# Patient Record
Sex: MALE | Race: WHITE | NOT HISPANIC OR LATINO | Employment: OTHER | URBAN - METROPOLITAN AREA
[De-identification: names, ages, dates, MRNs, and addresses within clinical notes are randomized per-mention and may not be internally consistent; named-entity substitution may affect disease eponyms.]

---

## 2017-01-16 DIAGNOSIS — N40.1 BENIGN NON-NODULAR PROSTATIC HYPERPLASIA WITH LOWER URINARY TRACT SYMPTOMS: Primary | Chronic | ICD-10-CM

## 2017-01-16 NOTE — TELEPHONE ENCOUNTER
terazosin (HYTRIN) 2 MG capsule      Last Written Prescription Date: 8/4/16  Last Fill Quantity: 90, # refills: 1    Last Office Visit with G, P or [facility] prescribing provider:  10/12/16   Future Office Visit:        BP Readings from Last 3 Encounters:   10/12/16 135/87   06/16/16 112/76   04/12/16 131/53

## 2017-01-18 RX ORDER — TERAZOSIN 2 MG/1
2 CAPSULE ORAL DAILY
Qty: 90 CAPSULE | Refills: 0 | Status: SHIPPED | OUTPATIENT
Start: 2017-01-18 | End: 2017-02-21

## 2017-02-02 ENCOUNTER — ANTICOAGULATION THERAPY VISIT (OUTPATIENT)
Dept: CARDIOLOGY | Facility: CLINIC | Age: 77
End: 2017-02-02
Payer: COMMERCIAL

## 2017-02-02 DIAGNOSIS — Z79.01 LONG-TERM (CURRENT) USE OF ANTICOAGULANTS: Primary | ICD-10-CM

## 2017-02-02 DIAGNOSIS — I48.0 PAROXYSMAL ATRIAL FIBRILLATION (H): ICD-10-CM

## 2017-02-02 LAB — INR POINT OF CARE: 3 (ref 0.86–1.14)

## 2017-02-02 PROCEDURE — 36416 COLLJ CAPILLARY BLOOD SPEC: CPT | Performed by: INTERNAL MEDICINE

## 2017-02-02 PROCEDURE — 85610 PROTHROMBIN TIME: CPT | Mod: QW | Performed by: INTERNAL MEDICINE

## 2017-02-02 NOTE — MR AVS SNAPSHOT
Aniket Macias   2/2/2017 11:00 AM   Anticoagulation Therapy Visit    Description:  76 year old male   Provider:   ANTICOAGULATION   Department:  [facility]           INR as of 2/2/2017     Selected INR 3.0 (2/2/2017)      Anticoagulation Summary as of 2/2/2017     INR goal 2.0-3.0   Selected INR 3.0 (2/2/2017)   Full instructions 5 mg every day   Next INR check 3/2/2017    Indications   Long-term (current) use of anticoagulants [Z79.01] [Z79.01]  Paroxysmal atrial fibrillation [I48.0]         Your next Anticoagulation Clinic appointment(s)     Mar 02, 2017 11:00 AM   Anticoagulation Visit with  ANTICOAGULATION   [facility] ([facility])    [id number]  [facility] [address]   [phone]            Mar 23, 2017  1:40 PM   Anticoagulation Visit with  ANTICOAGULATION   [facility] ([facility])    [id number]  [facility] [address]   [phone]              Contact Numbers     Anticoagulant (INR) Clinic Number: [phone]          February 2017 Details    Sun Mon Tue Wed Thu Fri Sat        1               2      5 mg   See details      3      5 mg         4      5 mg           5      5 mg         6      5 mg         7      5 mg         8      5 mg         9      5 mg         10      5 mg         11      5 mg           12      5 mg         13      5 mg         14      5 mg         15      5 mg         16      5 mg         17      5 mg         18      5 mg           19      5 mg         20      5 mg         21      5 mg         22      5 mg         23      5 mg         24      5 mg         25      5 mg           26      5 mg         27      5 mg         28      5 mg              Date Details   02/02 This INR check               How to take your warfarin dose     To take:  5 mg Take 1 of the 5 mg tablets.           March 2017 Details    Sun Mon Tue Wed Thu Fri  Sat        1      5 mg         2            3               4                 5               6               7               8               9               10               11                 12               13               14               15               16               17               18                 19               20               21               22               23               24               25                 26               27               28               29               30               31                 Date Details   No additional details    Date of next INR:  3/2/2017         How to take your warfarin dose     To take:  5 mg Take 1 of the 5 mg tablets.

## 2017-02-02 NOTE — PROGRESS NOTES
ANTICOAGULATION FOLLOW-UP CLINIC VISIT    Patient Name:  Aniket Macias  Date:  2/2/2017  Contact Type:  Face to Face    SUBJECTIVE:     Patient Findings     Positives No Problem Findings           OBJECTIVE    INR PROTIME   Date Value Ref Range Status   02/02/2017 3.0* 0.86 - 1.14 Final       ASSESSMENT / PLAN  INR assessment THER    Recheck INR In: 4 WEEKS    INR Location Clinic      Anticoagulation Summary as of 2/2/2017     INR goal 2.0-3.0   Selected INR 3.0 (2/2/2017)   Maintenance plan 5 mg (5 mg x 1) every day   Full instructions 5 mg every day   Weekly total 35 mg   No change documented Angelita Wolfe RN   Plan last modified Sallie Whalen RN (6/1/2016)   Next INR check 3/2/2017   Target end date Indefinite    Indications   Long-term (current) use of anticoagulants [Z79.01] [Z79.01]  Paroxysmal atrial fibrillation [I48.0]         Anticoagulation Episode Summary     INR check location     Preferred lab     Send INR reminders to [facility] HEART INR NURSE    Comments       Anticoagulation Care Providers     Provider Role Specialty Phone number    Sebastián Bermudez MD Responsible Cardiology [phone]            See the Encounter Report to view Anticoagulation Flowsheet and Dosing Calendar (Go to Encounters tab in chart review, and find the Anticoagulation Therapy Visit)    INR 3.0.  No changes.  He will add some broccoli or a little spinach to his iceberg salads so INR doesn't go any higher.  Continue same 5mg/day and recheck in 4 weeks.  He doesn't plan on switching to the [facility] clinic.  Jared Wolfe, LUANA

## 2017-02-21 ENCOUNTER — OFFICE VISIT (OUTPATIENT)
Dept: FAMILY MEDICINE | Facility: CLINIC | Age: 77
End: 2017-02-21
Payer: COMMERCIAL

## 2017-02-21 VITALS
DIASTOLIC BLOOD PRESSURE: 84 MMHG | RESPIRATION RATE: 18 BRPM | SYSTOLIC BLOOD PRESSURE: 132 MMHG | HEIGHT: 70 IN | TEMPERATURE: 97.2 F | HEART RATE: 77 BPM | WEIGHT: 193 LBS | OXYGEN SATURATION: 97 % | BODY MASS INDEX: 27.63 KG/M2

## 2017-02-21 DIAGNOSIS — M19.049 ARTHRITIS OF HAND: ICD-10-CM

## 2017-02-21 DIAGNOSIS — Z23 NEED FOR VACCINATION: ICD-10-CM

## 2017-02-21 DIAGNOSIS — Z85.828 HISTORY OF BASAL CELL CARCINOMA: ICD-10-CM

## 2017-02-21 DIAGNOSIS — Z00.01 ENCOUNTER FOR PREVENTATIVE ADULT HEALTH CARE EXAM WITH ABNORMAL FINDINGS: Primary | ICD-10-CM

## 2017-02-21 DIAGNOSIS — I10 BENIGN ESSENTIAL HYPERTENSION: Chronic | ICD-10-CM

## 2017-02-21 DIAGNOSIS — E78.5 HYPERLIPIDEMIA LDL GOAL <100: ICD-10-CM

## 2017-02-21 DIAGNOSIS — N40.1 BENIGN NON-NODULAR PROSTATIC HYPERPLASIA WITH LOWER URINARY TRACT SYMPTOMS: Chronic | ICD-10-CM

## 2017-02-21 DIAGNOSIS — I48.0 PAROXYSMAL ATRIAL FIBRILLATION (H): Chronic | ICD-10-CM

## 2017-02-21 DIAGNOSIS — Z79.01 LONG TERM CURRENT USE OF ANTICOAGULANT THERAPY: ICD-10-CM

## 2017-02-21 LAB
ALBUMIN SERPL-MCNC: 3.5 G/DL (ref 3.4–5)
ALP SERPL-CCNC: 96 U/L (ref 40–150)
ALT SERPL W P-5'-P-CCNC: 40 U/L (ref 0–70)
ANION GAP SERPL CALCULATED.3IONS-SCNC: 5 MMOL/L (ref 3–14)
AST SERPL W P-5'-P-CCNC: 23 U/L (ref 0–45)
BILIRUB SERPL-MCNC: 0.8 MG/DL (ref 0.2–1.3)
BUN SERPL-MCNC: 18 MG/DL (ref 7–30)
CALCIUM SERPL-MCNC: 8.9 MG/DL (ref 8.5–10.1)
CHLORIDE SERPL-SCNC: 104 MMOL/L (ref 94–109)
CHOLEST SERPL-MCNC: 129 MG/DL
CO2 SERPL-SCNC: 30 MMOL/L (ref 20–32)
CREAT SERPL-MCNC: 0.98 MG/DL (ref 0.66–1.25)
ERYTHROCYTE [DISTWIDTH] IN BLOOD BY AUTOMATED COUNT: 12.8 % (ref 10–15)
GFR SERPL CREATININE-BSD FRML MDRD: 74 ML/MIN/1.7M2
GLUCOSE SERPL-MCNC: 103 MG/DL (ref 70–99)
HCT VFR BLD AUTO: 42.3 % (ref 40–53)
HDLC SERPL-MCNC: 48 MG/DL
HGB BLD-MCNC: 14 G/DL (ref 13.3–17.7)
LDLC SERPL CALC-MCNC: 68 MG/DL
MCH RBC QN AUTO: 32.3 PG (ref 26.5–33)
MCHC RBC AUTO-ENTMCNC: 33.1 G/DL (ref 31.5–36.5)
MCV RBC AUTO: 98 FL (ref 78–100)
NONHDLC SERPL-MCNC: 81 MG/DL
PLATELET # BLD AUTO: 242 10E9/L (ref 150–450)
POTASSIUM SERPL-SCNC: 4.4 MMOL/L (ref 3.4–5.3)
PROT SERPL-MCNC: 6.7 G/DL (ref 6.8–8.8)
RBC # BLD AUTO: 4.34 10E12/L (ref 4.4–5.9)
SODIUM SERPL-SCNC: 139 MMOL/L (ref 133–144)
TRIGL SERPL-MCNC: 63 MG/DL
TSH SERPL DL<=0.005 MIU/L-ACNC: 2.36 MU/L (ref 0.4–4)
WBC # BLD AUTO: 10.5 10E9/L (ref 4–11)

## 2017-02-21 PROCEDURE — 80061 LIPID PANEL: CPT | Performed by: INTERNAL MEDICINE

## 2017-02-21 PROCEDURE — 90732 PPSV23 VACC 2 YRS+ SUBQ/IM: CPT | Performed by: INTERNAL MEDICINE

## 2017-02-21 PROCEDURE — G0438 PPPS, INITIAL VISIT: HCPCS | Performed by: INTERNAL MEDICINE

## 2017-02-21 PROCEDURE — 36415 COLL VENOUS BLD VENIPUNCTURE: CPT | Performed by: INTERNAL MEDICINE

## 2017-02-21 PROCEDURE — 85027 COMPLETE CBC AUTOMATED: CPT | Performed by: INTERNAL MEDICINE

## 2017-02-21 PROCEDURE — 84443 ASSAY THYROID STIM HORMONE: CPT | Performed by: INTERNAL MEDICINE

## 2017-02-21 PROCEDURE — G0009 ADMIN PNEUMOCOCCAL VACCINE: HCPCS | Performed by: INTERNAL MEDICINE

## 2017-02-21 PROCEDURE — 80053 COMPREHEN METABOLIC PANEL: CPT | Performed by: INTERNAL MEDICINE

## 2017-02-21 RX ORDER — TERAZOSIN 2 MG/1
2 CAPSULE ORAL DAILY
Qty: 90 CAPSULE | Refills: 3 | Status: SHIPPED | OUTPATIENT
Start: 2017-02-21 | End: 2018-04-23

## 2017-02-21 RX ORDER — LISINOPRIL 20 MG/1
20 TABLET ORAL DAILY
Qty: 90 TABLET | Refills: 3 | Status: SHIPPED | OUTPATIENT
Start: 2017-02-21 | End: 2018-04-17

## 2017-02-21 RX ORDER — WARFARIN SODIUM 5 MG/1
TABLET ORAL
Qty: 90 TABLET | Refills: 3 | Status: SHIPPED | OUTPATIENT
Start: 2017-02-21 | End: 2018-04-17

## 2017-02-21 RX ORDER — METOPROLOL TARTRATE 25 MG/1
37.5 TABLET, FILM COATED ORAL 2 TIMES DAILY
Qty: 360 TABLET | Refills: 3 | Status: SHIPPED | OUTPATIENT
Start: 2017-02-21 | End: 2017-03-23

## 2017-02-21 RX ORDER — ATORVASTATIN CALCIUM 40 MG/1
40 TABLET, FILM COATED ORAL DAILY
Qty: 90 TABLET | Refills: 3 | Status: SHIPPED | OUTPATIENT
Start: 2017-02-21 | End: 2018-04-17

## 2017-02-21 NOTE — PROGRESS NOTES
"  SUBJECTIVE:                                                            Aniket Macias is a 76 year old male who presents for Preventive Visit.  Are you in the first 12 months of your Medicare Part B coverage?  No    Healthy Habits:    Do you get at least three servings of calcium containing foods daily (dairy, green leafy vegetables, etc.)? yes    Amount of exercise or daily activities, outside of work: walking    Problems taking medications regularly No    Medication side effects: No    Have you had an eye exam in the past two years? yes    Do you see a dentist twice per year? yes    Do you have sleep apnea, excessive snoring or daytime drowsiness?no    COGNITIVE SCREEN  1) Repeat 3 items (Banana, Sunrise, Chair)    2) Clock draw: NORMAL  3) 3 item recall: Recalls 3 objects  Results: 3 items recalled: COGNITIVE IMPAIRMENT LESS LIKELY    Mini-CogTM Copyright S Benjamin. Licensed by the author for use in [employer]; reprinted with permission (gerda@[facility]). All rights reserved.      Aniket presents to the clinic today for his annual physical. He is fasting today.    Arthritis Pain- Complains of arthritis pain. Mostly in his hands, but also in his lower back. He has been trying to exercise to help control symptoms. He takes acetaminophen to treat, which only provides minor relief. He says it is \"not that bad\" at this time. Upon examination- DIP in 5th fingers and PIP joint left third finger are inflamed.    Prostate Concerns- Urinary flow is \"ok\" and he believes he is able to empty his bladder all the way, at least \"most of the time\". I have not checked his PSA. He has seen a urologist in the past when he lived in [place], and had biopsies done. They came back negative, but stated his prostate was enlarged. He gets up twice in an 8 hour night to go to the bathroom, which he feels is normal. No family h/o prostate cancer.    Of Note-  [place] has been walking up and down the hallways of his new apartment [employer] with " his wife Whitney.  S/P cataract surgery. Says he now will have to get reading glasses. He also has an astigmatism.  Had two basal cell carcinomas removed on his face. He follows with Dr. James annually.  Follows with cardiologist Dr. Bermudez for INR and EKG in March.    All Histories reviewed and [facility] as appropriate.  Social History   Substance Use Topics     Smoking status: Former Smoker     Packs/day: 1.00     Years: 20.00     Types: Cigarettes     Smokeless tobacco: Never Used      Comment: quit age 54     Alcohol use 0.0 oz/week     0 Standard drinks or equivalent per week      Comment: 2-3 wine/beer per week       The patient does not drink >3 drinks per day nor >7 drinks per week.    Today's PHQ-2 Score:   PHQ-2 ( 1999 Pf[employer]) 2/21/2017 10/12/2016   Q1: Little interest or pleasure in doing things 0 0   Q2: Feeling down, depressed or hopeless 0 0   PHQ-2 Score 0 0       Do you feel safe in your environment - Yes    Do you have a Health Care Directive?: Yes: Patient states has Advance Directive and will bring in a copy to clinic.    Current providers sharing in care for this patient include:   Patient Care Team:  Jennifer Mishra, DO as PCP - General (Internal Medicine)      Hearing impairment: No    Ability to successfully perform activities of daily living: Yes, no assistance needed     Fall risk:  Fallen 2 or more times in the past year?: No  Any fall with injury in the past year?: No    Home safety:  none identified      The following health maintenance items are reviewed in Epic and correct as of today:  Health Maintenance   Topic Date Due     ADVANCE DIRECTIVE PLANNING Q5 YRS (NO INBASKET)  06/09/1958     OP ANNUAL INR REFERRAL  09/09/2016     INFLUENZA VACCINE (SYSTEM ASSIGNED)  09/01/2017     FALL RISK ASSESSMENT  02/21/2018     COLONOSCOPY Q3 YR INBASKET MESSAGE  11/19/2018     LIPID SCREEN Q5 YR MALE (SYSTEM ASSIGNED)  02/21/2022     TETANUS IMMUNIZATION (SYSTEM ASSIGNED)  02/04/2024      "PNEUMOCOCCAL  Completed         ROS:  Comprehensive ROS negative unless as stated above in HPI.    This document serves as a record of the services and decisions personally performed and made by Jennifer Mishra DO. It was created on his/her behalf by Muna Linares, a trained medical scribe. The creation of this document is based the provider's statements to the medical scribe.  Scribe Muna Linares 11:00 AM, February 21, 2017  OBJECTIVE:                                                            /84 (BP Location: Right arm)  Pulse 77  Temp 97.2  F (36.2  C) (Oral)  Resp 18  Ht 5' 10\" (1.778 m)  Wt 193 lb (87.5 kg)  SpO2 97%  BMI 27.69 kg/m2 Estimated body mass index is 27.69 kg/(m^2) as calculated from the following:    Height as of this encounter: 5' 10\" (1.778 m).    Weight as of this encounter: 193 lb (87.5 kg).  EXAM:   GENERAL: healthy, alert and no distress  EYES: Eyes grossly normal to inspection, PERRL and conjunctivae and sclerae normal  HENT: ear canals and TM's normal, nose and mouth without ulcers or lesions  NECK: no adenopathy, no asymmetry, masses, or scars and thyroid normal to palpation  RESP: lungs clear to auscultation - no rales, rhonchi or wheezes  CV: irregularly irregular rhythm, normal S1 S2, no S3 or S4, no murmur, click or rub, mild sock line edema   ABDOMEN: soft, nontender, no hepatosplenomegaly, no masses and bowel sounds normal  MS: no gross musculoskeletal defects noted, inflamed DIP in 5th fingers bilaterally and PIP joint left third finger  NEURO: Normal strength and tone, mentation intact and speech normal  PSYCH: mentation appears normal, affect normal/bright    ASSESSMENT / PLAN:                                                            1. Encounter for preventative adult health care exam with abnormal findings  Robust male    2. Hyperlipidemia   - atorvastatin (LIPITOR) 40 MG tablet; Take 1 tablet (40 mg) by mouth daily  Dispense: 90 tablet; Refill: 3  - " "Lipid panel reflex to direct LDL    3. Benign essential hypertension; goal < 140/90  At goal on recheck  - lisinopril (PRINIVIL/ZESTRIL) 20 MG tablet; Take 1 tablet (20 mg) by mouth daily  Dispense: 90 tablet; Refill: 3  - CBC with platelets  - Comprehensive metabolic panel    4. Benign non-nodular prostatic hyperplasia with lower urinary tract symptoms  Stable, defer on PEPITO/PSA per discussion above - remote normal prostate biopsy in [place]  - terazosin (HYTRIN) 2 MG capsule; Take 1 capsule (2 mg) by mouth daily  Dispense: 90 capsule; Refill: 3    5. Paroxysmal atrial fibrillation  Follows with Dr. Bermudez and is anticoagulated   - metoprolol (LOPRESSOR) 25 MG tablet; Take 1.5 tablets (37.5 mg) by mouth 2 times daily  Dispense: 360 tablet; Refill: 3  - TSH with free T4 reflex    6. Long term current use of anticoagulant therapy  - warfarin (COUMADIN) 5 MG tablet; Take 1 tab daily or as directed by INR clinic  Dispense: 90 tablet; Refill: 3    7. Arthritis of hand  Treats with acetaminophen.     8. History of basal cell carcinoma  Follows with Dr. James      End of Life Planning:  Patient currently has an advanced directive: Yes: Patient states has Advance Directive and will bring in a copy to clinic.    COUNSELING:  Reviewed preventive health counseling, as reflected in patient instructions       Regular exercise       Healthy diet/nutrition  Estimated body mass index is 27.69 kg/(m^2) as calculated from the following:    Height as of this encounter: 5' 10\" (1.778 m).    Weight as of this encounter: 193 lb (87.5 kg).   reports that he has quit smoking. His smoking use included Cigarettes. He has a 20.00 pack-year smoking history. He has never used smokeless tobacco.       Patient Instructions   Labs today  Pneumonia shot today  Follow up annually or as needed    Appropriate preventive services were discussed with this patient, including applicable screening as appropriate for cardiovascular disease, diabetes, " osteopenia/osteoporosis, and glaucoma.  As appropriate for age/gender, discussed screening for colorectal cancer, prostate cancer, breast cancer, and cervical cancer. Checklist reviewing preventive services available has been given to the patient.    Reviewed patients plan of care and provided an AVS. The Intermediate Care Plan ( asthma action plan, low back pain action plan, and migraine action plan) for Aniket meets the Care Plan requirement. This Care Plan has been established and reviewed with the Patient.    The information in this document, created by the medical scribe for me, accurately reflects the services I personally performed and the decisions made by me. I have reviewed and approved this document for accuracy prior to leaving the patient care area.  Jennifer Mishra DO  11:02 AM, 02/21/17    Jennifer Mishra DO  [facility]

## 2017-02-21 NOTE — NURSING NOTE
"Chief Complaint   Patient presents with     Wellness Visit       Initial BP (!) 155/96 (BP Location: Right arm, Patient Position: Right side, Cuff Size: Adult Regular)  Pulse 77  Temp 97.2  F (36.2  C) (Oral)  Resp 18  Ht 5' 10\" (1.778 m)  Wt 193 lb (87.5 kg)  SpO2 97%  BMI 27.69 kg/m2 Estimated body mass index is 27.69 kg/(m^2) as calculated from the following:    Height as of this encounter: 5' 10\" (1.778 m).    Weight as of this encounter: 193 lb (87.5 kg).  Medication Reconciliation: complete   Sheeba Garza CMA (AAMA)      "

## 2017-02-21 NOTE — PATIENT INSTRUCTIONS
Labs today  Pneumonia shot today  Follow up annually or as needed      Preventive Health Recommendations:   Male Ages 65 and over    Yearly exam:             See your health care provider every year in order to  o   Review health changes.   o   Discuss preventive care.    o   Review your medicines if your doctor has prescribed any.    Talk with your health care provider about whether you should have a test to screen for prostate cancer (PSA).    Every 3 years, have a diabetes test (fasting glucose). If you are at risk for diabetes, you should have this test more often.    Every 5 years, have a cholesterol test. Have this test more often if you are at risk for high cholesterol or heart disease.     Every 10 years, have a colonoscopy. Or, have a yearly FIT test (stool test). These exams will check for colon cancer.    Talk to with your health care provider about screening for Abdominal Aortic Aneurysm if you have a family history of AAA or have a history of smoking.  Shots:     Get a flu shot each year.     Get a tetanus shot every 10 years.     Talk to your doctor about your pneumonia vaccines. There are now two you should receive - Pneumovax (PPSV 23) and Prevnar (PCV 13).    Talk to your doctor about a shingles vaccine.     Talk to your doctor about the hepatitis B vaccine.  Nutrition:     Eat at least 5 servings of fruits and vegetables each day.     Eat whole-grain bread, whole-wheat pasta and brown rice instead of white grains and rice.     Talk to your doctor about Calcium and Vitamin D.   Lifestyle    Exercise for at least 150 minutes a week (30 minutes a day, 5 days a week). This will help you control your weight and prevent disease.     Limit alcohol to one drink per day.     No smoking.     Wear sunscreen to prevent skin cancer.     See your dentist every six months for an exam and cleaning.     See your eye doctor every 1 to 2 years to screen for conditions such as glaucoma, macular degeneration and  cataracts.

## 2017-02-21 NOTE — NURSING NOTE
Screening Questionnaire for Adult Immunization    Are you sick today?   No   Do you have allergies to medications, food, a vaccine component or latex?   No   Have you ever had a serious reaction after receiving a vaccination?   No   Do you have a long-term health problem with heart disease, lung disease, asthma, kidney disease, metabolic disease (e.g. diabetes), anemia, or other blood disorder?   No   Do you have cancer, leukemia, HIV/AIDS, or any other immune system problem?   No   In the past 3 months, have you taken medications that affect  your immune system, such as prednisone, other steroids, or anticancer drugs; drugs for the treatment of rheumatoid arthritis, Crohn s disease, or psoriasis; or have you had radiation treatments?   No   Have you had a seizure, or a brain or other nervous system problem?   No   During the past year, have you received a transfusion of blood or blood     products, or been given immune (gamma) globulin or antiviral drug?   No   For women: Are you pregnant or is there a chance you could become        pregnant during the next month?   No   Have you received any vaccinations in the past 4 weeks?   No     Immunization questionnaire answers were all negative.      MNVFC doesn't apply on this patient    Per orders of Dr. Mishra, injection of Pneumovax 23 given by Sheeba Garza. Patient instructed to remain in clinic for 20 minutes afterwards, and to report any adverse reaction to me immediately.       Screening performed by Sheeba Garza on 2/21/2017 at 11:17 AM.

## 2017-02-21 NOTE — LETTER
[facility]  [address] [place] [address] [facility]  [address]  [place]  [address]  Tel: [phone]    February 23, 2017    Aniket Ricojuno  [address]  [address]  [place] [address]      [place],     It was nice seeing you in clinic this past week!   Your labs are all satisfactory overall.   Your fasting glucose is minimally elevated but not in a diabetic range.   Your cholesterol is excellent.   Your thyroid function (TSH) is normal.   Your kidney function (GFR) is normal.   Liver testing (AST, ALT, alkaline phosphatase and bilirubin) is normal.   Your complete blood count including white blood cells (infection fighting/inflammatory cells), hemoglobin (oxygen carrying) and platelets (which help blood clot) is normal.   Please continue with the plan of care as we discussed and let me know if you have any questions/concerns. Thanks!     Sincerely,    Jennifer Mishra, DO/NB    Results for orders placed or performed in visit on 02/21/17   TSH with free T4 reflex   Result Value Ref Range    TSH 2.36 0.40 - 4.00 mU/L   Lipid panel reflex to direct LDL   Result Value Ref Range    Cholesterol 129 <200 mg/dL    Triglycerides 63 <150 mg/dL    HDL Cholesterol 48 >39 mg/dL    LDL Cholesterol Calculated 68 <100 mg/dL    Non HDL Cholesterol 81 <130 mg/dL   CBC with platelets   Result Value Ref Range    WBC 10.5 4.0 - 11.0 10e9/L    RBC Count 4.34 (L) 4.4 - 5.9 10e12/L    Hemoglobin 14.0 13.3 - 17.7 g/dL    Hematocrit 42.3 40.0 - 53.0 %    MCV 98 78 - 100 fl    MCH 32.3 26.5 - 33.0 pg    MCHC 33.1 31.5 - 36.5 g/dL    RDW 12.8 10.0 - 15.0 %    Platelet Count 242 150 - 450 10e9/L   Comprehensive metabolic panel   Result Value Ref Range    Sodium 139 133 - 144 mmol/L    Potassium 4.4 3.4 - 5.3 mmol/L    Chloride 104 94 - 109 mmol/L    Carbon Dioxide 30 20 - 32 mmol/L    Anion Gap 5 3 - 14 mmol/L    Glucose 103 (H) 70 - 99 mg/dL    Urea Nitrogen 18 7 - 30 mg/dL    Creatinine 0.98 0.66 - 1.25 mg/dL    GFR Estimate 74 >60  mL/min/1.7m2    GFR Estimate If Black 89 >60 mL/min/1.7m2    Calcium 8.9 8.5 - 10.1 mg/dL    Bilirubin Total 0.8 0.2 - 1.3 mg/dL    Albumin 3.5 3.4 - 5.0 g/dL    Protein Total 6.7 (L) 6.8 - 8.8 g/dL    Alkaline Phosphatase 96 40 - 150 U/L    ALT 40 0 - 70 U/L    AST 23 0 - 45 U/L

## 2017-02-21 NOTE — MR AVS SNAPSHOT
After Visit Summary   2/21/2017    Aniket Macias    MRN: [id number]           Patient Information     Date Of Birth          1940        Visit Information        Provider Department      2/21/2017 10:30 AM Jennifer Mishra,  [facility]        Today's Diagnoses     Encounter for preventative adult health care exam with abnormal findings    -  1    Hyperlipidemia LDL goal <100        Benign essential hypertension; goal < 140/90        Benign non-nodular prostatic hyperplasia with lower urinary tract symptoms        Paroxysmal atrial fibrillation        Long term current use of anticoagulant therapy        Arthritis of hand        History of basal cell carcinoma          Care Instructions    Labs today  Pneumonia shot today  Follow up annually or as needed      Preventive Health Recommendations:   Male Ages 65 and over    Yearly exam:             See your health care provider every year in order to  o   Review health changes.   o   Discuss preventive care.    o   Review your medicines if your doctor has prescribed any.    Talk with your health care provider about whether you should have a test to screen for prostate cancer (PSA).    Every 3 years, have a diabetes test (fasting glucose). If you are at risk for diabetes, you should have this test more often.    Every 5 years, have a cholesterol test. Have this test more often if you are at risk for high cholesterol or heart disease.     Every 10 years, have a colonoscopy. Or, have a yearly FIT test (stool test). These exams will check for colon cancer.    Talk to with your health care provider about screening for Abdominal Aortic Aneurysm if you have a family history of AAA or have a history of smoking.  Shots:     Get a flu shot each year.     Get a tetanus shot every 10 years.     Talk to your doctor about your pneumonia vaccines. There are now two you should receive - Pneumovax (PPSV 23) and Prevnar (PCV 13).    Talk to your doctor  about a shingles vaccine.     Talk to your doctor about the hepatitis B vaccine.  Nutrition:     Eat at least 5 servings of fruits and vegetables each day.     Eat whole-grain bread, whole-wheat pasta and brown rice instead of white grains and rice.     Talk to your doctor about Calcium and Vitamin D.   Lifestyle    Exercise for at least 150 minutes a week (30 minutes a day, 5 days a week). This will help you control your weight and prevent disease.     Limit alcohol to one drink per day.     No smoking.     Wear sunscreen to prevent skin cancer.     See your dentist every six months for an exam and cleaning.     See your eye doctor every 1 to 2 years to screen for conditions such as glaucoma, macular degeneration and cataracts.        Follow-ups after your visit        Your next 10 appointments already scheduled     Mar 02, 2017 11:00 AM CST   Anticoagulation Visit with [facility] ANTICOAGULATION   [facility] ([facility])    [address] [place] [id number] [phone]            Mar 23, 2017  1:40 PM CDT   Anticoagulation Visit with HATFIELD ANTICOAGULATION   [facility] ([facility])    [address] [place] [address] [phone]            Mar 23, 2017  2:15 PM CDT   [facility] EP RETURN with Sebastián Bermudez MD   [facility] ([facility])    [address] [place] [id number] [phone]              Who to contact     If you have questions or need follow up information about today's clinic visit or your schedule please contact [facility] directly at [phone].  Normal or non-critical lab and imaging results will be communicated to you by MyChart, letter or phone within 4 business days after the clinic has received the results. If you do not hear from us within 7 days, please contact the clinic  "through [employer] or phone. If you have a critical or abnormal lab result, we will notify you by phone as soon as possible.  Submit refill requests through [employer] or call your pharmacy and they will forward the refill request to us. Please allow 3 business days for your refill to be completed.          Additional Information About Your Visit        [employer] Information     [employer] lets you send messages to your doctor, view your test results, renew your prescriptions, schedule appointments and more. To sign up, go to [email or web address] . Click on \"Log in\" on the left side of the screen, which will take you to the Welcome page. Then click on \"Sign up Now\" on the right side of the page.     You will be asked to enter the access code listed below, as well as some personal information. Please follow the directions to create your username and password.     Your access code is: [email or web address]  Expires: 2017 11:11 AM     Your access code will  in 90 days. If you need help or a new code, please call your [place] clinic or [phone].        Care EveryWhere ID     This is your Care EveryWhere ID. This could be used by other organizations to access your [facility] medical records  [id number]        Your Vitals Were     Pulse Temperature Respirations Height Pulse Oximetry BMI (Body Mass Index)    77 97.2  F (36.2  C) (Oral) 18 5' 10\" (1.778 m) 97% 27.69 kg/m2       Blood Pressure from Last 3 Encounters:   17 132/84   10/12/16 135/87   16 112/76    Weight from Last 3 Encounters:   17 193 lb (87.5 kg)   10/12/16 192 lb (87.1 kg)   16 192 lb (87.1 kg)              We Performed the Following     CBC with platelets     Comprehensive metabolic panel     Lipid panel reflex to direct LDL     TSH with free T4 reflex          Where to get your medicines      These medications were sent to [facility] [address] [facility] [place] - [address]  [address] [facility], [place] " [address]     Phone:  [phone]     atorvastatin 40 MG tablet    lisinopril 20 MG tablet    metoprolol 25 MG tablet    terazosin 2 MG capsule    warfarin 5 MG tablet          Primary Care Provider Office Phone # Fax #    Jennifer Mishra [phone]       [facility] [address] [facility] [address]  [facility] [address]        Thank you!     Thank you for choosing [facility]  for your care. Our goal is always to provide you with excellent care. Hearing back from our patients is one way we can continue to improve our services. Please take a few minutes to complete the written survey that you may receive in the mail after your visit with us. Thank you!             Your Updated Medication List - Protect others around you: Learn how to safely use, store and throw away your medicines at www.disposemymeds.org.          This list is accurate as of: 2/21/17 11:11 AM.  Always use your most recent med list.                   Brand Name Dispense Instructions for use    ACETAMINOPHEN PO      Take 1,000 mg by mouth every 6 hours as needed for pain       atorvastatin 40 MG tablet    LIPITOR    90 tablet    Take 1 tablet (40 mg) by mouth daily       hydrocortisone 1 % cream    CORTAID     Apply topically daily as needed       latanoprost 0.005 % ophthalmic solution    XALATAN     Place 1 drop into both eyes At Bedtime       lisinopril 20 MG tablet    PRINIVIL/ZESTRIL    90 tablet    Take 1 tablet (20 mg) by mouth daily       metoprolol 25 MG tablet    LOPRESSOR    360 tablet    Take 1.5 tablets (37.5 mg) by mouth 2 times daily       METROGEL 1 % gel   Generic drug:  metroNIDAZOLE      Apply topically daily as needed       MULTIVITAMIN PO      Take 1 tablet by mouth daily       terazosin 2 MG capsule    HYTRIN    90 capsule    Take 1 capsule (2 mg) by mouth daily       warfarin 5 MG tablet    COUMADIN    90 tablet    Take 1 tab daily or as directed by INR clinic

## 2017-03-02 ENCOUNTER — ANTICOAGULATION THERAPY VISIT (OUTPATIENT)
Dept: CARDIOLOGY | Facility: CLINIC | Age: 77
End: 2017-03-02
Payer: COMMERCIAL

## 2017-03-02 DIAGNOSIS — Z79.01 LONG-TERM (CURRENT) USE OF ANTICOAGULANTS: ICD-10-CM

## 2017-03-02 DIAGNOSIS — I48.0 PAROXYSMAL ATRIAL FIBRILLATION (H): ICD-10-CM

## 2017-03-02 LAB — INR POINT OF CARE: 2.7 (ref 0.86–1.14)

## 2017-03-02 PROCEDURE — 85610 PROTHROMBIN TIME: CPT | Mod: QW | Performed by: INTERNAL MEDICINE

## 2017-03-02 PROCEDURE — 36416 COLLJ CAPILLARY BLOOD SPEC: CPT | Performed by: INTERNAL MEDICINE

## 2017-03-02 NOTE — PROGRESS NOTES
ANTICOAGULATION FOLLOW-UP CLINIC VISIT    Patient Name:  Aniket Macias  Date:  3/2/2017  Contact Type:  Face to Face    SUBJECTIVE:     Patient Findings     Positives No Problem Findings           OBJECTIVE    INR Protime   Date Value Ref Range Status   03/02/2017 2.7 (A) 0.86 - 1.14 Final       ASSESSMENT / PLAN  INR assessment THER    Recheck INR In: 3 WEEKS    INR Location Clinic      Anticoagulation Summary as of 3/2/2017     INR goal 2.0-3.0   Today's INR 2.7   Maintenance plan 5 mg (5 mg x 1) every day   Full instructions 5 mg every day   Weekly total 35 mg   No change documented Angelita Wolfe RN   Plan last modified Sallie Whalen RN (6/1/2016)   Next INR check 3/23/2017   Target end date Indefinite    Indications   Long-term (current) use of anticoagulants [Z79.01] [Z79.01]  Paroxysmal atrial fibrillation [I48.0]         Anticoagulation Episode Summary     INR check location     Preferred lab     Send INR reminders to [facility] HEART INR NURSE    Comments       Anticoagulation Care Providers     Provider Role Specialty Phone number    Sebastián Bermudez MD Responsible Cardiology [phone]            See the Encounter Report to view Anticoagulation Flowsheet and Dosing Calendar (Go to Encounters tab in chart review, and find the Anticoagulation Therapy Visit)    INR 2.7.  No changes.  He is keeping up with the salads.  Continue same schedule and recheck in 3 weeks prior to seeing Dr. Bermudez.  Jared Wolfe, RN

## 2017-03-02 NOTE — MR AVS SNAPSHOT
Aniket Macias   3/2/2017 11:00 AM   Anticoagulation Therapy Visit    Description:  76 year old male   Provider:   ANTICOAGULATION   Department:  [facility]           INR as of 3/2/2017     Today's INR 2.7      Anticoagulation Summary as of 3/2/2017     INR goal 2.0-3.0   Today's INR 2.7   Full instructions 5 mg every day   Next INR check 3/23/2017    Indications   Long-term (current) use of anticoagulants [Z79.01] [Z79.01]  Paroxysmal atrial fibrillation [I48.0]         Your next Anticoagulation Clinic appointment(s)     Mar 23, 2017  1:40 PM CDT   Anticoagulation Visit with  ANTICOAGULATION   [facility] ([facility])    [address] [place], [place] [id number]  [facility] [address] [phone]              Contact Numbers     Anticoagulant (INR) Clinic Number: [phone]          March 2017 Details    Sun Mon Tue Wed Thu Fri Sat        1               2      5 mg   See details      3      5 mg         4      5 mg           5      5 mg         6      5 mg         7      5 mg         8      5 mg         9      5 mg         10      5 mg         11      5 mg           12      5 mg         13      5 mg         14      5 mg         15      5 mg         16      5 mg         17      5 mg         18      5 mg           19      5 mg         20      5 mg         21      5 mg         22      5 mg         23            24               25                 26               27               28               29               30               31                 Date Details   03/02 This INR check       Date of next INR:  3/23/2017         How to take your warfarin dose     To take:  5 mg Take 1 of the 5 mg tablets.

## 2017-03-13 DIAGNOSIS — L71.9 ROSACEA: Primary | ICD-10-CM

## 2017-03-13 NOTE — TELEPHONE ENCOUNTER
Metrogel is historical in chart    Pending Prescriptions:                       Disp   Refills    metroNIDAZOLE (METROGEL) 1 % gel          45 g   3            Sig: Apply topically daily as needed          Last Written Prescription Date:  historical  Last Fill Quantity: -,   # refills: -  Last Office Visit with [facility] [place] or [facility] prescribing provider: 2-21-17  Future Office visit:       Routing refill request to provider for review/approval because:  Medication is reported/historical    RT Gurwinder (R)

## 2017-03-14 PROBLEM — L71.9 ROSACEA: Status: ACTIVE | Noted: 2017-03-14

## 2017-03-14 RX ORDER — METRONIDAZOLE 10 MG/G
GEL TOPICAL DAILY
Qty: 45 G | Refills: 3 | Status: SHIPPED | OUTPATIENT
Start: 2017-03-14 | End: 2017-06-22

## 2017-03-14 NOTE — TELEPHONE ENCOUNTER
Pt called the clinic and confirmed  He's using this rx for rosacea  Please send this rx to [facility] [address] [place] [facility] - [place], [place] - [address]  Pt's out of rx

## 2017-03-17 ENCOUNTER — PRE VISIT (OUTPATIENT)
Dept: CARDIOLOGY | Facility: CLINIC | Age: 77
End: 2017-03-17

## 2017-03-17 ASSESSMENT — CHADS2 SCORE
HTN: YES
DIABETES: NO
VASCULAR DISEASE: NO
STROK/TIA/THROM: NO
CHF: NO
AGE: >74
SEX: MALE

## 2017-03-23 ENCOUNTER — OFFICE VISIT (OUTPATIENT)
Dept: CARDIOLOGY | Facility: CLINIC | Age: 77
End: 2017-03-23
Attending: INTERNAL MEDICINE
Payer: COMMERCIAL

## 2017-03-23 ENCOUNTER — ANTICOAGULATION THERAPY VISIT (OUTPATIENT)
Dept: CARDIOLOGY | Facility: CLINIC | Age: 77
End: 2017-03-23
Payer: COMMERCIAL

## 2017-03-23 VITALS
DIASTOLIC BLOOD PRESSURE: 80 MMHG | SYSTOLIC BLOOD PRESSURE: 134 MMHG | HEIGHT: 70 IN | HEART RATE: 71 BPM | WEIGHT: 192 LBS | BODY MASS INDEX: 27.49 KG/M2

## 2017-03-23 DIAGNOSIS — I48.0 PAROXYSMAL ATRIAL FIBRILLATION (H): Primary | ICD-10-CM

## 2017-03-23 DIAGNOSIS — I48.0 PAROXYSMAL ATRIAL FIBRILLATION (H): ICD-10-CM

## 2017-03-23 DIAGNOSIS — I48.0 PAROXYSMAL ATRIAL FIBRILLATION (H): Chronic | ICD-10-CM

## 2017-03-23 DIAGNOSIS — I10 BENIGN ESSENTIAL HYPERTENSION: Chronic | ICD-10-CM

## 2017-03-23 DIAGNOSIS — Z79.01 LONG-TERM (CURRENT) USE OF ANTICOAGULANTS: ICD-10-CM

## 2017-03-23 LAB — INR POINT OF CARE: 3.8 (ref 0.86–1.14)

## 2017-03-23 PROCEDURE — 99214 OFFICE O/P EST MOD 30 MIN: CPT | Mod: 25 | Performed by: INTERNAL MEDICINE

## 2017-03-23 PROCEDURE — 36416 COLLJ CAPILLARY BLOOD SPEC: CPT | Performed by: INTERNAL MEDICINE

## 2017-03-23 PROCEDURE — 85610 PROTHROMBIN TIME: CPT | Mod: QW | Performed by: INTERNAL MEDICINE

## 2017-03-23 PROCEDURE — 93000 ELECTROCARDIOGRAM COMPLETE: CPT | Performed by: INTERNAL MEDICINE

## 2017-03-23 RX ORDER — METOPROLOL TARTRATE 50 MG
50 TABLET ORAL 2 TIMES DAILY
Qty: 180 TABLET | Refills: 3 | Status: SHIPPED | OUTPATIENT
Start: 2017-03-23 | End: 2018-04-17

## 2017-03-23 NOTE — LETTER
3/23/2017    Jennifer Mishra, [facility]   [address]  [facility] [address]    RE: Aniket Macias       Dear Colleague,    I had the pleasure of seeing Aniket Macias in the [facility].    REASON FOR VISIT:  Evaluation of paroxysmal atrial fibrillation.      Mr. Macias is a pleasant 76-year-old gentleman with history of hypertension, skin cancer, paroxysmal atrial fibrillation who is here for routine followup.      The patient is followed here in clinic.  He has been on chronic therapy with Coumadin and metoprolol for paroxysmal atrial fibrillation.  He informs that in the last year, he started noticing more frequent episodes of palpitations.  However, the episodes are very sporadic and do not seem to affect his lifestyle.  In fact, he informs that he has not had any palpitations in the last 3 weeks.      EKG today showed atrial fibrillation with controlled ventricular response.      Of note, he also had a previous history of syncope which happened in April of last year.  At that time, workup was negative.  Echocardiogram revealed normal cardiac function and an event monitor was negative.  I offered a loop recorder, which he declined.     Outpatient Encounter Prescriptions as of 3/23/2017   Medication Sig Dispense Refill     metoprolol (LOPRESSOR) 50 MG tablet Take 1 tablet (50 mg) by mouth 2 times daily 180 tablet 3     metroNIDAZOLE (METROGEL) 1 % gel Apply topically daily 45 g 3     atorvastatin (LIPITOR) 40 MG tablet Take 1 tablet (40 mg) by mouth daily 90 tablet 3     lisinopril (PRINIVIL/ZESTRIL) 20 MG tablet Take 1 tablet (20 mg) by mouth daily 90 tablet 3     terazosin (HYTRIN) 2 MG capsule Take 1 capsule (2 mg) by mouth daily 90 capsule 3     warfarin (COUMADIN) 5 MG tablet Take 1 tab daily or as directed by INR clinic 90 tablet 3     hydrocortisone (CORTAID) 1 % cream Apply topically daily as needed       ACETAMINOPHEN PO Take 1,000 mg by mouth every 6  hours as needed for pain       Multiple Vitamins-Minerals (MULTIVITAMIN PO) Take 1 tablet by mouth daily        latanoprost (XALATAN) 0.005 % ophthalmic solution Place 1 drop into both eyes At Bedtime       [DISCONTINUED] metoprolol (LOPRESSOR) 25 MG tablet Take 1.5 tablets (37.5 mg) by mouth 2 times daily 360 tablet 3     No facility-administered encounter medications on file as of 3/23/2017.       ASSESSMENT AND PLAN:   1.  Atrial fibrillation.  It seems to be now persistent Afib; however, heart rate is well-controlled.  He seems to be asymptomatic.  I recommend a Holter monitor to evaluate nature of the AFib, as well as rate control.  During this visit, we discussed the possibility of increasing metoprolol to 50 mg a day.  He agreed with plan.   2.  Embolic prevention.  [id number] score of 3.  Continue anticoagulation with Coumadin indefinitely.   3.  Syncope, likely neurally-mediated hypotension.  He had had problems associated with that.  No recurrence so far.  Declined loop recorder.   4.  Followup care.  Follow up in clinic with me in a year or earlier as needed.     Again, thank you for allowing me to participate in the care of your patient.      Sincerely,    Sebastián Bermudez MD     [facility]

## 2017-03-23 NOTE — MR AVS SNAPSHOT
Aniket Macias   3/23/2017 1:40 PM   Anticoagulation Therapy Visit    Description:  76 year old male   Provider:  [facility] ANTICOAGULATION   Department:  [facility]           INR as of 3/23/2017     Today's INR 3.8!      Anticoagulation Summary as of 3/23/2017     INR goal 2.0-3.0   Today's INR 3.8!   Full instructions 3/23: Hold; Otherwise 5 mg every day   Next INR check 4/6/2017    Indications   Long-term (current) use of anticoagulants [Z79.01] [Z79.01]  Paroxysmal atrial fibrillation [I48.0]         Your next Anticoagulation Clinic appointment(s)     Apr 06, 2017 10:40 AM CDT   Anticoagulation Visit with  ANTICOAGULATION   [facility] ([facility])    [address] [place] [id number] [phone]              Contact Numbers     Anticoagulant (INR) Clinic Number: [phone]          March 2017 Details    Sun Mon Tue Wed Thu Fri Sat        1               2               3               4                 5               6               7               8               9               10               11                 12               13               14               15               16               17               18                 19               20               21               22               23      Hold   See details      24      5 mg         25      5 mg           26      5 mg         27      5 mg         28      5 mg         29      5 mg         30      5 mg         31      5 mg           Date Details   03/23 This INR check               How to take your warfarin dose     To take:  5 mg Take 1 of the 5 mg tablets.    Hold Do not take your warfarin dose. See the Details table to the right for additional instructions.                April 2017 Details    Sun Mon Tue Wed Thu Fri Sat           1      5 mg           2      5 mg         3      5 mg         4      5 mg         5      5 mg         6            7                8                 9               10               11               12               13               14               15                 16               17               18               19               20               21               22                 23               24               25               26               27               28               29                 30                      Date Details   No additional details    Date of next INR:  4/6/2017         How to take your warfarin dose     To take:  5 mg Take 1 of the 5 mg tablets.

## 2017-03-23 NOTE — PROGRESS NOTES
ANTICOAGULATION FOLLOW-UP CLINIC VISIT    Patient Name:  Aniket Macias  Date:  3/23/2017  Contact Type:  Face to Face    SUBJECTIVE:     Patient Findings     Positives No Problem Findings           OBJECTIVE    INR Protime   Date Value Ref Range Status   03/23/2017 3.8 (A) 0.86 - 1.14 Final       ASSESSMENT / PLAN  INR assessment SUPRA    Recheck INR In: 2 WEEKS    INR Location Clinic      Anticoagulation Summary as of 3/23/2017     INR goal 2.0-3.0   Today's INR 3.8!   Maintenance plan 5 mg (5 mg x 1) every day   Full instructions 3/23: Hold; Otherwise 5 mg every day   Weekly total 35 mg   Plan last modified Sallie Whalen, RN (6/1/2016)   Next INR check 4/6/2017   Target end date Indefinite    Indications   Long-term (current) use of anticoagulants [Z79.01] [Z79.01]  Paroxysmal atrial fibrillation [I48.0]         Anticoagulation Episode Summary     INR check location     Preferred lab     Send INR reminders to [facility] HEART INR NURSE    Comments       Anticoagulation Care Providers     Provider Role Specialty Phone number    Sebastián Bermudez MD Responsible Cardiology [phone]            See the Encounter Report to view Anticoagulation Flowsheet and Dosing Calendar (Go to Encounters tab in chart review, and find the Anticoagulation Therapy Visit)    INR 3.8.  Less greens this past week.  Seeing Dr. Bermudez today.  No bleeding.  Hold dose today then back to 5mg/day and recheck in 2 weeks. Jared Wolfe RN

## 2017-03-23 NOTE — PROGRESS NOTES
"HPI and Plan:   See dictation  [id number]    Physical Exam:  Vitals: /80  Pulse 71  Ht 1.778 m (5' 10\")  Wt 87.1 kg (192 lb)  BMI 27.55 kg/m2    Constitutional:  AAO x3.  Pt is in NAD.  HEAD: normocephalic.  SKIN: Skin normal color, texture and turgor with no lesions or eruptions.  Eyes: PERRL, EOMI.  ENT:  Supple, normal JVP. No lymphadenopathy or thyroid enlargement.  Chest:  CTAB.  Cardiac:  IIR, variable sound of S1 and Normal S2. No murmurs rubs or gallop.   Abdomen:  Normal BS.  Soft, non-tender and non-distended.  No rebound or guarding.    Extremities:  Pedious pulses palpable B/L.  No LE edema noticed.   Neurological: Strength and sensation grossly symmetric and intact throughout.       CURRENT MEDICATIONS:  Current Outpatient Prescriptions   Medication Sig Dispense Refill     metoprolol (LOPRESSOR) 50 MG tablet Take 1 tablet (50 mg) by mouth 2 times daily 180 tablet 3     metroNIDAZOLE (METROGEL) 1 % gel Apply topically daily 45 g 3     atorvastatin (LIPITOR) 40 MG tablet Take 1 tablet (40 mg) by mouth daily 90 tablet 3     lisinopril (PRINIVIL/ZESTRIL) 20 MG tablet Take 1 tablet (20 mg) by mouth daily 90 tablet 3     terazosin (HYTRIN) 2 MG capsule Take 1 capsule (2 mg) by mouth daily 90 capsule 3     warfarin (COUMADIN) 5 MG tablet Take 1 tab daily or as directed by INR clinic 90 tablet 3     hydrocortisone (CORTAID) 1 % cream Apply topically daily as needed       ACETAMINOPHEN PO Take 1,000 mg by mouth every 6 hours as needed for pain       Multiple Vitamins-Minerals (MULTIVITAMIN PO) Take 1 tablet by mouth daily        latanoprost (XALATAN) 0.005 % ophthalmic solution Place 1 drop into both eyes At Bedtime       [DISCONTINUED] metoprolol (LOPRESSOR) 25 MG tablet Take 1.5 tablets (37.5 mg) by mouth 2 times daily 360 tablet 3       ALLERGIES   No Known Allergies    PAST MEDICAL HISTORY:  Past Medical History:   Diagnosis Date     Basal cell carcinoma      BPH (benign prostatic hypertrophy)      " Diverticulosis      Glaucoma      Hemorrhoids      HTN (hypertension)      Hyperlipidemia      Obesity      Paroxysmal atrial fibrillation (H) Dx 2013 [place]     PVC (premature ventricular contraction)      Squamous cell carcinoma in situ of skin      Syncope 4/11/2016       PAST SURGICAL HISTORY:  Past Surgical History:   Procedure Laterality Date     BACK SURGERY       COLONOSCOPY  11/19/15    hx polyps     SURGICAL HISTORY OF -       Laminectomy     SURGICAL HISTORY OF -       biopsy of prostate     TONSILLECTOMY & ADENOIDECTOMY         FAMILY HISTORY:  Family History   Problem Relation Age of Onset     CEREBROVASCULAR DISEASE Mother      Hypertension Mother      CEREBROVASCULAR DISEASE Father      Myocardial Infarction Father      Hypertension Father      Hypertension Sister      Myocardial Infarction Sister        SOCIAL HISTORY:  Social History     Social History     Marital status:      Spouse name: N/A     Number of children: N/A     Years of education: N/A     Social History Main Topics     Smoking status: Former Smoker     Packs/day: 1.00     Years: 20.00     Types: Cigarettes     Smokeless tobacco: Never Used      Comment: quit age 54     Alcohol use 0.0 oz/week     0 Standard drinks or equivalent per week      Comment: 2-3 wine/beer per week     Drug use: No     Sexual activity: Not Currently     Partners: Female     Other Topics Concern     Caffeine Concern Yes     2 cups coffee per day     Sleep Concern No     Stress Concern No     Weight Concern No     Exercise No     Social History Narrative       Review of Systems:  Skin:  Negative     Eyes:  Negative    ENT:  Negative    Respiratory:  Positive for dyspnea on exertion  Cardiovascular:  Negative    Gastroenterology: Negative    Genitourinary:  not assessed    Musculoskeletal:  Positive for arthritis  Neurologic:  Positive for numbness or tingling of hands  Psychiatric:  Negative    Heme/Lymph/Imm:  Negative    Endocrine:  Negative         Recent Lab Results:  LIPID RESULTS:  Lab Results   Component Value Date    CHOL 129 2017    HDL 48 2017    LDL 68 2017    TRIG 63 2017    CHOLHDLRATIO 4.6 2015       LIVER ENZYME RESULTS:  Lab Results   Component Value Date    AST 23 2017    ALT 40 2017       CBC RESULTS:  Lab Results   Component Value Date    WBC 10.5 2017    RBC 4.34 (L) 2017    HGB 14.0 2017    HCT 42.3 2017    MCV 98 2017    MCH 32.3 2017    MCHC 33.1 2017    RDW 12.8 2017     2017       BMP RESULTS:  Lab Results   Component Value Date     2017    POTASSIUM 4.4 2017    CHLORIDE 104 2017    CO2 30 2017    ANIONGAP 5 2017     (H) 2017    BUN 18 2017    CR 0.98 2017    GFRESTIMATED 74 2017    GFRESTBLACK 89 2017    MCKAYLA 8.9 2017        A1C RESULTS:  No results found for: A1C    INR RESULTS:  Lab Results   Component Value Date    INR 3.8 (A) 2017    INR 2.7 (A) 2017    INR 2.13 (H) 2016    INR 1.96 (H) 2016         ECHOCARDIOGRAM  Recent Results (from the past 8760 hour(s))   Echocardiogram    Narrative    Interpretation Summary                    [facility]  Echocardiography Laboratory  [facility] [place] [address]        Name: EDWIN CHAU  MRN: [id number]  : 1940  Study Date: 2016 02:59 PM  Age: 75 yrs  Gender: Male  Reason For Study: Syncope  Ordering Physician: Allan Davidson PA-C  Referring Physician: Jennifer Mishra  Performed By: Arvind Garcia RDCS     BSA: 2.0 m2  Height: 70 in  Weight: 187 lb  HR: 67  BP: 132/62 mmHg  ______________________________________________________________________________        Procedure  Complete Portable Echo Adult.  ______________________________________________________________________________     Interpretation Summary     The left ventricle is normal in  size.  Left ventricular systolic function is normal.  The visual ejection fraction is estimated at 60%.  The aortic valve is trileaflet with aortic valve sclerosis.  The aortic root is normal size.  There is no pericardial effusion.  The rhythm was normal sinus.  ______________________________________________________________________________           Left Ventricle  The left ventricle is normal in size. There is normal left ventricular wall  thickness. Left ventricular systolic function is normal. The visual ejection  fraction is estimated at 60%. The transmitral spectral Doppler flow pattern  is normal for age.     Right Ventricle  The right ventricle is normal size. The right ventricular systolic function  is normal.  Atria  Normal left atrial size. Right atrial size is normal. Intact atrial septum.     Mitral Valve  The mitral valve is normal in structure and function. There is physiologic  mitral regurgitation.     Tricuspid Valve  Normal tricuspid valve. There is physiologic tricuspid regurgitation.     Aortic Valve  The aortic valve is trileaflet with aortic valve sclerosis. There is  physiologic aortic regurgitation. No aortic stenosis is present.     Pulmonic Valve  Normal pulmonic valve.     Vessels  The aortic root is normal size. The IVC is normal in size and reactivity with  respiration, suggesting normal central venous pressure.  Pericardium  There is no pericardial effusion.     Rhythm  The rhythm was normal sinus.     ______________________________________________________________________________  MMode/2D Measurements & Calculations  IVSd: 1.4 cm  LVIDd: 4.2 cm  LVIDs: 2.2 cm  LVPWd: 1.1 cm  FS: 48.2 %  EDV(Teich): 79.1 ml  ESV(Teich): 15.9 ml  LV mass(C)d: 189.8 grams  LA dimension: 4.7 cm  asc Aorta Diam: 3.4 cm  LA Volume (BP): 68.5 ml     LA Volume Index (BP): 33.7 ml/m2        Doppler Measurements & Calculations  MV E max peggy: 104.9 cm/sec  MV A max peggy: 48.0 cm/sec  MV E/A: 2.2  MV P1/2t max  peggy: 107.6 cm/sec  MV P1/2t: 59.7 msec  MVA(P1/2t): 3.7 cm2  MV dec slope: 527.6 cm/sec2  MV dec time: 0.20 sec  Lateral E/e': 12.5  Medial E/e': 14.6              ______________________________________________________________________________        Report approved by: Sandy Smith 04/11/2016 04:19 PM            Orders Placed This Encounter   Procedures     EKG 12-lead complete w/read - Clinics (performed today)     Holter Monitor 24 hour - Adult     Orders Placed This Encounter   Medications     metoprolol (LOPRESSOR) 50 MG tablet     Sig: Take 1 tablet (50 mg) by mouth 2 times daily     Dispense:  180 tablet     Refill:  3     Medications Discontinued During This Encounter   Medication Reason     metoprolol (LOPRESSOR) 25 MG tablet Reorder         Encounter Diagnoses   Name Primary?     Paroxysmal atrial fibrillation (H) Yes     Benign essential hypertension; goal < 140/90      Paroxysmal atrial fibrillation          CC  Sebastián Bermudez MD   [facility]  [address]    [place] [id number]

## 2017-03-23 NOTE — MR AVS SNAPSHOT
After Visit Summary   3/23/2017    Aniket Macias    MRN: [id number]           Patient Information     Date Of Birth          1940        Visit Information        Provider Department      3/23/2017 2:15 PM Sebastián Bermudez MD [facility] HEART [facility]        Today's Diagnoses     Paroxysmal atrial fibrillation (H)    -  1    Benign essential hypertension; goal < 140/90        Paroxysmal atrial fibrillation           Follow-ups after your visit        Your next 10 appointments already scheduled     Apr 06, 2017 10:40 AM CDT   Anticoagulation Visit with [place] [facility]TICO[facility]ULATION   [facility] ([facility])    [address]  [place] [address]   [phone]            Apr 06, 2017 11:00 AM CDT   Holter Monitor with MON   [facility] - [facility] ([facility])    [address]  [place] [address] [phone]              Future tests that were ordered for you today     Open Future Orders        Priority Expected Expires Ordered    Holter Monitor 24 hour - Adult Routine 3/30/2017 3/23/2018 3/23/2017            Who to contact     If you have questions or need follow up information about today's clinic visit or your schedule please contact [facility] directly at [phone].  Normal or non-critical lab and imaging results will be communicated to you by MyChart, letter or phone within 4 business days after the clinic has received the results. If you do not hear from us within 7 days, please contact the clinic through MyChart or phone. If you have a critical or abnormal lab result, we will notify you by phone as soon as possible.  Submit refill requests through [email or web address] or call your pharmacy and they will forward the refill request to us. Please allow 3 business days for your refill to be completed.           "Additional Information About Your Visit        MyChart Information     [employer] lets you send messages to your doctor, view your test results, renew your prescriptions, schedule appointments and more. To sign up, go to [email or web address] . Click on \"Log in\" on the left side of the screen, which will take you to the Welcome page. Then click on \"Sign up Now\" on the right side of the page.     You will be asked to enter the access code listed below, as well as some personal information. Please follow the directions to create your username and password.     Your access code is: [email or web address]  Expires: 2017 12:11 PM     Your access code will  in 90 days. If you need help or a new code, please call your [place] clinic or [phone].        Care EveryWhere ID     This is your Care EveryWhere ID. This could be used by other organizations to access your [place] medical records  [id number]        Your Vitals Were     Pulse Height BMI (Body Mass Index)             71 1.778 m (5' 10\") 27.55 kg/m2          Blood Pressure from Last 3 Encounters:   17 134/80   17 132/84   10/12/16 135/87    Weight from Last 3 Encounters:   17 87.1 kg (192 lb)   17 87.5 kg (193 lb)   10/12/16 87.1 kg (192 lb)              We Performed the Following     EKG 12-lead complete w/read - Clinics (performed today)     EKG 12-lead complete w/read - Clinics (to be scheduled)     Follow-Up with Electrophysiologist          Today's Medication Changes          These changes are accurate as of: 3/23/17  2:36 PM.  If you have any questions, ask your nurse or doctor.               These medicines have changed or have updated prescriptions.        Dose/Directions    metoprolol 50 MG tablet   Commonly known as:  LOPRESSOR   This may have changed:    - medication strength  - how much to take   Used for:  Paroxysmal atrial fibrillation (H)   Changed by:  Sebastián Bermudez MD        Dose:  50 mg   Take 1 tablet (50 mg) " by mouth 2 times daily   Quantity:  180 tablet   Refills:  3            Where to get your medicines      These medications were sent to [facility] [address] [facility] [place] - [address]  [address], [place] [address]     Phone:  [phone]     metoprolol 50 MG tablet                Primary Care Provider Office Phone # Fax #    [place] Mishra,  [phone]       [facility] [address]   [facility] [address]        Thank you!     Thank you for choosing [facility] [place]  for your care. Our goal is always to provide you with excellent care. Hearing back from our patients is one way we can continue to improve our services. Please take a few minutes to complete the written survey that you may receive in the mail after your visit with us. Thank you!             Your Updated Medication List - Protect others around you: Learn how to safely use, store and throw away your medicines at www.disposemymeds.org.          This list is accurate as of: 3/23/17  2:36 PM.  Always use your most recent med list.                   Brand Name Dispense Instructions for use    ACETAMINOPHEN PO      Take 1,000 mg by mouth every 6 hours as needed for pain       atorvastatin 40 MG tablet    LIPITOR    90 tablet    Take 1 tablet (40 mg) by mouth daily       hydrocortisone 1 % cream    CORTAID     Apply topically daily as needed       latanoprost 0.005 % ophthalmic solution    XALATAN     Place 1 drop into both eyes At Bedtime       lisinopril 20 MG tablet    PRINIVIL/ZESTRIL    90 tablet    Take 1 tablet (20 mg) by mouth daily       metoprolol 50 MG tablet    LOPRESSOR    180 tablet    Take 1 tablet (50 mg) by mouth 2 times daily       metroNIDAZOLE 1 % gel    METROGEL    45 g    Apply topically daily       MULTIVITAMIN PO      Take 1 tablet by mouth daily       terazosin 2 MG capsule    HYTRIN    90 capsule    Take 1 capsule (2 mg) by mouth  daily       warfarin 5 MG tablet    COUMADIN    90 tablet    Take 1 tab daily or as directed by INR clinic

## 2017-03-24 NOTE — PROGRESS NOTES
REASON FOR VISIT:  Evaluation of paroxysmal atrial fibrillation.      HISTORY OF PRESENT ILLNESS:  Mr. Macias is a pleasant 76-year-old gentleman with history of hypertension, skin cancer, paroxysmal atrial fibrillation who is here for routine followup.      The patient is followed here in clinic.  He has been on chronic therapy with Coumadin and metoprolol for paroxysmal atrial fibrillation.  He informs that in the last year, he started noticing more frequent episodes of palpitations.  However, the episodes are very sporadic and do not seem to affect his lifestyle.  In fact, he informs that he has not had any palpitations in the last 3 weeks.      EKG today showed atrial fibrillation with controlled ventricular response.      Of note, he also had a previous history of syncope which happened in April of last year.  At that time, workup was negative.  Echocardiogram revealed normal cardiac function and an event monitor was negative.  I offered a loop recorder, which he declined.      ASSESSMENT AND PLAN:   1.  Atrial fibrillation.  It seems to be now persistent Afib; however, heart rate is well-controlled.  He seems to be asymptomatic.  I recommend a Holter monitor to evaluate nature of the AFib, as well as rate control.  During this visit, we discussed the possibility of increasing metoprolol to 50 mg a day.  He agreed with plan.   2.  Embolic prevention.  [id number] score of 3.  Continue anticoagulation with Coumadin indefinitely.   3.  Syncope, likely neurally-mediated hypotension.  He had had problems associated with that.  No recurrence so far.  Declined loop recorder.   4.  Followup care.  Follow up in clinic with me in a year or earlier as needed.         ELISABET TURNER MD             D: 2017 14:36   T: 2017 11:05   MT: al      Name:     EDWIN MACIAS   MRN:      [id number]        Account:      [id number]   :      1940           Service Date: 2017      Document: [id number]

## 2017-04-06 ENCOUNTER — ANTICOAGULATION THERAPY VISIT (OUTPATIENT)
Dept: CARDIOLOGY | Facility: CLINIC | Age: 77
End: 2017-04-06
Payer: COMMERCIAL

## 2017-04-06 ENCOUNTER — HOSPITAL ENCOUNTER (OUTPATIENT)
Dept: CARDIOLOGY | Facility: CLINIC | Age: 77
Discharge: HOME | End: 2017-04-06
Attending: INTERNAL MEDICINE | Admitting: INTERNAL MEDICINE
Payer: MEDICARE

## 2017-04-06 DIAGNOSIS — I48.0 PAROXYSMAL ATRIAL FIBRILLATION (H): ICD-10-CM

## 2017-04-06 DIAGNOSIS — Z79.01 LONG-TERM (CURRENT) USE OF ANTICOAGULANTS: ICD-10-CM

## 2017-04-06 LAB — INR POINT OF CARE: 2.8 (ref 0.86–1.14)

## 2017-04-06 PROCEDURE — 93225 XTRNL ECG REC<48 HRS REC: CPT

## 2017-04-06 PROCEDURE — 93227 XTRNL ECG REC<48 HR R&I: CPT | Performed by: INTERNAL MEDICINE

## 2017-04-06 PROCEDURE — 36416 COLLJ CAPILLARY BLOOD SPEC: CPT | Performed by: INTERNAL MEDICINE

## 2017-04-06 PROCEDURE — 85610 PROTHROMBIN TIME: CPT | Mod: QW | Performed by: INTERNAL MEDICINE

## 2017-04-06 NOTE — PROGRESS NOTES
ANTICOAGULATION FOLLOW-UP CLINIC VISIT    Patient Name:  Aniket Macias  Date:  4/6/2017  Contact Type:  Face to Face    SUBJECTIVE:     Patient Findings     Positives No Problem Findings           OBJECTIVE    INR Protime   Date Value Ref Range Status   04/06/2017 2.8 (A) 0.86 - 1.14 Final       ASSESSMENT / PLAN  INR assessment THER    Recheck INR In: 4 WEEKS    INR Location Clinic      Anticoagulation Summary as of 4/6/2017     INR goal 2.0-3.0   Today's INR 2.8   Maintenance plan 5 mg (5 mg x 1) every day   Full instructions 5 mg every day   Weekly total 35 mg   No change documented Angelita Wolfe RN   Plan last modified Sallie Whalen RN (6/1/2016)   Next INR check 5/4/2017   Target end date Indefinite    Indications   Long-term (current) use of anticoagulants [Z79.01] [Z79.01]  Paroxysmal atrial fibrillation [I48.0]         Anticoagulation Episode Summary     INR check location     Preferred lab     Send INR reminders to [facility] HEART INR NURSE    Comments       Anticoagulation Care Providers     Provider Role Specialty Phone number    Sebastián Bermudez MD Responsible Cardiology [phone]            See the Encounter Report to view Anticoagulation Flowsheet and Dosing Calendar (Go to Encounters tab in chart review, and find the Anticoagulation Therapy Visit)    INR 2.8.  Back in range after holding 1 dose and increasing his greens.  Continue the 5mg/day and recheck in 4 weeks.  Jared Wolfe, LUANA

## 2017-04-06 NOTE — MR AVS SNAPSHOT
Aniket Macias   4/6/2017 10:40 AM   Anticoagulation Therapy Visit    Description:  76 year old male   Provider:   ANTICOAGULATION   Department:  [facility]           INR as of 4/6/2017     Today's INR 2.8      Anticoagulation Summary as of 4/6/2017     INR goal 2.0-3.0   Today's INR 2.8   Full instructions 5 mg every day   Next INR check 5/4/2017    Indications   Long-term (current) use of anticoagulants [Z79.01] [Z79.01]  Paroxysmal atrial fibrillation [I48.0]         Your next Anticoagulation Clinic appointment(s)     May 04, 2017 10:40 AM CDT   Anticoagulation Visit with  ANTICOAGULATION   [facility] ([facility])    [address] [place] [id number] [phone]              Contact Numbers     Anticoagulant (INR) Clinic Number: [phone]          April 2017 Details    Sun Mon Tue Wed Thu Fri Sat           1                 2               3               4               5               6      5 mg   See details      7      5 mg         8      5 mg           9      5 mg         10      5 mg         11      5 mg         12      5 mg         13      5 mg         14      5 mg         15      5 mg           16      5 mg         17      5 mg         18      5 mg         19      5 mg         20      5 mg         21      5 mg         22      5 mg           23      5 mg         24      5 mg         25      5 mg         26      5 mg         27      5 mg         28      5 mg         29      5 mg           30      5 mg                Date Details   04/06 This INR check               How to take your warfarin dose     To take:  5 mg Take 1 of the 5 mg tablets.           May 2017 Details    Sun Mon Tue Wed Thu Fri Sat      1      5 mg         2      5 mg         3      5 mg         4            5               6                 7               8               9               10               11               12               13                  14               15               16               17               18               19               20                 21               22               23               24               25               26               27                 28               29               30               31                   Date Details   No additional details    Date of next INR:  5/4/2017         How to take your warfarin dose     To take:  5 mg Take 1 of the 5 mg tablets.

## 2017-04-11 ENCOUNTER — TELEPHONE (OUTPATIENT)
Dept: CARDIOLOGY | Facility: CLINIC | Age: 77
End: 2017-04-11

## 2017-04-12 ENCOUNTER — APPOINTMENT (OUTPATIENT)
Age: 77
Setting detail: DERMATOLOGY
End: 2017-05-14

## 2017-04-12 DIAGNOSIS — B35.1 TINEA UNGUIUM: ICD-10-CM

## 2017-04-12 DIAGNOSIS — D18.0 HEMANGIOMA: ICD-10-CM

## 2017-04-12 DIAGNOSIS — L81.4 OTHER MELANIN HYPERPIGMENTATION: ICD-10-CM

## 2017-04-12 DIAGNOSIS — D22 MELANOCYTIC NEVI: ICD-10-CM

## 2017-04-12 DIAGNOSIS — L21.8 OTHER SEBORRHEIC DERMATITIS: ICD-10-CM

## 2017-04-12 DIAGNOSIS — Z85.828 PERSONAL HISTORY OF OTHER MALIGNANT NEOPLASM OF SKIN: ICD-10-CM

## 2017-04-12 DIAGNOSIS — L82.1 OTHER SEBORRHEIC KERATOSIS: ICD-10-CM

## 2017-04-12 PROBLEM — D18.01 HEMANGIOMA OF SKIN AND SUBCUTANEOUS TISSUE: Status: ACTIVE | Noted: 2017-04-12

## 2017-04-12 PROBLEM — D22.5 MELANOCYTIC NEVI OF TRUNK: Status: ACTIVE | Noted: 2017-04-12

## 2017-04-12 PROCEDURE — 99214 OFFICE O/P EST MOD 30 MIN: CPT

## 2017-04-12 PROCEDURE — OTHER MIPS QUALITY: OTHER

## 2017-04-12 PROCEDURE — OTHER TREATMENT REGIMEN: OTHER

## 2017-04-12 PROCEDURE — OTHER COUNSELING: OTHER

## 2017-04-12 ASSESSMENT — LOCATION DETAILED DESCRIPTION DERM
LOCATION DETAILED: RIGHT GREAT TOENAIL
LOCATION DETAILED: RIGHT MEDIAL UPPER BACK
LOCATION DETAILED: RIGHT SUPERIOR MEDIAL UPPER BACK
LOCATION DETAILED: RIGHT INFERIOR MEDIAL UPPER BACK
LOCATION DETAILED: RIGHT CENTRAL TEMPLE
LOCATION DETAILED: RIGHT UPPER CUTANEOUS LIP
LOCATION DETAILED: RIGHT INFERIOR MEDIAL MALAR CHEEK
LOCATION DETAILED: INFERIOR MID FOREHEAD
LOCATION DETAILED: LEFT GREAT TOENAIL
LOCATION DETAILED: LEFT INFERIOR MEDIAL MALAR CHEEK

## 2017-04-12 ASSESSMENT — LOCATION SIMPLE DESCRIPTION DERM
LOCATION SIMPLE: RIGHT CHEEK
LOCATION SIMPLE: RIGHT GREAT TOE
LOCATION SIMPLE: INFERIOR FOREHEAD
LOCATION SIMPLE: RIGHT TEMPLE
LOCATION SIMPLE: LEFT GREAT TOE
LOCATION SIMPLE: RIGHT UPPER BACK
LOCATION SIMPLE: LEFT CHEEK
LOCATION SIMPLE: RIGHT LIP

## 2017-04-12 ASSESSMENT — LOCATION ZONE DERM
LOCATION ZONE: TRUNK
LOCATION ZONE: FACE
LOCATION ZONE: FACE
LOCATION ZONE: LIP
LOCATION ZONE: TOENAIL

## 2017-04-12 NOTE — PROCEDURE: MIPS QUALITY
Quality 431: Preventive Care And Screening: Unhealthy Alcohol Use - Screening: Patient screened for unhealthy alcohol use using a single question and scores less than 2 times per year
Detail Level: Detailed
Quality 226: Preventive Care And Screening: Tobacco Use: Screening And Cessation Intervention: Patient screened for tobacco and is an ex-smoker
Quality 130: Documentation Of Current Medications In The Medical Record: Current Medications Documented

## 2017-04-12 NOTE — PROCEDURE: TREATMENT REGIMEN
Detail Level: Zone
Otc Regimen: Apply OTC hydrocortisone to dry scaly red areas on face until resolved.

## 2017-05-04 ENCOUNTER — ANTICOAGULATION THERAPY VISIT (OUTPATIENT)
Dept: CARDIOLOGY | Facility: CLINIC | Age: 77
End: 2017-05-04
Payer: COMMERCIAL

## 2017-05-04 DIAGNOSIS — Z79.01 LONG-TERM (CURRENT) USE OF ANTICOAGULANTS: ICD-10-CM

## 2017-05-04 DIAGNOSIS — I48.0 PAROXYSMAL ATRIAL FIBRILLATION (H): ICD-10-CM

## 2017-05-04 LAB — INR POINT OF CARE: 2.9 (ref 0.86–1.14)

## 2017-05-04 PROCEDURE — 85610 PROTHROMBIN TIME: CPT | Mod: QW | Performed by: INTERNAL MEDICINE

## 2017-05-04 PROCEDURE — 36416 COLLJ CAPILLARY BLOOD SPEC: CPT | Performed by: INTERNAL MEDICINE

## 2017-05-04 NOTE — PROGRESS NOTES
ANTICOAGULATION FOLLOW-UP CLINIC VISIT    Patient Name:  Aniket Macias  Date:  5/4/2017  Contact Type:  Face to Face    SUBJECTIVE:     Patient Findings     Positives No Problem Findings           OBJECTIVE    INR Protime   Date Value Ref Range Status   05/04/2017 2.9 (A) 0.86 - 1.14 Final       ASSESSMENT / PLAN  INR assessment THER    Recheck INR In: 5 WEEKS    INR Location Clinic      Anticoagulation Summary as of 5/4/2017     INR goal 2.0-3.0   Today's INR 2.9   Maintenance plan 5 mg (5 mg x 1) every day   Full instructions 5 mg every day   Weekly total 35 mg   No change documented Angelita Wolfe RN   Plan last modified Sallie Whalen RN (6/1/2016)   Next INR check 6/8/2017   Target end date Indefinite    Indications   Long-term (current) use of anticoagulants [Z79.01] [Z79.01]  Paroxysmal atrial fibrillation [I48.0]         Anticoagulation Episode Summary     INR check location     Preferred lab     Send INR reminders to [facility] HEART INR NURSE    Comments       Anticoagulation Care Providers     Provider Role Specialty Phone number    Sebastián Bermudez MD Responsible Cardiology [phone]            See the Encounter Report to view Anticoagulation Flowsheet and Dosing Calendar (Go to Encounters tab in chart review, and find the Anticoagulation Therapy Visit)    INR 2.9.  No changes.  Continue same schedule and recheck in 5 weeks.  He has broccoli about every other day and is able to continue that amount or even a little bit more.  Jared Wolfe, RN

## 2017-05-04 NOTE — MR AVS SNAPSHOT
Aniket Macias   5/4/2017 10:40 AM   Anticoagulation Therapy Visit    Description:  76 year old male   Provider:   ANTICOAGULATION   Department:  [facility]           INR as of 5/4/2017     Today's INR 2.9      Anticoagulation Summary as of 5/4/2017     INR goal 2.0-3.0   Today's INR 2.9   Full instructions 5 mg every day   Next INR check 6/8/2017    Indications   Long-term (current) use of anticoagulants [Z79.01] [Z79.01]  Paroxysmal atrial fibrillation [I48.0]         Your next Anticoagulation Clinic appointment(s)     Jun 08, 2017 10:40 AM CDT   Anticoagulation Visit with  ANTICOAGULATION   [facility] ([facility])    [address] [place] [id number] [phone]              Contact Numbers     Anticoagulant (INR) Clinic Number: [phone]          May 2017 Details    Sun Mon Tue Wed Thu Fri Sat      1               2               3               4      5 mg   See details      5      5 mg         6      5 mg           7      5 mg         8      5 mg         9      5 mg         10      5 mg         11      5 mg         12      5 mg         13      5 mg           14      5 mg         15      5 mg         16      5 mg         17      5 mg         18      5 mg         19      5 mg         20      5 mg           21      5 mg         22      5 mg         23      5 mg         24      5 mg         25      5 mg         26      5 mg         27      5 mg           28      5 mg         29      5 mg         30      5 mg         31      5 mg             Date Details   05/04 This INR check               How to take your warfarin dose     To take:  5 mg Take 1 of the 5 mg tablets.           June 2017 Details    Sun Mon Tue Wed Thu Fri Sat         1      5 mg         2      5 mg         3      5 mg           4      5 mg         5      5 mg         6      5 mg         7      5 mg         8            9               10                 11                12               13               14               15               16               17                 18               19               20               21               22               23               24                 25               26               27               28               29               30                 Date Details   No additional details    Date of next INR:  6/8/2017         How to take your warfarin dose     To take:  5 mg Take 1 of the 5 mg tablets.

## 2017-06-08 ENCOUNTER — ANTICOAGULATION THERAPY VISIT (OUTPATIENT)
Dept: CARDIOLOGY | Facility: CLINIC | Age: 77
End: 2017-06-08
Payer: COMMERCIAL

## 2017-06-08 DIAGNOSIS — I48.0 PAROXYSMAL ATRIAL FIBRILLATION (H): ICD-10-CM

## 2017-06-08 DIAGNOSIS — Z79.01 LONG-TERM (CURRENT) USE OF ANTICOAGULANTS: ICD-10-CM

## 2017-06-08 LAB — INR POINT OF CARE: 3 (ref 0.86–1.14)

## 2017-06-08 PROCEDURE — 36416 COLLJ CAPILLARY BLOOD SPEC: CPT | Performed by: INTERNAL MEDICINE

## 2017-06-08 PROCEDURE — 85610 PROTHROMBIN TIME: CPT | Mod: QW | Performed by: INTERNAL MEDICINE

## 2017-06-08 NOTE — PROGRESS NOTES
ANTICOAGULATION FOLLOW-UP CLINIC VISIT    Patient Name:  Aniket Macias  Date:  6/8/2017  Contact Type:  Face to Face    SUBJECTIVE:     Patient Findings     Positives Change in diet/appetite (eating the whole 30 diet for 3 more weeks)           OBJECTIVE    INR Protime   Date Value Ref Range Status   06/08/2017 3.0 (A) 0.86 - 1.14 Final       ASSESSMENT / PLAN  INR assessment THER    Recheck INR In: 5 WEEKS    INR Location Clinic      Anticoagulation Summary as of 6/8/2017     INR goal 2.0-3.0   Today's INR 3.0   Maintenance plan 5 mg (5 mg x 1) every day   Full instructions 5 mg every day   Weekly total 35 mg   No change documented Stefani Kaminski RN   Plan last modified Sallie Whalen RN (6/1/2016)   Next INR check 7/13/2017   Target end date Indefinite    Indications   Long-term (current) use of anticoagulants [Z79.01] [Z79.01]  Paroxysmal atrial fibrillation [I48.0]         Anticoagulation Episode Summary     INR check location     Preferred lab     Send INR reminders to [facility] HEART INR NURSE    Comments       Anticoagulation Care Providers     Provider Role Specialty Phone number    Sebastián Bermudez MD Responsible Cardiology [phone]            See the Encounter Report to view Anticoagulation Flowsheet and Dosing Calendar (Go to Encounters tab in chart review, and find the Anticoagulation Therapy Visit)    INR 3.0 No change in meds. He started eating the Whole 30 diet 1 week ago and plans to continue on it for at least another 3 weeks. No abnormal bleeding or bruising. Will eat an extra serving of greens or veggies today and he can eat an extra serving per week. Continue 5 mg daily and recheck in 5 weeks. Dosage adjustment made based on physician directed care plan.    Stefani Kaminski, RN

## 2017-06-08 NOTE — MR AVS SNAPSHOT
Aniket Macias   6/8/2017 10:40 AM   Anticoagulation Therapy Visit    Description:  76 year old male   Provider:   ANTICOAGULATION   Department:  [facility]           INR as of 6/8/2017     Today's INR 3.0      Anticoagulation Summary as of 6/8/2017     INR goal 2.0-3.0   Today's INR 3.0   Full instructions 5 mg every day   Next INR check 7/13/2017    Indications   Long-term (current) use of anticoagulants [Z79.01] [Z79.01]  Paroxysmal atrial fibrillation [I48.0]         Your next Anticoagulation Clinic appointment(s)     Jul 13, 2017 10:40 AM CDT   Anticoagulation Visit with  ANTICOAGULATION   [facility] ([facility])    [address] [place] [id number] [phone]              Contact Numbers     Anticoagulant (INR) Clinic Number: [phone]          June 2017 Details    Sun Mon Tue Wed Thu Fri Sat         1               2               3                 4               5               6               7               8      5 mg   See details      9      5 mg         10      5 mg           11      5 mg         12      5 mg         13      5 mg         14      5 mg         15      5 mg         16      5 mg         17      5 mg           18      5 mg         19      5 mg         20      5 mg         21      5 mg         22      5 mg         23      5 mg         24      5 mg           25      5 mg         26      5 mg         27      5 mg         28      5 mg         29      5 mg         30      5 mg           Date Details   06/08 This INR check               How to take your warfarin dose     To take:  5 mg Take 1 of the 5 mg tablets.           July 2017 Details    Sun Mon Tue Wed Thu Fri Sat           1      5 mg           2      5 mg         3      5 mg         4      5 mg         5      5 mg         6      5 mg         7      5 mg         8      5 mg           9      5 mg         10      5 mg         11      5 mg          12      5 mg         13            14               15                 16               17               18               19               20               21               22                 23               24               25               26               27               28               29                 30               31                     Date Details   No additional details    Date of next INR:  7/13/2017         How to take your warfarin dose     To take:  5 mg Take 1 of the 5 mg tablets.

## 2017-06-22 ENCOUNTER — OFFICE VISIT (OUTPATIENT)
Dept: FAMILY MEDICINE | Facility: CLINIC | Age: 77
End: 2017-06-22
Payer: COMMERCIAL

## 2017-06-22 VITALS
SYSTOLIC BLOOD PRESSURE: 131 MMHG | TEMPERATURE: 96.8 F | DIASTOLIC BLOOD PRESSURE: 80 MMHG | BODY MASS INDEX: 26.2 KG/M2 | HEART RATE: 59 BPM | OXYGEN SATURATION: 98 % | HEIGHT: 70 IN | WEIGHT: 183 LBS

## 2017-06-22 DIAGNOSIS — M54.50 ACUTE RIGHT-SIDED LOW BACK PAIN WITHOUT SCIATICA: ICD-10-CM

## 2017-06-22 DIAGNOSIS — R29.898 WEAKNESS OF RIGHT LEG: Primary | ICD-10-CM

## 2017-06-22 PROCEDURE — 99213 OFFICE O/P EST LOW 20 MIN: CPT | Performed by: NURSE PRACTITIONER

## 2017-06-22 RX ORDER — TRAMADOL HYDROCHLORIDE 50 MG/1
100 TABLET ORAL EVERY 8 HOURS PRN
Qty: 20 TABLET | Refills: 0 | Status: SHIPPED | OUTPATIENT
Start: 2017-06-22 | End: 2017-06-28

## 2017-06-22 NOTE — PROGRESS NOTES
SUBJECTIVE:                                                    Aniket Macias is a 77 year old male who presents to clinic today for the following health issues:      Back Pain      Duration: 4 days of right sided low back pain that does not radiate into his leg, it is sharp and positional.  However today he has been experiencing righ tlegt weakness when going up the stairs.  He has no numbness or tingling but his thigh strength is decreased.  He denies knee pain or weakness.  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              Specific cause: none    Description:   Location of pain: low back right  Character of pain: Radiating  Pain radiation:radiates into the right leg and upper hip/sciatic area, next to spine.  New numbness or weakness in legs, not attributed to pain:  YES weakness just today    Intensity: Currently 6/10, At its worst 8/10    History:   Pain interferes with job: Not applicable  History of back problems: previous spinal surgery - 30 years ago or so.  Any previous MRI or X-rays: None  Sees a specialist for back pain:  No  Therapies tried without relief: acetaminophen (Tylenol)    Alleviating factors:   Improved by: acetaminophen (Tylenol), rest and sitting      Precipitating factors:  Worsened by: Lifting, Bending, Standing and Walking    Functional and Psychosocial Screen (Sameer STarT Back):      Not performed today       Accompanying Signs & Symptoms:  Risk of Fracture:  None  Risk of Cauda Equina:  none  Risk of Infection:  None  Risk of Cancer:  None  Risk of Ankylosing Spondylitis:  Onset at age <35, male, AND morning back stiffness. no                    Problem list and histories reviewed & adjusted, as indicated.  Additional history: as documented    Patient Active Problem List   Diagnosis     Paroxysmal atrial fibrillation     Benign essential hypertension; goal < 140/90     Hyperlipidemia     Glaucoma     Benign neoplasm of colon      Benign non-nodular prostatic hyperplasia with lower urinary tract symptoms     Long-term (current) use of anticoagulants [Z79.01]     History of basal cell carcinoma     Rosacea     Past Surgical History:   Procedure Laterality Date     BACK SURGERY       COLONOSCOPY  11/19/15    hx polyps     SURGICAL HISTORY OF -       Laminectomy     SURGICAL HISTORY OF -       biopsy of prostate     TONSILLECTOMY & ADENOIDECTOMY         Social History   Substance Use Topics     Smoking status: Former Smoker     Packs/day: 1.00     Years: 20.00     Types: Cigarettes     Smokeless tobacco: Never Used      Comment: quit age 54     Alcohol use 0.0 oz/week     0 Standard drinks or equivalent per week      Comment: 2-3 wine/beer per week     Family History   Problem Relation Age of Onset     CEREBROVASCULAR DISEASE Mother      Hypertension Mother      CEREBROVASCULAR DISEASE Father      Myocardial Infarction Father      Hypertension Father      Hypertension Sister      Myocardial Infarction Sister          Current Outpatient Prescriptions   Medication Sig Dispense Refill     UNABLE TO FIND MEDICATION NAME: Pro-racia    OTC rosacea cream.       traMADol (ULTRAM) 50 MG tablet Take 2 tablets (100 mg) by mouth every 8 hours as needed for pain Maximum 6 tablet(s) per day 20 tablet 0     metoprolol (LOPRESSOR) 50 MG tablet Take 1 tablet (50 mg) by mouth 2 times daily 180 tablet 3     atorvastatin (LIPITOR) 40 MG tablet Take 1 tablet (40 mg) by mouth daily 90 tablet 3     lisinopril (PRINIVIL/ZESTRIL) 20 MG tablet Take 1 tablet (20 mg) by mouth daily 90 tablet 3     terazosin (HYTRIN) 2 MG capsule Take 1 capsule (2 mg) by mouth daily 90 capsule 3     warfarin (COUMADIN) 5 MG tablet Take 1 tab daily or as directed by INR clinic 90 tablet 3     hydrocortisone (CORTAID) 1 % cream Apply topically daily as needed       ACETAMINOPHEN PO Take 1,000 mg by mouth every 6 hours as needed for pain       Multiple Vitamins-Minerals (MULTIVITAMIN PO)  "Take 1 tablet by mouth daily        latanoprost (XALATAN) 0.005 % ophthalmic solution Place 1 drop into both eyes At Bedtime       No Known Allergies    Reviewed and updated as needed this visit by clinical staff  Allergies       Reviewed and updated as needed this visit by Provider         ROS:  Constitutional, HEENT, cardiovascular, pulmonary, gi and gu systems are negative, except as otherwise noted.    OBJECTIVE:                                                    /80 (BP Location: Right arm, Cuff Size: Adult Regular)  Pulse 59  Temp 96.8  F (36  C) (Tympanic)  Ht 5' 10\" (1.778 m)  Wt 183 lb (83 kg)  SpO2 98%  BMI 26.26 kg/m2  Body mass index is 26.26 kg/(m^2).  GENERAL: healthy, alert and no distress  EYES: Eyes grossly normal to inspection, PERRL and conjunctivae and sclerae normal  NECK: no adenopathy, no asymmetry, masses, or scars and thyroid normal to palpation  RESP: lungs clear to auscultation - no rales, rhonchi or wheezes  CV: regular rate and rhythm, normal S1 S2, no S3 or S4, no murmur, click or rub, no peripheral edema   MS: no gross musculoskeletal defects noted, no edema, right quadricep 4/5 left quad 5/5, noted decreased quadricep strenth stepping onto exam platform and stepping down with no associated pain in low back  BACK: no CVA tenderness,  paralumbar tenderness at right L4 level mid scapular line with muscle induration, neg SLR, no pain in right glut,     PSYCH: mentation appears normal, affect normal/bright    Diagnostic Test Results:  MRI     ASSESSMENT/PLAN:                                                        ICD-10-CM    1. Weakness of right leg M62.81 MR Lumbar Spine w/o Contrast   2. Acute right-sided low back pain without sciatica M54.5 traMADol (ULTRAM) 50 MG tablet       Patient Instructions   Schedule the MRI of your lumbar spine at [phone]      MAHNAZ Ag [facility]    "

## 2017-06-22 NOTE — MR AVS SNAPSHOT
After Visit Summary   6/22/2017    Aniket Macias    MRN: [id number]           Patient Information     Date Of Birth          1940        Visit Information        Provider Department      6/22/2017 3:30 PM Arina Gomez APRN CNP [facility]        Today's Diagnoses     Weakness of right leg    -  1    Acute right-sided low back pain without sciatica          Care Instructions    Schedule the MRI of your lumbar spine at [phone]          Follow-ups after your visit        Your next 10 appointments already scheduled     Jul 13, 2017 10:40 AM CDT   Anticoagulation Visit with [place] ANTICOAGULATION   [facility] PHYSICIANS [facility] AT [place] ([facility])    [address]  [facility] [id number] [phone]              Future tests that were ordered for you today     Open Future Orders        Priority Expected Expires Ordered    MR Lumbar Spine w/o Contrast Routine 6/22/2017 6/22/2018 6/22/2017            Who to contact     If you have questions or need follow up information about today's clinic visit or your schedule please contact [facility] directly at [phone].  Normal or non-critical lab and imaging results will be communicated to you by [facility], letter or phone within 4 business days after the clinic has received the results. If you do not hear from us within 7 days, please contact the clinic through [facility] or phone. If you have a critical or abnormal lab result, we will notify you by phone as soon as possible.  Submit refill requests through [facility] or call your pharmacy and they will forward the refill request to us. Please allow 3 business days for your refill to be completed.          Additional Information About Your Visit        [employer] Information     [facility] lets you send messages to your doctor, view your test results, renew your prescriptions, schedule appointments and more. To sign up, go to [email or web address]  ". Click on \"Log in\" on the left side of the screen, which will take you to the Welcome page. Then click on \"Sign up Now\" on the right side of the page.     You will be asked to enter the access code listed below, as well as some personal information. Please follow the directions to create your username and password.     Your access code is: [email or web address]  Expires: 2017  4:07 PM     Your access code will  in 90 days. If you need help or a new code, please call your [facility] clinic or [phone].        Care EveryWhere ID     This is your Care EveryWhere ID. This could be used by other organizations to access your [facility] medical records  [id number]        Your Vitals Were     Pulse Temperature Height Pulse Oximetry BMI (Body Mass Index)       59 96.8  F (36  C) (Tympanic) 5' 10\" (1.778 m) 98% 26.26 kg/m2        Blood Pressure from Last 3 Encounters:   17 131/80   17 134/80   17 132/84    Weight from Last 3 Encounters:   17 183 lb (83 kg)   17 192 lb (87.1 kg)   17 193 lb (87.5 kg)                 Today's Medication Changes          These changes are accurate as of: 17  4:07 PM.  If you have any questions, ask your nurse or doctor.               Start taking these medicines.        Dose/Directions    traMADol 50 MG tablet   Commonly known as:  ULTRAM   Used for:  Acute right-sided low back pain without sciatica   Started by:  Arina Gomez APRN CNP        Dose:  100 mg   Take 2 tablets (100 mg) by mouth every 8 hours as needed for pain Maximum 6 tablet(s) per day   Quantity:  20 tablet   Refills:  0         Stop taking these medicines if you haven't already. Please contact your care team if you have questions.     metroNIDAZOLE 1 % gel   Commonly known as:  METROGEL   Stopped by:  Arina Gomez APRN CNP                Where to get your medicines      Some of these will need a paper prescription and others can be bought over the counter.  Ask your nurse " if you have questions.     Bring a paper prescription for each of these medications     traMADol 50 MG tablet                Primary Care Provider Office Phone # Fax #    Jennifer Mishra [phone]       [facility] [address] [facility] [address]  [facility] [address]        Equal Access to Services     EDY CORDOVA : Hadii aad ku hadasho Soomaali, waaxda luqadaha, qaybta kaalmada adeegyada, waxay idiin hayaan adeeg khluis manuelashwin lasherman akins. So [facility] [phone].    ATENCIÓN: Si habla español, tiene a owens disposición servicios gratuitos de asistencia lingüística. Llame al [phone].    We comply with applicable federal civil rights laws and [place] laws. We do not discriminate on the basis of race, color, national origin, age, disability sex, sexual orientation or gender identity.            Thank you!     Thank you for choosing [facility]  for your care. Our goal is always to provide you with excellent care. Hearing back from our patients is one way we can continue to improve our services. Please take a few minutes to complete the written survey that you may receive in the mail after your visit with us. Thank you!             Your Updated Medication List - Protect others around you: Learn how to safely use, store and throw away your medicines at www.disposemymeds.org.          This list is accurate as of: 6/22/17  4:07 PM.  Always use your most recent med list.                   Brand Name Dispense Instructions for use Diagnosis    ACETAMINOPHEN PO      Take 1,000 mg by mouth every 6 hours as needed for pain        atorvastatin 40 MG tablet    LIPITOR    90 tablet    Take 1 tablet (40 mg) by mouth daily    Hyperlipidemia LDL goal <100       hydrocortisone 1 % cream    CORTAID     Apply topically daily as needed        latanoprost 0.005 % ophthalmic solution    XALATAN     Place 1 drop into both eyes At Bedtime        lisinopril 20 MG tablet    PRINIVIL/ZESTRIL    90 tablet    Take 1  tablet (20 mg) by mouth daily    Benign essential hypertension       metoprolol 50 MG tablet    LOPRESSOR    180 tablet    Take 1 tablet (50 mg) by mouth 2 times daily    Paroxysmal atrial fibrillation (H)       MULTIVITAMIN PO      Take 1 tablet by mouth daily        terazosin 2 MG capsule    HYTRIN    90 capsule    Take 1 capsule (2 mg) by mouth daily    Benign non-nodular prostatic hyperplasia with lower urinary tract symptoms       traMADol 50 MG tablet    ULTRAM    20 tablet    Take 2 tablets (100 mg) by mouth every 8 hours as needed for pain Maximum 6 tablet(s) per day    Acute right-sided low back pain without sciatica       UNABLE TO FIND      MEDICATION NAME: Pro-racia  OTC rosacea cream.        warfarin 5 MG tablet    COUMADIN    90 tablet    Take 1 tab daily or as directed by INR clinic    Long term current use of anticoagulant therapy

## 2017-06-22 NOTE — NURSING NOTE
"Chief Complaint   Patient presents with     Back Pain     right sciatic pain x 4 days       Initial There were no vitals taken for this visit. Estimated body mass index is 27.55 kg/(m^2) as calculated from the following:    Height as of 3/23/17: 5' 10\" (1.778 m).    Weight as of 3/23/17: 192 lb (87.1 kg).  Medication Reconciliation: complete   Sherri Camargo MA      "

## 2017-06-22 NOTE — NURSING NOTE
"Chief Complaint   Patient presents with     Back Pain     right sciatic pain x 4 days       Initial /80 (BP Location: Right arm, Cuff Size: Adult Regular)  Pulse 59  Temp 96.8  F (36  C) (Tympanic)  Ht 5' 10\" (1.778 m)  Wt 183 lb (83 kg)  SpO2 98%  BMI 26.26 kg/m2 Estimated body mass index is 26.26 kg/(m^2) as calculated from the following:    Height as of this encounter: 5' 10\" (1.778 m).    Weight as of this encounter: 183 lb (83 kg).  Medication Reconciliation: complete   Sherri Camargo MA      "

## 2017-06-25 ENCOUNTER — HOSPITAL ENCOUNTER (OUTPATIENT)
Dept: MRI IMAGING | Facility: CLINIC | Age: 77
Discharge: HOME | End: 2017-06-25
Attending: NURSE PRACTITIONER | Admitting: NURSE PRACTITIONER
Payer: MEDICARE

## 2017-06-25 DIAGNOSIS — R29.898 WEAKNESS OF RIGHT LEG: ICD-10-CM

## 2017-06-25 PROCEDURE — 72148 MRI LUMBAR SPINE W/O DYE: CPT

## 2017-06-25 NOTE — LETTER
[facility]  [address] [facility]  [address]  [place]  [address]  Tel: [phone]    June 27, 2017    Aniket Ricojuno  [address]  [facility] [address]        Dear  Jackyjuno,    This is your copy of the MRI of your low back showing the spinal stenosis.  The referral to the spine specialists has been placed and you will be receiving a call.    If you have any further questions or problems, please contact our office.      Sincerely,    Arina Gomez, DIANA/subhash    Results for orders placed or performed during the hospital encounter of 06/25/17   MR Lumbar Spine w/o Contrast    Narrative    MR LUMBAR SPINE WITHOUT CONTRAST 6/25/2017 7:49 AM    HISTORY: Right leg pain.    COMPARISON: None.    TECHNIQUE: Routine exam T12 through the sacrum.    FINDINGS: Conus lies at mid L1 and appears normal. Alignment is normal  throughout the lumbar spine. No paraspinous soft tissue abnormality.    Findings by level as follows:    L1-2: Negative. No disc protrusion. No central or lateral stenosis.    L2-3: Dehydration of the disc material. Mild annular bulge. Small  right lateral disc protrusion seen on axial series 901 image 25 but  without definite nerve root compression. No central stenosis. No  left-sided foraminal stenosis. Mild facet joint disease on the right.    L3-4: Dehydration of the disc material. Moderate annular bulge. Mild  facet joint disease on the left and moderate facet joint disease on  the right. Mild to moderate central stenosis. Mild bilateral foraminal  stenosis.    L4-5: Dehydration of the disc material. Prominent annular bulge and  posterior annular fissure. Moderate bilateral facet joint disease with  ligamentous hypertrophy. Severe central spinal stenosis. Moderate  bilateral foraminal stenosis.    L5-S1: Advanced narrowing of the interspace. Annular bulge and small  posterior annular fissure. No significant central or right lateral  stenosis. Mild foraminal stenosis on the left.  Mild bilateral facet  joint disease.      Impression    IMPRESSION:  1. Multilevel degenerative change as described L2-3 through  lumbosacral level.  2. Severe central spinal stenosis L4-5.  3. Tiny right lateral disc protrusion L2-3 but without definite nerve  root compression.    YELITZA RAMIREZ MD           Enclosure: Lab Results

## 2017-06-27 ENCOUNTER — TELEPHONE (OUTPATIENT)
Dept: FAMILY MEDICINE | Facility: CLINIC | Age: 77
End: 2017-06-27

## 2017-06-27 DIAGNOSIS — M48.062 SPINAL STENOSIS OF LUMBAR REGION WITH NEUROGENIC CLAUDICATION: Primary | ICD-10-CM

## 2017-06-27 NOTE — TELEPHONE ENCOUNTER
Correction to previous note:  Severe spinal stenosis of L4-L5  Collaborated with PCP  He will be referred to ortho  spine specialists for appropriate disposition

## 2017-06-27 NOTE — TELEPHONE ENCOUNTER
Reason for Call:  Other MRI    Detailed comments: patient is calling for results of MRI he had on 6/25.    Phone Number Patient can be reached at: Cell number on file:    Telephone Information:   Mobile [phone]       Best Time: any    Can we leave a detailed message on this number? YES    Call taken on 6/27/2017 at 4:20 PM by Indigo Roth

## 2017-06-27 NOTE — PROGRESS NOTES
This is your copy of the MRI of your low back showing the spinal stenosis.  The referral to the spine specialists has been placed and you will be receiving a call.

## 2017-06-28 DIAGNOSIS — M54.50 ACUTE RIGHT-SIDED LOW BACK PAIN WITHOUT SCIATICA: ICD-10-CM

## 2017-06-28 RX ORDER — TRAMADOL HYDROCHLORIDE 50 MG/1
50-100 TABLET ORAL EVERY 8 HOURS PRN
Qty: 60 TABLET | Refills: 0 | Status: ON HOLD | OUTPATIENT
Start: 2017-06-28 | End: 2018-11-29

## 2017-06-28 NOTE — TELEPHONE ENCOUNTER
PCP,  Pt requesting a refill of recent Tramadol RX.  Recent dx and referral to ortho.  Catherine Marley RN

## 2017-06-28 NOTE — TELEPHONE ENCOUNTER
Controlled Substance Refill Request for Tramadol  Problem List Complete:  No     PROVIDER TO CONSIDER COMPLETION OF PROBLEM LIST AND OVERVIEW/CONTROLLED SUBSTANCE AGREEMENT    Last Written Prescription Date:  06/22/17  Last Fill Quantity: 20,   # refills: 0    Last Office Visit with [facility] [place]: 06/22/17    Future Office visit:     Controlled substance agreement on file: No.     Processing:  Patient will  in clinic   checked in past 6 months?  No, route to RN     Darby Rodriguez MA

## 2017-06-28 NOTE — TELEPHONE ENCOUNTER
Reason for Call:  Medication or medication refill:    Do you use a [facility]?  Name of the pharmacy and phone number for the current request:  Will  at      Name of the medication requested: traMADol (ULTRAM) 50 MG tablet    Other request: Wife has an appointment today. Aniket Macias would like the prescription to be ready when she has her appointment.     Can we leave a detailed message on this number? YES    Phone number patient can be reached at: Cell number on file:    Telephone Information:   Mobile [phone]       Best Time: ASAP     Call taken on 6/28/2017 at 10:47 AM by Deb Poole

## 2017-07-05 ENCOUNTER — OFFICE VISIT (OUTPATIENT)
Dept: NEUROSURGERY | Facility: CLINIC | Age: 77
End: 2017-07-05
Attending: PHYSICIAN ASSISTANT
Payer: COMMERCIAL

## 2017-07-05 VITALS
HEIGHT: 69 IN | HEART RATE: 51 BPM | SYSTOLIC BLOOD PRESSURE: 125 MMHG | DIASTOLIC BLOOD PRESSURE: 73 MMHG | OXYGEN SATURATION: 97 % | TEMPERATURE: 97.9 F | WEIGHT: 179 LBS | BODY MASS INDEX: 26.51 KG/M2

## 2017-07-05 DIAGNOSIS — M54.16 LUMBAR RADICULOPATHY: Primary | ICD-10-CM

## 2017-07-05 PROCEDURE — 99211 OFF/OP EST MAY X REQ PHY/QHP: CPT | Performed by: PHYSICIAN ASSISTANT

## 2017-07-05 PROCEDURE — 99203 OFFICE O/P NEW LOW 30 MIN: CPT | Performed by: PHYSICIAN ASSISTANT

## 2017-07-05 NOTE — NURSING NOTE
"Aniket Macias is a 77 year old male who presents for:  Chief Complaint   Patient presents with     Neurologic Problem     referral from Dr. PADMA Mishra for right sided low back pain without sciatica        Initial Vitals:  There were no vitals taken for this visit. Estimated body mass index is 26.26 kg/(m^2) as calculated from the following:    Height as of 6/22/17: 5' 10\" (1.778 m).    Weight as of 6/22/17: 183 lb (83 kg).. There is no height or weight on file to calculate BSA. BP completed using cuff size: regular  Data Unavailable    Do you feel safe in your environment?  Yes  Do you need any refills today? No    Nursing Comments:referral from Dr. PADMA Mishra for right sided low back pain without sciatica .  Patient rates his pain today as 3-4 to upper right thigh      5 min. nursing intake time  Casi Hong CMA, AAS      Discharge plan:   See providers dictation   2 min. nursing discharge time  Casi Hong CMA, AAS       "

## 2017-07-05 NOTE — MR AVS SNAPSHOT
After Visit Summary   7/5/2017    Aniket Macias    MRN: [id number]           Patient Information     Date Of Birth          1940        Visit Information        Provider Department      7/5/2017 2:05 PM Beryl Kwan PA-C [facility] Neurosurgery [facility]        Today's Diagnoses     Lumbar radiculopathy    -  1      Care Instructions    L4-5 steroid injection ordered - they will contact you in 1-2 days to schedule          Follow-ups after your visit        Your next 10 appointments already scheduled     Jul 13, 2017 10:40 AM CDT   Anticoagulation Visit with [facility]   [facility] [place] ([facility])    [address] [place] [id number] [phone]              Future tests that were ordered for you today     Open Future Orders        Priority Expected Expires Ordered    XR Lumbar Epidural Injection Incl Imaging Routine 7/5/2017 7/5/2018 7/5/2017            Who to contact     If you have questions or need follow up information about today's clinic visit or your schedule please contact [facility] NEUROSURGERY [facility] directly at [phone].  Normal or non-critical lab and imaging results will be communicated to you by [employer], letter or phone within 4 business days after the clinic has received the results. If you do not hear from us within 7 days, please contact the clinic through [employer] or phone. If you have a critical or abnormal lab result, we will notify you by phone as soon as possible.  Submit refill requests through [employer] or call your pharmacy and they will forward the refill request to us. Please allow 3 business days for your refill to be completed.          Additional Information About Your Visit        [employer] Information     [employer] lets you send messages to your doctor, view your test results, renew your prescriptions, schedule appointments and more. To sign up, go to  [email or web address] . Click on \"Log in\" on the left side of the screen, which will take you to the Welcome page. Then click on \"Sign up Now\" on the right side of the page.     You will be asked to enter the access code listed below, as well as some personal information. Please follow the directions to create your username and password.     Your access code is: [email or web address]  Expires: 2017  4:07 PM     Your access code will  in 90 days. If you need help or a new code, please call your [facility] clinic or [phone].        Care EveryWhere ID     This is your Care EveryWhere ID. This could be used by other organizations to access your [facility] medical records  [id number]        Your Vitals Were     Pulse Temperature Height Pulse Oximetry BMI (Body Mass Index)       51 97.9  F (36.6  C) (Oral) 5' 8.5\" (1.74 m) 97% 26.82 kg/m2        Blood Pressure from Last 3 Encounters:   17 125/73   17 131/80   17 134/80    Weight from Last 3 Encounters:   17 179 lb (81.2 kg)   17 183 lb (83 kg)   17 192 lb (87.1 kg)               Primary Care Provider Office Phone # Fax #    Jennifer Mishra  [phone]       [facility] [address] [facility] [address]  [facility] [address]        Equal Access to Services     EDY CORDOVA AH: Hadii aad ku hadasho Soomaali, waaxda luqadaha, qaybta kaalmada adeegyada, kim akins. So [facility] [phone].    ATENCIÓN: Si habla español, tiene a owens disposición servicios gratuitos de asistencia lingüística. Llame al [phone].    We comply with applicable federal civil rights laws and [place] laws. We do not discriminate on the basis of race, color, national origin, age, disability sex, sexual orientation or gender identity.            Thank you!     Thank you for choosing [facility] NEUROSURGERY [facility]  for your care. Our goal is always to provide you with excellent care. Hearing back from our " patients is one way we can continue to improve our services. Please take a few minutes to complete the written survey that you may receive in the mail after your visit with us. Thank you!             Your Updated Medication List - Protect others around you: Learn how to safely use, store and throw away your medicines at www.disposemymeds.org.          This list is accurate as of: 7/5/17  2:33 PM.  Always use your most recent med list.                   Brand Name Dispense Instructions for use Diagnosis    ACETAMINOPHEN PO      Take 1,000 mg by mouth every 6 hours as needed for pain        atorvastatin 40 MG tablet    LIPITOR    90 tablet    Take 1 tablet (40 mg) by mouth daily    Hyperlipidemia LDL goal <100       hydrocortisone 1 % cream    CORTAID     Apply topically daily as needed        latanoprost 0.005 % ophthalmic solution    XALATAN     Place 1 drop into both eyes At Bedtime        lisinopril 20 MG tablet    PRINIVIL/ZESTRIL    90 tablet    Take 1 tablet (20 mg) by mouth daily    Benign essential hypertension       metoprolol 50 MG tablet    LOPRESSOR    180 tablet    Take 1 tablet (50 mg) by mouth 2 times daily    Paroxysmal atrial fibrillation (H)       MULTIVITAMIN PO      Take 1 tablet by mouth daily        terazosin 2 MG capsule    HYTRIN    90 capsule    Take 1 capsule (2 mg) by mouth daily    Benign non-nodular prostatic hyperplasia with lower urinary tract symptoms       traMADol 50 MG tablet    ULTRAM    60 tablet    Take 1-2 tablets ( mg) by mouth every 8 hours as needed for pain Maximum 6 tablet(s) per day    Acute right-sided low back pain without sciatica       UNABLE TO FIND      MEDICATION NAME: Pro-racia  OTC rosacea cream.        warfarin 5 MG tablet    COUMADIN    90 tablet    Take 1 tab daily or as directed by INR clinic    Long term current use of anticoagulant therapy

## 2017-07-05 NOTE — PROGRESS NOTES
Dr. Jack Jacobs  [facility]  Neurosurgery Clinic Visit      CC: Right low back pain    Primary care Provider: Jennifer Mishra      Reason For Visit:   I was asked to consult on the patient for low back pain.      HPI: Aniket Macias is a 77 year old male who presents for evaluation of right low back pain x 1 week. Pain is located in right low back and radiates into right lateral thigh and right groin. Describes the pain as constant and achy. Standing or walking for extended periods of time causes the pain to worsen. He has been using heat, which has helped dull the pain. He has not done any physical therapy or injections. Denies trauma or injury. He does have a remote history of a lumbar laminectomy approximately 30 years ago. He has noticed changes in urination with hesitancy. Denies bowel changes. He experienced one episode of left leg weakness when he went from seated to standing and felt as though his leg collapsed. Denies foot drop.   Current pain: 2-3/10 At worst: 7-8/10    Past Medical History:   Diagnosis Date     Basal cell carcinoma      BPH (benign prostatic hypertrophy)      Diverticulosis      Glaucoma      Hemorrhoids      HTN (hypertension)      Hyperlipidemia      Obesity      Paroxysmal atrial fibrillation (H) Dx 2013 [place]     PVC (premature ventricular contraction)      Squamous cell carcinoma in situ of skin      Syncope 4/11/2016       Past Medical History reviewed with patient during visit.    Past Surgical History:   Procedure Laterality Date     BACK SURGERY       COLONOSCOPY  11/19/15    hx polyps     SURGICAL HISTORY OF -       Laminectomy     SURGICAL HISTORY OF -       biopsy of prostate     TONSILLECTOMY & ADENOIDECTOMY       Past Surgical History reviewed with patient during visit.    Current Outpatient Prescriptions   Medication     traMADol (ULTRAM) 50 MG tablet     UNABLE TO FIND     metoprolol (LOPRESSOR) 50 MG tablet     atorvastatin (LIPITOR) 40 MG tablet      lisinopril (PRINIVIL/ZESTRIL) 20 MG tablet     terazosin (HYTRIN) 2 MG capsule     warfarin (COUMADIN) 5 MG tablet     hydrocortisone (CORTAID) 1 % cream     ACETAMINOPHEN PO     Multiple Vitamins-Minerals (MULTIVITAMIN PO)     latanoprost (XALATAN) 0.005 % ophthalmic solution     No current facility-administered medications for this visit.        No Known Allergies    Social History     Social History     Marital status:      Spouse name: N/A     Number of children: N/A     Years of education: N/A     Social History Main Topics     Smoking status: Former Smoker     Packs/day: 1.00     Years: 20.00     Types: Cigarettes     Smokeless tobacco: Never Used      Comment: quit age 54     Alcohol use 0.0 oz/week     0 Standard drinks or equivalent per week      Comment: 2-3 wine/beer per week     Drug use: No     Sexual activity: Not Currently     Partners: Female     Other Topics Concern     Caffeine Concern Yes     2 cups coffee per day     Sleep Concern No     Stress Concern No     Weight Concern No     Exercise No     Social History Narrative       Family History   Problem Relation Age of Onset     CEREBROVASCULAR DISEASE Mother      Hypertension Mother      CEREBROVASCULAR DISEASE Father      Myocardial Infarction Father      Hypertension Father      Hypertension Sister      Myocardial Infarction Sister           ROS: 10 point ROS neg other than the symptoms noted above in the HPI.    Vital Signs: There were no vitals taken for this visit.    Examination:  Constitutional:  Alert, well nourished, NAD.  HEENT: Normocephalic, atraumatic.   Pulmonary:  Without shortness of breath, normal effort.   Lymph: no lymphadenopathy to low back or LE.   Integumentary: Skin is free of rashes or lesions.   Cardiovascular:  No pitting edema of BLE.      Neurological:  Awake  Alert  Oriented x 3  Speech clear  Cranial nerves II - XII grossly intact  PERRL  EOMI  Face symmetric  Tongue midline  Motor exam   Hip Flexor:                 Right: 5/5  Left:  5/5  Hip Adductor:             Right:  5/5  Left:  5/5  Hip Abductor:             Right:  5/5  Left:  5/5  Gastroc Soleus:        Right:  5/5  Left:  5/5  Tib/Ant:                      Right:  5/5  Left:  5/5  EHL:                          Right:  5/5  Left:  5/5       Sensation normal to bilateral upper and lower extremities.    Reflexes are 2+ in the patellar and Achilles. There is no clonus. Downgoing Babinski.    Musculoskeletal:  Gait: Able to stand from a seated position. Normal non-antalgic, non-myelopathic gait.  Able to heel/toe walk without loss of balance  Lumbar examination reveals no tenderness of the spine or paraspinous muscles.  Hip height is symmetrical. Negative SI joint, sciatic notch or greater trochanteric tenderness to palpation bilaterally.  Straight leg raise is negative bilaterally.      Imaging:   MRI of the lumbar spine from 6/25/2017 was reviewed in the office today.     MR LUMBAR SPINE WITHOUT CONTRAST 6/25/2017 7:49 AM     HISTORY: Right leg pain.     COMPARISON: None.     TECHNIQUE: Routine exam T12 through the sacrum.     FINDINGS: Conus lies at mid L1 and appears normal. Alignment is normal throughout the lumbar spine. No paraspinous soft tissue abnormality.     Findings by level as follows:     L1-2: Negative. No disc protrusion. No central or lateral stenosis.     L2-3: Dehydration of the disc material. Mild annular bulge. Small  right lateral disc protrusion seen on axial series 901 image 25 but  without definite nerve root compression. No central stenosis. No  left-sided foraminal stenosis. Mild facet joint disease on the right.     L3-4: Dehydration of the disc material. Moderate annular bulge. Mild facet joint disease on the left and moderate facet joint disease on the right. Mild to moderate central stenosis. Mild bilateral foraminal stenosis.     L4-5: Dehydration of the disc material. Prominent annular bulge and  posterior annular fissure.  Moderate bilateral facet joint disease with  ligamentous hypertrophy. Severe central spinal stenosis. Moderate  bilateral foraminal stenosis.     L5-S1: Advanced narrowing of the interspace. Annular bulge and small posterior annular fissure. No significant central or right lateral  stenosis. Mild foraminal stenosis on the left. Mild bilateral facet  joint disease.      IMPRESSION:  1. Multilevel degenerative change as described L2-3 through  lumbosacral level.  2. Severe central spinal stenosis L4-5.  3. Tiny right lateral disc protrusion L2-3 but without definite nerve  root compression.     YELITZA RAMIREZ MD    Assessment/Plan:   Aniket Macias is a 77 year old male ho presents for evaluation of right low back pain x 1 week. Pain is located in right low back and radiates into right lateral thigh and right groin. Describes the pain as constant and achy. Lumbar MRI reveals severe central stenosis at L4-5, which is likely causing his symptoms. We discussed physical therapy and lumbar KEVIN, as he is adamant about starting with conservative treatment before discussing surgery. He does not wish to try physical therapy at this point and would like to have an order for an injection placed. I have referred him for an L4-5 KEVIN. Patient voiced understanding and agreement.          Beryl Kwan PA-C  [facility]  [address] [address]    Tel [phone]  Pager [phone]

## 2017-07-06 ENCOUNTER — CARE COORDINATION (OUTPATIENT)
Dept: CARDIOLOGY | Facility: CLINIC | Age: 77
End: 2017-07-06

## 2017-07-06 ENCOUNTER — TELEPHONE (OUTPATIENT)
Dept: CARDIOLOGY | Facility: CLINIC | Age: 77
End: 2017-07-06

## 2017-07-06 NOTE — PROGRESS NOTES
Call from LUANA Martinez - patient scheduled for spinal injection on Mon 7/10 and will hold warfarin for 4 days prior (today 7/6 - 7/9) and then resume his usual 5 mg daily on 7/10 after procedure. He was scheduled for routine INR check on 7/13.    Called patient and r/s his INR recheck to 7/17. Normally want to recheck 10 days after resuming but [place].

## 2017-07-06 NOTE — TELEPHONE ENCOUNTER
Pt called and is having a spinal injection on Monday and needs to hold his Warfarin per Epic for 4 days prior to injection. Pt then stated that he will restart Monday night, taking 5 mg and is scheduled for an INR on Thursday. Pt wanting to know what else he will need to do. Pt has INR's completed at this clinic, so explained that I would discuss with INR nurse, as to what pt needs to do. Spoke to Rosa who stated that pt takes 5 mg daily and would not need to have INR done until about 7/20. Rosa will call pt to ask if date and what time will work for pt. Marina

## 2017-07-10 ENCOUNTER — HOSPITAL ENCOUNTER (OUTPATIENT)
Dept: GENERAL RADIOLOGY | Facility: CLINIC | Age: 77
End: 2017-07-10
Attending: PHYSICIAN ASSISTANT
Payer: MEDICARE

## 2017-07-10 ENCOUNTER — HOSPITAL ENCOUNTER (OUTPATIENT)
Facility: CLINIC | Age: 77
Discharge: HOME | End: 2017-07-10
Attending: RADIOLOGY
Payer: MEDICARE

## 2017-07-10 VITALS
SYSTOLIC BLOOD PRESSURE: 136 MMHG | RESPIRATION RATE: 18 BRPM | TEMPERATURE: 97.4 F | DIASTOLIC BLOOD PRESSURE: 77 MMHG | OXYGEN SATURATION: 97 % | HEART RATE: 60 BPM

## 2017-07-10 DIAGNOSIS — M54.16 LUMBAR RADICULOPATHY: ICD-10-CM

## 2017-07-10 LAB — INR PPP: 1.22 (ref 0.86–1.14)

## 2017-07-10 PROCEDURE — 27211111 XR LUMBAR TRANSFORAMINAL INJ SINGLE

## 2017-07-10 PROCEDURE — 25500064 ZZH RX 255 OP 636: Performed by: PHYSICIAN ASSISTANT

## 2017-07-10 PROCEDURE — 25000128 H RX IP 250 OP 636: Performed by: PHYSICIAN ASSISTANT

## 2017-07-10 PROCEDURE — 40000863 ZZH STATISTIC RADIOLOGY XRAY, US, CT, MAR, NM

## 2017-07-10 PROCEDURE — 36415 COLL VENOUS BLD VENIPUNCTURE: CPT | Performed by: RADIOLOGY

## 2017-07-10 PROCEDURE — 25000125 ZZHC RX 250: Performed by: PHYSICIAN ASSISTANT

## 2017-07-10 PROCEDURE — 85610 PROTHROMBIN TIME: CPT | Performed by: RADIOLOGY

## 2017-07-10 RX ORDER — DEXAMETHASONE SODIUM PHOSPHATE 10 MG/ML
20 INJECTION, SOLUTION INTRAMUSCULAR; INTRAVENOUS ONCE
Status: COMPLETED | OUTPATIENT
Start: 2017-07-10 | End: 2017-07-10

## 2017-07-10 RX ORDER — IOPAMIDOL 408 MG/ML
10 INJECTION, SOLUTION INTRATHECAL ONCE
Status: COMPLETED | OUTPATIENT
Start: 2017-07-10 | End: 2017-07-10

## 2017-07-10 RX ADMIN — DEXAMETHASONE SODIUM PHOSPHATE 20 MG: 10 INJECTION, SOLUTION INTRAMUSCULAR; INTRAVENOUS at 11:52

## 2017-07-10 RX ADMIN — IOPAMIDOL 2 ML: 408 INJECTION, SOLUTION INTRATHECAL at 11:50

## 2017-07-10 RX ADMIN — LIDOCAINE HYDROCHLORIDE 2.5 ML: 10 INJECTION, SOLUTION EPIDURAL; INFILTRATION; INTRACAUDAL; PERINEURAL at 11:49

## 2017-07-10 RX ADMIN — LIDOCAINE HYDROCHLORIDE 3 ML: 10 INJECTION, SOLUTION EPIDURAL; INFILTRATION; INTRACAUDAL; PERINEURAL at 11:52

## 2017-07-10 NOTE — PROGRESS NOTES
Pt arrives ambulatory for his KEVIN,  alert and oriented, wife is with the patient, skin is warm and dry, color is good.

## 2017-07-10 NOTE — DISCHARGE INSTRUCTIONS
Steroid Injection Discharge Instructions     After you go home:      You may resume your normal diet.    Care of Puncture Site:      If you have a bandaid on your puncture site, you may remove it the next morning    You may shower tomorrow    No bath tubs, whirlpools or swimming for at least 3 days     Activity:      You may go back to normal activity in 24 hours    You should let pain be your guide as to the extent of your activities    Maintain any activity limitations as ordered by your provider    Do NOT drive a vehicle until tomorrow    Medicines:      You may resume all medications    Resume your Warfarin/Coumadin at your regular dose today. Follow up with your provider to have your INR rechecked    Resume your Platelet Inhibitors and Aspirin tomorrow at your regular dose    For minor pain, you may take Acetaminophen (Tylenol) or Ibuprofen (Advil)    Pain:       You may experience increased or different pain over the next 24-48 hours    For the next 48 hrs - you may use ice packs for discomfort     Call your primary care doctor if:      You have severe pain that does not improve with pain medication    You have chills or a fever greater than 101 F (38 C)    The site is red, swollen, hot or tender    New problems with your bowel or bladder    Any questions or concerns    Other Instructions:      New numbness down your leg post injection is temporary and may last for up to 6 hours. You may need assistance with activity until your leg has normal sensation.    If you are diabetic, monitor your blood sugar closely. Contact the provider who manages your diabetes to help you control your blood sugar if needed.    For Your Information:      A steroid was injected to help decrease swelling and may help to reduce pain. It may take up to 7-10 days to obtain full results.    Some patients will get lasting relief from a single injection. Others may require up to 3 injections to get results. If you have more than one  steroid injection, they should be given 2 weeks apart.    Side effects of your steroid injection are mild and will go away in 2-3 days  - Insomnia  - Heartburn  - Flushed face  - Water retention  - Increased appetite  - Increased blood sugar      If you have questions call:        [facility] Radiology Dept @ [phone]      The provider who performed your procedure was ___Dr. Frost.

## 2017-07-10 NOTE — IP AVS SNAPSHOT
[place] [address]    [place] [address]    Phone:  [phone]                                       After Visit Summary   7/10/2017    Aniket Macias    MRN: [id number]           After Visit Summary Signature Page     I have received my discharge instructions, and my questions have been answered. I have discussed any challenges I see with this plan with the nurse or doctor.    ..........................................................................................................................................  Patient/Patient Representative Signature      ..........................................................................................................................................  Patient Representative Print Name and Relationship to Patient    ..................................................               ................................................  Date                                            Time    ..........................................................................................................................................  Reviewed by Signature/Title    ...................................................              ..............................................  Date                                                            Time

## 2017-07-10 NOTE — IP AVS SNAPSHOT
MRN:[id number]                      After Visit Summary   7/10/2017    Aniket Macias    MRN: [id number]           Visit Information        Department      7/10/2017 10:08 AM [facility]          Review of your medicines      UNREVIEWED medicines. Ask your doctor about these medicines        Dose / Directions    ACETAMINOPHEN PO        Dose:  1000 mg   Take 1,000 mg by mouth every 6 hours as needed for pain   Refills:  0       atorvastatin 40 MG tablet   Commonly known as:  LIPITOR   Used for:  Hyperlipidemia LDL goal <100        Dose:  40 mg   Take 1 tablet (40 mg) by mouth daily   Quantity:  90 tablet   Refills:  3       hydrocortisone 1 % cream   Commonly known as:  CORTAID        Apply topically daily as needed   Refills:  0       latanoprost 0.005 % ophthalmic solution   Commonly known as:  XALATAN        Dose:  1 drop   Place 1 drop into both eyes At Bedtime   Refills:  0       lisinopril 20 MG tablet   Commonly known as:  PRINIVIL/ZESTRIL   Used for:  Benign essential hypertension        Dose:  20 mg   Take 1 tablet (20 mg) by mouth daily   Quantity:  90 tablet   Refills:  3       metoprolol 50 MG tablet   Commonly known as:  LOPRESSOR   Used for:  Paroxysmal atrial fibrillation (H)        Dose:  50 mg   Take 1 tablet (50 mg) by mouth 2 times daily   Quantity:  180 tablet   Refills:  3       MULTIVITAMIN PO        Dose:  1 tablet   Take 1 tablet by mouth daily   Refills:  0       terazosin 2 MG capsule   Commonly known as:  HYTRIN   Used for:  Benign non-nodular prostatic hyperplasia with lower urinary tract symptoms        Dose:  2 mg   Take 1 capsule (2 mg) by mouth daily   Quantity:  90 capsule   Refills:  3       traMADol 50 MG tablet   Commonly known as:  ULTRAM   Used for:  Acute right-sided low back pain without sciatica        Dose:   mg   Take 1-2 tablets ( mg) by mouth every 8 hours as needed for pain Maximum 6 tablet(s) per day   Quantity:  60 tablet    Refills:  0       UNABLE TO FIND        MEDICATION NAME: Pro-racia  OTC rosacea cream.   Refills:  0       warfarin 5 MG tablet   Commonly known as:  COUMADIN   Used for:  Long term current use of anticoagulant therapy        Take 1 tab daily or as directed by INR clinic   Quantity:  90 tablet   Refills:  3                Protect others around you: Learn how to safely use, store and throw away your medicines at www.disposemymeds.org.         Follow-ups after your visit        Your next 10 appointments already scheduled     Jul 17, 2017 10:40 AM CDT   Anticoagulation Visit with [facility]TICO[facility]ULATION   [facility] ([facility])    [address] [place], [place] [id number]  [facility] [id number] [phone]               Care Instructions        Further instructions from your care team       Steroid Injection Discharge Instructions     After you go home:      You may resume your normal diet.    Care of Puncture Site:      If you have a bandaid on your puncture site, you may remove it the next morning    You may shower tomorrow    No bath tubs, whirlpools or swimming for at least 3 days     Activity:      You may go back to normal activity in 24 hours    You should let pain be your guide as to the extent of your activities    Maintain any activity limitations as ordered by your provider    Do NOT drive a vehicle until tomorrow    Medicines:      You may resume all medications    Resume your Warfarin/Coumadin at your regular dose today. Follow up with your provider to have your INR rechecked    Resume your Platelet Inhibitors and Aspirin tomorrow at your regular dose    For minor pain, you may take Acetaminophen (Tylenol) or Ibuprofen (Advil)    Pain:       You may experience increased or different pain over the next 24-48 hours    For the next 48 hrs - you may use ice packs for discomfort     Call your primary care doctor if:      You have severe pain that does not improve with  "pain medication    You have chills or a fever greater than 101 F (38 C)    The site is red, swollen, hot or tender    New problems with your bowel or bladder    Any questions or concerns    Other Instructions:      New numbness down your leg post injection is temporary and may last for up to 6 hours. You may need assistance with activity until your leg has normal sensation.    If you are diabetic, monitor your blood sugar closely. Contact the provider who manages your diabetes to help you control your blood sugar if needed.    For Your Information:      A steroid was injected to help decrease swelling and may help to reduce pain. It may take up to 7-10 days to obtain full results.    Some patients will get lasting relief from a single injection. Others may require up to 3 injections to get results. If you have more than one steroid injection, they should be given 2 weeks apart.    Side effects of your steroid injection are mild and will go away in 2-3 days  - Insomnia  - Heartburn  - Flushed face  - Water retention  - Increased appetite  - Increased blood sugar      If you have questions call:        [facility] Radiology Dept @ [phone]      The provider who performed your procedure was ___Dr. Frost.         Additional Information About Your Visit        [employer] Information     [employer] lets you send messages to your doctor, view your test results, renew your prescriptions, schedule appointments and more. To sign up, go to [email or web address] . Click on \"Log in\" on the left side of the screen, which will take you to the Welcome page. Then click on \"Sign up Now\" on the right side of the page.     You will be asked to enter the access code listed below, as well as some personal information. Please follow the directions to create your username and password.     Your access code is: [email or web address]  Expires: 2017  4:07 PM     Your access code will  in 90 days. If you need help or a new code, " please call your [place] clinic or [phone].        Care EveryWhere ID     This is your Care EveryWhere ID. This could be used by other organizations to access your [place] medical records  [id number]        Your Vitals Were     Blood Pressure Pulse Temperature Respirations Pulse Oximetry       140/78 (BP Location: Right arm) 58 97.4  F (36.3  C) (Oral) 18 97%        Primary Care Provider Office Phone # Fax #    Jennifer Mishra,  [phone]      Equal Access to Services     [facility]: Hadii aad ku hadasho Soomaali, waaxda luqadaha, qaybta kaalmada adeegyada, waxay hiro hayrosa isela solomon . So [facility] [phone].    ATENCIÓN: Si habla español, tiene a owens disposición servicios gratuitos de asistencia lingüística. Llame al [phone].    We comply with applicable federal civil rights laws and [place] laws. We do not discriminate on the basis of race, color, national origin, age, disability sex, sexual orientation or gender identity.            Thank you!     Thank you for choosing [facility] for your care. Our goal is always to provide you with excellent care. Hearing back from our patients is one way we can continue to improve our services. Please take a few minutes to complete the written survey that you may receive in the mail after you visit with us. Thank you!             Medication List: This is a list of all your medications and when to take them. Check marks below indicate your daily home schedule. Keep this list as a reference.      Medications           Morning Afternoon Evening Bedtime As Needed    ACETAMINOPHEN PO   Take 1,000 mg by mouth every 6 hours as needed for pain                                atorvastatin 40 MG tablet   Commonly known as:  LIPITOR   Take 1 tablet (40 mg) by mouth daily                                hydrocortisone 1 % cream   Commonly known as:  CORTAID   Apply topically daily as needed                                latanoprost 0.005 %  ophthalmic solution   Commonly known as:  XALATAN   Place 1 drop into both eyes At Bedtime                                lisinopril 20 MG tablet   Commonly known as:  PRINIVIL/ZESTRIL   Take 1 tablet (20 mg) by mouth daily                                metoprolol 50 MG tablet   Commonly known as:  LOPRESSOR   Take 1 tablet (50 mg) by mouth 2 times daily                                MULTIVITAMIN PO   Take 1 tablet by mouth daily                                terazosin 2 MG capsule   Commonly known as:  HYTRIN   Take 1 capsule (2 mg) by mouth daily                                traMADol 50 MG tablet   Commonly known as:  ULTRAM   Take 1-2 tablets ( mg) by mouth every 8 hours as needed for pain Maximum 6 tablet(s) per day                                UNABLE TO FIND   MEDICATION NAME: Pro-racia  OTC rosacea cream.                                warfarin 5 MG tablet   Commonly known as:  COUMADIN   Take 1 tab daily or as directed by INR clinic

## 2017-07-17 ENCOUNTER — ANTICOAGULATION THERAPY VISIT (OUTPATIENT)
Dept: CARDIOLOGY | Facility: CLINIC | Age: 77
End: 2017-07-17
Payer: COMMERCIAL

## 2017-07-17 DIAGNOSIS — Z79.01 LONG-TERM (CURRENT) USE OF ANTICOAGULANTS: ICD-10-CM

## 2017-07-17 DIAGNOSIS — I48.0 PAROXYSMAL ATRIAL FIBRILLATION (H): ICD-10-CM

## 2017-07-17 LAB — INR POINT OF CARE: 2 (ref 0.86–1.14)

## 2017-07-17 PROCEDURE — 85610 PROTHROMBIN TIME: CPT | Mod: QW | Performed by: INTERNAL MEDICINE

## 2017-07-17 PROCEDURE — 36416 COLLJ CAPILLARY BLOOD SPEC: CPT | Performed by: INTERNAL MEDICINE

## 2017-07-17 NOTE — MR AVS SNAPSHOT
Aniket Macias   7/17/2017 10:40 AM   Anticoagulation Therapy Visit    Description:  77 year old male   Provider:   ANTICOAGULATION   Department:  [facility]           INR as of 7/17/2017     Today's INR 2.0      Anticoagulation Summary as of 7/17/2017     INR goal 2.0-3.0   Today's INR 2.0   Full instructions 5 mg every day   Next INR check 8/2/2017    Indications   Long-term (current) use of anticoagulants [Z79.01] [Z79.01]  Paroxysmal atrial fibrillation [I48.0]         Your next Anticoagulation Clinic appointment(s)     Aug 02, 2017 10:40 AM CDT   Anticoagulation Visit with  ANTICOAGULATION   [facility] ([facility])    [address] [place] [id number] [phone]              Contact Numbers     Anticoagulant (INR) Clinic Number: [id number]          July 2017 Details    Sun Mon Tue Wed Thu Fri Sat           1                 2               3               4               5               6               7               8                 9               10               11               12               13               14               15                 16               17      5 mg   See details      18      5 mg         19      5 mg         20      5 mg         21      5 mg         22      5 mg           23      5 mg         24      5 mg         25      5 mg         26      5 mg         27      5 mg         28      5 mg         29      5 mg           30      5 mg         31      5 mg               Date Details   07/17 This INR check               How to take your warfarin dose     To take:  5 mg Take 1 of the 5 mg tablets.           August 2017 Details    Sun Mon Tue Wed Thu Fri Sat       1      5 mg         2            3               4               5                 6               7               8               9               10               11               12                 13               14                15               16               17               18               19                 20               21               22               23               24               25               26                 27               28               29               30               31                  Date Details   No additional details    Date of next INR:  8/2/2017         How to take your warfarin dose     To take:  5 mg Take 1 of the 5 mg tablets.

## 2017-07-17 NOTE — PROGRESS NOTES
ANTICOAGULATION FOLLOW-UP CLINIC VISIT    Patient Name:  Aniket Macias  Date:  7/17/2017  Contact Type:  Face to Face    SUBJECTIVE:     Patient Findings     Positives No Problem Findings           OBJECTIVE    INR Protime   Date Value Ref Range Status   07/17/2017 2.0 (A) 0.86 - 1.14 Final       ASSESSMENT / PLAN  INR assessment THER    Recheck INR In: 2 WEEKS    INR Location Clinic      Anticoagulation Summary as of 7/17/2017     INR goal 2.0-3.0   Today's INR 2.0   Maintenance plan 5 mg (5 mg x 1) every day   Full instructions 5 mg every day   Weekly total 35 mg   No change documented Angelita Wolfe RN   Plan last modified Sallie Whalen RN (6/1/2016)   Next INR check 8/2/2017   Target end date Indefinite    Indications   Long-term (current) use of anticoagulants [Z79.01] [Z79.01]  Paroxysmal atrial fibrillation [I48.0]         Anticoagulation Episode Summary     INR check location     Preferred lab     Send INR reminders to [facility] HEART INR NURSE    Comments       Anticoagulation Care Providers     Provider Role Specialty Phone number    Sebastián Bermudez MD Responsible Cardiology [phone]            See the Encounter Report to view Anticoagulation Flowsheet and Dosing Calendar (Go to Encounters tab in chart review, and find the Anticoagulation Therapy Visit)    INR 2.0.  He held his warfarin for 4 days prior to spinal injection on 7/10.  Her resumed his 5mg/day on 7/10.  The last INR was 3.0 and he had just started the whole 30 diet this week of the last INR check.  He has been doing the whole 30 diet for about 2 month now but we really don't have an INR reflecting that diet so we will continue the 5mg/day and recheck in about 2 weeks.  Jared Wolfe, LUANA

## 2017-08-02 ENCOUNTER — ANTICOAGULATION THERAPY VISIT (OUTPATIENT)
Dept: CARDIOLOGY | Facility: CLINIC | Age: 77
End: 2017-08-02
Payer: COMMERCIAL

## 2017-08-02 DIAGNOSIS — I48.0 PAROXYSMAL ATRIAL FIBRILLATION (H): ICD-10-CM

## 2017-08-02 DIAGNOSIS — Z79.01 LONG-TERM (CURRENT) USE OF ANTICOAGULANTS: ICD-10-CM

## 2017-08-02 LAB — INR POINT OF CARE: 2.6 (ref 0.86–1.14)

## 2017-08-02 PROCEDURE — 36416 COLLJ CAPILLARY BLOOD SPEC: CPT | Performed by: INTERNAL MEDICINE

## 2017-08-02 PROCEDURE — 85610 PROTHROMBIN TIME: CPT | Mod: QW | Performed by: INTERNAL MEDICINE

## 2017-08-02 NOTE — PROGRESS NOTES
ANTICOAGULATION FOLLOW-UP CLINIC VISIT    Patient Name:  Aniket Macias  Date:  8/2/2017  Contact Type:  Face to Face    SUBJECTIVE:     Patient Findings     Positives No Problem Findings           OBJECTIVE    INR Protime   Date Value Ref Range Status   08/02/2017 2.6 (A) 0.86 - 1.14 Final       ASSESSMENT / PLAN  INR assessment THER    Recheck INR In: 5 WEEKS    INR Location Clinic      Anticoagulation Summary as of 8/2/2017     INR goal 2.0-3.0   Today's INR 2.6   Maintenance plan 5 mg (5 mg x 1) every day   Full instructions 5 mg every day   Weekly total 35 mg   No change documented Rosa Zaman, RN   Plan last modified Sallie Whalen RN (6/1/2016)   Next INR check 9/6/2017   Target end date Indefinite    Indications   Long-term (current) use of anticoagulants [Z79.01] [Z79.01]  Paroxysmal atrial fibrillation [I48.0]         Anticoagulation Episode Summary     INR check location     Preferred lab     Send INR reminders to [facility] HEART INR NURSE    Comments       Anticoagulation Care Providers     Provider Role Specialty Phone number    Sebastián Bermudez MD Responsible Cardiology [phone]            See the Encounter Report to view Anticoagulation Flowsheet and Dosing Calendar (Go to Encounters tab in chart review, and find the Anticoagulation Therapy Visit)    IRN 2.6 No med or diet changes. Has been on Whole30 diet for a couple of months now, and INR stable with that change. Continue 5 mg daily, recheck in 5 weeks.    Rosa Zaman, RN

## 2017-08-02 NOTE — MR AVS SNAPSHOT
Aniket Macias   8/2/2017 10:40 AM   Anticoagulation Therapy Visit    Description:  77 year old male   Provider:   ANTICOAGULATION   Department:  [facility]           INR as of 8/2/2017     Today's INR 2.6      Anticoagulation Summary as of 8/2/2017     INR goal 2.0-3.0   Today's INR 2.6   Full instructions 5 mg every day   Next INR check 9/6/2017    Indications   Long-term (current) use of anticoagulants [Z79.01] [Z79.01]  Paroxysmal atrial fibrillation [I48.0]         Your next Anticoagulation Clinic appointment(s)     Sep 06, 2017 10:40 AM CDT   Anticoagulation Visit with  ANTICOAGULATION   [facility] ([facility])    [address] [place] [id number] [phone]              Contact Numbers     Anticoagulant (INR) Clinic Number: [id number]          August 2017 Details    Sun Mon Tue Wed Thu Fri Sat       1               2      5 mg   See details      3      5 mg         4      5 mg         5      5 mg           6      5 mg         7      5 mg         8      5 mg         9      5 mg         10      5 mg         11      5 mg         12      5 mg           13      5 mg         14      5 mg         15      5 mg         16      5 mg         17      5 mg         18      5 mg         19      5 mg           20      5 mg         21      5 mg         22      5 mg         23      5 mg         24      5 mg         25      5 mg         26      5 mg           27      5 mg         28      5 mg         29      5 mg         30      5 mg         31      5 mg            Date Details   08/02 This INR check               How to take your warfarin dose     To take:  5 mg Take 1 of the 5 mg tablets.           September 2017 Details    Sun Mon Tue Wed Thu Fri Sat          1      5 mg         2      5 mg           3      5 mg         4      5 mg         5      5 mg         6            7               8               9                 10                11               12               13               14               15               16                 17               18               19               20               21               22               23                 24               25               26               27               28               29               30                Date Details   No additional details    Date of next INR:  9/6/2017         How to take your warfarin dose     To take:  5 mg Take 1 of the 5 mg tablets.

## 2017-09-06 ENCOUNTER — ANTICOAGULATION THERAPY VISIT (OUTPATIENT)
Dept: CARDIOLOGY | Facility: CLINIC | Age: 77
End: 2017-09-06
Payer: COMMERCIAL

## 2017-09-06 DIAGNOSIS — Z79.01 LONG-TERM (CURRENT) USE OF ANTICOAGULANTS: ICD-10-CM

## 2017-09-06 DIAGNOSIS — I48.0 PAROXYSMAL ATRIAL FIBRILLATION (H): ICD-10-CM

## 2017-09-06 LAB — INR POINT OF CARE: 2.6 (ref 0.86–1.14)

## 2017-09-06 PROCEDURE — 36416 COLLJ CAPILLARY BLOOD SPEC: CPT | Performed by: INTERNAL MEDICINE

## 2017-09-06 PROCEDURE — 85610 PROTHROMBIN TIME: CPT | Mod: QW | Performed by: INTERNAL MEDICINE

## 2017-09-06 NOTE — MR AVS SNAPSHOT
Aniket Macias   9/6/2017 10:40 AM   Anticoagulation Therapy Visit    Description:  77 year old male   Provider:   ANTICOAGULATION   Department:  [facility]           INR as of 9/6/2017     Today's INR 2.6      Anticoagulation Summary as of 9/6/2017     INR goal 2.0-3.0   Today's INR 2.6   Full instructions 5 mg every day   Next INR check 10/11/2017    Indications   Long-term (current) use of anticoagulants [Z79.01] [Z79.01]  Paroxysmal atrial fibrillation [I48.0]         Your next Anticoagulation Clinic appointment(s)     Oct 11, 2017 10:40 AM CDT   Anticoagulation Visit with  ANTICOAGULATION   [facility] ([facility])    [address] [place] [id number] [phone]              Contact Numbers     Anticoagulant (INR) Clinic Number: [phone]          September 2017 Details    Sun Mon Tue Wed Thu Fri Sat          1               2                 3               4               5               6      5 mg   See details      7      5 mg         8      5 mg         9      5 mg           10      5 mg         11      5 mg         12      5 mg         13      5 mg         14      5 mg         15      5 mg         16      5 mg           17      5 mg         18      5 mg         19      5 mg         20      5 mg         21      5 mg         22      5 mg         23      5 mg           24      5 mg         25      5 mg         26      5 mg         27      5 mg         28      5 mg         29      5 mg         30      5 mg          Date Details   09/06 This INR check               How to take your warfarin dose     To take:  5 mg Take 1 of the 5 mg tablets.           October 2017 Details    Sun Mon Tue Wed Thu Fri Sat     1      5 mg         2      5 mg         3      5 mg         4      5 mg         5      5 mg         6      5 mg         7      5 mg           8      5 mg         9      5 mg         10      5 mg         11             12               13               14                 15               16               17               18               19               20               21                 22               23               24               25               26               27               28                 29               30               31                    Date Details   No additional details    Date of next INR:  10/11/2017         How to take your warfarin dose     To take:  5 mg Take 1 of the 5 mg tablets.

## 2017-09-06 NOTE — PROGRESS NOTES
ANTICOAGULATION FOLLOW-UP CLINIC VISIT    Patient Name:  Aniket Macias  Date:  9/6/2017  Contact Type:  Face to Face    SUBJECTIVE:     Patient Findings     Positives No Problem Findings           OBJECTIVE    INR Protime   Date Value Ref Range Status   09/06/2017 2.6 (A) 0.86 - 1.14 Final       ASSESSMENT / PLAN  INR assessment THER    Recheck INR In: 5 WEEKS    INR Location Clinic      Anticoagulation Summary as of 9/6/2017     INR goal 2.0-3.0   Today's INR 2.6   Maintenance plan 5 mg (5 mg x 1) every day   Full instructions 5 mg every day   Weekly total 35 mg   No change documented Stefani Kaminski, LUANA   Plan last modified Sallie Whalen RN (6/1/2016)   Next INR check 10/11/2017   Target end date Indefinite    Indications   Long-term (current) use of anticoagulants [Z79.01] [Z79.01]  Paroxysmal atrial fibrillation [I48.0]         Anticoagulation Episode Summary     INR check location     Preferred lab     Send INR reminders to [facility] HEART INR NURSE    Comments       Anticoagulation Care Providers     Provider Role Specialty Phone number    Sebastián Bermudez MD Responsible Cardiology [phone]            See the Encounter Report to view Anticoagulation Flowsheet and Dosing Calendar (Go to Encounters tab in chart review, and find the Anticoagulation Therapy Visit)    INR 2.6 No change in meds or diet. No abnormal bleeding or bruising. Will continue current dosing of 5 mg daily and recheck in 5 weeks. Dosage adjustment made based on physician directed care plan.    Stefani Kaminski, LUANA

## 2017-10-06 ENCOUNTER — ALLIED HEALTH/NURSE VISIT (OUTPATIENT)
Dept: NURSING | Facility: CLINIC | Age: 77
End: 2017-10-06
Payer: COMMERCIAL

## 2017-10-06 DIAGNOSIS — Z23 NEED FOR PROPHYLACTIC VACCINATION AND INOCULATION AGAINST INFLUENZA: Primary | ICD-10-CM

## 2017-10-06 PROCEDURE — G0008 ADMIN INFLUENZA VIRUS VAC: HCPCS

## 2017-10-06 PROCEDURE — 90662 IIV NO PRSV INCREASED AG IM: CPT

## 2017-10-06 PROCEDURE — 99207 ZZC NO CHARGE NURSE ONLY: CPT

## 2017-10-06 NOTE — PROGRESS NOTES
Injectable Influenza Immunization Documentation    1.  Is the person to be vaccinated sick today?   No    2. Does the person to be vaccinated have an allergy to a component   of the vaccine?   No    3. Has the person to be vaccinated ever had a serious reaction   to influenza vaccine in the past?   No    4. Has the person to be vaccinated ever had Guillain-Barré syndrome?   No    Form completed by Nolvia Mehta LPN

## 2017-10-06 NOTE — MR AVS SNAPSHOT
"              After Visit Summary   10/6/2017    Aniket Macias    MRN: [id number]           Patient Information     Date Of Birth          1940        Visit Information        Provider Department      10/6/2017 10:30 AM [place] NURSE [facility]        Today's Diagnoses     Need for prophylactic vaccination and inoculation against influenza    -  1       Follow-ups after your visit        Your next 10 appointments already scheduled     Oct 11, 2017 10:40 AM CDT   Anticoagulation Visit with [place] ANTICOAGULATION   [facility] ([facility])    [address] [place], [place] [id number]  [place] [address] [phone]              Who to contact     If you have questions or need follow up information about today's clinic visit or your schedule please contact [facility] directly at [phone].  Normal or non-critical lab and imaging results will be communicated to you by MyChart, letter or phone within 4 business days after the clinic has received the results. If you do not hear from us within 7 days, please contact the clinic through MyChart or phone. If you have a critical or abnormal lab result, we will notify you by phone as soon as possible.  Submit refill requests through LUV[facility] or call your pharmacy and they will forward the refill request to us. Please allow 3 business days for your refill to be completed.          Additional Information About Your Visit        MyChart Information     LUVHAN lets you send messages to your doctor, view your test results, renew your prescriptions, schedule appointments and more. To sign up, go to [email or web address] . Click on \"Log in\" on the left side of the screen, which will take you to the Welcome page. Then click on \"Sign up Now\" on the right side of the page.     You will be asked to enter the access code listed below, as well as some personal information. Please follow the directions to create your " username and password.     Your access code is: [email or web address]  Expires: 2018 10:47 AM     Your access code will  in 90 days. If you need help or a new code, please call your [place] clinic or [phone].        Care EveryWhere ID     This is your Care EveryWhere ID. This could be used by other organizations to access your [place] medical records  [id number]         Blood Pressure from Last 3 Encounters:   07/10/17 136/77   17 125/73   17 131/80    Weight from Last 3 Encounters:   17 179 lb (81.2 kg)   17 183 lb (83 kg)   17 192 lb (87.1 kg)              We Performed the Following     FLU VACCINE, INCREASED ANTIGEN, PRESV FREE, AGE 65+ [80111]     Vaccine Administration, Initial [02860]        Primary Care Provider Office Phone # Fax #    Jennifer Mishra,  [phone] [address] [address]        Equal Access to Services     [facility]: Hadii aad ku hadasho Soomaali, waaxda luqadaha, qaybta kaalmada hafsa, kim solomon . So [facility] [phone].    ATENCIÓN: Si habla español, tiene a owens disposición servicios gratuitos de asistencia lingüística. [facility] [phone].    We comply with applicable federal civil rights laws and [place] laws. We do not discriminate on the basis of race, color, national origin, age, disability, sex, sexual orientation, or gender identity.            Thank you!     Thank you for choosing [facility]  for your care. Our goal is always to provide you with excellent care. Hearing back from our patients is one way we can continue to improve our services. Please take a few minutes to complete the written survey that you may receive in the mail after your visit with us. Thank you!             Your Updated Medication List - Protect others around you: Learn how to safely use, store and throw away your medicines at www.disposemymeds.org.          This list is accurate  as of: 10/6/17 10:47 AM.  Always use your most recent med list.                   Brand Name Dispense Instructions for use Diagnosis    ACETAMINOPHEN PO      Take 1,000 mg by mouth every 6 hours as needed for pain        atorvastatin 40 MG tablet    LIPITOR    90 tablet    Take 1 tablet (40 mg) by mouth daily    Hyperlipidemia LDL goal <100       hydrocortisone 1 % cream    CORTAID     Apply topically daily as needed        latanoprost 0.005 % ophthalmic solution    XALATAN     Place 1 drop into both eyes At Bedtime        lisinopril 20 MG tablet    PRINIVIL/ZESTRIL    90 tablet    Take 1 tablet (20 mg) by mouth daily    Benign essential hypertension       metoprolol 50 MG tablet    LOPRESSOR    180 tablet    Take 1 tablet (50 mg) by mouth 2 times daily    Paroxysmal atrial fibrillation (H)       MULTIVITAMIN PO      Take 1 tablet by mouth daily        terazosin 2 MG capsule    HYTRIN    90 capsule    Take 1 capsule (2 mg) by mouth daily    Benign non-nodular prostatic hyperplasia with lower urinary tract symptoms       traMADol 50 MG tablet    ULTRAM    60 tablet    Take 1-2 tablets ( mg) by mouth every 8 hours as needed for pain Maximum 6 tablet(s) per day    Acute right-sided low back pain without sciatica       UNABLE TO FIND      MEDICATION NAME: Pro-racia  OTC rosacea cream.        warfarin 5 MG tablet    COUMADIN    90 tablet    Take 1 tab daily or as directed by INR clinic    Long term current use of anticoagulant therapy

## 2017-10-11 ENCOUNTER — ANTICOAGULATION THERAPY VISIT (OUTPATIENT)
Dept: CARDIOLOGY | Facility: CLINIC | Age: 77
End: 2017-10-11
Payer: COMMERCIAL

## 2017-10-11 DIAGNOSIS — I48.0 PAROXYSMAL ATRIAL FIBRILLATION (H): ICD-10-CM

## 2017-10-11 DIAGNOSIS — Z79.01 LONG-TERM (CURRENT) USE OF ANTICOAGULANTS: ICD-10-CM

## 2017-10-11 LAB — INR POINT OF CARE: 2.6 (ref 0.86–1.14)

## 2017-10-11 PROCEDURE — 85610 PROTHROMBIN TIME: CPT | Mod: QW | Performed by: INTERNAL MEDICINE

## 2017-10-11 PROCEDURE — 36416 COLLJ CAPILLARY BLOOD SPEC: CPT | Performed by: INTERNAL MEDICINE

## 2017-10-11 NOTE — MR AVS SNAPSHOT
Aniket Macias   10/11/2017 10:40 AM   Anticoagulation Therapy Visit    Description:  77 year old male   Provider:   ANTICOAGULATION   Department:  [facility]           INR as of 10/11/2017     Today's INR 2.6      Anticoagulation Summary as of 10/11/2017     INR goal 2.0-3.0   Today's INR 2.6   Full instructions 5 mg every day   Next INR check 11/15/2017    Indications   Long-term (current) use of anticoagulants [Z79.01] [Z79.01]  Paroxysmal atrial fibrillation [I48.0]         Your next Anticoagulation Clinic appointment(s)     Nov 15, 2017 10:40 AM CST   Anticoagulation Visit with  ANTICOAGULATION   [facility] ([facility])    [address] [id number]  [facility] [id number] [phone]              Contact Numbers     Anticoagulant (INR) Clinic Number: [id number]          October 2017 Details    Sun Mon Tue Wed Thu Fri Sat     1               2               3               4               5               6               7                 8               9               10               11      5 mg   See details      12      5 mg         13      5 mg         14      5 mg           15      5 mg         16      5 mg         17      5 mg         18      5 mg         19      5 mg         20      5 mg         21      5 mg           22      5 mg         23      5 mg         24      5 mg         25      5 mg         26      5 mg         27      5 mg         28      5 mg           29      5 mg         30      5 mg         31      5 mg              Date Details   10/11 This INR check               How to take your warfarin dose     To take:  5 mg Take 1 of the 5 mg tablets.           November 2017 Details    Sun Mon Tue Wed Thu Fri Sat        1      5 mg         2      5 mg         3      5 mg         4      5 mg           5      5 mg         6      5 mg         7      5 mg         8      5 mg         9      5 mg         10      5 mg         11       5 mg           12      5 mg         13      5 mg         14      5 mg         15            16               17               18                 19               20               21               22               23               24               25                 26               27               28               29               30                  Date Details   No additional details    Date of next INR:  11/15/2017         How to take your warfarin dose     To take:  5 mg Take 1 of the 5 mg tablets.

## 2017-10-11 NOTE — PROGRESS NOTES
ANTICOAGULATION FOLLOW-UP CLINIC VISIT    Patient Name:  Aniket Macias  Date:  10/11/2017  Contact Type:  Face to Face    SUBJECTIVE:     Patient Findings     Positives No Problem Findings           OBJECTIVE    INR Protime   Date Value Ref Range Status   10/11/2017 2.6 (A) 0.86 - 1.14 Final       ASSESSMENT / PLAN  INR assessment THER    Recheck INR In: 5 WEEKS    INR Location Clinic      Anticoagulation Summary as of 10/11/2017     INR goal 2.0-3.0   Today's INR 2.6   Maintenance plan 5 mg (5 mg x 1) every day   Full instructions 5 mg every day   Weekly total 35 mg   No change documented Kalina Garcia RN   Plan last modified Sallie Whalen RN (6/1/2016)   Next INR check 11/15/2017   Target end date Indefinite    Indications   Long-term (current) use of anticoagulants [Z79.01] [Z79.01]  Paroxysmal atrial fibrillation [I48.0]         Anticoagulation Episode Summary     INR check location     Preferred lab     Send INR reminders to [facility] HEART INR NURSE    Comments       Anticoagulation Care Providers     Provider Role Specialty Phone number    Sebastián Bermudez MD Responsible Cardiology [phone]            See the Encounter Report to view Anticoagulation Flowsheet and Dosing Calendar (Go to Encounters tab in chart review, and find the Anticoagulation Therapy Visit)    INR 2.6. No diet or med changes. No bleeding or bruising issues. He will continue with his current warfarin dosing of 5 mg daily. Recheck in 5 weeks.Dosage adjustment made based on physician directed care plan.    Kalina Garcia RN

## 2017-11-15 ENCOUNTER — ANTICOAGULATION THERAPY VISIT (OUTPATIENT)
Dept: CARDIOLOGY | Facility: CLINIC | Age: 77
End: 2017-11-15
Payer: COMMERCIAL

## 2017-11-15 DIAGNOSIS — I48.0 PAROXYSMAL ATRIAL FIBRILLATION (H): ICD-10-CM

## 2017-11-15 DIAGNOSIS — Z79.01 LONG-TERM (CURRENT) USE OF ANTICOAGULANTS: ICD-10-CM

## 2017-11-15 LAB — INR POINT OF CARE: 3.9 (ref 0.86–1.14)

## 2017-11-15 PROCEDURE — 85610 PROTHROMBIN TIME: CPT | Mod: QW | Performed by: INTERNAL MEDICINE

## 2017-11-15 PROCEDURE — 36416 COLLJ CAPILLARY BLOOD SPEC: CPT | Performed by: INTERNAL MEDICINE

## 2017-11-15 NOTE — MR AVS SNAPSHOT
Aniket aMcias   11/15/2017 10:40 AM   Anticoagulation Therapy Visit    Description:  77 year old male   Provider:  LIU ANTICOAGULATION   Department:  [facility]           INR as of 11/15/2017     Today's INR 3.9!      Anticoagulation Summary as of 11/15/2017     INR goal 2.0-3.0   Today's INR 3.9!   Full instructions 11/15: Hold; Otherwise 5 mg every day   Next INR check 11/29/2017    Indications   Long-term (current) use of anticoagulants [Z79.01] [Z79.01]  Paroxysmal atrial fibrillation [I48.0]         Your next Anticoagulation Clinic appointment(s)     Nov 29, 2017 11:00 AM CST   Anticoagulation Visit with [place] ANTICOAGULATION   [facility] ([facility])    [address] [place] [address]   [phone]              Contact Numbers     Anticoagulant (INR) Clinic Number: [phone]          November 2017 Details    Sun Mon Tue Wed Thu Fri Sat        1               2               3               4                 5               6               7               8               9               10               11                 12               13               14               15      Hold   See details      16      5 mg         17      5 mg         18      5 mg           19      5 mg         20      5 mg         21      5 mg         22      5 mg         23      5 mg         24      5 mg         25      5 mg           26      5 mg         27      5 mg         28      5 mg         29            30                  Date Details   11/15 This INR check       Date of next INR:  11/29/2017         How to take your warfarin dose     To take:  5 mg Take 1 of the 5 mg tablets.    Hold Do not take your warfarin dose. See the Details table to the right for additional instructions.

## 2017-11-15 NOTE — PROGRESS NOTES
ANTICOAGULATION FOLLOW-UP CLINIC VISIT    Patient Name:  Aniket Macias  Date:  11/15/2017  Contact Type:  Face to Face    SUBJECTIVE:        OBJECTIVE    INR Protime   Date Value Ref Range Status   11/15/2017 3.9 (A) 0.86 - 1.14 Final       ASSESSMENT / PLAN  INR assessment SUPRA    Recheck INR In: 2 WEEKS    INR Location Clinic      Anticoagulation Summary as of 11/15/2017     INR goal 2.0-3.0   Today's INR 3.9!   Maintenance plan 5 mg (5 mg x 1) every day   Full instructions 11/15: Hold; Otherwise 5 mg every day   Weekly total 35 mg   Plan last modified Sallie Whalen, RN (6/1/2016)   Next INR check 11/29/2017   Target end date Indefinite    Indications   Long-term (current) use of anticoagulants [Z79.01] [Z79.01]  Paroxysmal atrial fibrillation [I48.0]         Anticoagulation Episode Summary     INR check location     Preferred lab     Send INR reminders to [facility] HEART INR NURSE    Comments       Anticoagulation Care Providers     Provider Role Specialty Phone number    Sebastián Bermudez MD Responsible Cardiology [phone]          INR 3.9. No changes to diet. No bleeding or bruising issues. Pt's arthritis flared up in his fingers so he has been taking tylenol 1,000 mg daily for past week. He is not sure if he will continue to take. Pt ate a lot of broccoli last night. I told him he should increase his greens if he has to continue to take tylenol. Will have pt hold warfarin today, and then resume taking 5 mg daily. Recheck INR in 2 weeks.  See the Encounter Report to view Anticoagulation Flowsheet and Dosing Calendar (Go to Encounters tab in chart review, and find the Anticoagulation Therapy Visit)    Dosage adjustment made based on physician directed care plan.    Sallie Whalen RN

## 2017-11-29 ENCOUNTER — ANTICOAGULATION THERAPY VISIT (OUTPATIENT)
Dept: CARDIOLOGY | Facility: CLINIC | Age: 77
End: 2017-11-29
Payer: COMMERCIAL

## 2017-11-29 DIAGNOSIS — I48.0 PAROXYSMAL ATRIAL FIBRILLATION (H): ICD-10-CM

## 2017-11-29 DIAGNOSIS — Z79.01 LONG-TERM (CURRENT) USE OF ANTICOAGULANTS: ICD-10-CM

## 2017-11-29 LAB — INR POINT OF CARE: 3.1 (ref 0.86–1.14)

## 2017-11-29 PROCEDURE — 85610 PROTHROMBIN TIME: CPT | Mod: QW | Performed by: INTERNAL MEDICINE

## 2017-11-29 PROCEDURE — 36416 COLLJ CAPILLARY BLOOD SPEC: CPT | Performed by: INTERNAL MEDICINE

## 2017-11-29 NOTE — MR AVS SNAPSHOT
Aniket Macias   11/29/2017 11:00 AM   Anticoagulation Therapy Visit    Description:  77 year old male   Provider:  LIU ANTICOAGULATION   Department:  [facility]           INR as of 11/29/2017     Today's INR 3.1!      Anticoagulation Summary as of 11/29/2017     INR goal 2.0-3.0   Today's INR 3.1!   Full instructions 5 mg every day   Next INR check 1/3/2018    Indications   Long-term (current) use of anticoagulants [Z79.01] [Z79.01]  Paroxysmal atrial fibrillation [I48.0]         Your next Anticoagulation Clinic appointment(s)     Jan 03, 2018 10:40 AM CST   Anticoagulation Visit with [place] ANTICOAGULATION   [facility] ([facility])    [address] [place] [address] [phone]              Contact Numbers     Anticoagulant (INR) Clinic Number: [phone]          November 2017 Details    Sun Mon Tue Wed Thu Fri Sat        1               2               3               4                 5               6               7               8               9               10               11                 12               13               14               15               16               17               18                 19               20               21               22               23               24               25                 26               27               28               29      5 mg   See details      30      5 mg            Date Details   11/29 This INR check               How to take your warfarin dose     To take:  5 mg Take 1 of the 5 mg tablets.           December 2017 Details    Sun Mon Tue Wed Thu Fri Sat          1      5 mg         2      5 mg           3      5 mg         4      5 mg         5      5 mg         6      5 mg         7      5 mg         8      5 mg         9      5 mg           10      5 mg         11      5 mg         12      5 mg         13      5 mg         14      5 mg         15       5 mg         16      5 mg           17      5 mg         18      5 mg         19      5 mg         20      5 mg         21      5 mg         22      5 mg         23      5 mg           24      5 mg         25      5 mg         26      5 mg         27      5 mg         28      5 mg         29      5 mg         30      5 mg           31      5 mg                Date Details   No additional details            How to take your warfarin dose     To take:  5 mg Take 1 of the 5 mg tablets.           January 2018 Details    Sun Mon Tue Wed Thu Fri Sat      1      5 mg         2      5 mg         3            4               5               6                 7               8               9               10               11               12               13                 14               15               16               17               18               19               20                 21               22               23               24               25               26               27                 28               29               30               31                   Date Details   No additional details    Date of next INR:  1/3/2018         How to take your warfarin dose     To take:  5 mg Take 1 of the 5 mg tablets.

## 2017-11-29 NOTE — PROGRESS NOTES
ANTICOAGULATION FOLLOW-UP CLINIC VISIT    Patient Name:  Aniket Macias  Date:  11/29/2017  Contact Type:  Face to Face    SUBJECTIVE:     Patient Findings     Positives Change in diet/appetite (eating broccoli or salad daily; continues to have wine several times a week)           OBJECTIVE    INR Protime   Date Value Ref Range Status   11/29/2017 3.1 (A) 0.86 - 1.14 Final       ASSESSMENT / PLAN  INR assessment THER    Recheck INR In: 5 WEEKS    INR Location Clinic      Anticoagulation Summary as of 11/29/2017     INR goal 2.0-3.0   Today's INR 3.1!   Maintenance plan 5 mg (5 mg x 1) every day   Full instructions 5 mg every day   Weekly total 35 mg   No change documented Stefani Kaminski RN   Plan last modified Sallie Whalen RN (6/1/2016)   Next INR check 1/3/2018   Target end date Indefinite    Indications   Long-term (current) use of anticoagulants [Z79.01] [Z79.01]  Paroxysmal atrial fibrillation [I48.0]         Anticoagulation Episode Summary     INR check location     Preferred lab     Send INR reminders to [facility] HEART INR NURSE    Comments       Anticoagulation Care Providers     Provider Role Specialty Phone number    Sebastián Bermudez MD Responsible Cardiology [phone]            See the Encounter Report to view Anticoagulation Flowsheet and Dosing Calendar (Go to Encounters tab in chart review, and find the Anticoagulation Therapy Visit)    INR 3.1 He has stopped using Tylenol for arthritis pain in his hands. Has been eating greens or broccoli most days. Continues to drink wine a few times a week. No abnormal bleeding or bruising. He will continue current dosing of 5 mg daily, continue to eat greens/veggies most days and recheck in 5 weeks after the holidays. Dosage adjustment made based on physician directed care plan.    Stefani Kaminski, RN

## 2018-01-03 ENCOUNTER — ANTICOAGULATION THERAPY VISIT (OUTPATIENT)
Dept: CARDIOLOGY | Facility: CLINIC | Age: 78
End: 2018-01-03
Payer: COMMERCIAL

## 2018-01-03 DIAGNOSIS — Z79.01 LONG-TERM (CURRENT) USE OF ANTICOAGULANTS: ICD-10-CM

## 2018-01-03 DIAGNOSIS — I48.0 PAROXYSMAL ATRIAL FIBRILLATION (H): ICD-10-CM

## 2018-01-03 LAB — INR POINT OF CARE: 2.7 (ref 0.86–1.14)

## 2018-01-03 PROCEDURE — 36416 COLLJ CAPILLARY BLOOD SPEC: CPT | Performed by: INTERNAL MEDICINE

## 2018-01-03 PROCEDURE — 85610 PROTHROMBIN TIME: CPT | Mod: QW | Performed by: INTERNAL MEDICINE

## 2018-01-03 NOTE — PROGRESS NOTES
ANTICOAGULATION FOLLOW-UP CLINIC VISIT    Patient Name:  Aniket Macias  Date:  1/3/2018  Contact Type:  Face to Face    SUBJECTIVE:     Patient Findings     Positives No Problem Findings           OBJECTIVE    INR Protime   Date Value Ref Range Status   01/03/2018 2.7 (A) 0.86 - 1.14 Final       ASSESSMENT / PLAN  INR assessment THER    Recheck INR In: 5 WEEKS    INR Location Clinic      Anticoagulation Summary as of 1/3/2018     INR goal 2.0-3.0   Today's INR 2.7   Maintenance plan 5 mg (5 mg x 1) every day   Full instructions 5 mg every day   Weekly total 35 mg   Plan last modified Sallie Whalen RN (6/1/2016)   Next INR check 2/7/2018   Target end date Indefinite    Indications   Long-term (current) use of anticoagulants [Z79.01] [Z79.01]  Paroxysmal atrial fibrillation [I48.0]         Anticoagulation Episode Summary     INR check location     Preferred lab     Send INR reminders to [facility] HEART INR NURSE    Comments       Anticoagulation Care Providers     Provider Role Specialty Phone number    Sebastián Bermudez MD Responsible Cardiology [phone]            See the Encounter Report to view Anticoagulation Flowsheet and Dosing Calendar (Go to Encounters tab in chart review, and find the Anticoagulation Therapy Visit)    INR 2.7 No change in meds. No greens or ETOH for 2 days. Recent URI but symptoms improving. No abnormal bleeding or bruising. Will continue current dosing of 5 mg daily and recheck in 5 weeks. Dosage adjustment made based on physician directed care plan.    Stefani Kaminski RN

## 2018-01-03 NOTE — MR AVS SNAPSHOT
Aniket Macias   1/3/2018 10:40 AM   Anticoagulation Therapy Visit    Description:  77 year old male   Provider:   ANTICOAGULATION   Department:  [facility]           INR as of 1/3/2018     Today's INR 2.7      Anticoagulation Summary as of 1/3/2018     INR goal 2.0-3.0   Today's INR 2.7   Full instructions 5 mg every day   Next INR check 2/7/2018    Indications   Long-term (current) use of anticoagulants [Z79.01] [Z79.01]  Paroxysmal atrial fibrillation [I48.0]         Your next Anticoagulation Clinic appointment(s)     Jan 03, 2018 10:40 AM CST   Anticoagulation Visit with  ANTICOAGULATION   [facility] ([facility])    [address]  [facility] [address]   [phone]            Feb 07, 2018 10:40 AM CST   Anticoagulation Visit with  ANTICOAGULATION   [facility] ([facility])    [address]  [facility] [address]   [phone]              Contact Numbers     Anticoagulant (INR) Clinic Number: [phone]          January 2018 Details    Sun Mon Tue Wed Thu Fri Sat      1               2               3      5 mg   See details      4      5 mg         5      5 mg         6      5 mg           7      5 mg         8      5 mg         9      5 mg         10      5 mg         11      5 mg         12      5 mg         13      5 mg           14      5 mg         15      5 mg         16      5 mg         17      5 mg         18      5 mg         19      5 mg         20      5 mg           21      5 mg         22      5 mg         23      5 mg         24      5 mg         25      5 mg         26      5 mg         27      5 mg           28      5 mg         29      5 mg         30      5 mg         31      5 mg             Date Details   01/03 This INR check               How to take your warfarin dose     To take:  5 mg Take 1 of the 5 mg tablets.           February 2018  Details    Sun Mon Tue Wed Thu Fri Sat         1      5 mg         2      5 mg         3      5 mg           4      5 mg         5      5 mg         6      5 mg         7            8               9               10                 11               12               13               14               15               16               17                 18               19               20               21               22               23               24                 25               26               27               28                   Date Details   No additional details    Date of next INR:  2/7/2018         How to take your warfarin dose     To take:  5 mg Take 1 of the 5 mg tablets.

## 2018-02-07 ENCOUNTER — ANTICOAGULATION THERAPY VISIT (OUTPATIENT)
Dept: CARDIOLOGY | Facility: CLINIC | Age: 78
End: 2018-02-07
Payer: COMMERCIAL

## 2018-02-07 DIAGNOSIS — I48.0 PAROXYSMAL ATRIAL FIBRILLATION (H): ICD-10-CM

## 2018-02-07 DIAGNOSIS — Z79.01 LONG-TERM (CURRENT) USE OF ANTICOAGULANTS: ICD-10-CM

## 2018-02-07 LAB — INR POINT OF CARE: 2.6 (ref 0.86–1.14)

## 2018-02-07 PROCEDURE — 85610 PROTHROMBIN TIME: CPT | Mod: QW | Performed by: INTERNAL MEDICINE

## 2018-02-07 PROCEDURE — 36416 COLLJ CAPILLARY BLOOD SPEC: CPT | Performed by: INTERNAL MEDICINE

## 2018-02-07 NOTE — PROGRESS NOTES
ANTICOAGULATION FOLLOW-UP CLINIC VISIT    Patient Name:  Aniket Macias  Date:  2/7/2018  Contact Type:  Face to Face    SUBJECTIVE:     Patient Findings     Positives No Problem Findings           OBJECTIVE    INR Protime   Date Value Ref Range Status   02/07/2018 2.6 (A) 0.86 - 1.14 Final       ASSESSMENT / PLAN  INR assessment THER    Recheck INR In: 2 WEEKS    INR Location Clinic      Anticoagulation Summary as of 2/7/2018     INR goal 2.0-3.0   Today's INR 2.6   Maintenance plan 5 mg (5 mg x 1) every day   Full instructions 5 mg every day   Weekly total 35 mg   No change documented Sallie Whalen RN   Plan last modified Sallie Whalen RN (6/1/2016)   Next INR check 2/22/2018   Target end date Indefinite    Indications   Long-term (current) use of anticoagulants [Z79.01] [Z79.01]  Paroxysmal atrial fibrillation [I48.0]         Anticoagulation Episode Summary     INR check location     Preferred lab     Send INR reminders to [facility] HEART INR NURSE    Comments       Anticoagulation Care Providers     Provider Role Specialty Phone number    Sebastián Bermudez MD Responsible Cardiology [phone]          INR 2.6. No changes to diet or medications. Will continue same dosing of 5 mg daily. Pt may be leaving to [place] until Mid April, on 2/23. The plans are not completely set yet. Will recheck INR in 2 weeks prior to him leaving.   See the Encounter Report to view Anticoagulation Flowsheet and Dosing Calendar (Go to Encounters tab in chart review, and find the Anticoagulation Therapy Visit)    Dosage adjustment made based on physician directed care plan.    Sallie Whalen RN

## 2018-02-07 NOTE — MR AVS SNAPSHOT
Aniket Macias   2/7/2018 10:40 AM   Anticoagulation Therapy Visit    Description:  77 year old male   Provider:  [facility]   Department:  [facility]           INR as of 2/7/2018     Today's INR 2.6      Anticoagulation Summary as of 2/7/2018     INR goal 2.0-3.0   Today's INR 2.6   Full instructions 5 mg every day   Next INR check 2/22/2018    Indications   Long-term (current) use of anticoagulants [Z79.01] [Z79.01]  Paroxysmal atrial fibrillation [I48.0]         Your next Anticoagulation Clinic appointment(s)     Feb 22, 2018 10:40 AM CST   Anticoagulation Visit with  ANTICOAGULATION   [facility] ([facility])    [address] [place] [id number] [phone]              Contact Numbers     Anticoagulant (INR) Clinic Number: [phone]          February 2018 Details    Sun Mon Tue Wed Thu Fri Sat         1               2               3                 4               5               6               7      5 mg   See details      8      5 mg         9      5 mg         10      5 mg           11      5 mg         12      5 mg         13      5 mg         14      5 mg         15      5 mg         16      5 mg         17      5 mg           18      5 mg         19      5 mg         20      5 mg         21      5 mg         22            23               24                 25               26               27               28                   Date Details   02/07 This INR check       Date of next INR:  2/22/2018         How to take your warfarin dose     To take:  5 mg Take 1 of the 5 mg tablets.

## 2018-03-15 ENCOUNTER — ANTICOAGULATION THERAPY VISIT (OUTPATIENT)
Dept: CARDIOLOGY | Facility: CLINIC | Age: 78
End: 2018-03-15
Payer: COMMERCIAL

## 2018-03-15 DIAGNOSIS — I48.0 PAROXYSMAL ATRIAL FIBRILLATION (H): ICD-10-CM

## 2018-03-15 DIAGNOSIS — Z79.01 LONG-TERM (CURRENT) USE OF ANTICOAGULANTS: ICD-10-CM

## 2018-03-15 LAB — INR POINT OF CARE: 2.6 (ref 0.86–1.14)

## 2018-03-15 PROCEDURE — 85610 PROTHROMBIN TIME: CPT | Mod: QW | Performed by: INTERNAL MEDICINE

## 2018-03-15 PROCEDURE — 36416 COLLJ CAPILLARY BLOOD SPEC: CPT | Performed by: INTERNAL MEDICINE

## 2018-03-15 NOTE — MR AVS SNAPSHOT
Aniket Macias   3/15/2018 10:40 AM   Anticoagulation Therapy Visit    Description:  77 year old male   Provider:   ANTICOAGULATION   Department:  [facility]           INR as of 3/15/2018     Today's INR 2.6      Anticoagulation Summary as of 3/15/2018     INR goal 2.0-3.0   Today's INR 2.6   Full instructions 5 mg every day   Next INR check 4/17/2018    Indications   Long-term (current) use of anticoagulants [Z79.01] [Z79.01]  Paroxysmal atrial fibrillation [I48.0]         Your next Anticoagulation Clinic appointment(s)     Apr 17, 2018 10:20 AM CDT   Anticoagulation Visit with  ANTICOAGULATION   [facility] ([facility])    [address] [place] [id number] [phone]              Contact Numbers     Anticoagulant (INR) Clinic Number: [phone]          March 2018 Details    Sun Mon Tue Wed Thu Fri Sat         1               2               3                 4               5               6               7               8               9               10                 11               12               13               14               15      5 mg   See details      16      5 mg         17      5 mg           18      5 mg         19      5 mg         20      5 mg         21      5 mg         22      5 mg         23      5 mg         24      5 mg           25      5 mg         26      5 mg         27      5 mg         28      5 mg         29      5 mg         30      5 mg         31      5 mg          Date Details   03/15 This INR check               How to take your warfarin dose     To take:  5 mg Take 1 of the 5 mg tablets.           April 2018 Details    Sun Mon Tue Wed Thu Fri Sat     1      5 mg         2      5 mg         3      5 mg         4      5 mg         5      5 mg         6      5 mg         7      5 mg           8      5 mg         9      5 mg         10      5 mg         11      5 mg         12      5 mg          13      5 mg         14      5 mg           15      5 mg         16      5 mg         17            18               19               20               21                 22               23               24               25               26               27               28                 29               30                     Date Details   No additional details    Date of next INR:  4/17/2018         How to take your warfarin dose     To take:  5 mg Take 1 of the 5 mg tablets.

## 2018-03-15 NOTE — PROGRESS NOTES
ANTICOAGULATION FOLLOW-UP CLINIC VISIT    Patient Name:  Aniket Macias  Date:  3/15/2018  Contact Type:  Face to Face    SUBJECTIVE:     Patient Findings     Positives No Problem Findings           OBJECTIVE    INR Protime   Date Value Ref Range Status   03/15/2018 2.6 (A) 0.86 - 1.14 Final       ASSESSMENT / PLAN  INR assessment THER    Recheck INR In: 5 WEEKS    INR Location Clinic      Anticoagulation Summary as of 3/15/2018     INR goal 2.0-3.0   Today's INR 2.6   Maintenance plan 5 mg (5 mg x 1) every day   Full instructions 5 mg every day   Weekly total 35 mg   No change documented Angelita Wolfe RN   Plan last modified Sallie Whalen RN (6/1/2016)   Next INR check 4/17/2018   Target end date Indefinite    Indications   Long-term (current) use of anticoagulants [Z79.01] [Z79.01]  Paroxysmal atrial fibrillation [I48.0]         Anticoagulation Episode Summary     INR check location     Preferred lab     Send INR reminders to [facility] HEART INR NURSE    Comments       Anticoagulation Care Providers     Provider Role Specialty Phone number    Sebastián Bermudez MD Responsible Cardiology [phone]            See the Encounter Report to view Anticoagulation Flowsheet and Dosing Calendar (Go to Encounters tab in chart review, and find the Anticoagulation Therapy Visit)    INR 2.6.  No changes.  Continue same schedule and recheck in 4-5 weeks prior to seeing Dr. Bermudez.  Jared Wolfe, RN

## 2018-04-17 ENCOUNTER — OFFICE VISIT (OUTPATIENT)
Dept: CARDIOLOGY | Facility: CLINIC | Age: 78
End: 2018-04-17
Attending: INTERNAL MEDICINE
Payer: COMMERCIAL

## 2018-04-17 ENCOUNTER — ANTICOAGULATION THERAPY VISIT (OUTPATIENT)
Dept: CARDIOLOGY | Facility: CLINIC | Age: 78
End: 2018-04-17
Payer: COMMERCIAL

## 2018-04-17 VITALS
WEIGHT: 188 LBS | HEART RATE: 73 BPM | BODY MASS INDEX: 27.85 KG/M2 | DIASTOLIC BLOOD PRESSURE: 99 MMHG | HEIGHT: 69 IN | SYSTOLIC BLOOD PRESSURE: 157 MMHG

## 2018-04-17 DIAGNOSIS — I48.0 PAROXYSMAL ATRIAL FIBRILLATION (H): Chronic | ICD-10-CM

## 2018-04-17 DIAGNOSIS — Z79.01 LONG-TERM (CURRENT) USE OF ANTICOAGULANTS: ICD-10-CM

## 2018-04-17 DIAGNOSIS — I10 BENIGN ESSENTIAL HYPERTENSION: Chronic | ICD-10-CM

## 2018-04-17 DIAGNOSIS — I48.0 PAROXYSMAL ATRIAL FIBRILLATION (H): ICD-10-CM

## 2018-04-17 DIAGNOSIS — Z79.01 LONG TERM CURRENT USE OF ANTICOAGULANT THERAPY: ICD-10-CM

## 2018-04-17 DIAGNOSIS — E78.5 HYPERLIPIDEMIA LDL GOAL <100: ICD-10-CM

## 2018-04-17 LAB — INR POINT OF CARE: 3.6 (ref 0.86–1.14)

## 2018-04-17 PROCEDURE — 36416 COLLJ CAPILLARY BLOOD SPEC: CPT | Performed by: INTERNAL MEDICINE

## 2018-04-17 PROCEDURE — 99207 ZZC NO CHARGE NURSE ONLY: CPT | Performed by: INTERNAL MEDICINE

## 2018-04-17 PROCEDURE — 99214 OFFICE O/P EST MOD 30 MIN: CPT | Performed by: INTERNAL MEDICINE

## 2018-04-17 PROCEDURE — 93000 ELECTROCARDIOGRAM COMPLETE: CPT | Performed by: INTERNAL MEDICINE

## 2018-04-17 PROCEDURE — 85610 PROTHROMBIN TIME: CPT | Mod: QW | Performed by: INTERNAL MEDICINE

## 2018-04-17 RX ORDER — LISINOPRIL 20 MG/1
20 TABLET ORAL DAILY
Qty: 90 TABLET | Refills: 3 | Status: SHIPPED | OUTPATIENT
Start: 2018-04-17 | End: 2018-05-23

## 2018-04-17 RX ORDER — METOPROLOL TARTRATE 50 MG
50 TABLET ORAL 2 TIMES DAILY
Qty: 180 TABLET | Refills: 3 | Status: SHIPPED | OUTPATIENT
Start: 2018-04-17 | End: 2019-04-04

## 2018-04-17 RX ORDER — WARFARIN SODIUM 5 MG/1
TABLET ORAL
Qty: 90 TABLET | Refills: 3 | Status: SHIPPED | OUTPATIENT
Start: 2018-04-17 | End: 2018-04-23

## 2018-04-17 RX ORDER — ATORVASTATIN CALCIUM 40 MG/1
40 TABLET, FILM COATED ORAL DAILY
Qty: 90 TABLET | Refills: 3 | Status: SHIPPED | OUTPATIENT
Start: 2018-04-17 | End: 2019-04-04

## 2018-04-17 NOTE — PROGRESS NOTES
ANTICOAGULATION FOLLOW-UP CLINIC VISIT    Patient Name:  Aniket Macias  Date:  4/17/2018  Contact Type:  Face to Face    SUBJECTIVE:     Patient Findings     Positives Change in medications    Comments Has been taking tramadol by mistake thinking it was one of his prostate medications. Last taken last night.           OBJECTIVE    INR Protime   Date Value Ref Range Status   04/17/2018 3.6 (A) 0.86 - 1.14 Final       ASSESSMENT / PLAN  INR assessment SUPRA    Recheck INR In: 3 WEEKS    INR Location Clinic    Billed home INR No    Vit K given? None      Anticoagulation Summary as of 4/17/2018     INR goal 2.0-3.0   Today's INR 3.6!   Maintenance plan 5 mg (5 mg x 1) every day   Full instructions 4/17: 2.5 mg; Otherwise 5 mg every day   Weekly total 35 mg   Plan last modified Sallie Whalen, RN (6/1/2016)   Next INR check 5/8/2018   Target end date Indefinite    Indications   Long-term (current) use of anticoagulants [Z79.01] [Z79.01]  Paroxysmal atrial fibrillation [I48.0]         Anticoagulation Episode Summary     INR check location     Preferred lab     Send INR reminders to [facility] HEART INR NURSE    Comments       Anticoagulation Care Providers     Provider Role Specialty Phone number    Sebastián Bermudez MD Responsible Cardiology [phone]            See the Encounter Report to view Anticoagulation Flowsheet and Dosing Calendar (Go to Encounters tab in chart review, and find the Anticoagulation Therapy Visit)    INR 3.6. No diet changes, however, he showed me a bottle of Tramadol that he thought he was taking for his prostate. I explained that it was a pain medication. He was surprised to hear that. He said he usually will take Tylenol if he has any general aches or pain. No bleeding or bruising issues. He will take 2.5 mg today, then resume his normal schedule of 5 mg daily. He will make sure to put the pain medication away then look for his terazosin. He will also eat some greens (not kale) this  week as well if he does have to use the tylenol.He agreed for an inr recheck in 3 weeks.Dosage adjustment made based on physician directed care plan.    Kalina Garcia RN

## 2018-04-17 NOTE — MR AVS SNAPSHOT
Aniket Macias   4/17/2018 10:20 AM   Anticoagulation Therapy Visit    Description:  77 year old male   Provider:  [facility]   Department:  [facility]           INR as of 4/17/2018     Today's INR 3.6!      Anticoagulation Summary as of 4/17/2018     INR goal 2.0-3.0   Today's INR 3.6!   Full instructions 4/17: 2.5 mg; Otherwise 5 mg every day   Next INR check 5/8/2018    Indications   Long-term (current) use of anticoagulants [Z79.01] [Z79.01]  Paroxysmal atrial fibrillation [I48.0]         Your next Anticoagulation Clinic appointment(s)     May 08, 2018 10:40 AM CDT   Anticoagulation Visit with [facility]   [facility] ([facility])    [address] [place] [id number] [phone] OPT [address]              Contact Numbers     Anticoagulant (INR) Clinic Number: [phone]          April 2018 Details    Sun Mon Tue Wed Thu Fri Sat     1               2               3               4               5               6               7                 8               9               10               11               12               13               14                 15               16               17      2.5 mg   See details      18      5 mg         19      5 mg         20      5 mg         21      5 mg           22      5 mg         23      5 mg         24      5 mg         25      5 mg         26      5 mg         27      5 mg         28      5 mg           29      5 mg         30      5 mg               Date Details   04/17 This INR check               How to take your warfarin dose     To take:  2.5 mg Take 0.5 of a 5 mg tablet.    To take:  5 mg Take 1 of the 5 mg tablets.           May 2018 Details    Sun Mon Tue Wed Thu Fri Sat       1      5 mg         2      5 mg         3      5 mg         4      5 mg         5      5 mg           6      5 mg         7      5 mg         8            9               10                11               12                 13               14               15               16               17               18               19                 20               21               22               23               24               25               26                 27               28               29               30               31                  Date Details   No additional details    Date of next INR:  5/8/2018         How to take your warfarin dose     To take:  5 mg Take 1 of the 5 mg tablets.

## 2018-04-17 NOTE — PROGRESS NOTES
"Electrophysiology/ Clinic Note         H&P and Plan:     REASON FOR VISIT:  Evaluation of paroxysmal atrial fibrillation.       HISTORY OF PRESENT ILLNESS:  Mr. Macias is a pleasant 77-year-old gentleman with history of hypertension, skin cancer and permanent atrial fibrillation, who is here for routine followup.       He is on chronic therapy with Coumadin and metoprolol. Heart rate seems to be well controlled and he is asymptomatic.  He denies any symptoms such as chest pain, shortness of breath, palpitation, lightheadedness, near syncope or syncope.       Holter done in 2017 revealed Afib with average heart rate around 70 bpm.  EKG today showed atrial fibrillation with controlled ventricular response.  Previous echo (2016) revealed normal cardiac function.      ASSESSMENT AND PLAN:   1.  Atrial fibrillation.  Heart rate is well-controlled.  He continues to be asymptomatic.  We will continue metoprolol at current dose.    2.  Embolic prevention.  [id number] score of 3.  Continue anticoagulation with Coumadin indefinitely.   3.  HTN.  BP is elevated today.  He would like to follow up BP at home prior to adjust medications.  He will let us know if BP remains elevated.  4. Syncope, likely neurally-mediated hypotension. No recurrence in the last year. He declined loop recorder in the past.   4.  Followup care.  Follow up in clinic with me in a year or earlier as needed.       Sebastián Bermudez MD    Physical Exam:  Vitals: BP (!) 157/99  Pulse 73  Ht 1.74 m (5' 8.5\")  Wt 85.3 kg (188 lb)  BMI 28.17 kg/m2    Constitutional:  AAO x3.  Pt is in NAD.  HEAD: normocephalic.  SKIN: Skin normal color, texture and turgor with no lesions or eruptions.  Eyes: PERRL, EOMI.  ENT:  Supple, normal JVP. No lymphadenopathy or thyroid enlargement.  Chest:  CTAB.  Cardiac:  IIR, variable sound of S1 and Normal S2.  No murmurs rubs or gallop.    Abdomen:  Normal BS.  Soft, non-tender and non-distended.  No rebound or guarding.  "   Extremities:  Pedious pulses palpable B/L.  No LE edema noticed.   Neurological: Strength and sensation grossly symmetric and intact throughout.       CURRENT MEDICATIONS:  Current Outpatient Prescriptions   Medication Sig Dispense Refill     atorvastatin (LIPITOR) 40 MG tablet Take 1 tablet (40 mg) by mouth daily 90 tablet 3     lisinopril (PRINIVIL/ZESTRIL) 20 MG tablet Take 1 tablet (20 mg) by mouth daily 90 tablet 3     metoprolol tartrate (LOPRESSOR) 50 MG tablet Take 1 tablet (50 mg) by mouth 2 times daily 180 tablet 3     warfarin (COUMADIN) 5 MG tablet Take 1 tab daily or as directed by INR clinic 90 tablet 3     traMADol (ULTRAM) 50 MG tablet Take 1-2 tablets ( mg) by mouth every 8 hours as needed for pain Maximum 6 tablet(s) per day 60 tablet 0     UNABLE TO FIND MEDICATION NAME: Pro-racia    OTC rosacea cream.       terazosin (HYTRIN) 2 MG capsule Take 1 capsule (2 mg) by mouth daily 90 capsule 3     hydrocortisone (CORTAID) 1 % cream Apply topically daily as needed       ACETAMINOPHEN PO Take 1,000 mg by mouth every 6 hours as needed for pain       Multiple Vitamins-Minerals (MULTIVITAMIN PO) Take 1 tablet by mouth daily        latanoprost (XALATAN) 0.005 % ophthalmic solution Place 1 drop into both eyes At Bedtime       [DISCONTINUED] metoprolol (LOPRESSOR) 50 MG tablet Take 1 tablet (50 mg) by mouth 2 times daily 180 tablet 3     [DISCONTINUED] atorvastatin (LIPITOR) 40 MG tablet Take 1 tablet (40 mg) by mouth daily 90 tablet 3     [DISCONTINUED] lisinopril (PRINIVIL/ZESTRIL) 20 MG tablet Take 1 tablet (20 mg) by mouth daily 90 tablet 3     [DISCONTINUED] warfarin (COUMADIN) 5 MG tablet Take 1 tab daily or as directed by INR clinic 90 tablet 3       ALLERGIES   No Active Allergies    PAST MEDICAL HISTORY:  Past Medical History:   Diagnosis Date     Basal cell carcinoma      BPH (benign prostatic hypertrophy)      Diverticulosis      Glaucoma      Hemorrhoids      HTN (hypertension)       Hyperlipidemia      Obesity      Persistent atrial fibrillation (H)      PVC (premature ventricular contraction)      Squamous cell carcinoma in situ of skin      Syncope 4/11/2016       PAST SURGICAL HISTORY:  Past Surgical History:   Procedure Laterality Date     BACK SURGERY       COLONOSCOPY  11/19/15    hx polyps     SURGICAL HISTORY OF -       Laminectomy     SURGICAL HISTORY OF -       biopsy of prostate     TONSILLECTOMY & ADENOIDECTOMY         FAMILY HISTORY:  Family History   Problem Relation Age of Onset     CEREBROVASCULAR DISEASE Mother      Hypertension Mother      CEREBROVASCULAR DISEASE Father      Myocardial Infarction Father      Hypertension Father      Hypertension Sister      Myocardial Infarction Sister        SOCIAL HISTORY:  Social History     Social History     Marital status:      Spouse name: N/A     Number of children: N/A     Years of education: N/A     Social History Main Topics     Smoking status: Former Smoker     Packs/day: 1.00     Years: 20.00     Types: Cigarettes     Smokeless tobacco: Never Used      Comment: quit age 54     Alcohol use 0.0 oz/week     0 Standard drinks or equivalent per week      Comment: 2-3 wine/beer per week     Drug use: No     Sexual activity: Not Currently     Partners: Female     Other Topics Concern     Caffeine Concern Yes     2 cups coffee per day     Sleep Concern No     Stress Concern No     Weight Concern No     Exercise No     Social History Narrative       Review of Systems:  Skin:  Negative     Eyes:  Negative    ENT:  Negative    Respiratory:  Positive for dyspnea on exertion  Cardiovascular:  Negative    Gastroenterology: Negative    Genitourinary:  not assessed    Musculoskeletal:  Positive for arthritis  Neurologic:  Positive for numbness or tingling of hands  Psychiatric:  Negative    Heme/Lymph/Imm:  Negative    Endocrine:  Negative        Recent Lab Results:  LIPID RESULTS:  Lab Results   Component Value Date    CHOL 129 02/21/2017     HDL 48 02/21/2017    LDL 68 02/21/2017    TRIG 63 02/21/2017    CHOLHDLRATIO 4.6 03/05/2015       LIVER ENZYME RESULTS:  Lab Results   Component Value Date    AST 23 02/21/2017    ALT 40 02/21/2017       CBC RESULTS:  Lab Results   Component Value Date    WBC 10.5 02/21/2017    RBC 4.34 (L) 02/21/2017    HGB 14.0 02/21/2017    HCT 42.3 02/21/2017    MCV 98 02/21/2017    MCH 32.3 02/21/2017    MCHC 33.1 02/21/2017    RDW 12.8 02/21/2017     02/21/2017       BMP RESULTS:  Lab Results   Component Value Date     02/21/2017    POTASSIUM 4.4 02/21/2017    CHLORIDE 104 02/21/2017    CO2 30 02/21/2017    ANIONGAP 5 02/21/2017     (H) 02/21/2017    BUN 18 02/21/2017    CR 0.98 02/21/2017    GFRESTIMATED 74 02/21/2017    GFRESTBLACK 89 02/21/2017    MCKAYLA 8.9 02/21/2017        A1C RESULTS:  No results found for: A1C    INR RESULTS:  Lab Results   Component Value Date    INR 3.6 (A) 04/17/2018    INR 2.6 (A) 03/15/2018    INR 1.22 (H) 07/10/2017    INR 2.13 (H) 04/12/2016         ECHOCARDIOGRAM  No results found for this or any previous visit (from the past 8760 hour(s)).      No orders of the defined types were placed in this encounter.    Orders Placed This Encounter   Medications     atorvastatin (LIPITOR) 40 MG tablet     Sig: Take 1 tablet (40 mg) by mouth daily     Dispense:  90 tablet     Refill:  3     lisinopril (PRINIVIL/ZESTRIL) 20 MG tablet     Sig: Take 1 tablet (20 mg) by mouth daily     Dispense:  90 tablet     Refill:  3     metoprolol tartrate (LOPRESSOR) 50 MG tablet     Sig: Take 1 tablet (50 mg) by mouth 2 times daily     Dispense:  180 tablet     Refill:  3     warfarin (COUMADIN) 5 MG tablet     Sig: Take 1 tab daily or as directed by INR clinic     Dispense:  90 tablet     Refill:  3     Medications Discontinued During This Encounter   Medication Reason     atorvastatin (LIPITOR) 40 MG tablet Reorder     lisinopril (PRINIVIL/ZESTRIL) 20 MG tablet Reorder     metoprolol (LOPRESSOR) 50  MG tablet Reorder     warfarin (COUMADIN) 5 MG tablet Reorder         Encounter Diagnoses   Name Primary?     Paroxysmal atrial fibrillation (H)      Hyperlipidemia LDL goal <100      Benign essential hypertension; goal < 140/90      Paroxysmal atrial fibrillation      Long term current use of anticoagulant therapy          CC  Sebastián Bermudez MD  [address]  [place] [id number]

## 2018-04-17 NOTE — LETTER
"4/17/2018    Jennifer Mishra, DO  [address]  [place] [address]    RE: Aniket Macias       Dear Colleague,    I had the pleasure of seeing Aniket Macias in the [facility].    Electrophysiology/ Clinic Note         H&P and Plan:     REASON FOR VISIT:  Evaluation of paroxysmal atrial fibrillation.       HISTORY OF PRESENT ILLNESS:  Mr. Macias is a pleasant 77-year-old gentleman with history of hypertension, skin cancer and permanent atrial fibrillation, who is here for routine followup.       He is on chronic therapy with Coumadin and metoprolol. Heart rate seems to be well controlled and he is asymptomatic.  He denies any symptoms such as chest pain, shortness of breath, palpitation, lightheadedness, near syncope or syncope.       Holter done in 2017 revealed Afib with average heart rate around 70 bpm.  EKG today showed atrial fibrillation with controlled ventricular response.  Previous echo (2016) revealed normal cardiac function.      ASSESSMENT AND PLAN:   1.  Atrial fibrillation.   Heart rate is well-controlled.  He  continues to be asymptomatic.   We will continue metoprolol at current dose.    2.  Embolic prevention.  [id number] score of 3.  Continue anticoagulation with Coumadin indefinitely.   3.   HTN.  BP is elevated today.  He would like to follow up BP at home prior to adjust medications.  He will let us know if BP remains elevated.  4. Syncope, likely neurally-mediated hypotension. No recurrence in the last year. He declined loop recorder in the past.   4.  Followup care.  Follow up in clinic with me in a year or earlier as needed.       Sebastián Bermudez MD    Physical Exam:  Vitals: BP (!) 157/99  Pulse 73  Ht 1.74 m (5' 8.5\")  Wt 85.3 kg (188 lb)  BMI 28.17 kg/m2    Constitutional:  AAO x3.  Pt is in NAD.  HEAD: normocephalic.  SKIN: Skin normal color, texture and turgor with no lesions or eruptions.  Eyes: PERRL, EOMI.  ENT:  Supple, normal JVP. No " lymphadenopathy or thyroid enlargement.  Chest:  CTAB.  Cardiac:  IIR, variable sound of S1 and Normal S2.  No murmurs rubs or gallop.    Abdomen:  Normal BS.  Soft, non-tender and non-distended.  No rebound or guarding.    Extremities:  Pedious pulses palpable B/L.  No LE edema noticed.   Neurological: Strength and sensation grossly symmetric and intact throughout.       CURRENT MEDICATIONS:  Current Outpatient Prescriptions   Medication Sig Dispense Refill     atorvastatin (LIPITOR) 40 MG tablet Take 1 tablet (40 mg) by mouth daily 90 tablet 3     lisinopril (PRINIVIL/ZESTRIL) 20 MG tablet Take 1 tablet (20 mg) by mouth daily 90 tablet 3     metoprolol tartrate (LOPRESSOR) 50 MG tablet Take 1 tablet (50 mg) by mouth 2 times daily 180 tablet 3     warfarin (COUMADIN) 5 MG tablet Take 1 tab daily or as directed by INR clinic 90 tablet 3     traMADol (ULTRAM) 50 MG tablet Take 1-2 tablets ( mg) by mouth every 8 hours as needed for pain Maximum 6 tablet(s) per day 60 tablet 0     UNABLE TO FIND MEDICATION NAME: Pro-racia    OTC rosacea cream.       terazosin (HYTRIN) 2 MG capsule Take 1 capsule (2 mg) by mouth daily 90 capsule 3     hydrocortisone (CORTAID) 1 % cream Apply topically daily as needed       ACETAMINOPHEN PO Take 1,000 mg by mouth every 6 hours as needed for pain       Multiple Vitamins-Minerals (MULTIVITAMIN PO) Take 1 tablet by mouth daily        latanoprost (XALATAN) 0.005 % ophthalmic solution Place 1 drop into both eyes At Bedtime       [DISCONTINUED] metoprolol (LOPRESSOR) 50 MG tablet Take 1 tablet (50 mg) by mouth 2 times daily 180 tablet 3     [DISCONTINUED] atorvastatin (LIPITOR) 40 MG tablet Take 1 tablet (40 mg) by mouth daily 90 tablet 3     [DISCONTINUED] lisinopril (PRINIVIL/ZESTRIL) 20 MG tablet Take 1 tablet (20 mg) by mouth daily 90 tablet 3     [DISCONTINUED] warfarin (COUMADIN) 5 MG tablet Take 1 tab daily or as directed by INR clinic 90 tablet 3       ALLERGIES   No Active  Allergies    PAST MEDICAL HISTORY:  Past Medical History:   Diagnosis Date     Basal cell carcinoma      BPH (benign prostatic hypertrophy)      Diverticulosis      Glaucoma      Hemorrhoids      HTN (hypertension)      Hyperlipidemia      Obesity      Persistent atrial fibrillation (H)      PVC (premature ventricular contraction)      Squamous cell carcinoma in situ of skin      Syncope 4/11/2016       PAST SURGICAL HISTORY:  Past Surgical History:   Procedure Laterality Date     BACK SURGERY       COLONOSCOPY  11/19/15    hx polyps     SURGICAL HISTORY OF -       Laminectomy     SURGICAL HISTORY OF -       biopsy of prostate     TONSILLECTOMY & ADENOIDECTOMY         FAMILY HISTORY:  Family History   Problem Relation Age of Onset     CEREBROVASCULAR DISEASE Mother      Hypertension Mother      CEREBROVASCULAR DISEASE Father      Myocardial Infarction Father      Hypertension Father      Hypertension Sister      Myocardial Infarction Sister        SOCIAL HISTORY:  Social History     Social History     Marital status:      Spouse name: N/A     Number of children: N/A     Years of education: N/A     Social History Main Topics     Smoking status: Former Smoker     Packs/day: 1.00     Years: 20.00     Types: Cigarettes     Smokeless tobacco: Never Used      Comment: quit age 54     Alcohol use 0.0 oz/week     0 Standard drinks or equivalent per week      Comment: 2-3 wine/beer per week     Drug use: No     Sexual activity: Not Currently     Partners: Female     Other Topics Concern     Caffeine Concern Yes     2 cups coffee per day     Sleep Concern No     Stress Concern No     Weight Concern No     Exercise No     Social History Narrative       Review of Systems:  Skin:  Negative     Eyes:  Negative    ENT:  Negative    Respiratory:  Positive for dyspnea on exertion  Cardiovascular:  Negative    Gastroenterology: Negative    Genitourinary:  not assessed    Musculoskeletal:  Positive for arthritis  Neurologic:   Positive for numbness or tingling of hands  Psychiatric:  Negative    Heme/Lymph/Imm:  Negative    Endocrine:  Negative        Recent Lab Results:  LIPID RESULTS:  Lab Results   Component Value Date    CHOL 129 02/21/2017    HDL 48 02/21/2017    LDL 68 02/21/2017    TRIG 63 02/21/2017    CHOLHDLRATIO 4.6 03/05/2015       LIVER ENZYME RESULTS:  Lab Results   Component Value Date    AST 23 02/21/2017    ALT 40 02/21/2017       CBC RESULTS:  Lab Results   Component Value Date    WBC 10.5 02/21/2017    RBC 4.34 (L) 02/21/2017    HGB 14.0 02/21/2017    HCT 42.3 02/21/2017    MCV 98 02/21/2017    MCH 32.3 02/21/2017    MCHC 33.1 02/21/2017    RDW 12.8 02/21/2017     02/21/2017       BMP RESULTS:  Lab Results   Component Value Date     02/21/2017    POTASSIUM 4.4 02/21/2017    CHLORIDE 104 02/21/2017    CO2 30 02/21/2017    ANIONGAP 5 02/21/2017     (H) 02/21/2017    BUN 18 02/21/2017    CR 0.98 02/21/2017    GFRESTIMATED 74 02/21/2017    GFRESTBLACK 89 02/21/2017    MCKAYLA 8.9 02/21/2017        A1C RESULTS:  No results found for: A1C    INR RESULTS:  Lab Results   Component Value Date    INR 3.6 (A) 04/17/2018    INR 2.6 (A) 03/15/2018    INR 1.22 (H) 07/10/2017    INR 2.13 (H) 04/12/2016         ECHOCARDIOGRAM  No results found for this or any previous visit (from the past 8760 hour(s)).      No orders of the defined types were placed in this encounter.    Orders Placed This Encounter   Medications     atorvastatin (LIPITOR) 40 MG tablet     Sig: Take 1 tablet (40 mg) by mouth daily     Dispense:  90 tablet     Refill:  3     lisinopril (PRINIVIL/ZESTRIL) 20 MG tablet     Sig: Take 1 tablet (20 mg) by mouth daily     Dispense:  90 tablet     Refill:  3     metoprolol tartrate (LOPRESSOR) 50 MG tablet     Sig: Take 1 tablet (50 mg) by mouth 2 times daily     Dispense:  180 tablet     Refill:  3     warfarin (COUMADIN) 5 MG tablet     Sig: Take 1 tab daily or as directed by INR clinic     Dispense:  90  tablet     Refill:  3     Medications Discontinued During This Encounter   Medication Reason     atorvastatin (LIPITOR) 40 MG tablet Reorder     lisinopril (PRINIVIL/ZESTRIL) 20 MG tablet Reorder     metoprolol (LOPRESSOR) 50 MG tablet Reorder     warfarin (COUMADIN) 5 MG tablet Reorder         Encounter Diagnoses   Name Primary?     Paroxysmal atrial fibrillation (H)      Hyperlipidemia LDL goal <100      Benign essential hypertension; goal < 140/90      Paroxysmal atrial fibrillation      Long term current use of anticoagulant therapy          Thank you for allowing me to participate in the care of your patient.    Sincerely,     Sebastián Bermudez MD     [facility]

## 2018-04-17 NOTE — LETTER
"4/17/2018    [place] Mishra, DO  [address]  [place] [address]    RE: Aniket Macias       Dear Colleague,    I had the pleasure of seeing Aniket Macias in the [facility].    Electrophysiology/ Clinic Note         H&P and Plan:     REASON FOR VISIT:  Evaluation of paroxysmal atrial fibrillation.       HISTORY OF PRESENT ILLNESS:  Mr. Macias is a pleasant 77-year-old gentleman with history of hypertension, skin cancer and permanent atrial fibrillation, who is here for routine followup.       He is on chronic therapy with Coumadin and metoprolol. Heart rate seems to be well controlled and he is asymptomatic.  He denies any symptoms such as chest pain, shortness of breath, palpitation, lightheadedness, near syncope or syncope.       Holter done in 2017 revealed Afib with average heart rate around 70 bpm.  EKG today showed atrial fibrillation with controlled ventricular response.  Previous echo (2016) revealed normal cardiac function.      ASSESSMENT AND PLAN:   1.  Atrial fibrillation.   Heart rate is well-controlled.  He  continues to be asymptomatic.   We will continue metoprolol at current dose.    2.  Embolic prevention.  [id number] score of 3.  Continue anticoagulation with Coumadin indefinitely.   3.   HTN.  BP is elevated today.  He would like to follow up BP at home prior to adjust medications.  He will let us know if BP remains elevated.  4. Syncope, likely neurally-mediated hypotension. No recurrence in the last year. He declined loop recorder in the past.   4.  Followup care.  Follow up in clinic with me in a year or earlier as needed.       Sebastián Bermudez MD    Physical Exam:  Vitals: BP (!) 157/99  Pulse 73  Ht 1.74 m (5' 8.5\")  Wt 85.3 kg (188 lb)  BMI 28.17 kg/m2    Constitutional:  AAO x3.  Pt is in NAD.  HEAD: normocephalic.  SKIN: Skin normal color, texture and turgor with no lesions or eruptions.  Eyes: PERRL, EOMI.  ENT:  Supple, normal JVP. No " lymphadenopathy or thyroid enlargement.  Chest:  CTAB.  Cardiac:  IIR, variable sound of S1 and Normal S2.  No murmurs rubs or gallop.    Abdomen:  Normal BS.  Soft, non-tender and non-distended.  No rebound or guarding.    Extremities:  Pedious pulses palpable B/L.  No LE edema noticed.   Neurological: Strength and sensation grossly symmetric and intact throughout.       CURRENT MEDICATIONS:  Current Outpatient Prescriptions   Medication Sig Dispense Refill     atorvastatin (LIPITOR) 40 MG tablet Take 1 tablet (40 mg) by mouth daily 90 tablet 3     lisinopril (PRINIVIL/ZESTRIL) 20 MG tablet Take 1 tablet (20 mg) by mouth daily 90 tablet 3     metoprolol tartrate (LOPRESSOR) 50 MG tablet Take 1 tablet (50 mg) by mouth 2 times daily 180 tablet 3     warfarin (COUMADIN) 5 MG tablet Take 1 tab daily or as directed by INR clinic 90 tablet 3     traMADol (ULTRAM) 50 MG tablet Take 1-2 tablets ( mg) by mouth every 8 hours as needed for pain Maximum 6 tablet(s) per day 60 tablet 0     UNABLE TO FIND MEDICATION NAME: Pro-racia    OTC rosacea cream.       terazosin (HYTRIN) 2 MG capsule Take 1 capsule (2 mg) by mouth daily 90 capsule 3     hydrocortisone (CORTAID) 1 % cream Apply topically daily as needed       ACETAMINOPHEN PO Take 1,000 mg by mouth every 6 hours as needed for pain       Multiple Vitamins-Minerals (MULTIVITAMIN PO) Take 1 tablet by mouth daily        latanoprost (XALATAN) 0.005 % ophthalmic solution Place 1 drop into both eyes At Bedtime       [DISCONTINUED] metoprolol (LOPRESSOR) 50 MG tablet Take 1 tablet (50 mg) by mouth 2 times daily 180 tablet 3     [DISCONTINUED] atorvastatin (LIPITOR) 40 MG tablet Take 1 tablet (40 mg) by mouth daily 90 tablet 3     [DISCONTINUED] lisinopril (PRINIVIL/ZESTRIL) 20 MG tablet Take 1 tablet (20 mg) by mouth daily 90 tablet 3     [DISCONTINUED] warfarin (COUMADIN) 5 MG tablet Take 1 tab daily or as directed by INR clinic 90 tablet 3       ALLERGIES   No Active  Allergies    PAST MEDICAL HISTORY:  Past Medical History:   Diagnosis Date     Basal cell carcinoma      BPH (benign prostatic hypertrophy)      Diverticulosis      Glaucoma      Hemorrhoids      HTN (hypertension)      Hyperlipidemia      Obesity      Persistent atrial fibrillation (H)      PVC (premature ventricular contraction)      Squamous cell carcinoma in situ of skin      Syncope 4/11/2016       PAST SURGICAL HISTORY:  Past Surgical History:   Procedure Laterality Date     BACK SURGERY       COLONOSCOPY  11/19/15    hx polyps     SURGICAL HISTORY OF -       Laminectomy     SURGICAL HISTORY OF -       biopsy of prostate     TONSILLECTOMY & ADENOIDECTOMY         FAMILY HISTORY:  Family History   Problem Relation Age of Onset     CEREBROVASCULAR DISEASE Mother      Hypertension Mother      CEREBROVASCULAR DISEASE Father      Myocardial Infarction Father      Hypertension Father      Hypertension Sister      Myocardial Infarction Sister        SOCIAL HISTORY:  Social History     Social History     Marital status:      Spouse name: N/A     Number of children: N/A     Years of education: N/A     Social History Main Topics     Smoking status: Former Smoker     Packs/day: 1.00     Years: 20.00     Types: Cigarettes     Smokeless tobacco: Never Used      Comment: quit age 54     Alcohol use 0.0 oz/week     0 Standard drinks or equivalent per week      Comment: 2-3 wine/beer per week     Drug use: No     Sexual activity: Not Currently     Partners: Female     Other Topics Concern     Caffeine Concern Yes     2 cups coffee per day     Sleep Concern No     Stress Concern No     Weight Concern No     Exercise No     Social History Narrative       Review of Systems:  Skin:  Negative     Eyes:  Negative    ENT:  Negative    Respiratory:  Positive for dyspnea on exertion  Cardiovascular:  Negative    Gastroenterology: Negative    Genitourinary:  not assessed    Musculoskeletal:  Positive for arthritis  Neurologic:   Positive for numbness or tingling of hands  Psychiatric:  Negative    Heme/Lymph/Imm:  Negative    Endocrine:  Negative        Recent Lab Results:  LIPID RESULTS:  Lab Results   Component Value Date    CHOL 129 02/21/2017    HDL 48 02/21/2017    LDL 68 02/21/2017    TRIG 63 02/21/2017    CHOLHDLRATIO 4.6 03/05/2015       LIVER ENZYME RESULTS:  Lab Results   Component Value Date    AST 23 02/21/2017    ALT 40 02/21/2017       CBC RESULTS:  Lab Results   Component Value Date    WBC 10.5 02/21/2017    RBC 4.34 (L) 02/21/2017    HGB 14.0 02/21/2017    HCT 42.3 02/21/2017    MCV 98 02/21/2017    MCH 32.3 02/21/2017    MCHC 33.1 02/21/2017    RDW 12.8 02/21/2017     02/21/2017       BMP RESULTS:  Lab Results   Component Value Date     02/21/2017    POTASSIUM 4.4 02/21/2017    CHLORIDE 104 02/21/2017    CO2 30 02/21/2017    ANIONGAP 5 02/21/2017     (H) 02/21/2017    BUN 18 02/21/2017    CR 0.98 02/21/2017    GFRESTIMATED 74 02/21/2017    GFRESTBLACK 89 02/21/2017    MCKAYLA 8.9 02/21/2017        A1C RESULTS:  No results found for: A1C    INR RESULTS:  Lab Results   Component Value Date    INR 3.6 (A) 04/17/2018    INR 2.6 (A) 03/15/2018    INR 1.22 (H) 07/10/2017    INR 2.13 (H) 04/12/2016         ECHOCARDIOGRAM  No results found for this or any previous visit (from the past 8760 hour(s)).      No orders of the defined types were placed in this encounter.    Orders Placed This Encounter   Medications     atorvastatin (LIPITOR) 40 MG tablet     Sig: Take 1 tablet (40 mg) by mouth daily     Dispense:  90 tablet     Refill:  3     lisinopril (PRINIVIL/ZESTRIL) 20 MG tablet     Sig: Take 1 tablet (20 mg) by mouth daily     Dispense:  90 tablet     Refill:  3     metoprolol tartrate (LOPRESSOR) 50 MG tablet     Sig: Take 1 tablet (50 mg) by mouth 2 times daily     Dispense:  180 tablet     Refill:  3     warfarin (COUMADIN) 5 MG tablet     Sig: Take 1 tab daily or as directed by INR clinic     Dispense:  90  tablet     Refill:  3     Medications Discontinued During This Encounter   Medication Reason     atorvastatin (LIPITOR) 40 MG tablet Reorder     lisinopril (PRINIVIL/ZESTRIL) 20 MG tablet Reorder     metoprolol (LOPRESSOR) 50 MG tablet Reorder     warfarin (COUMADIN) 5 MG tablet Reorder         Encounter Diagnoses   Name Primary?     Paroxysmal atrial fibrillation (H)      Hyperlipidemia LDL goal <100      Benign essential hypertension; goal < 140/90      Paroxysmal atrial fibrillation      Long term current use of anticoagulant therapy          CC  Sebastián Bermudez MD  [address]  [place] [address]                Thank you for allowing me to participate in the care of your patient.      Sincerely,     Sebastián Bermudez MD     [facility]    cc:   Sebastián Bermudez MD  [address]  [place] [id number]

## 2018-04-17 NOTE — MR AVS SNAPSHOT
After Visit Summary   4/17/2018    Aniket Macias    MRN: [id number]           Patient Information     Date Of Birth          1940        Visit Information        Provider Department      4/17/2018 10:45 AM Sebastián Bermudez MD [facility]        Today's Diagnoses     Paroxysmal atrial fibrillation (H)        Hyperlipidemia LDL goal <100        Benign essential hypertension; goal < 140/90        Paroxysmal atrial fibrillation        Long term current use of anticoagulant therapy           Follow-ups after your visit        Your next 10 appointments already scheduled     Apr 23, 2018  1:30 PM CDT   PHYSICAL with [place] Mishra,    [facility]    [address] [facility] [id number] [phone]            May 08, 2018 10:40 AM CDT   Anticoagulation Visit with [facility]   [facility] ([facility])    [address]  [facility] [id number] [phone] OPT 2              Who to contact     If you have questions or need follow up information about today's clinic visit or your schedule please contact [facility] directly at [phone].  Normal or non-critical lab and imaging results will be communicated to you by [facility], letter or phone within 4 business days after the clinic has received the results. If you do not hear from us within 7 days, please contact the clinic through [facility] or phone. If you have a critical or abnormal lab result, we will notify you by phone as soon as possible.  Submit refill requests through [employer] or call your pharmacy and they will forward the refill request to us. Please allow 3 business days for your refill to be completed.          Additional Information About Your Visit        [facility] Information     [employer] lets you send messages to your doctor, view your test results,  "renew your prescriptions, schedule appointments and more. To sign up, go to [email or web address] . Click on \"Log in\" on the left side of the screen, which will take you to the Welcome page. Then click on \"Sign up Now\" on the right side of the page.     You will be asked to enter the access code listed below, as well as some personal information. Please follow the directions to create your username and password.     Your access code is: [email or web address]  Expires: 2018 11:15 AM     Your access code will  in 90 days. If you need help or a new code, please call your [facility] clinic or [phone].        Care EveryWhere ID     This is your Care EveryWhere ID. This could be used by other organizations to access your [facility] medical records  [id number]        Your Vitals Were     Pulse Height BMI (Body Mass Index)             73 1.74 m (5' 8.5\") 28.17 kg/m2          Blood Pressure from Last 3 Encounters:   18 (!) 157/99   07/10/17 136/77   17 125/73    Weight from Last 3 Encounters:   18 85.3 kg (188 lb)   17 81.2 kg (179 lb)   17 83 kg (183 lb)              We Performed the Following     EKG 12-lead complete w/read - Clinics (to be scheduled)     Follow-Up with Electrophysiologist          Where to get your medicines      These medications were sent to [id number] [place] [facility] [address]  [address] [address] [facility] [address]     Phone:  [phone]     atorvastatin 40 MG tablet    lisinopril 20 MG tablet    metoprolol tartrate 50 MG tablet    warfarin 5 MG tablet          Primary Care Provider Office Phone # Fax #    [place] Whitley Mishra [phone] [address] [facility] [address]  [facility] [address]        Equal Access to Services     EDY CORDOVA AH: Hadii gonsalo Ellison, makenna woods, ashley pereyra, kim akins. [facility] [phone].    ATENCIÓN: Si marcin gold a owens " disposición servicios gratuitos de asistencia lingüística. Elicia watson [phone].    We comply with applicable federal civil rights laws and [place] laws. We do not discriminate on the basis of race, color, national origin, age, disability, sex, sexual orientation, or gender identity.            Thank you!     Thank you for choosing [facility]  for your care. Our goal is always to provide you with excellent care. Hearing back from our patients is one way we can continue to improve our services. Please take a few minutes to complete the written survey that you may receive in the mail after your visit with us. Thank you!             Your Updated Medication List - Protect others around you: Learn how to safely use, store and throw away your medicines at www.disposemymeds.org.          This list is accurate as of 4/17/18 11:15 AM.  Always use your most recent med list.                   Brand Name Dispense Instructions for use Diagnosis    ACETAMINOPHEN PO      Take 1,000 mg by mouth every 6 hours as needed for pain        atorvastatin 40 MG tablet    LIPITOR    90 tablet    Take 1 tablet (40 mg) by mouth daily    Hyperlipidemia LDL goal <100       hydrocortisone 1 % cream    CORTAID     Apply topically daily as needed        latanoprost 0.005 % ophthalmic solution    XALATAN     Place 1 drop into both eyes At Bedtime        lisinopril 20 MG tablet    PRINIVIL/ZESTRIL    90 tablet    Take 1 tablet (20 mg) by mouth daily    Benign essential hypertension       metoprolol tartrate 50 MG tablet    LOPRESSOR    180 tablet    Take 1 tablet (50 mg) by mouth 2 times daily    Paroxysmal atrial fibrillation (H)       MULTIVITAMIN PO      Take 1 tablet by mouth daily        terazosin 2 MG capsule    HYTRIN    90 capsule    Take 1 capsule (2 mg) by mouth daily    Benign non-nodular prostatic hyperplasia with lower urinary tract symptoms       traMADol 50 MG tablet    ULTRAM    60 tablet     Take 1-2 tablets ( mg) by mouth every 8 hours as needed for pain Maximum 6 tablet(s) per day    Acute right-sided low back pain without sciatica       UNABLE TO FIND      MEDICATION NAME: Pro-racia  OTC rosacea cream.        warfarin 5 MG tablet    COUMADIN    90 tablet    Take 1 tab daily or as directed by INR clinic    Long term current use of anticoagulant therapy

## 2018-04-23 ENCOUNTER — OFFICE VISIT (OUTPATIENT)
Dept: FAMILY MEDICINE | Facility: CLINIC | Age: 78
End: 2018-04-23
Payer: COMMERCIAL

## 2018-04-23 VITALS
SYSTOLIC BLOOD PRESSURE: 160 MMHG | OXYGEN SATURATION: 94 % | DIASTOLIC BLOOD PRESSURE: 88 MMHG | WEIGHT: 186.9 LBS | HEART RATE: 81 BPM | TEMPERATURE: 97.3 F | BODY MASS INDEX: 27.68 KG/M2 | HEIGHT: 69 IN

## 2018-04-23 DIAGNOSIS — Z12.11 SPECIAL SCREENING FOR MALIGNANT NEOPLASMS, COLON: ICD-10-CM

## 2018-04-23 DIAGNOSIS — Z00.00 MEDICARE ANNUAL WELLNESS VISIT, SUBSEQUENT: Primary | ICD-10-CM

## 2018-04-23 DIAGNOSIS — E78.5 HYPERLIPIDEMIA, UNSPECIFIED HYPERLIPIDEMIA TYPE: Chronic | ICD-10-CM

## 2018-04-23 DIAGNOSIS — H93.13 TINNITUS, BILATERAL: ICD-10-CM

## 2018-04-23 DIAGNOSIS — N40.1 BENIGN NON-NODULAR PROSTATIC HYPERPLASIA WITH LOWER URINARY TRACT SYMPTOMS: Chronic | ICD-10-CM

## 2018-04-23 DIAGNOSIS — M48.062 SPINAL STENOSIS OF LUMBAR REGION WITH NEUROGENIC CLAUDICATION: ICD-10-CM

## 2018-04-23 DIAGNOSIS — I10 BENIGN ESSENTIAL HYPERTENSION: Chronic | ICD-10-CM

## 2018-04-23 DIAGNOSIS — I48.0 PAROXYSMAL ATRIAL FIBRILLATION (H): Chronic | ICD-10-CM

## 2018-04-23 PROCEDURE — 99213 OFFICE O/P EST LOW 20 MIN: CPT | Mod: 25 | Performed by: INTERNAL MEDICINE

## 2018-04-23 PROCEDURE — 99397 PER PM REEVAL EST PAT 65+ YR: CPT | Performed by: INTERNAL MEDICINE

## 2018-04-23 RX ORDER — WARFARIN SODIUM 5 MG/1
5 TABLET ORAL DAILY
COMMUNITY
End: 2019-06-05

## 2018-04-23 RX ORDER — TERAZOSIN 2 MG/1
4 CAPSULE ORAL AT BEDTIME
Qty: 180 CAPSULE | Refills: 3 | Status: SHIPPED | OUTPATIENT
Start: 2018-04-23 | End: 2018-05-23

## 2018-04-23 NOTE — PATIENT INSTRUCTIONS
Increase your Terazosin to 4 mg every evening  Let me know if your urine flow isn't improving  Continue monitoring your blood pressure  Try to reduce the salt/sodium in your diet  Continue Lisinopril 20 mg in AM and 10 mg in PM for now  Schedule future fasting labs and nurse blood pressure check in about a month  You should get a call to schedule the colonoscopy for sometime later this fall  The buzzing in your ears sounds not worrisome  Follow up at least annually but sooner if questions/concerns    Preventive Health Recommendations:   Male Ages 65 and over    Yearly exam:             See your health care provider every year in order to  o   Review health changes.   o   Discuss preventive care.    o   Review your medicines if your doctor has prescribed any.    Talk with your health care provider about whether you should have a test to screen for prostate cancer (PSA).    Every 3 years, have a diabetes test (fasting glucose). If you are at risk for diabetes, you should have this test more often.    Every 5 years, have a cholesterol test. Have this test more often if you are at risk for high cholesterol or heart disease.     Every 10 years, have a colonoscopy. Or, have a yearly FIT test (stool test). These exams will check for colon cancer.    Talk to with your health care provider about screening for Abdominal Aortic Aneurysm if you have a family history of AAA or have a history of smoking.  Shots:     Get a flu shot each year.     Get a tetanus shot every 10 years.     Talk to your doctor about your pneumonia vaccines. There are now two you should receive - Pneumovax (PPSV 23) and Prevnar (PCV 13).    Talk to your doctor about a shingles vaccine.     Talk to your doctor about the hepatitis B vaccine.  Nutrition:     Eat at least 5 servings of fruits and vegetables each day.     Eat whole-grain bread, whole-wheat pasta and brown rice instead of white grains and rice.     Talk to your doctor about Calcium and Vitamin  D.   Lifestyle    Exercise for at least 150 minutes a week (30 minutes a day, 5 days a week). This will help you control your weight and prevent disease.     Limit alcohol to one drink per day.     No smoking.     Wear sunscreen to prevent skin cancer.     See your dentist every six months for an exam and cleaning.     See your eye doctor every 1 to 2 years to screen for conditions such as glaucoma, macular degeneration and cataracts.

## 2018-04-23 NOTE — NURSING NOTE
"Chief Complaint   Patient presents with     Wellness Visit       Initial /86 (BP Location: Right arm, Cuff Size: Adult Large)  Pulse 81  Temp 97.3  F (36.3  C) (Oral)  Ht 5' 8.5\" (1.74 m)  Wt 186 lb 14.4 oz (84.8 kg)  SpO2 94%  BMI 28 kg/m2 Estimated body mass index is 28 kg/(m^2) as calculated from the following:    Height as of this encounter: 5' 8.5\" (1.74 m).    Weight as of this encounter: 186 lb 14.4 oz (84.8 kg).  Medication Reconciliation: complete   Alexandra Pillai MA  "

## 2018-04-23 NOTE — PROGRESS NOTES
SUBJECTIVE:   Aniket Macias is a 77 year old male who presents for Preventive Visit.  Are you in the first 12 months of your Medicare Part B coverage?  No    Healthy Habits:    Do you get at least three servings of calcium containing foods daily (dairy, green leafy vegetables, etc.)? yes    Amount of exercise or daily activities, outside of work: 2-3 day(s) per week    Problems taking medications regularly No    Medication side effects: No    Have you had an eye exam in the past two years? Yes     Do you see a dentist twice per year? once    Do you have sleep apnea, excessive snoring or daytime drowsiness? No       Ability to successfully perform activities of daily living: Yes, no assistance needed    Home safety:  none identified     Hearing impairment: No    Fall risk:  Fallen 2 or more times in the past year?: No  Any fall with injury in the past year?: No    COGNITIVE SCREEN  1) Repeat 3 items (Banana, Sunrise, Chair)    2) Clock draw: NORMAL  3) 3 item recall: Recalls 3 objects  Results: 3 items recalled: COGNITIVE IMPAIRMENT LESS LIKELY    Mini-CogTM Copyright S Benjamin. Licensed by the author for use in [employer]; reprinted with permission (gerda@[facility]). All rights reserved.      Aniket is here for annual visit today but also has a lot of additional medical concerns.  BPs erratic recently - 120s-150s/70-100s even after increasing from Lisinopril 20 mg to 20 mg in AM and 10 mg in PM per cardiology. He is checking daily. Never dizzy when on lower end. Says he could cut back on sodium in diet as doesn't pay attention to that.  Had lumbar injection last summer per neurosurg that worked quite well. Thought was having some recurrent stabs of pain recently but this was minimal and he isn't concerned about it.  Some buzzing in his ears bilaterally like insects that is chronic for about 10-15 years and is there no matter what time of day. No hearing impairment that he notices. 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 Hearing is better than his wife's though. No vertigo. Doesn't bother him at all but he brought it up to his wife and she was concerned and said he aught to mention it to me today.  H/o BPH and has starting-stopping of urinary stream and worsening of frequency every 2 hours (and recently got up 3 times during a 2.5 hour movie he was watching). Sometimes still has strong steady stream but other times really has to strain to get a flow started. Is on Hytrin 2 mg daily. Used to be on Finasteride but stopped d/t cost. Urologist in [place], [place] had done past benign biopsies. Declines PSA b/c scared of false readings as he's seen in the past.   Some grinding in neck and hard time looking up to right. Some arthritis in 5th digits bilaterally.     Problems        Social History   Substance Use Topics     Smoking status: Former Smoker     Packs/day: 1.00     Years: 20.00     Types: Cigarettes     Smokeless tobacco: Never Used      Comment: quit age 54     Alcohol use 0.0 oz/week     0 Standard drinks or equivalent per week      Comment: 3-4 drinks per week       If you drink alcohol do you typically have >3 drinks per day or >7 drinks per week? No                        Today's PHQ-2 Score:   PHQ-2 ( 1999 Pfizer) 4/23/2018 2/21/2017   Q1: Little interest or pleasure in doing things 0 0   Q2: Feeling down, depressed or hopeless 0 0   PHQ-2 Score 0 0       Do you feel safe in your environment - Yes    Do you have a Health Care Directive?: Yes: Advance Directive has been received and scanned.    Current providers sharing in care for this patient include:   Patient Care Team:  Jennifer Mishra DO as PCP - General (Internal Medicine)    The following health maintenance items are reviewed in Epic and correct as of today:  Health Maintenance   Topic Date Due     ADVANCE DIRECTIVE PLANNING Q5 YRS  06/09/1994     OP ANNUAL INR REFERRAL  09/09/2016     COLONOSCOPY Q3 YR  11/19/2018     FALL RISK  "ASSESSMENT  04/23/2019     LIPID SCREEN Q5 YR MALE (SYSTEM ASSIGNED)  02/21/2022     TETANUS IMMUNIZATION (SYSTEM ASSIGNED)  02/04/2024     PNEUMOCOCCAL  Completed     INFLUENZA VACCINE  Completed       ROS:  Comprehensive ROS negative unless as stated above in HPI.     OBJECTIVE:   /88  Pulse 81  Temp 97.3  F (36.3  C) (Oral)  Ht 5' 8.5\" (1.74 m)  Wt 186 lb 14.4 oz (84.8 kg)  SpO2 94%  BMI 28 kg/m2 Estimated body mass index is 28 kg/(m^2) as calculated from the following:    Height as of this encounter: 5' 8.5\" (1.74 m).    Weight as of this encounter: 186 lb 14.4 oz (84.8 kg).  EXAM:   BP elevated still on recheck  GENERAL: healthy, alert and no distress  EYES: Eyes grossly normal to inspection, PERRL and conjunctivae and sclerae normal  HENT: oropharynx clear, oral mucous membranes moist and minor cerumen on right  NECK: no adenopathy, no asymmetry, masses, or scars and thyroid normal to palpation  RESP: lungs clear to auscultation - no rales, rhonchi or wheezes  CV: irregularly irregular rate controlled, no murmur, click or rub, no peripheral edema and no carotid bruits  ABDOMEN: soft, nontender, no hepatosplenomegaly, no masses and bowel sounds normal  RECTAL: bilobed enlarged prostate without nodularity and soft internal and external hemorrhoids  MS: left buttock lipoma; 5th finger DIP OA bilaterally   SKIN: no suspicious lesions or rashes  NEURO: Normal strength and tone, mentation intact and speech normal  PSYCH: mentation appears normal, affect normal/bright    ASSESSMENT / PLAN:   1. Medicare annual wellness visit, subsequent  Up to date on preventative health care - colonoscopy due later this year d/t h/o polyps and frequent monitoring  Follows with Dr. James for h/o BCC    2. Benign non-nodular prostatic hyperplasia with lower urinary tract symptoms  See discussion above and PEPITO  He declines PSA; h/o negative prostate biopsies in the past when living in [place]  Trial of increased Hytrin  - " "terazosin (HYTRIN) 2 MG capsule; Take 2 capsules (4 mg) by mouth At Bedtime  Dispense: 180 capsule; Refill: 3    3. Benign essential hypertension; goal < 140/90  Increasing Hytrin  Decrease Na+ in diet  May need to still eventually increase Lisinopril to the full 40 mg daily dose  Has f/u with cardiology planned next month as well  - CBC with platelets; Future  - Comprehensive metabolic panel; Future    4. Paroxysmal atrial fibrillation  Appreciate Coumadin clinic - apparently they caught a mix up of his recently where he was using Tramadol instead of Terazosin. MiniCog normal today - need to follow.  Acetaminophen may enhance the anticoagulant effect of Vitamin K Antagonists. This appears most likely with daily acetaminophen doses exceeding 1.3 or 2 g/day for multiple consecutive days. He is aware to have greens if higher dose Acetaminophen.     5. Hyperlipidemia, unspecified hyperlipidemia type  Remains on Lipitor  - Lipid panel reflex to direct LDL Fasting; Future    6. Spinal stenosis of lumbar region with neurogenic claudication  Improved with cortisone injection last year; Tramadol still on list but he likely rarely uses    7. Tinnitus, bilateral  Minor cerumen only; chronic symptoms which do not bother him without associated other symptomatology - reassured ok to monitor    8. Special screening for malignant neoplasms, colon  He is willing and robust enough to have repeat colonoscopy late 2018  - GASTROENTEROLOGY ADULT REF PROCEDURE ONLY Harpreet Dos Santos [phone]; Dr. POLINA Burden; Future    End of Life Planning:  Patient currently has an advanced directive: Yes.  Practitioner is supportive of decision.    COUNSELING:  Reviewed preventive health counseling, as reflected in patient instructions       Regular exercise       Healthy diet/nutrition  Estimated body mass index is 28 kg/(m^2) as calculated from the following:    Height as of this encounter: 5' 8.5\" (1.74 m).    Weight as of this encounter: 186 " lb 14.4 oz (84.8 kg).   reports that he has quit smoking. His smoking use included Cigarettes. He has a 20.00 pack-year smoking history. He has never used smokeless tobacco.      Appropriate preventive services were discussed with this patient, including applicable screening as appropriate for cardiovascular disease, diabetes, osteopenia/osteoporosis, and glaucoma.  As appropriate for age/gender, discussed screening for colorectal cancer, prostate cancer, breast cancer, and cervical cancer. Checklist reviewing preventive services available has been given to the patient.    Reviewed patients plan of care and provided an AVS. The Intermediate Care Plan ( asthma action plan, low back pain action plan, and migraine action plan) for Aniket meets the Care Plan requirement. This Care Plan has been established and reviewed with the Patient.    Patient Instructions   Increase your Terazosin to 4 mg every evening  Let me know if your urine flow isn't improving  Continue monitoring your blood pressure  Try to reduce the salt/sodium in your diet  Continue Lisinopril 20 mg in AM and 10 mg in PM for now  Schedule future fasting labs and nurse blood pressure check in about a month  You should get a call to schedule the colonoscopy for sometime later this fall  The buzzing in your ears sounds not worrisome  Follow up at least annually but sooner if questions/concerns    MDM: Additional time outside of preventative health care spent discussing uncontrolled HTN and BPH.      Jennifer Mishra,   [facility]

## 2018-04-23 NOTE — MR AVS SNAPSHOT
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        Benign essential hypertension; goal < 140/90        Hyperlipidemia, unspecified hyperlipidemia type        Special screening for malignant neoplasms, colon          Care Instructions    Increase your Terazosin to 4 mg every evening  Let me know if your urine flow isn't improving  Continue monitoring your blood pressure  Try to reduce the salt/sodium in your diet  Continue Lisinopril 20 mg in AM and 10 mg in PM for now  Schedule future fasting labs and nurse blood pressure check in about a month  You should get a call to schedule the colonoscopy for sometime later this fall  The buzzing in your ears sounds not worrisome  Follow up at least annually but sooner if questions/concerns    Preventive Health Recommendations:   Male Ages 65 and over    Yearly exam:             See your health care provider every year in order to  o   Review health changes.   o   Discuss preventive care.    o   Review your medicines if your doctor has prescribed any.    Talk with your health care provider about whether you should have a test to screen for prostate cancer (PSA).    Every 3 years, have a diabetes test (fasting glucose). If you are at risk for diabetes, you should have this test more often.    Every 5 years, have a cholesterol test. Have this test more often if you are at risk for high cholesterol or heart disease.     Every 10 years, have a colonoscopy. Or, have a yearly FIT test (stool test). These exams will check for colon cancer.    Talk to with your health care provider about screening for  Abdominal Aortic Aneurysm if you have a family history of AAA or have a history of smoking.  Shots:     Get a flu shot each year.     Get a tetanus shot every 10 years.     Talk to your doctor about your pneumonia vaccines. There are now two you should receive - Pneumovax (PPSV 23) and Prevnar (PCV 13).    Talk to your doctor about a shingles vaccine.     Talk to your doctor about the hepatitis B vaccine.  Nutrition:     Eat at least 5 servings of fruits and vegetables each day.     Eat whole-grain bread, whole-wheat pasta and brown rice instead of white grains and rice.     Talk to your doctor about Calcium and Vitamin D.   Lifestyle    Exercise for at least 150 minutes a week (30 minutes a day, 5 days a week). This will help you control your weight and prevent disease.     Limit alcohol to one drink per day.     No smoking.     Wear sunscreen to prevent skin cancer.     See your dentist every six months for an exam and cleaning.     See your eye doctor every 1 to 2 years to screen for conditions such as glaucoma, macular degeneration and cataracts.          Follow-ups after your visit        Additional Services     GASTROENTEROLOGY ADULT REF PROCEDURE ONLY Harpreet Dos Santos [phone]; Dr. POLINA Burden                 Your next 10 appointments already scheduled     May 08, 2018 10:40 AM CDT   Anticoagulation Visit with [facility]   [facility] ([facility])    [address] [place] [id number] [phone] OPT 2              Future tests that were ordered for you today     Open Future Orders        Priority Expected Expires Ordered    CBC with platelets Routine 5/21/2018 10/23/2018 4/23/2018    Comprehensive metabolic panel Routine 5/21/2018 10/23/2018 4/23/2018    Lipid panel reflex to direct LDL Fasting Routine 5/21/2018 10/23/2018 4/23/2018    GASTROENTEROLOGY ADULT REF PROCEDURE ONLY Harpreet Dos Santos [phone]; Dr. POLINA Burden  "Routine 2018 10/23/2018 2018            Who to contact     If you have questions or need follow up information about today's clinic visit or your schedule please contact [facility] directly at [phone].  Normal or non-critical lab and imaging results will be communicated to you by MyChart, letter or phone within 4 business days after the clinic has received the results. If you do not hear from us within 7 days, please contact the clinic through IPLogichart or phone. If you have a critical or abnormal lab result, we will notify you by phone as soon as possible.  Submit refill requests through Nimia or call your pharmacy and they will forward the refill request to us. Please allow 3 business days for your refill to be completed.          Additional Information About Your Visit        IPLogicharContextbroker Information     Nimia lets you send messages to your doctor, view your test results, renew your prescriptions, schedule appointments and more. To sign up, go to [email or web address] . Click on \"Log in\" on the left side of the screen, which will take you to the Welcome page. Then click on \"Sign up Now\" on the right side of the page.     You will be asked to enter the access code listed below, as well as some personal information. Please follow the directions to create your username and password.     Your access code is: [email or web address]  Expires: 2018 11:15 AM     Your access code will  in 90 days. If you need help or a new code, please call your [facility] clinic or [phone].        Care EveryWhere ID     This is your Care EveryWhere ID. This could be used by other organizations to access your [facility] medical records  [id number]        Your Vitals Were     Pulse Temperature Height Pulse Oximetry BMI (Body Mass Index)       81 97.3  F (36.3  C) (Oral) 5' 8.5\" (1.74 m) 94% 28 kg/m2        Blood Pressure from Last 3 Encounters:   18 160/88   18 (!) 157/99   07/10/17 136/77    " Weight from Last 3 Encounters:   04/23/18 186 lb 14.4 oz (84.8 kg)   04/17/18 188 lb (85.3 kg)   07/05/17 179 lb (81.2 kg)              We Performed the Following     [facility] ACCESS CODE - Add PCP and Click on cosign on right          Today's Medication Changes          These changes are accurate as of 4/23/18  2:35 PM.  If you have any questions, ask your nurse or doctor.               These medicines have changed or have updated prescriptions.        Dose/Directions    lisinopril 20 MG tablet   Commonly known as:  PRINIVIL/ZESTRIL   This may have changed:  additional instructions   Used for:  Benign essential hypertension        Dose:  20 mg   Take 1 tablet (20 mg) by mouth daily   Quantity:  90 tablet   Refills:  3       terazosin 2 MG capsule   Commonly known as:  HYTRIN   This may have changed:    - how much to take  - when to take this   Used for:  Benign non-nodular prostatic hyperplasia with lower urinary tract symptoms   Changed by:  Jennifer Mishra DO        Dose:  4 mg   Take 2 capsules (4 mg) by mouth At Bedtime   Quantity:  180 capsule   Refills:  3            Where to get your medicines      These medications were sent to [id number] - [place], [place] - [address]  [address], [facility] [address]     Phone:  [phone]     terazosin 2 MG capsule                Primary Care Provider Office Phone # Fax #    Jennifer Mishra [phone] [address]  [facility] [address]        Equal Access to Services     GINNY CORDOVA AH: Hadii aad ku hadasho Soomaali, waaxda luqadaha, qaybta kaalmada adeegyada, waxay idiin hayaan petra galvezaraashwin la'rosa isela . So [facility] [phone].    ATENCIÓN: Si habla español, tiene a owens disposición servicios gratuitos de asistencia lingüística. Llame al [phone].    We comply with applicable federal civil rights laws and [place] laws. We do not discriminate on the basis of race, color, national origin, age,  disability, sex, sexual orientation, or gender identity.            Thank you!     Thank you for choosing [facility]  for your care. Our goal is always to provide you with excellent care. Hearing back from our patients is one way we can continue to improve our services. Please take a few minutes to complete the written survey that you may receive in the mail after your visit with us. Thank you!             Your Updated Medication List - Protect others around you: Learn how to safely use, store and throw away your medicines at www.disposemymeds.org.          This list is accurate as of 4/23/18  2:35 PM.  Always use your most recent med list.                   Brand Name Dispense Instructions for use Diagnosis    ACETAMINOPHEN PO      Take 1,000 mg by mouth every 6 hours as needed for pain        atorvastatin 40 MG tablet    LIPITOR    90 tablet    Take 1 tablet (40 mg) by mouth daily    Hyperlipidemia LDL goal <100       hydrocortisone 1 % cream    CORTAID     Apply topically daily as needed        JANTOVEN 5 MG tablet   Generic drug:  warfarin      Take 5 mg by mouth daily        latanoprost 0.005 % ophthalmic solution    XALATAN     Place 1 drop into both eyes At Bedtime        lisinopril 20 MG tablet    PRINIVIL/ZESTRIL    90 tablet    Take 1 tablet (20 mg) by mouth daily    Benign essential hypertension       metoprolol tartrate 50 MG tablet    LOPRESSOR    180 tablet    Take 1 tablet (50 mg) by mouth 2 times daily    Paroxysmal atrial fibrillation (H)       MULTIVITAMIN PO      Take 1 tablet by mouth daily        terazosin 2 MG capsule    HYTRIN    180 capsule    Take 2 capsules (4 mg) by mouth At Bedtime    Benign non-nodular prostatic hyperplasia with lower urinary tract symptoms       traMADol 50 MG tablet    ULTRAM    60 tablet    Take 1-2 tablets ( mg) by mouth every 8 hours as needed for pain Maximum 6 tablet(s) per day    Acute right-sided low back pain without sciatica       UNABLE TO  FIND      MEDICATION NAME: Pro-racia  OTC rosacea cream.

## 2018-04-29 ENCOUNTER — TELEPHONE (OUTPATIENT)
Dept: FAMILY MEDICINE | Facility: CLINIC | Age: 78
End: 2018-04-29

## 2018-04-29 NOTE — TELEPHONE ENCOUNTER
"As I was going through Lowell's chart this weekend, I noticed a recent INR (cardiology team) note where he must have mixed up his Tramadol and his Terazosin (which is for his prostate). This was the note, \"Has been taking tramadol by mistake thinking it was one of his prostate medications. Last taken last night\". She corrected this for him.    How long does he think he did that for? He saw me in clinic last week with worsening urinary flow and so Terazosin was increased. He's also been struggling with erratic blood pressures so may be good to review his meds with him over the phone and suggest he bring them all in either to his next INR appointment or his upcoming cardiology appointment.    Thanks!  "

## 2018-04-30 NOTE — TELEPHONE ENCOUNTER
TO PCP:   Spoke with patient  He only mixed up the Tramadol and Terazosin for 3 days total   States his BP readings have been better since recent OV - 130s/low 80s usually. Today was 130/76.   He will bring a log of readings to his next INR visit    Altagracia SOL RN

## 2018-05-01 ENCOUNTER — ANTICOAGULATION THERAPY VISIT (OUTPATIENT)
Dept: CARDIOLOGY | Facility: CLINIC | Age: 78
End: 2018-05-01
Payer: COMMERCIAL

## 2018-05-01 DIAGNOSIS — I48.0 PAROXYSMAL ATRIAL FIBRILLATION (H): ICD-10-CM

## 2018-05-01 DIAGNOSIS — Z79.01 LONG-TERM (CURRENT) USE OF ANTICOAGULANTS: ICD-10-CM

## 2018-05-01 LAB — INR POINT OF CARE: 3 (ref 0.86–1.14)

## 2018-05-01 PROCEDURE — 36416 COLLJ CAPILLARY BLOOD SPEC: CPT | Performed by: INTERNAL MEDICINE

## 2018-05-01 PROCEDURE — 85610 PROTHROMBIN TIME: CPT | Mod: QW | Performed by: INTERNAL MEDICINE

## 2018-05-01 NOTE — PROGRESS NOTES
ANTICOAGULATION FOLLOW-UP CLINIC VISIT    Patient Name:  Aniket Macias  Date:  5/1/2018  Contact Type:  Face to Face    SUBJECTIVE:     Patient Findings     Positives No Problem Findings           OBJECTIVE    INR Protime   Date Value Ref Range Status   05/01/2018 3.0 (A) 0.86 - 1.14 Final       ASSESSMENT / PLAN  INR assessment THER    Recheck INR In: 4 WEEKS    INR Location Clinic      Anticoagulation Summary as of 5/1/2018     INR goal 2.0-3.0   Today's INR 3.0   Maintenance plan 5 mg (5 mg x 1) every day   Full instructions 5 mg every day   Weekly total 35 mg   No change documented Stefani Kaminski, LUANA   Plan last modified Sallie Whalen RN (6/1/2016)   Next INR check 5/29/2018   Target end date Indefinite    Indications   Long-term (current) use of anticoagulants [Z79.01] [Z79.01]  Paroxysmal atrial fibrillation [I48.0]         Anticoagulation Episode Summary     INR check location     Preferred lab     Send INR reminders to [facility] HEART INR NURSE    Comments       Anticoagulation Care Providers     Provider Role Specialty Phone number    Sebastián Bermudez MD Responsible Cardiology [phone]            See the Encounter Report to view Anticoagulation Flowsheet and Dosing Calendar (Go to Encounters tab in chart review, and find the Anticoagulation Therapy Visit)    INR 3.0 No longer taking Tramadol regularly. No change in other meds or diet. No abnormal bleeding or bruising. Will continue current dosing of 5 mg daily and recheck in 4 weeks. If still in range then will stretch time interval to 5 weeks.    Stefani Kaminski RN

## 2018-05-01 NOTE — MR AVS SNAPSHOT
Aniket Macias   5/1/2018 10:20 AM   Anticoagulation Therapy Visit    Description:  77 year old male   Provider:  [facility]   Department:  [facility]           INR as of 5/1/2018     Today's INR 3.0      Anticoagulation Summary as of 5/1/2018     INR goal 2.0-3.0   Today's INR 3.0   Full instructions 5 mg every day   Next INR check 5/29/2018    Indications   Long-term (current) use of anticoagulants [Z79.01] [Z79.01]  Paroxysmal atrial fibrillation [I48.0]         Your next Anticoagulation Clinic appointment(s)     May 29, 2018 10:40 AM CDT   Anticoagulation Visit with  ANTICOAGULATION   [facility] ([facility])    [address] [place], [place] [address]  [facility] [address] [phone] OPT 2              Contact Numbers     Anticoagulant (INR) Clinic Number: [phone]          May 2018 Details    Sun Mon Tue Wed Thu Fri Sat       1      5 mg   See details      2      5 mg         3      5 mg         4      5 mg         5      5 mg           6      5 mg         7      5 mg         8      5 mg         9      5 mg         10      5 mg         11      5 mg         12      5 mg           13      5 mg         14      5 mg         15      5 mg         16      5 mg         17      5 mg         18      5 mg         19      5 mg           20      5 mg         21      5 mg         22      5 mg         23      5 mg         24      5 mg         25      5 mg         26      5 mg           27      5 mg         28      5 mg         29            30               31                  Date Details   05/01 This INR check       Date of next INR:  5/29/2018         How to take your warfarin dose     To take:  5 mg Take 1 of the 5 mg tablets.

## 2018-05-02 ENCOUNTER — APPOINTMENT (OUTPATIENT)
Age: 78
Setting detail: DERMATOLOGY
End: 2018-05-23

## 2018-05-02 DIAGNOSIS — Z71.89 OTHER SPECIFIED COUNSELING: ICD-10-CM

## 2018-05-02 DIAGNOSIS — L81.4 OTHER MELANIN HYPERPIGMENTATION: ICD-10-CM

## 2018-05-02 DIAGNOSIS — L82.1 OTHER SEBORRHEIC KERATOSIS: ICD-10-CM

## 2018-05-02 DIAGNOSIS — Z85.828 PERSONAL HISTORY OF OTHER MALIGNANT NEOPLASM OF SKIN: ICD-10-CM

## 2018-05-02 DIAGNOSIS — L11.1 TRANSIENT ACANTHOLYTIC DERMATOSIS [GROVER]: ICD-10-CM

## 2018-05-02 DIAGNOSIS — L98.8 OTHER SPECIFIED DISORDERS OF THE SKIN AND SUBCUTANEOUS TISSUE: ICD-10-CM

## 2018-05-02 DIAGNOSIS — D22 MELANOCYTIC NEVI: ICD-10-CM

## 2018-05-02 DIAGNOSIS — L57.0 ACTINIC KERATOSIS: ICD-10-CM

## 2018-05-02 DIAGNOSIS — B35.1 TINEA UNGUIUM: ICD-10-CM

## 2018-05-02 DIAGNOSIS — D18.0 HEMANGIOMA: ICD-10-CM

## 2018-05-02 PROBLEM — D18.01 HEMANGIOMA OF SKIN AND SUBCUTANEOUS TISSUE: Status: ACTIVE | Noted: 2018-05-02

## 2018-05-02 PROBLEM — D48.5 NEOPLASM OF UNCERTAIN BEHAVIOR OF SKIN: Status: ACTIVE | Noted: 2018-05-02

## 2018-05-02 PROBLEM — D22.5 MELANOCYTIC NEVI OF TRUNK: Status: ACTIVE | Noted: 2018-05-02

## 2018-05-02 PROCEDURE — 17003 DESTRUCT PREMALG LES 2-14: CPT

## 2018-05-02 PROCEDURE — 17000 DESTRUCT PREMALG LESION: CPT

## 2018-05-02 PROCEDURE — OTHER LIQUID NITROGEN: OTHER

## 2018-05-02 PROCEDURE — 11100: CPT | Mod: 59

## 2018-05-02 PROCEDURE — 99214 OFFICE O/P EST MOD 30 MIN: CPT | Mod: 25

## 2018-05-02 PROCEDURE — OTHER MIPS QUALITY: OTHER

## 2018-05-02 PROCEDURE — OTHER BIOPSY BY SHAVE METHOD: OTHER

## 2018-05-02 PROCEDURE — OTHER COUNSELING: OTHER

## 2018-05-02 ASSESSMENT — LOCATION SIMPLE DESCRIPTION DERM
LOCATION SIMPLE: LEFT CHEEK
LOCATION SIMPLE: RIGHT LOWER BACK
LOCATION SIMPLE: RIGHT UPPER BACK
LOCATION SIMPLE: RIGHT CHEEK
LOCATION SIMPLE: RIGHT TEMPLE
LOCATION SIMPLE: RIGHT LIP
LOCATION SIMPLE: INFERIOR FOREHEAD
LOCATION SIMPLE: LEFT SCALP
LOCATION SIMPLE: RIGHT GREAT TOE
LOCATION SIMPLE: LEFT EAR
LOCATION SIMPLE: LEFT GREAT TOE

## 2018-05-02 ASSESSMENT — LOCATION ZONE DERM
LOCATION ZONE: SCALP
LOCATION ZONE: LIP
LOCATION ZONE: TOE
LOCATION ZONE: TOENAIL
LOCATION ZONE: FACE
LOCATION ZONE: FACE
LOCATION ZONE: EAR
LOCATION ZONE: TRUNK

## 2018-05-02 ASSESSMENT — LOCATION DETAILED DESCRIPTION DERM
LOCATION DETAILED: LEFT SCAPHA
LOCATION DETAILED: RIGHT SUPERIOR MEDIAL UPPER BACK
LOCATION DETAILED: RIGHT MEDIAL UPPER BACK
LOCATION DETAILED: RIGHT INFERIOR MEDIAL UPPER BACK
LOCATION DETAILED: LEFT SUPERIOR CENTRAL MALAR CHEEK
LOCATION DETAILED: RIGHT UPPER CUTANEOUS LIP
LOCATION DETAILED: RIGHT GREAT TOENAIL
LOCATION DETAILED: RIGHT SUPERIOR MEDIAL MIDBACK
LOCATION DETAILED: RIGHT CENTRAL TEMPLE
LOCATION DETAILED: RIGHT SUPERIOR MEDIAL MALAR CHEEK
LOCATION DETAILED: LEFT CENTRAL FRONTAL SCALP
LOCATION DETAILED: LEFT DORSAL GREAT TOE
LOCATION DETAILED: INFERIOR MID FOREHEAD

## 2018-05-02 NOTE — PROCEDURE: BIOPSY BY SHAVE METHOD
Body Location Override (Optional - Billing Will Still Be Based On Selected Body Map Location If Applicable): left nasal alar groove
Size Of Lesion In Cm: 0.3
Curettage Text: The wound bed was treated with curettage after the biopsy was performed.
Electrodesiccation And Curettage Text: The wound bed was treated with electrodesiccation and curettage after the biopsy was performed.
Biopsy Method: double edge Personna blade
Hemostasis: Electrocautery and Aluminum Chloride
Destruction After The Procedure: No
Detail Level: Detailed
Anesthesia Type: 1% lidocaine with epinephrine
Cryotherapy Text: The wound bed was treated with cryotherapy after the biopsy was performed.
Wound Care: Vaseline
Silver Nitrate Text: The wound bed was treated with silver nitrate after the biopsy was performed.
Electrodesiccation Text: The wound bed was treated with electrodesiccation after the biopsy was performed.
Type Of Destruction Used: Electrodesiccation
Dressing: Band-Aid
Consent: Written consent was obtained and risks were reviewed including but not limited to scarring, infection, bleeding, scabbing, incomplete removal, nerve damage and allergy to anesthesia.
Notification Instructions: Patient will be notified of biopsy results. However, patient instructed to call the office if not contacted within 2 weeks.
Was A Bandage Applied: Yes
Billing Type: Third-Party Bill
Biopsy Type: H and E
Post-Care Instructions: I reviewed with the patient in detail post-care instructions. Patient is to keep the biopsy site cover and dry overnight, and then apply H2O2,  vaseline  and a bandage daily until healed.
Additional Anesthesia Volume In Cc (Will Not Render If 0): 0

## 2018-05-02 NOTE — PROCEDURE: COUNSELING
Patient Specific Counseling (Will Not Stick From Patient To Patient): Continue current treatment with tea tree oil
Detail Level: Detailed
Detail Level: Generalized
Detail Level: Zone
Detail Level: Simple

## 2018-05-02 NOTE — PROCEDURE: LIQUID NITROGEN
Detail Level: Detailed
Duration Of Freeze Thaw-Cycle (Seconds): 10
Number Of Freeze-Thaw Cycles: 1 freeze-thaw cycle
Total Number Of Aks Treated: 3
Render Post-Care Instructions In Note?: yes
Consent: The patient's consent was obtained including but not limited to risks of crusting, scabbing, blistering, scarring, darker or lighter pigmentary change, recurrence, incomplete removal and infection.
Post-Care Instructions: I reviewed with the patient in detail post-care instructions. (Written instructions given) Patient is to wear sun protection, and avoid picking at any of the treated lesions. Pt may apply Vaseline to crusted or scabbing areas.

## 2018-05-02 NOTE — PROCEDURE: MIPS QUALITY
Detail Level: Detailed
Quality 431: Preventive Care And Screening: Unhealthy Alcohol Use - Screening: Patient screened for unhealthy alcohol use using a single question and scores less than 2 times per year
Quality 265: Biopsy Follow-Up: Biopsy results reviewed, communicated, tracked, and documented
Quality 226: Preventive Care And Screening: Tobacco Use: Screening And Cessation Intervention: Patient screened for tobacco and is an ex-smoker
Quality 110: Preventive Care And Screening: Influenza Immunization: Influenza Immunization previously received during influenza season
Quality 130: Documentation Of Current Medications In The Medical Record: Current Medications Documented

## 2018-05-11 ENCOUNTER — APPOINTMENT (OUTPATIENT)
Age: 78
Setting detail: DERMATOLOGY
End: 2018-05-23

## 2018-05-11 PROBLEM — C44.311 BASAL CELL CARCINOMA OF SKIN OF NOSE: Status: ACTIVE | Noted: 2018-05-11

## 2018-05-11 PROCEDURE — OTHER MOHS SURGERY PHONE CONSULTATION: OTHER

## 2018-05-22 ENCOUNTER — ALLIED HEALTH/NURSE VISIT (OUTPATIENT)
Dept: NURSING | Facility: CLINIC | Age: 78
End: 2018-05-22
Payer: MEDICARE

## 2018-05-22 ENCOUNTER — TELEPHONE (OUTPATIENT)
Dept: FAMILY MEDICINE | Facility: CLINIC | Age: 78
End: 2018-05-22

## 2018-05-22 ENCOUNTER — APPOINTMENT (OUTPATIENT)
Age: 78
Setting detail: DERMATOLOGY
End: 2018-05-23

## 2018-05-22 VITALS — SYSTOLIC BLOOD PRESSURE: 138 MMHG | DIASTOLIC BLOOD PRESSURE: 92 MMHG

## 2018-05-22 DIAGNOSIS — I10 BENIGN ESSENTIAL HYPERTENSION: Chronic | ICD-10-CM

## 2018-05-22 DIAGNOSIS — E78.5 HYPERLIPIDEMIA, UNSPECIFIED HYPERLIPIDEMIA TYPE: Chronic | ICD-10-CM

## 2018-05-22 DIAGNOSIS — Z71.89 OTHER SPECIFIED COUNSELING: ICD-10-CM

## 2018-05-22 DIAGNOSIS — N40.1 BENIGN NON-NODULAR PROSTATIC HYPERPLASIA WITH LOWER URINARY TRACT SYMPTOMS: Chronic | ICD-10-CM

## 2018-05-22 DIAGNOSIS — I10 BENIGN ESSENTIAL HYPERTENSION: Primary | Chronic | ICD-10-CM

## 2018-05-22 PROBLEM — C44.311 BASAL CELL CARCINOMA OF SKIN OF NOSE: Status: ACTIVE | Noted: 2018-05-22

## 2018-05-22 LAB
ALBUMIN SERPL-MCNC: 3.4 G/DL (ref 3.4–5)
ALP SERPL-CCNC: 85 U/L (ref 40–150)
ALT SERPL W P-5'-P-CCNC: 47 U/L (ref 0–70)
ANION GAP SERPL CALCULATED.3IONS-SCNC: 8 MMOL/L (ref 3–14)
AST SERPL W P-5'-P-CCNC: 28 U/L (ref 0–45)
BILIRUB SERPL-MCNC: 0.9 MG/DL (ref 0.2–1.3)
BUN SERPL-MCNC: 17 MG/DL (ref 7–30)
CALCIUM SERPL-MCNC: 8.3 MG/DL (ref 8.5–10.1)
CHLORIDE SERPL-SCNC: 107 MMOL/L (ref 94–109)
CHOLEST SERPL-MCNC: 110 MG/DL
CO2 SERPL-SCNC: 26 MMOL/L (ref 20–32)
CREAT SERPL-MCNC: 0.96 MG/DL (ref 0.66–1.25)
ERYTHROCYTE [DISTWIDTH] IN BLOOD BY AUTOMATED COUNT: 13.2 % (ref 10–15)
GFR SERPL CREATININE-BSD FRML MDRD: 76 ML/MIN/1.7M2
GLUCOSE SERPL-MCNC: 112 MG/DL (ref 70–99)
HCT VFR BLD AUTO: 42.9 % (ref 40–53)
HDLC SERPL-MCNC: 53 MG/DL
HGB BLD-MCNC: 14 G/DL (ref 13.3–17.7)
LDLC SERPL CALC-MCNC: 47 MG/DL
MCH RBC QN AUTO: 33.2 PG (ref 26.5–33)
MCHC RBC AUTO-ENTMCNC: 32.6 G/DL (ref 31.5–36.5)
MCV RBC AUTO: 102 FL (ref 78–100)
NONHDLC SERPL-MCNC: 57 MG/DL
PLATELET # BLD AUTO: 193 10E9/L (ref 150–450)
POTASSIUM SERPL-SCNC: 4.4 MMOL/L (ref 3.4–5.3)
PROT SERPL-MCNC: 6.4 G/DL (ref 6.8–8.8)
RBC # BLD AUTO: 4.22 10E12/L (ref 4.4–5.9)
SODIUM SERPL-SCNC: 141 MMOL/L (ref 133–144)
TRIGL SERPL-MCNC: 52 MG/DL
WBC # BLD AUTO: 8.7 10E9/L (ref 4–11)

## 2018-05-22 PROCEDURE — 36415 COLL VENOUS BLD VENIPUNCTURE: CPT | Performed by: INTERNAL MEDICINE

## 2018-05-22 PROCEDURE — 85027 COMPLETE CBC AUTOMATED: CPT | Performed by: INTERNAL MEDICINE

## 2018-05-22 PROCEDURE — OTHER MIPS QUALITY: OTHER

## 2018-05-22 PROCEDURE — 80053 COMPREHEN METABOLIC PANEL: CPT | Performed by: INTERNAL MEDICINE

## 2018-05-22 PROCEDURE — OTHER DIAGNOSIS COMMENT: OTHER

## 2018-05-22 PROCEDURE — 17312 MOHS ADDL STAGE: CPT

## 2018-05-22 PROCEDURE — OTHER COUNSELING: OTHER

## 2018-05-22 PROCEDURE — OTHER MOHS SURGERY: OTHER

## 2018-05-22 PROCEDURE — 99207 ZZC NO CHARGE NURSE ONLY: CPT

## 2018-05-22 PROCEDURE — 80061 LIPID PANEL: CPT | Performed by: INTERNAL MEDICINE

## 2018-05-22 PROCEDURE — 17311 MOHS 1 STAGE H/N/HF/G: CPT

## 2018-05-22 NOTE — MR AVS SNAPSHOT
After Visit Summary   5/22/2018    Aniket Macias    MRN: [id number]           Patient Information     Date Of Birth          1940        Visit Information        Provider Department      5/22/2018 10:45 AM CS NURSE [facility]        Today's Diagnoses     Benign essential hypertension; goal < 140/90    -  1       Follow-ups after your visit        Your next 10 appointments already scheduled     May 29, 2018 10:40 AM CDT   Anticoagulation Visit with [facility]   [facility] ([facility])    [address]  [facility] [address] [phone] OPT 2              Who to contact     If you have questions or need follow up information about today's clinic visit or your schedule please contact [facility] directly at [phone].  Normal or non-critical lab and imaging results will be communicated to you by [employer], letter or phone within 4 business days after the clinic has received the results. If you do not hear from us within 7 days, please contact the clinic through [employer] or phone. If you have a critical or abnormal lab result, we will notify you by phone as soon as possible.  Submit refill requests through [employer] or call your pharmacy and they will forward the refill request to us. Please allow 3 business days for your refill to be completed.          Additional Information About Your Visit        MyChart Information     [employer] gives you secure access to your electronic health record. If you see a primary care provider, you can also send messages to your care team and make appointments. If you have questions, please call your primary care clinic.  If you do not have a primary care provider, please call [phone] and they will assist you.        Care EveryWhere ID     This is your Care EveryWhere ID. This could be used by other organizations to access your [facility] medical records  [id number]          Blood Pressure from Last 3 Encounters:   05/22/18 (!) 138/92   04/23/18 160/88   04/17/18 (!) 157/99    Weight from Last 3 Encounters:   04/23/18 186 lb 14.4 oz (84.8 kg)   04/17/18 188 lb (85.3 kg)   07/05/17 179 lb (81.2 kg)              Today, you had the following     No orders found for display         Today's Medication Changes          These changes are accurate as of 5/22/18 11:00 AM.  If you have any questions, ask your nurse or doctor.               These medicines have changed or have updated prescriptions.        Dose/Directions    lisinopril 20 MG tablet   Commonly known as:  PRINIVIL/ZESTRIL   This may have changed:  additional instructions   Used for:  Benign essential hypertension        Dose:  20 mg   Take 1 tablet (20 mg) by mouth daily   Quantity:  90 tablet   Refills:  3                Primary Care Provider Office Phone # Fax #    Jennifer Arreaga Danica,  [phone] [address] [address]        Equal Access to Services     [facility]: Hadii gonsalo ku hadasho Soomaali, waaxda luqadaha, qaybta kaalmada adeegyada, waxay hiro solomon . So [facility] [phone].    ATENCIÓN: Si habla español, tiene a owens disposición servicios gratuitos de asistencia lingüística. Llame al [phone].    We comply with applicable federal civil rights laws and [place] laws. We do not discriminate on the basis of race, color, national origin, age, disability, sex, sexual orientation, or gender identity.            Thank you!     Thank you for choosing [facility]  for your care. Our goal is always to provide you with excellent care. Hearing back from our patients is one way we can continue to improve our services. Please take a few minutes to complete the written survey that you may receive in the mail after your visit with us. Thank you!             Your Updated Medication List - Protect others around you: Learn how to safely use, store and throw away  your medicines at www.disposemymeds.org.          This list is accurate as of 5/22/18 11:00 AM.  Always use your most recent med list.                   Brand Name Dispense Instructions for use Diagnosis    ACETAMINOPHEN PO      Take 1,000 mg by mouth every 6 hours as needed for pain        atorvastatin 40 MG tablet    LIPITOR    90 tablet    Take 1 tablet (40 mg) by mouth daily    Hyperlipidemia LDL goal <100       hydrocortisone 1 % cream    CORTAID     Apply topically daily as needed        JANTOVEN 5 MG tablet   Generic drug:  warfarin      Take 5 mg by mouth daily        latanoprost 0.005 % ophthalmic solution    XALATAN     Place 1 drop into both eyes At Bedtime        lisinopril 20 MG tablet    PRINIVIL/ZESTRIL    90 tablet    Take 1 tablet (20 mg) by mouth daily    Benign essential hypertension       metoprolol tartrate 50 MG tablet    LOPRESSOR    180 tablet    Take 1 tablet (50 mg) by mouth 2 times daily    Paroxysmal atrial fibrillation (H)       MULTIVITAMIN PO      Take 1 tablet by mouth daily        terazosin 2 MG capsule    HYTRIN    180 capsule    Take 2 capsules (4 mg) by mouth At Bedtime    Benign non-nodular prostatic hyperplasia with lower urinary tract symptoms       traMADol 50 MG tablet    ULTRAM    60 tablet    Take 1-2 tablets ( mg) by mouth every 8 hours as needed for pain Maximum 6 tablet(s) per day    Acute right-sided low back pain without sciatica       UNABLE TO FIND      MEDICATION NAME: Pro-racia  OTC rosacea cream.

## 2018-05-22 NOTE — PROCEDURE: MOHS SURGERY
Post-Care Instructions: The patient was provided with detailed verbal and written instructions for daily wound care, including use of H2O2 cleansing, followed by application of plain Vaseline and a bandage.  These instructions including Dr. London's contact information should there be any questions or concerns.  The patient is not to engage in any heavy lifting, exercise, or swimming for the next week.  Should the patient develop any fevers, chills, bleeding, or pain not controlled by OTC analgesics, s/he should contact the office immediately.

## 2018-05-22 NOTE — PROCEDURE: MOHS SURGERY
Body Location Override (Optional - Billing Will Still Be Based On Selected Body Map Location If Applicable): Left Nasal Alar Groove

## 2018-05-22 NOTE — PROCEDURE: MIPS QUALITY
Quality 110: Preventive Care And Screening: Influenza Immunization: Influenza Immunization previously received during influenza season
Detail Level: Detailed
Quality 130: Documentation Of Current Medications In The Medical Record: Current Medications Documented
Quality 431: Preventive Care And Screening: Unhealthy Alcohol Use - Screening: Patient screened for unhealthy alcohol use using a single question and scores less than 2 times per year
Quality 143: Oncology: Medical And Radiation- Pain Intensity Quantified: Pain severity quantified, no pain present
Quality 226: Preventive Care And Screening: Tobacco Use: Screening And Cessation Intervention: Patient screened for tobacco and is an ex-smoker

## 2018-05-22 NOTE — TELEPHONE ENCOUNTER
Patient is here for a nurse only appointment for a blood pressure check.    Aniket Macias is a 77 year old patient who comes in today for a Blood Pressure check.  Initial BP:  BP (!) 158/98     Data Unavailable  Disposition: Had patient wait 10 minutes to recheck BP  Patient is here for a BP check only.  Riana Blanchard CMA    After 10 minutes 138/92    Please see readings and advise.  Riana Blanchard CMA

## 2018-05-22 NOTE — NURSING NOTE
Aniket Macias is a 77 year old patient who comes in today for a Blood Pressure check.  Initial BP:  BP (!) 158/98     Data Unavailable  Disposition: Had patient wait 10 minutes to recheck BP  Patient is here for a BP check only.  Riana Blanchard CMA

## 2018-05-23 RX ORDER — TERAZOSIN 5 MG/1
5 CAPSULE ORAL AT BEDTIME
Qty: 90 CAPSULE | Refills: 3 | Status: SHIPPED | OUTPATIENT
Start: 2018-05-23 | End: 2019-04-04

## 2018-05-23 RX ORDER — LISINOPRIL 40 MG/1
40 TABLET ORAL DAILY
Qty: 90 TABLET | Refills: 3 | Status: SHIPPED | OUTPATIENT
Start: 2018-05-23 | End: 2019-04-04

## 2018-05-29 ENCOUNTER — ANTICOAGULATION THERAPY VISIT (OUTPATIENT)
Dept: CARDIOLOGY | Facility: CLINIC | Age: 78
End: 2018-05-29
Payer: COMMERCIAL

## 2018-05-29 DIAGNOSIS — Z79.01 LONG-TERM (CURRENT) USE OF ANTICOAGULANTS: ICD-10-CM

## 2018-05-29 DIAGNOSIS — I48.0 PAROXYSMAL ATRIAL FIBRILLATION (H): ICD-10-CM

## 2018-05-29 LAB — INR POINT OF CARE: 3.2 (ref 0.86–1.14)

## 2018-05-29 PROCEDURE — 36416 COLLJ CAPILLARY BLOOD SPEC: CPT | Performed by: INTERNAL MEDICINE

## 2018-05-29 PROCEDURE — 85610 PROTHROMBIN TIME: CPT | Mod: QW | Performed by: INTERNAL MEDICINE

## 2018-05-29 NOTE — MR AVS SNAPSHOT
Aniket Macias   5/29/2018 10:40 AM   Anticoagulation Therapy Visit    Description:  77 year old male   Provider:  [facility]   Department:  [facility]           INR as of 5/29/2018     Today's INR 3.2!      Anticoagulation Summary as of 5/29/2018     INR goal 2.0-3.0   Today's INR 3.2!   Full warfarin instructions 2.5 mg on Tue; 5 mg all other days   Next INR check 6/12/2018    Indications   Long-term (current) use of anticoagulants [Z79.01] [Z79.01]  Paroxysmal atrial fibrillation [I48.0]         Your next Anticoagulation Clinic appointment(s)     May 29, 2018 10:40 AM CDT   Anticoagulation Visit with  ANTICOAGULATION   [facility] ([facility])    [address] [place] [address] [phone] OPT 2            Jun 12, 2018 10:40 AM CDT   Anticoagulation Visit with  ANTICOAGULATION   [facility] ([facility])    [address] [place] [address] [phone] OPT 2              Contact Numbers     Anticoagulant (INR) Clinic Number: [phone]          May 2018 Details    Sun Mon Tue Wed Thu Fri Sat       1               2               3               4               5                 6               7               8               9               10               11               12                 13               14               15               16               17               18               19                 20               21               22               23               24               25               26                 27               28               29      2.5 mg   See details      30      5 mg         31      5 mg            Date Details   05/29 This INR check               How to take your warfarin dose     To take:  2.5 mg Take 0.5 of a 5 mg tablet.    To take:  5 mg Take 1 of the 5 mg tablets.           June 2018 Details    Sun  Mon Tue Wed Thu Fri Sat          1      5 mg         2      5 mg           3      5 mg         4      5 mg         5      2.5 mg         6      5 mg         7      5 mg         8      5 mg         9      5 mg           10      5 mg         11      5 mg         12            13               14               15               16                 17               18               19               20               21               22               23                 24               25               26               27               28               29               30                Date Details   No additional details    Date of next INR:  6/12/2018         How to take your warfarin dose     To take:  2.5 mg Take 0.5 of a 5 mg tablet.    To take:  5 mg Take 1 of the 5 mg tablets.

## 2018-05-29 NOTE — PROGRESS NOTES
ANTICOAGULATION FOLLOW-UP CLINIC VISIT    Patient Name:  Aniket Macias  Date:  5/29/2018  Contact Type:  Face to Face    SUBJECTIVE:     Patient Findings     Positives Change in diet/appetite (hasn't been able to keep green veggies/salads in his diet)           OBJECTIVE    INR Protime   Date Value Ref Range Status   05/29/2018 3.2 (A) 0.86 - 1.14 Final       ASSESSMENT / PLAN  INR assessment SUPRA    Recheck INR In: 2 WEEKS    INR Location Clinic      Anticoagulation Summary as of 5/29/2018     INR goal 2.0-3.0   Today's INR 3.2!   Warfarin maintenance plan 2.5 mg (5 mg x 0.5) on Tue; 5 mg (5 mg x 1) all other days   Full warfarin instructions 2.5 mg on Tue; 5 mg all other days   Weekly warfarin total 32.5 mg   Plan last modified Stefani Kaminski, RN (5/29/2018)   Next INR check 6/12/2018   Target end date Indefinite    Indications   Long-term (current) use of anticoagulants [Z79.01] [Z79.01]  Paroxysmal atrial fibrillation [I48.0]         Anticoagulation Episode Summary     INR check location     Preferred lab     Send INR reminders to [facility] HEART INR NURSE    Comments       Anticoagulation Care Providers     Provider Role Specialty Phone number    Sebastián Bermudez MD Responsible Cardiology [phone]            See the Encounter Report to view Anticoagulation Flowsheet and Dosing Calendar (Go to Encounters tab in chart review, and find the Anticoagulation Therapy Visit)    INR 3.2 He hasn't been able to keep green veggies/salads in his diet recently. No change in meds. Had a basal cell cancer excised from his face. No abnormal bleeding or bruising. Today's INR was the 3rd INR at 3.0 or above recently so will decrease dosing to 2.5 mg Tuesdays and 5 mg all other days with recheck in 2 weeks. Dosage adjustment made based on physician directed care plan.    Stefani Kaminski, RN

## 2018-06-05 ENCOUNTER — APPOINTMENT (OUTPATIENT)
Age: 78
Setting detail: DERMATOLOGY
End: 2018-07-09

## 2018-06-05 DIAGNOSIS — L98419 CHRONIC ULCER OF OTHER SPECIFIED SITES: ICD-10-CM

## 2018-06-05 DIAGNOSIS — L98429 CHRONIC ULCER OF OTHER SPECIFIED SITES: ICD-10-CM

## 2018-06-05 DIAGNOSIS — L57.0 ACTINIC KERATOSIS: ICD-10-CM

## 2018-06-05 PROBLEM — L98.499 NON-PRESSURE CHRONIC ULCER OF SKIN OF OTHER SITES WITH UNSPECIFIED SEVERITY: Status: ACTIVE | Noted: 2018-06-05

## 2018-06-05 PROBLEM — D48.5 NEOPLASM OF UNCERTAIN BEHAVIOR OF SKIN: Status: ACTIVE | Noted: 2018-06-05

## 2018-06-05 PROCEDURE — OTHER BIOPSY BY SHAVE METHOD: OTHER

## 2018-06-05 PROCEDURE — OTHER COUNSELING: OTHER

## 2018-06-05 PROCEDURE — OTHER POST-OP WOUND EVALUATION: OTHER

## 2018-06-05 PROCEDURE — OTHER MIPS QUALITY: OTHER

## 2018-06-05 PROCEDURE — 11100: CPT

## 2018-06-05 PROCEDURE — OTHER DIAGNOSIS COMMENT: OTHER

## 2018-06-05 PROCEDURE — 99212 OFFICE O/P EST SF 10 MIN: CPT | Mod: 25

## 2018-06-05 ASSESSMENT — LOCATION SIMPLE DESCRIPTION DERM
LOCATION SIMPLE: LEFT CHEEK
LOCATION SIMPLE: LEFT NOSE

## 2018-06-05 ASSESSMENT — LOCATION DETAILED DESCRIPTION DERM
LOCATION DETAILED: LEFT MEDIAL MALAR CHEEK
LOCATION DETAILED: LEFT NASAL ALA

## 2018-06-05 ASSESSMENT — LOCATION ZONE DERM
LOCATION ZONE: NOSE
LOCATION ZONE: FACE

## 2018-06-05 NOTE — PROCEDURE: POST-OP WOUND EVALUATION
Detail Level: Detailed
Patient To Follow-Up With?: our clinic
Wound Crusting?: crusted
Body Location Override (Optional): L nasal alar groove
Follow Up Units (Optional): 0
Wound Evaluated By (Optional): Mary London, MPhil, MD and Sari London, DNP, APRN, NP-C

## 2018-06-05 NOTE — PROCEDURE: MIPS QUALITY
Quality 110: Preventive Care And Screening: Influenza Immunization: Influenza Immunization previously received during influenza season
Detail Level: Detailed
Quality 226: Preventive Care And Screening: Tobacco Use: Screening And Cessation Intervention: Patient screened for tobacco and is an ex-smoker
Quality 131: Pain Assessment And Follow-Up: Pain assessment using a standardized tool is documented as negative, no follow-up plan required
Quality 130: Documentation Of Current Medications In The Medical Record: Current Medications Documented
Quality 431: Preventive Care And Screening: Unhealthy Alcohol Use - Screening: Patient screened for unhealthy alcohol use using a single question and scores less than 2 times per year

## 2018-06-05 NOTE — PROCEDURE: BIOPSY BY SHAVE METHOD
Billing Type: Third-Party Bill
Body Location Override (Optional - Billing Will Still Be Based On Selected Body Map Location If Applicable): L medial cheek
Dressing: Band-Aid
Type Of Destruction Used: Electrodesiccation
Size Of Lesion In Cm: 0.7
Silver Nitrate Text: The wound bed was treated with silver nitrate after the biopsy was performed.
X Size Of Lesion In Cm: 0
Consent: Written consent was obtained and risks were reviewed including but not limited to scarring, infection, bleeding, scabbing, incomplete removal, nerve damage and allergy to anesthesia.
Notification Instructions: Patient will be notified of biopsy results. However, patient instructed to call the office if not contacted within 2 weeks.
Anesthesia Volume In Cc (Will Not Render If 0): 0.3
Hemostasis: Aluminum Chloride
Post-Care Instructions: I reviewed with the patient in detail post-care instructions. Patient is to keep the biopsy site cover and dry overnight, and then apply H2O2,  vaseline  and a bandage daily until healed.
Biopsy Method: double edge Personna blade
Bill 12657 For Specimen Handling/Conveyance To Laboratory?: no
Electrodesiccation And Curettage Text: The wound bed was treated with electrodesiccation and curettage after the biopsy was performed.
Biopsy Type: H and E
Render Post-Care Instructions In Note?: yes
Electrodesiccation Text: The wound bed was treated with electrodesiccation after the biopsy was performed.
Detail Level: Detailed
Wound Care: Vaseline
Curettage Text: The wound bed was treated with curettage after the biopsy was performed.
Anesthesia Type: 1% lidocaine with epinephrine and a 1:10 solution of 8.4% sodium bicarbonate
Cryotherapy Text: The wound bed was treated with cryotherapy after the biopsy was performed.

## 2018-06-12 ENCOUNTER — ANTICOAGULATION THERAPY VISIT (OUTPATIENT)
Dept: CARDIOLOGY | Facility: CLINIC | Age: 78
End: 2018-06-12
Payer: COMMERCIAL

## 2018-06-12 DIAGNOSIS — Z79.01 LONG-TERM (CURRENT) USE OF ANTICOAGULANTS: ICD-10-CM

## 2018-06-12 DIAGNOSIS — I48.0 PAROXYSMAL ATRIAL FIBRILLATION (H): ICD-10-CM

## 2018-06-12 LAB — INR POINT OF CARE: 2.6 (ref 0.86–1.14)

## 2018-06-12 PROCEDURE — 36416 COLLJ CAPILLARY BLOOD SPEC: CPT | Performed by: INTERNAL MEDICINE

## 2018-06-12 PROCEDURE — 85610 PROTHROMBIN TIME: CPT | Mod: QW | Performed by: INTERNAL MEDICINE

## 2018-06-12 NOTE — PROGRESS NOTES
ANTICOAGULATION FOLLOW-UP CLINIC VISIT    Patient Name:  Aniket Macias  Date:  6/12/2018  Contact Type:  Face to Face    SUBJECTIVE:     Patient Findings     Positives No Problem Findings           OBJECTIVE    INR Protime   Date Value Ref Range Status   06/12/2018 2.6 (A) 0.86 - 1.14 Final       ASSESSMENT / PLAN  INR assessment THER    Recheck INR In: 4 WEEKS    INR Location Clinic      Anticoagulation Summary as of 6/12/2018     INR goal 2.0-3.0   Today's INR 2.6   Warfarin maintenance plan 2.5 mg (5 mg x 0.5) on Tue; 5 mg (5 mg x 1) all other days   Full warfarin instructions 2.5 mg on Tue; 5 mg all other days   Weekly warfarin total 32.5 mg   No change documented Elo Sarkar RN   Plan last modified Stefani Kaminski RN (5/29/2018)   Next INR check 7/10/2018   Target end date Indefinite    Indications   Long-term (current) use of anticoagulants [Z79.01] [Z79.01]  Paroxysmal atrial fibrillation [I48.0]         Anticoagulation Episode Summary     INR check location     Preferred lab     Send INR reminders to [facility] HEART INR NURSE    Comments       Anticoagulation Care Providers     Provider Role Specialty Phone number    Sebastián Bermudez MD Responsible Cardiology [phone]            See the Encounter Report to view Anticoagulation Flowsheet and Dosing Calendar (Go to Encounters tab in chart review, and find the Anticoagulation Therapy Visit)    INR 2.6. No changes to dosing 2.5 mg on Tue; 5 mg all other days. Pt took acetaminophen 1000 mg on Jose 6/10/18. Not eating as many greens. No other changes or concerns. Next appt in 4 weeks. Dosage adjustment made based on physician directed care plan.    Elo Sarkar, RN

## 2018-06-12 NOTE — MR AVS SNAPSHOT
Aniket Macias   6/12/2018 10:40 AM   Anticoagulation Therapy Visit    Description:  78 year old male   Provider:  LIU ANTICOAGULATION   Department:  [facility]           INR as of 6/12/2018     Today's INR 2.6      Anticoagulation Summary as of 6/12/2018     INR goal 2.0-3.0   Today's INR 2.6   Full warfarin instructions 2.5 mg on Tue; 5 mg all other days   Next INR check 7/10/2018    Indications   Long-term (current) use of anticoagulants [Z79.01] [Z79.01]  Paroxysmal atrial fibrillation [I48.0]         Your next Anticoagulation Clinic appointment(s)     Jul 10, 2018 10:20 AM CDT   Anticoagulation Visit with HATFIELD ANTICOAGULATION   [facility] ([facility])    [address] [place] [id number] [phone] OPT 2              Contact Numbers     Anticoagulant (INR) Clinic Number: [phone]          June 2018 Details    Sun Mon Tue Wed Thu Fri Sat          1               2                 3               4               5               6               7               8               9                 10               11               12      2.5 mg   See details      13      5 mg         14      5 mg         15      5 mg         16      5 mg           17      5 mg         18      5 mg         19      2.5 mg         20      5 mg         21      5 mg         22      5 mg         23      5 mg           24      5 mg         25      5 mg         26      2.5 mg         27      5 mg         28      5 mg         29      5 mg         30      5 mg          Date Details   06/12 This INR check               How to take your warfarin dose     To take:  2.5 mg Take 0.5 of a 5 mg tablet.    To take:  5 mg Take 1 of the 5 mg tablets.           July 2018 Details    Sun Mon Tue Wed Thu Fri Sat     1      5 mg         2      5 mg         3      2.5 mg         4      5 mg         5      5 mg         6      5 mg         7      5 mg           8      5 mg          9      5 mg         10            11               12               13               14                 15               16               17               18               19               20               21                 22               23               24               25               26               27               28                 29               30               31                    Date Details   No additional details    Date of next INR:  7/10/2018         How to take your warfarin dose     To take:  2.5 mg Take 0.5 of a 5 mg tablet.    To take:  5 mg Take 1 of the 5 mg tablets.

## 2018-06-26 ENCOUNTER — ALLIED HEALTH/NURSE VISIT (OUTPATIENT)
Dept: NURSING | Facility: CLINIC | Age: 78
End: 2018-06-26
Payer: MEDICARE

## 2018-06-26 VITALS — DIASTOLIC BLOOD PRESSURE: 88 MMHG | SYSTOLIC BLOOD PRESSURE: 130 MMHG

## 2018-06-26 DIAGNOSIS — I10 BENIGN ESSENTIAL HYPERTENSION: Primary | Chronic | ICD-10-CM

## 2018-06-26 DIAGNOSIS — I10 BENIGN ESSENTIAL HYPERTENSION: Chronic | ICD-10-CM

## 2018-06-26 PROCEDURE — 99207 ZZC NO CHARGE NURSE ONLY: CPT

## 2018-06-26 PROCEDURE — 36415 COLL VENOUS BLD VENIPUNCTURE: CPT | Performed by: INTERNAL MEDICINE

## 2018-06-26 PROCEDURE — 80048 BASIC METABOLIC PNL TOTAL CA: CPT | Performed by: INTERNAL MEDICINE

## 2018-06-26 NOTE — MR AVS SNAPSHOT
After Visit Summary   6/26/2018    Aniket Macias    MRN: [id number]           Patient Information     Date Of Birth          1940        Visit Information        Provider Department      6/26/2018 10:00 AM CS NURSE [facility] Jenifer        Today's Diagnoses     Benign essential hypertension; goal < 140/90    -  1       Follow-ups after your visit        Your next 10 appointments already scheduled     Jul 10, 2018 10:20 AM CDT   Anticoagulation Visit with HATFIELD ANTICOAGULATION   [facility] ([facility])    [address]  [facility] [address] [phone] OPT 2              Who to contact     If you have questions or need follow up information about today's clinic visit or your schedule please contact [facility] directly at [phone].  Normal or non-critical lab and imaging results will be communicated to you by [email or web address], letter or phone within 4 business days after the clinic has received the results. If you do not hear from us within 7 days, please contact the clinic through [email or web address] or phone. If you have a critical or abnormal lab result, we will notify you by phone as soon as possible.  Submit refill requests through [employer]JET or call your pharmacy and they will forward the refill request to us. Please allow 3 business days for your refill to be completed.          Additional Information About Your Visit        MyChart Information     XO[employer]ET gives you secure access to your electronic health record. If you see a primary care provider, you can also send messages to your care team and make appointments. If you have questions, please call your primary care clinic.  If you do not have a primary care provider, please call [phone] and they will assist you.        Care EveryWhere ID     This is your Care EveryWhere ID. This could be used by other organizations to access your [facility] medical records  [id number]          Blood Pressure from Last 3 Encounters:   06/26/18 130/88   05/22/18 (!) 138/92   04/23/18 160/88    Weight from Last 3 Encounters:   04/23/18 186 lb 14.4 oz (84.8 kg)   04/17/18 188 lb (85.3 kg)   07/05/17 179 lb (81.2 kg)              Today, you had the following     No orders found for display       Primary Care Provider Office Phone # Fax #    Jennifer Mishra,  [phone] [address]   [facility] [address]        Equal Access to Services     [facility]: Hadii aad ku hadasho Soomaali, waaxda luqadaha, qaybta kaalmada adeegyada, kim solomon . So [facility] [phone].    ATENCIÓN: Si habla español, tiene a owens disposición servicios gratuitos de asistencia lingüística. Llame al [phone].    We comply with applicable federal civil rights laws and [place] laws. We do not discriminate on the basis of race, color, national origin, age, disability, sex, sexual orientation, or gender identity.            Thank you!     Thank you for choosing [facility]  for your care. Our goal is always to provide you with excellent care. Hearing back from our patients is one way we can continue to improve our services. Please take a few minutes to complete the written survey that you may receive in the mail after your visit with us. Thank you!             Your Updated Medication List - Protect others around you: Learn how to safely use, store and throw away your medicines at www.disposemymeds.org.          This list is accurate as of 6/26/18 11:59 PM.  Always use your most recent med list.                   Brand Name Dispense Instructions for use Diagnosis    ACETAMINOPHEN PO      Take 1,000 mg by mouth every 6 hours as needed for pain        atorvastatin 40 MG tablet    LIPITOR    90 tablet    Take 1 tablet (40 mg) by mouth daily    Hyperlipidemia LDL goal <100       hydrocortisone 1 % cream    CORTAID     Apply topically daily as needed        JANTOVEN 5 MG  tablet   Generic drug:  warfarin      Take 5 mg by mouth daily        latanoprost 0.005 % ophthalmic solution    XALATAN     Place 1 drop into both eyes At Bedtime        lisinopril 40 MG tablet    PRINIVIL/ZESTRIL    90 tablet    Take 1 tablet (40 mg) by mouth daily    Benign essential hypertension       metoprolol tartrate 50 MG tablet    LOPRESSOR    180 tablet    Take 1 tablet (50 mg) by mouth 2 times daily    Paroxysmal atrial fibrillation (H)       MULTIVITAMIN PO      Take 1 tablet by mouth daily        terazosin 5 MG capsule    HYTRIN    90 capsule    Take 1 capsule (5 mg) by mouth At Bedtime    Benign non-nodular prostatic hyperplasia with lower urinary tract symptoms       traMADol 50 MG tablet    ULTRAM    60 tablet    Take 1-2 tablets ( mg) by mouth every 8 hours as needed for pain Maximum 6 tablet(s) per day    Acute right-sided low back pain without sciatica       UNABLE TO FIND      MEDICATION NAME: Pro-racia  OTC rosacea cream.

## 2018-06-26 NOTE — NURSING NOTE
Aniket Macias is a 78 year old patient who comes in today for a Blood Pressure check.  Initial BP:  /88 (BP Location: Right arm, Patient Position: Chair, Cuff Size: Adult Regular)     Data Unavailable  Disposition: results routed to provider

## 2018-06-27 LAB
ANION GAP SERPL CALCULATED.3IONS-SCNC: 7 MMOL/L (ref 3–14)
BUN SERPL-MCNC: 13 MG/DL (ref 7–30)
CALCIUM SERPL-MCNC: 8.2 MG/DL (ref 8.5–10.1)
CHLORIDE SERPL-SCNC: 109 MMOL/L (ref 94–109)
CO2 SERPL-SCNC: 27 MMOL/L (ref 20–32)
CREAT SERPL-MCNC: 1.05 MG/DL (ref 0.66–1.25)
GFR SERPL CREATININE-BSD FRML MDRD: 68 ML/MIN/1.7M2
GLUCOSE SERPL-MCNC: 96 MG/DL (ref 70–99)
POTASSIUM SERPL-SCNC: 4.6 MMOL/L (ref 3.4–5.3)
SODIUM SERPL-SCNC: 143 MMOL/L (ref 133–144)

## 2018-07-10 ENCOUNTER — ANTICOAGULATION THERAPY VISIT (OUTPATIENT)
Dept: CARDIOLOGY | Facility: CLINIC | Age: 78
End: 2018-07-10
Payer: COMMERCIAL

## 2018-07-10 DIAGNOSIS — I48.0 PAROXYSMAL ATRIAL FIBRILLATION (H): ICD-10-CM

## 2018-07-10 DIAGNOSIS — Z79.01 LONG-TERM (CURRENT) USE OF ANTICOAGULANTS: ICD-10-CM

## 2018-07-10 LAB — INR POINT OF CARE: 2.7 (ref 0.86–1.14)

## 2018-07-10 PROCEDURE — 99207 ZZC NO CHARGE NURSE ONLY: CPT | Performed by: INTERNAL MEDICINE

## 2018-07-10 PROCEDURE — 85610 PROTHROMBIN TIME: CPT | Mod: QW | Performed by: INTERNAL MEDICINE

## 2018-07-10 PROCEDURE — 36416 COLLJ CAPILLARY BLOOD SPEC: CPT | Performed by: INTERNAL MEDICINE

## 2018-07-10 NOTE — PROGRESS NOTES
ANTICOAGULATION FOLLOW-UP CLINIC VISIT    Patient Name:  Aniket Macias  Date:  7/10/2018  Contact Type:  Face to Face    SUBJECTIVE:     Patient Findings     Positives Other complaints (Pt reports increase in his arthritis. )           OBJECTIVE    INR Protime   Date Value Ref Range Status   07/10/2018 2.7 (A) 0.86 - 1.14 Final       ASSESSMENT / PLAN  INR assessment THER    Recheck INR In: 3 WEEKS    INR Location Clinic      Anticoagulation Summary as of 7/10/2018     INR goal 2.0-3.0   Today's INR 2.7   Warfarin maintenance plan 2.5 mg (5 mg x 0.5) on Tue; 5 mg (5 mg x 1) all other days   Full warfarin instructions 2.5 mg on Tue; 5 mg all other days   Weekly warfarin total 32.5 mg   Plan last modified Stefani Kaminski RN (5/29/2018)   Next INR check 7/31/2018   Target end date Indefinite    Indications   Long-term (current) use of anticoagulants [Z79.01] [Z79.01]  Paroxysmal atrial fibrillation [I48.0]         Anticoagulation Episode Summary     INR check location     Preferred lab     Send INR reminders to [facility] HEART INR NURSE    Comments       Anticoagulation Care Providers     Provider Role Specialty Phone number    Sebastián Bermudez MD Responsible Cardiology [phone]            See the Encounter Report to view Anticoagulation Flowsheet and Dosing Calendar (Go to Encounters tab in chart review, and find the Anticoagulation Therapy Visit)    INR 2.7. No changes to dosing. Pt reports an increase in arthritis pain. States he will be taking more tylenol for the pain. He will increase his greens while taking tylenol. Denies bleeding or bruising. Next apt in 3 weeks. Dosage adjustment made based on physician directed care plan.    Elo Sarkar RN

## 2018-07-10 NOTE — MR AVS SNAPSHOT
Aniket Macias   7/10/2018 10:20 AM   Anticoagulation Therapy Visit    Description:  78 year old male   Provider:  LIU ANTICOAGULATION   Department:  [facility]           INR as of 7/10/2018     Today's INR 2.7      Anticoagulation Summary as of 7/10/2018     INR goal 2.0-3.0   Today's INR 2.7   Full warfarin instructions 2.5 mg on Tue; 5 mg all other days   Next INR check 7/31/2018    Indications   Long-term (current) use of anticoagulants [Z79.01] [Z79.01]  Paroxysmal atrial fibrillation [I48.0]         Your next Anticoagulation Clinic appointment(s)     Jul 31, 2018 10:40 AM CDT   Anticoagulation Visit with HATFIELD ANTICOAGULATION   [facility] ([facility])    [address] [place] [id number] [phone] OPT 2              Contact Numbers     Anticoagulant (INR) Clinic Number: [phone]          July 2018 Details    Sun Mon Tue Wed Thu Fri Sat     1               2               3               4               5               6               7                 8               9               10      2.5 mg   See details      11      5 mg         12      5 mg         13      5 mg         14      5 mg           15      5 mg         16      5 mg         17      2.5 mg         18      5 mg         19      5 mg         20      5 mg         21      5 mg           22      5 mg         23      5 mg         24      2.5 mg         25      5 mg         26      5 mg         27      5 mg         28      5 mg           29      5 mg         30      5 mg         31                 Date Details   07/10 This INR check       Date of next INR:  7/31/2018         How to take your warfarin dose     To take:  2.5 mg Take 0.5 of a 5 mg tablet.    To take:  5 mg Take 1 of the 5 mg tablets.

## 2018-07-31 ENCOUNTER — ANTICOAGULATION THERAPY VISIT (OUTPATIENT)
Dept: CARDIOLOGY | Facility: CLINIC | Age: 78
End: 2018-07-31
Payer: COMMERCIAL

## 2018-07-31 DIAGNOSIS — Z79.01 LONG-TERM (CURRENT) USE OF ANTICOAGULANTS: ICD-10-CM

## 2018-07-31 DIAGNOSIS — I48.0 PAROXYSMAL ATRIAL FIBRILLATION (H): ICD-10-CM

## 2018-07-31 LAB — INR POINT OF CARE: 3.5 (ref 0.86–1.14)

## 2018-07-31 PROCEDURE — 85610 PROTHROMBIN TIME: CPT | Mod: QW | Performed by: INTERNAL MEDICINE

## 2018-07-31 PROCEDURE — 36416 COLLJ CAPILLARY BLOOD SPEC: CPT | Performed by: INTERNAL MEDICINE

## 2018-07-31 NOTE — MR AVS SNAPSHOT
Aniket Macias   7/31/2018 10:40 AM   Anticoagulation Therapy Visit    Description:  78 year old male   Provider:  LIU ANTICOAGULATION   Department:  [facility]           INR as of 7/31/2018     Today's INR 3.5!      Anticoagulation Summary as of 7/31/2018     INR goal 2.0-3.0   Today's INR 3.5!   Full warfarin instructions 2.5 mg on Tue; 5 mg all other days   Next INR check 8/14/2018    Indications   Long-term (current) use of anticoagulants [Z79.01] [Z79.01]  Paroxysmal atrial fibrillation [I48.0]         Your next Anticoagulation Clinic appointment(s)     Aug 14, 2018 10:40 AM CDT   Anticoagulation Visit with HATFIELD ANTICOAGULATION   [facility] ([facility])    [address] [place] [id number] [phone] OPT 2              Contact Numbers     Anticoagulant (INR) Clinic Number: [phone]          July 2018 Details    Sun Mon Tue Wed Thu Fri Sat     1               2               3               4               5               6               7                 8               9               10               11               12               13               14                 15               16               17               18               19               20               21                 22               23               24               25               26               27               28                 29               30               31      2.5 mg   See details           Date Details   07/31 This INR check               How to take your warfarin dose     To take:  2.5 mg Take 0.5 of a 5 mg tablet.           August 2018 Details    Sun Mon Tue Wed Thu Fri Sat        1      5 mg         2      5 mg         3      5 mg         4      5 mg           5      5 mg         6      5 mg         7      2.5 mg         8      5 mg         9      5 mg         10      5 mg         11      5 mg           12      5 mg         13       5 mg         14            15               16               17               18                 19               20               21               22               23               24               25                 26               27               28               29               30               31                 Date Details   No additional details    Date of next INR:  8/14/2018         How to take your warfarin dose     To take:  2.5 mg Take 0.5 of a 5 mg tablet.    To take:  5 mg Take 1 of the 5 mg tablets.

## 2018-07-31 NOTE — PROGRESS NOTES
ANTICOAGULATION FOLLOW-UP CLINIC VISIT    Patient Name:  Aniket Macias  Date:  7/31/2018  Contact Type:  Face to Face    SUBJECTIVE:     Patient Findings     Positives Extra doses (he mistakenly took 5 mg daily for the last 3 weeks), Change in diet/appetite (eating fewer greens recently)           OBJECTIVE    INR Protime   Date Value Ref Range Status   07/31/2018 3.5 (A) 0.86 - 1.14 Final       ASSESSMENT / PLAN  INR assessment SUPRA    Recheck INR In: 2 WEEKS    INR Location Clinic      Anticoagulation Summary as of 7/31/2018     INR goal 2.0-3.0   Today's INR 3.5!   Warfarin maintenance plan 2.5 mg (5 mg x 0.5) on Tue; 5 mg (5 mg x 1) all other days   Full warfarin instructions 2.5 mg on Tue; 5 mg all other days   Weekly warfarin total 32.5 mg   Plan last modified Stefani Kaminski RN (5/29/2018)   Next INR check 8/14/2018   Target end date Indefinite    Indications   Long-term (current) use of anticoagulants [Z79.01] [Z79.01]  Paroxysmal atrial fibrillation [I48.0]         Anticoagulation Episode Summary     INR check location     Preferred lab     Send INR reminders to [facility] HEART INR NURSE    Comments       Anticoagulation Care Providers     Provider Role Specialty Phone number    Sebastián Bermudez MD Responsible Cardiology [phone]            See the Encounter Report to view Anticoagulation Flowsheet and Dosing Calendar (Go to Encounters tab in chart review, and find the Anticoagulation Therapy Visit)    INR 3.5 He forgot that he was to take only 2.5 mg on Tuesdays so he has been taking 5 mg daily for the last 3 weeks. Eating fewer greens this month while he has been travelling. No abnormal bleeding or bruising. Will resume the lower dosing schedule of 2.5 mg Tu and 5 mg all other days with recheck in 2 weeks. He will resume eating his usual amount of greens. Dosage adjustment made based on physician directed care plan.    Stefani Kaminski RN

## 2018-08-15 ENCOUNTER — ANTICOAGULATION THERAPY VISIT (OUTPATIENT)
Dept: CARDIOLOGY | Facility: CLINIC | Age: 78
End: 2018-08-15
Payer: COMMERCIAL

## 2018-08-15 DIAGNOSIS — Z79.01 LONG-TERM (CURRENT) USE OF ANTICOAGULANTS: ICD-10-CM

## 2018-08-15 DIAGNOSIS — I48.0 PAROXYSMAL ATRIAL FIBRILLATION (H): ICD-10-CM

## 2018-08-15 LAB — INR POINT OF CARE: 2.3 (ref 0.86–1.14)

## 2018-08-15 PROCEDURE — 85610 PROTHROMBIN TIME: CPT | Mod: QW | Performed by: INTERNAL MEDICINE

## 2018-08-15 PROCEDURE — 36416 COLLJ CAPILLARY BLOOD SPEC: CPT | Performed by: INTERNAL MEDICINE

## 2018-08-15 NOTE — PROGRESS NOTES
ANTICOAGULATION FOLLOW-UP CLINIC VISIT    Patient Name:  Aniket Macias  Date:  8/15/2018  Contact Type:  Face to Face    SUBJECTIVE:        OBJECTIVE    INR Protime   Date Value Ref Range Status   08/15/2018 2.3 (A) 0.86 - 1.14 Final       ASSESSMENT / PLAN  INR assessment THER    Recheck INR In: 5 WEEKS    INR Location Clinic      Anticoagulation Summary as of 8/15/2018     INR goal 2.0-3.0   Today's INR 2.3   Warfarin maintenance plan 2.5 mg (5 mg x 0.5) on Tue; 5 mg (5 mg x 1) all other days   Full warfarin instructions 2.5 mg on Tue; 5 mg all other days   Weekly warfarin total 32.5 mg   No change documented Penfield, Lynette E, RN   Plan last modified Stefani Kaminski RN (5/29/2018)   Next INR check 9/18/2018   Target end date Indefinite    Indications   Long-term (current) use of anticoagulants [Z79.01] [Z79.01]  Paroxysmal atrial fibrillation [I48.0]         Anticoagulation Episode Summary     INR check location     Preferred lab     Send INR reminders to [facility] HEART INR NURSE    Comments       Anticoagulation Care Providers     Provider Role Specialty Phone number    Sebastián Bermudez MD Responsible Cardiology [phone]            See the Encounter Report to view Anticoagulation Flowsheet and Dosing Calendar (Go to Encounters tab in chart review, and find the Anticoagulation Therapy Visit)    INR=2.3  No changes or complications. Will continue same confirmed dose and recheck in 4 weeks. Pt states he will not come sooner than 5 weeks.     Lynette E. Penfield, RN

## 2018-08-15 NOTE — MR AVS SNAPSHOT
Aniket Macias   8/15/2018 10:40 AM   Anticoagulation Therapy Visit    Description:  78 year old male   Provider:  LIU ANTICOAGULATION   Department:  [facility]           INR as of 8/15/2018     Today's INR 2.3      Anticoagulation Summary as of 8/15/2018     INR goal 2.0-3.0   Today's INR 2.3   Full warfarin instructions 2.5 mg on Tue; 5 mg all other days   Next INR check 9/18/2018    Indications   Long-term (current) use of anticoagulants [Z79.01] [Z79.01]  Paroxysmal atrial fibrillation [I48.0]         Your next Anticoagulation Clinic appointment(s)     Sep 18, 2018 10:40 AM CDT   Anticoagulation Visit with HATFIELD ANTICOAGULATION   [facility] ([facility])    [address] [place] [id number] [phone] OPT 2              Contact Numbers     Anticoagulant (INR) Clinic Number: [phone]          August 2018 Details    Sun Mon Tue Wed Thu Fri Sat        1               2               3               4                 5               6               7               8               9               10               11                 12               13               14               15      5 mg   See details      16      5 mg         17      5 mg         18      5 mg           19      5 mg         20      5 mg         21      2.5 mg         22      5 mg         23      5 mg         24      5 mg         25      5 mg           26      5 mg         27      5 mg         28      2.5 mg         29      5 mg         30      5 mg         31      5 mg           Date Details   08/15 This INR check               How to take your warfarin dose     To take:  2.5 mg Take 0.5 of a 5 mg tablet.    To take:  5 mg Take 1 of the 5 mg tablets.           September 2018 Details    Sun Mon Tue Wed Thu Fri Sat           1      5 mg           2      5 mg         3      5 mg         4      2.5 mg         5      5 mg         6      5 mg         7      5 mg          8      5 mg           9      5 mg         10      5 mg         11      2.5 mg         12      5 mg         13      5 mg         14      5 mg         15      5 mg           16      5 mg         17      5 mg         18            19               20               21               22                 23               24               25               26               27               28               29                 30                      Date Details   No additional details    Date of next INR:  9/18/2018         How to take your warfarin dose     To take:  2.5 mg Take 0.5 of a 5 mg tablet.    To take:  5 mg Take 1 of the 5 mg tablets.

## 2018-09-18 ENCOUNTER — ANTICOAGULATION THERAPY VISIT (OUTPATIENT)
Dept: CARDIOLOGY | Facility: CLINIC | Age: 78
End: 2018-09-18
Payer: COMMERCIAL

## 2018-09-18 DIAGNOSIS — I48.0 PAROXYSMAL ATRIAL FIBRILLATION (H): ICD-10-CM

## 2018-09-18 DIAGNOSIS — Z79.01 LONG-TERM (CURRENT) USE OF ANTICOAGULANTS: ICD-10-CM

## 2018-09-18 LAB — INR POINT OF CARE: 4 (ref 0.86–1.14)

## 2018-09-18 PROCEDURE — 36416 COLLJ CAPILLARY BLOOD SPEC: CPT | Performed by: INTERNAL MEDICINE

## 2018-09-18 PROCEDURE — 85610 PROTHROMBIN TIME: CPT | Mod: QW | Performed by: INTERNAL MEDICINE

## 2018-09-18 NOTE — MR AVS SNAPSHOT
Aniket Macias   9/18/2018 10:40 AM   Anticoagulation Therapy Visit    Description:  78 year old male   Provider:  LIU ANTICOAGULATION   Department:  [facility]           INR as of 9/18/2018     Today's INR 4.0!      Anticoagulation Summary as of 9/18/2018     INR goal 2.0-3.0   Today's INR 4.0!   Full warfarin instructions 9/18: Hold; Otherwise 2.5 mg on Tue; 5 mg all other days   Next INR check 10/1/2018    Indications   Long-term (current) use of anticoagulants [Z79.01] [Z79.01]  Paroxysmal atrial fibrillation [I48.0]         Your next Anticoagulation Clinic appointment(s)     Oct 01, 2018  9:00 AM CDT   Anticoagulation Visit with  ANTICOAGULATION   [facility] ([facility])    [address] [place] [id number]   [phone] OPT 2              Contact Numbers     Anticoagulant (INR) Clinic Number: [phone]          September 2018 Details    Sun Mon Tue Wed Thu Fri Sat           1                 2               3               4               5               6               7               8                 9               10               11               12               13               14               15                 16               17               18      Hold   See details      19      5 mg         20      5 mg         21      5 mg         22      5 mg           23      5 mg         24      5 mg         25      2.5 mg         26      5 mg         27      5 mg         28      5 mg         29      5 mg           30      5 mg                Date Details   09/18 This INR check               How to take your warfarin dose     To take:  2.5 mg Take 0.5 of a 5 mg tablet.    To take:  5 mg Take 1 of the 5 mg tablets.    Hold Do not take your warfarin dose. See the Details table to the right for additional instructions.                October 2018 Details    Sun Mon Tue Wed Thu Fri Sat      1            2               3                4               5               6                 7               8               9               10               11               12               13                 14               15               16               17               18               19               20                 21               22               23               24               25               26               27                 28               29               30               31                   Date Details   No additional details    Date of next INR:  10/1/2018         How to take your warfarin dose     To take:  5 mg Take 1 of the 5 mg tablets.

## 2018-09-18 NOTE — PROGRESS NOTES
ANTICOAGULATION FOLLOW-UP CLINIC VISIT    Patient Name:  Aniket Macias  Date:  9/18/2018  Contact Type:  Face to Face    SUBJECTIVE:     Patient Findings     Positives Unexplained INR or factor level change           OBJECTIVE    INR Protime   Date Value Ref Range Status   09/18/2018 4.0 (A) 0.86 - 1.14 Final       ASSESSMENT / PLAN  INR assessment SUPRA    Recheck INR In: 2 WEEKS    INR Location Clinic      Anticoagulation Summary as of 9/18/2018     INR goal 2.0-3.0   Today's INR 4.0!   Warfarin maintenance plan 2.5 mg (5 mg x 0.5) on Tue; 5 mg (5 mg x 1) all other days   Full warfarin instructions 9/18: Hold; Otherwise 2.5 mg on Tue; 5 mg all other days   Weekly warfarin total 32.5 mg   Plan last modified Stefani Kaminski RN (5/29/2018)   Next INR check 10/1/2018   Target end date Indefinite    Indications   Long-term (current) use of anticoagulants [Z79.01] [Z79.01]  Paroxysmal atrial fibrillation [I48.0]         Anticoagulation Episode Summary     INR check location     Preferred lab     Send INR reminders to [facility] HEART INR NURSE    Comments       Anticoagulation Care Providers     Provider Role Specialty Phone number    Sebastián Bermudez MD Responsible Cardiology [phone]            See the Encounter Report to view Anticoagulation Flowsheet and Dosing Calendar (Go to Encounters tab in chart review, and find the Anticoagulation Therapy Visit)    INR 4.0 Unknown cause for rise in INR. Denies infection, diarrhea or dehydration. No change in meds or diet. No abnormal bleeding or bruising. Will hold today's dose then resume 2.5 mg Tu and 5 mg all other days with recheck in 2 weeks. Will drink a spinach smoothie today or eat an extra serving of broccoli today then resume usual diet. Dosage adjustment made based on physician directed care plan.    Stefani Kaminski, RN

## 2018-09-29 ENCOUNTER — ALLIED HEALTH/NURSE VISIT (OUTPATIENT)
Dept: NURSING | Facility: CLINIC | Age: 78
End: 2018-09-29
Payer: COMMERCIAL

## 2018-09-29 DIAGNOSIS — Z23 NEED FOR PROPHYLACTIC VACCINATION AND INOCULATION AGAINST INFLUENZA: Primary | ICD-10-CM

## 2018-09-29 PROCEDURE — 99207 ZZC NO CHARGE NURSE ONLY: CPT

## 2018-09-29 PROCEDURE — 90662 IIV NO PRSV INCREASED AG IM: CPT

## 2018-09-29 PROCEDURE — G0008 ADMIN INFLUENZA VIRUS VAC: HCPCS

## 2018-09-29 NOTE — MR AVS SNAPSHOT
After Visit Summary   9/29/2018    Aniket Macias    MRN: [id number]           Patient Information     Date Of Birth          1940        Visit Information        Provider Department      9/29/2018 10:00 AM [place] NURSE [facility]        Today's Diagnoses     Need for prophylactic vaccination and inoculation against influenza    -  1       Follow-ups after your visit        Your next 10 appointments already scheduled     Oct 01, 2018  9:00 AM CDT   Anticoagulation Visit with [facility] ANTICOAGULATION   [facility] ([facility])    [address]  [facility] [address] [phone] OPT 2              Who to contact     If you have questions or need follow up information about today's clinic visit or your schedule please contact [facility] directly at [phone].  Normal or non-critical lab and imaging results will be communicated to you by [employer], letter or phone within 4 business days after the clinic has received the results. If you do not hear from us within 7 days, please contact the clinic through [employer] or phone. If you have a critical or abnormal lab result, we will notify you by phone as soon as possible.  Submit refill requests through [employer] or call your pharmacy and they will forward the refill request to us. Please allow 3 business days for your refill to be completed.          Additional Information About Your Visit        MyChart Information     [employer] gives you secure access to your electronic health record. If you see a primary care provider, you can also send messages to your care team and make appointments. If you have questions, please call your primary care clinic.  If you do not have a primary care provider, please call [phone] and they will assist you.        Care EveryWhere ID     This is your Care EveryWhere ID. This could be used by other organizations to access your [facility]  records  [id number]         Blood Pressure from Last 3 Encounters:   06/26/18 130/88   05/22/18 (!) 138/92   04/23/18 160/88    Weight from Last 3 Encounters:   04/23/18 186 lb 14.4 oz (84.8 kg)   04/17/18 188 lb (85.3 kg)   07/05/17 179 lb (81.2 kg)              We Performed the Following     FLU VACCINE, INCREASED ANTIGEN, PRESV FREE, AGE 65+ [56184]     Vaccine Administration, Initial [53168]        Primary Care Provider Office Phone # Fax #    Jennifer Mishra,  [phone] [address] [address]        Equal Access to Services     EDY CORDOVA : Hadii gonsalo whitten hadasho Soomaali, waaxda luqadaha, qaybta kaalmada adeegyada, kim solomon . So [facility] [phone].    ATENCIÓN: Si habla español, tiene a owens disposición servicios gratuitos de asistencia lingüística. Llame al [phone].    We comply with applicable federal civil rights laws and [place] laws. We do not discriminate on the basis of race, color, national origin, age, disability, sex, sexual orientation, or gender identity.            Thank you!     Thank you for choosing [facility]  for your care. Our goal is always to provide you with excellent care. Hearing back from our patients is one way we can continue to improve our services. Please take a few minutes to complete the written survey that you may receive in the mail after your visit with us. Thank you!             Your Updated Medication List - Protect others around you: Learn how to safely use, store and throw away your medicines at www.disposemymeds.org.          This list is accurate as of 9/29/18 10:03 AM.  Always use your most recent med list.                   Brand Name Dispense Instructions for use Diagnosis    ACETAMINOPHEN PO      Take 1,000 mg by mouth every 6 hours as needed for pain        atorvastatin 40 MG tablet    LIPITOR    90 tablet    Take 1 tablet (40 mg) by mouth daily    Hyperlipidemia LDL goal  <100       hydrocortisone 1 % cream    CORTAID     Apply topically daily as needed        JANTOVEN 5 MG tablet   Generic drug:  warfarin      Take 5 mg by mouth daily        latanoprost 0.005 % ophthalmic solution    XALATAN     Place 1 drop into both eyes At Bedtime        lisinopril 40 MG tablet    PRINIVIL/ZESTRIL    90 tablet    Take 1 tablet (40 mg) by mouth daily    Benign essential hypertension       metoprolol tartrate 50 MG tablet    LOPRESSOR    180 tablet    Take 1 tablet (50 mg) by mouth 2 times daily    Paroxysmal atrial fibrillation (H)       MULTIVITAMIN PO      Take 1 tablet by mouth daily        terazosin 5 MG capsule    HYTRIN    90 capsule    Take 1 capsule (5 mg) by mouth At Bedtime    Benign non-nodular prostatic hyperplasia with lower urinary tract symptoms       traMADol 50 MG tablet    ULTRAM    60 tablet    Take 1-2 tablets ( mg) by mouth every 8 hours as needed for pain Maximum 6 tablet(s) per day    Acute right-sided low back pain without sciatica       UNABLE TO FIND      MEDICATION NAME: Pro-racia  OTC rosacea cream.

## 2018-09-29 NOTE — PROGRESS NOTES

## 2018-09-29 NOTE — NURSING NOTE
"Chief Complaint   Patient presents with     Allied Health Visit     Imm/Inj     Flu shot      There were no vitals taken for this visit. Estimated body mass index is 28 kg/(m^2) as calculated from the following:    Height as of 4/23/18: 5' 8.5\" (1.74 m).    Weight as of 4/23/18: 186 lb 14.4 oz (84.8 kg).  bp completed using cuff size: NA (Not Taken)       Health Maintenance addressed:  NONE    n/a    Jessica Suarez MA     "

## 2018-10-01 ENCOUNTER — ANTICOAGULATION THERAPY VISIT (OUTPATIENT)
Dept: CARDIOLOGY | Facility: CLINIC | Age: 78
End: 2018-10-01
Payer: COMMERCIAL

## 2018-10-01 DIAGNOSIS — I48.0 PAROXYSMAL ATRIAL FIBRILLATION (H): ICD-10-CM

## 2018-10-01 DIAGNOSIS — Z79.01 LONG-TERM (CURRENT) USE OF ANTICOAGULANTS: ICD-10-CM

## 2018-10-01 LAB — INR POINT OF CARE: 2.9 (ref 0.86–1.14)

## 2018-10-01 PROCEDURE — 85610 PROTHROMBIN TIME: CPT | Mod: QW | Performed by: INTERNAL MEDICINE

## 2018-10-01 PROCEDURE — 36416 COLLJ CAPILLARY BLOOD SPEC: CPT | Performed by: INTERNAL MEDICINE

## 2018-10-01 NOTE — MR AVS SNAPSHOT
Aniket Macias   10/1/2018 9:00 AM   Anticoagulation Therapy Visit    Description:  78 year old male   Provider:  [facility]AT[facility] ANTICOAGULATION   Department:  [facility]           INR as of 10/1/2018     Today's INR 2.9      Anticoagulation Summary as of 10/1/2018     INR goal 2.0-3.0   Today's INR 2.9   Full warfarin instructions 2.5 mg on Tue; 5 mg all other days   Next INR check 10/30/2018    Indications   Long-term (current) use of anticoagulants [Z79.01] [Z79.01]  Paroxysmal atrial fibrillation [I48.0]         Your next Anticoagulation Clinic appointment(s)     Oct 01, 2018  9:00 AM CDT   Anticoagulation Visit with  ANTICOAGULATION   [facility] ([facility])    [address] [address] [phone] OPT 2            Oct 30, 2018 10:40 AM CDT   Anticoagulation Visit with  ANTICOAGULATION   [facility] ([facility])    [address] [address] [phone] OPT 2              Contact Numbers     Anticoagulant (INR) Clinic Number: [phone]          October 2018 Details    Sun Mon Tue Wed Thu Fri Sat      1      5 mg   See details      2      2.5 mg         3      5 mg         4      5 mg         5      5 mg         6      5 mg           7      5 mg         8      5 mg         9      2.5 mg         10      5 mg         11      5 mg         12      5 mg         13      5 mg           14      5 mg         15      5 mg         16      2.5 mg         17      5 mg         18      5 mg         19      5 mg         20      5 mg           21      5 mg         22      5 mg         23      2.5 mg         24      5 mg         25      5 mg         26      5 mg         27      5 mg           28      5 mg         29      5 mg         30            31                   Date Details   10/01 This INR check       Date of next INR:  10/30/2018         How to take your  warfarin dose     To take:  2.5 mg Take 0.5 of a 5 mg tablet.    To take:  5 mg Take 1 of the 5 mg tablets.

## 2018-10-01 NOTE — PROGRESS NOTES
ANTICOAGULATION FOLLOW-UP CLINIC VISIT    Patient Name:  Aniket Macias  Date:  10/1/2018  Contact Type:  Face to Face    SUBJECTIVE:     Patient Findings     Positives No Problem Findings           OBJECTIVE    INR Protime   Date Value Ref Range Status   10/01/2018 2.9 (A) 0.86 - 1.14 Final       ASSESSMENT / PLAN  INR assessment THER    Recheck INR In: 4 WEEKS    INR Location Clinic      Anticoagulation Summary as of 10/1/2018     INR goal 2.0-3.0   Today's INR 2.9   Warfarin maintenance plan 2.5 mg (5 mg x 0.5) on Tue; 5 mg (5 mg x 1) all other days   Full warfarin instructions 2.5 mg on Tue; 5 mg all other days   Weekly warfarin total 32.5 mg   No change documented Stefani Kaminski RN   Plan last modified Stefani Kaminski RN (5/29/2018)   Next INR check 10/30/2018   Target end date Indefinite    Indications   Long-term (current) use of anticoagulants [Z79.01] [Z79.01]  Paroxysmal atrial fibrillation [I48.0]         Anticoagulation Episode Summary     INR check location     Preferred lab     Send INR reminders to [facility] HEART INR NURSE    Comments       Anticoagulation Care Providers     Provider Role Specialty Phone number    Sebastián Bermudez MD Responsible Cardiology [phone]            See the Encounter Report to view Anticoagulation Flowsheet and Dosing Calendar (Go to Encounters tab in chart review, and find the Anticoagulation Therapy Visit)    INR 2.9 INR back in range after a one time decrease in dosing 2 weeks ago. No known cause for rise in INR 2 weeks ago. No change in meds or diet. No abnormal bleeding or bruising. Will continue current dosing of 2.5 mg Tuesdays and 5 mg all other days with recheck in 4 weeks. Pt would like to stretch time interval to 5 weeks if still in range at next INR appt.    Stefani Kaminski RN

## 2018-10-30 ENCOUNTER — ANTICOAGULATION THERAPY VISIT (OUTPATIENT)
Dept: CARDIOLOGY | Facility: CLINIC | Age: 78
End: 2018-10-30
Payer: COMMERCIAL

## 2018-10-30 DIAGNOSIS — I48.0 PAROXYSMAL ATRIAL FIBRILLATION (H): ICD-10-CM

## 2018-10-30 LAB — INR POINT OF CARE: 3 (ref 0.86–1.14)

## 2018-10-30 PROCEDURE — 36416 COLLJ CAPILLARY BLOOD SPEC: CPT | Performed by: INTERNAL MEDICINE

## 2018-10-30 PROCEDURE — 85610 PROTHROMBIN TIME: CPT | Mod: QW | Performed by: INTERNAL MEDICINE

## 2018-10-30 NOTE — PROGRESS NOTES
ANTICOAGULATION FOLLOW-UP CLINIC VISIT    Patient Name:  Aniket Macias  Date:  10/30/2018  Contact Type:  Face to Face    SUBJECTIVE:     Patient Findings     Positives No Problem Findings           OBJECTIVE    INR Protime   Date Value Ref Range Status   10/30/2018 3.0 (A) 0.86 - 1.14 Final       ASSESSMENT / PLAN  INR assessment THER    Recheck INR In: 5 WEEKS    INR Location Clinic      Anticoagulation Summary as of 10/30/2018     INR goal 2.0-3.0   Today's INR 3.0   Warfarin maintenance plan 2.5 mg (5 mg x 0.5) on Tue; 5 mg (5 mg x 1) all other days   Full warfarin instructions 2.5 mg on Tue; 5 mg all other days   Weekly warfarin total 32.5 mg   No change documented Rosa Zaman RN   Plan last modified Stefani Kaminski RN (5/29/2018)   Next INR check 12/3/2018   Target end date Indefinite    Indications   Long-term (current) use of anticoagulants [Z79.01] [Z79.01]  Paroxysmal atrial fibrillation [I48.0]         Anticoagulation Episode Summary     INR check location     Preferred lab     Send INR reminders to [facility] HEART INR NURSE    Comments       Anticoagulation Care Providers     Provider Role Specialty Phone number    Sebastián Bermudez MD Responsible Cardiology [phone]            See the Encounter Report to view Anticoagulation Flowsheet and Dosing Calendar (Go to Encounters tab in chart review, and find the Anticoagulation Therapy Visit)    INR 3.0 No complications noted. No med or diet changes. Denies any changes or signs/sx bleeding or clots. Continue same confirmed dosing, recheck in 5 weeks.    Rosa Zaman RN

## 2018-11-26 ENCOUNTER — TELEPHONE (OUTPATIENT)
Dept: CARDIOLOGY | Facility: CLINIC | Age: 78
End: 2018-11-26

## 2018-11-26 NOTE — TELEPHONE ENCOUNTER
Pt called and LM stating that he was needing to hold his Warfarin for 5 days prior to having a colonoscopy on 11/29.  Pt stated that he had already held on Saturday and Sunday and wondered if he should continue to hold.  LM for pt that yes he may continue the hold until procedure and then pt will restart as directed.  Pt has no history of a Stroke of valve replacement. Marina

## 2018-11-29 ENCOUNTER — SURGERY (OUTPATIENT)
Age: 78
End: 2018-11-29

## 2018-11-29 ENCOUNTER — HOSPITAL ENCOUNTER (OUTPATIENT)
Facility: CLINIC | Age: 78
Discharge: HOME | End: 2018-11-29
Attending: SPECIALIST | Admitting: SPECIALIST
Payer: MEDICARE

## 2018-11-29 VITALS
HEIGHT: 70 IN | RESPIRATION RATE: 16 BRPM | SYSTOLIC BLOOD PRESSURE: 140 MMHG | WEIGHT: 180 LBS | DIASTOLIC BLOOD PRESSURE: 85 MMHG | BODY MASS INDEX: 25.77 KG/M2 | OXYGEN SATURATION: 96 %

## 2018-11-29 LAB — COLONOSCOPY: NORMAL

## 2018-11-29 PROCEDURE — 25000132 ZZH RX MED GY IP 250 OP 250 PS 637: Mod: GY | Performed by: SPECIALIST

## 2018-11-29 PROCEDURE — 88305 TISSUE EXAM BY PATHOLOGIST: CPT | Performed by: SPECIALIST

## 2018-11-29 PROCEDURE — A9270 NON-COVERED ITEM OR SERVICE: HCPCS | Mod: GY | Performed by: SPECIALIST

## 2018-11-29 PROCEDURE — 45385 COLONOSCOPY W/LESION REMOVAL: CPT | Performed by: SPECIALIST

## 2018-11-29 PROCEDURE — 45381 COLONOSCOPY SUBMUCOUS NJX: CPT | Performed by: SPECIALIST

## 2018-11-29 PROCEDURE — G0500 MOD SEDAT ENDO SERVICE >5YRS: HCPCS | Performed by: SPECIALIST

## 2018-11-29 PROCEDURE — 27210582 ZZH DEVICE CLIP RESOLUTION, EACH: Performed by: SPECIALIST

## 2018-11-29 PROCEDURE — 25000128 H RX IP 250 OP 636: Performed by: SPECIALIST

## 2018-11-29 PROCEDURE — 88305 TISSUE EXAM BY PATHOLOGIST: CPT | Mod: 26 | Performed by: SPECIALIST

## 2018-11-29 PROCEDURE — 99153 MOD SED SAME PHYS/QHP EA: CPT | Performed by: SPECIALIST

## 2018-11-29 RX ORDER — SIMETHICONE 20 MG/.3ML
EMULSION ORAL PRN
Status: DISCONTINUED | OUTPATIENT
Start: 2018-11-29 | End: 2018-11-29 | Stop reason: HOSPADM

## 2018-11-29 RX ORDER — ONDANSETRON 2 MG/ML
4 INJECTION INTRAMUSCULAR; INTRAVENOUS
Status: DISCONTINUED | OUTPATIENT
Start: 2018-11-29 | End: 2018-11-29 | Stop reason: HOSPADM

## 2018-11-29 RX ORDER — LIDOCAINE 40 MG/G
CREAM TOPICAL
Status: DISCONTINUED | OUTPATIENT
Start: 2018-11-29 | End: 2018-11-29 | Stop reason: HOSPADM

## 2018-11-29 RX ORDER — FENTANYL CITRATE 50 UG/ML
INJECTION, SOLUTION INTRAMUSCULAR; INTRAVENOUS PRN
Status: DISCONTINUED | OUTPATIENT
Start: 2018-11-29 | End: 2018-11-29 | Stop reason: HOSPADM

## 2018-11-29 RX ADMIN — MIDAZOLAM 0.5 MG: 1 INJECTION INTRAMUSCULAR; INTRAVENOUS at 11:16

## 2018-11-29 RX ADMIN — Medication 20 MG: at 11:17

## 2018-11-29 RX ADMIN — MIDAZOLAM 1 MG: 1 INJECTION INTRAMUSCULAR; INTRAVENOUS at 11:12

## 2018-11-29 RX ADMIN — FENTANYL CITRATE 25 MCG: 50 INJECTION, SOLUTION INTRAMUSCULAR; INTRAVENOUS at 11:16

## 2018-11-29 RX ADMIN — FENTANYL CITRATE 50 MCG: 50 INJECTION, SOLUTION INTRAMUSCULAR; INTRAVENOUS at 11:12

## 2018-11-30 LAB — COPATH REPORT: NORMAL

## 2018-11-30 NOTE — TELEPHONE ENCOUNTER
Patient presents to follow up R UTIS  Patient presents with:  Urinary Symptoms (urologic): follow uo to recurrent UTI's, no infections in the last 3 months but c/o frequency    She is currently using hipprex BID w/ vit C    She reports improvement   Denies Spoke with [place] and his BPs have been erratic at home - does have afib. BPs 120-160s. He feels fine. Will increase Lisinopril from 30 to 40 mg and also increase Hytrin from 4 to 5 mg. So far that increase has helped from 2 to 4 mg of Hytrin with urinary flow but he'd like to try higher. Still may need further BP control. He will come in for lab and BP check in a month. Reviewed recent labs with him which were stable other than slight elevation of glucose at 112 but he had a piece of candy 20 minutes before lab.

## 2018-12-03 ENCOUNTER — ANTICOAGULATION THERAPY VISIT (OUTPATIENT)
Dept: CARDIOLOGY | Facility: CLINIC | Age: 78
End: 2018-12-03

## 2018-12-03 DIAGNOSIS — Z79.01 LONG TERM CURRENT USE OF ANTICOAGULANTS WITH INR GOAL OF 2.0-3.0: ICD-10-CM

## 2018-12-03 DIAGNOSIS — I48.0 PAROXYSMAL ATRIAL FIBRILLATION (H): ICD-10-CM

## 2018-12-03 NOTE — PROGRESS NOTES
ANTICOAGULATION FOLLOW-UP CLINIC VISIT    Patient Name:  Aniket Macias  Date:  12/3/2018  Contact Type:  Telephone/ patient    SUBJECTIVE:     Patient Findings     Positives Intentional hold of therapy           OBJECTIVE    INR Protime   Date Value Ref Range Status   10/30/2018 3.0 (A) 0.86 - 1.14 Final       ASSESSMENT / PLAN  No question data found.  Anticoagulation Summary as of 12/3/2018     INR goal 2.0-3.0   Today's INR No new INR was available at the time of this encounter.   Warfarin maintenance plan 2.5 mg (5 mg x 0.5) on Tue; 5 mg (5 mg x 1) all other days   Full warfarin instructions 2.5 mg on Tue; 5 mg all other days   Weekly warfarin total 32.5 mg   No change documented Stefani Kaminski, LUANA   Plan last modified Stefani Kaminski RN (5/29/2018)   Next INR check 12/10/2018   Target end date Indefinite    Indications   Paroxysmal atrial fibrillation [I48.0]  Long term current use of anticoagulants with INR goal of 2.0-3.0 [Z79.01]         Anticoagulation Episode Summary     INR check location     Preferred lab     Send INR reminders to [facility] HEART INR NURSE    Comments       Anticoagulation Care Providers     Provider Role Specialty Phone number    Sebastián Bermudez MD Responsible Cardiology [phone]            See the Encounter Report to view Anticoagulation Flowsheet and Dosing Calendar (Go to Encounters tab in chart review, and find the Anticoagulation Therapy Visit)    No INR. Spoke with pt via phone. He had held warfarin for 5 days before a colonoscopy with polypectomy last week. Resumed warfarin 11/29 so today is too early for an INR appt. He states that he is doing well with no changes in meds/diet and no bleeding. Rescheduled INR appt to 12/10.  INR clinic referral order renewal submitted for signature.  Has the patient previously taken warfarin? yes  If yes, for what indication? Paroxysmal afib    Does the patient have any of the following indications for a higher range of  2.5-3.5:    Mitral position mechanical valve? no    Zuri-Shiley, Ball and Cage or Monoleaflet valve (regardless of position) no    Other (if yes, please explain) no    Stefani Kaminski RN

## 2018-12-03 NOTE — MR AVS SNAPSHOT
Aniket Macias   12/3/2018   Anticoagulation Therapy Visit    Description:  78 year old male   Provider:  Stefani Kaminski, RN   Department:  [facility]           INR as of 12/3/2018     Today's INR No new INR was available at the time of this encounter.      Anticoagulation Summary as of 12/3/2018     INR goal 2.0-3.0   Today's INR No new INR was available at the time of this encounter.   Full warfarin instructions 2.5 mg on Tue; 5 mg all other days   Next INR check 12/10/2018    Indications   Paroxysmal atrial fibrillation [I48.0]  Long term current use of anticoagulants with INR goal of 2.0-3.0 [Z79.01]         Your next Anticoagulation Clinic appointment(s)     Dec 10, 2018  9:00 AM CST   Anticoagulation Visit with [facility] ANTICOAGULATION   [facility] ([facility])    [address] [place] [address]   [phone] OPT 2              Contact Numbers     Anticoagulant (INR) Clinic Number: [phone]          December 2018 Details    Sun Mon Tue Wed Thu Fri Sat           1                 2               3      5 mg   See details      4      2.5 mg         5      5 mg         6      5 mg         7      5 mg         8      5 mg           9      5 mg         10            11               12               13               14               15                 16               17               18               19               20               21               22                 23               24               25               26               27               28               29                 30               31                     Date Details   12/03 This INR check       Date of next INR:  12/10/2018         How to take your warfarin dose     To take:  2.5 mg Take 0.5 of a 5 mg tablet.    To take:  5 mg Take 1 of the 5 mg tablets.

## 2018-12-12 ENCOUNTER — ANTICOAGULATION THERAPY VISIT (OUTPATIENT)
Dept: CARDIOLOGY | Facility: CLINIC | Age: 78
End: 2018-12-12
Payer: COMMERCIAL

## 2018-12-12 DIAGNOSIS — Z79.01 LONG TERM CURRENT USE OF ANTICOAGULANTS WITH INR GOAL OF 2.0-3.0: ICD-10-CM

## 2018-12-12 DIAGNOSIS — I48.0 PAROXYSMAL ATRIAL FIBRILLATION (H): ICD-10-CM

## 2018-12-12 LAB — INR POINT OF CARE: 2.6 (ref 0.86–1.14)

## 2018-12-12 PROCEDURE — 36416 COLLJ CAPILLARY BLOOD SPEC: CPT | Performed by: INTERNAL MEDICINE

## 2018-12-12 PROCEDURE — 85610 PROTHROMBIN TIME: CPT | Mod: QW | Performed by: INTERNAL MEDICINE

## 2018-12-12 NOTE — PROGRESS NOTES
ANTICOAGULATION FOLLOW-UP CLINIC VISIT    Patient Name:  Aniket Macias  Date:  2018  Contact Type:  Face to Face    SUBJECTIVE:        OBJECTIVE    INR Protime   Date Value Ref Range Status   2018 2.6 (A) 0.86 - 1.14 Final       ASSESSMENT / PLAN  INR assessment THER    Recheck INR In: 5 WEEKS    INR Location Clinic      Anticoagulation Summary  As of 2018    INR goal:   2.0-3.0   TTR:   72.2 % (2.5 y)   INR used for dosin.6 (2018)   Warfarin maintenance plan:   2.5 mg (5 mg x 0.5) every Tue; 5 mg (5 mg x 1) all other days   Full warfarin instructions:   2.5 mg every Tue; 5 mg all other days   Weekly warfarin total:   32.5 mg   No change documented:   Penfield, Lynette E, RN   Plan last modified:   Stefani Kaminski RN (2018)   Next INR check:   2019   Target end date:   Indefinite    Indications    Paroxysmal atrial fibrillation [I48.0]  Long term current use of anticoagulants with INR goal of 2.0-3.0 [Z79.01]             Anticoagulation Episode Summary     INR check location:       Preferred lab:       Send INR reminders to:   [facility] HEART INR NURSE    Comments:         Anticoagulation Care Providers     Provider Role Specialty Phone number    Sebastián Bermudez MD Responsible Cardiology [phone]            See the Encounter Report to view Anticoagulation Flowsheet and Dosing Calendar (Go to Encounters tab in chart review, and find the Anticoagulation Therapy Visit)    INR=2.6  No changes or complications. Will continue same confirmed dose and recheck in 5 weeks.   Lynette E. Penfield, RN

## 2019-01-14 ENCOUNTER — ANTICOAGULATION THERAPY VISIT (OUTPATIENT)
Dept: CARDIOLOGY | Facility: CLINIC | Age: 79
End: 2019-01-14
Payer: MEDICARE

## 2019-01-14 DIAGNOSIS — Z79.01 LONG TERM CURRENT USE OF ANTICOAGULANTS WITH INR GOAL OF 2.0-3.0: ICD-10-CM

## 2019-01-14 DIAGNOSIS — I48.0 PAROXYSMAL ATRIAL FIBRILLATION (H): ICD-10-CM

## 2019-01-14 LAB — INR POINT OF CARE: 3.1 (ref 0.86–1.14)

## 2019-01-14 PROCEDURE — 36416 COLLJ CAPILLARY BLOOD SPEC: CPT | Performed by: INTERNAL MEDICINE

## 2019-01-14 PROCEDURE — 85610 PROTHROMBIN TIME: CPT | Mod: QW | Performed by: INTERNAL MEDICINE

## 2019-01-14 PROCEDURE — 99207 ZZC NO CHARGE NURSE ONLY: CPT | Performed by: INTERNAL MEDICINE

## 2019-01-14 NOTE — PROGRESS NOTES
ANTICOAGULATION FOLLOW-UP CLINIC VISIT    Patient Name:  Aniket Macias  Date:  1/14/2019  Contact Type:  Face to Face    SUBJECTIVE:     Patient Findings     Positives:   No Problem Findings           OBJECTIVE    INR Protime   Date Value Ref Range Status   01/14/2019 3.1 (A) 0.86 - 1.14 Final       ASSESSMENT / PLAN  INR assessment SUPRA    Recheck INR In: 5 WEEKS    INR Location Clinic      Anticoagulation Summary  As of 1/14/2019    INR goal:   2.0-3.0   TTR:   72.5 % (2.6 y)   INR used for dosing:   3.1! (1/14/2019)   Warfarin maintenance plan:   2.5 mg (5 mg x 0.5) every Tue; 5 mg (5 mg x 1) all other days   Full warfarin instructions:   2.5 mg every Tue; 5 mg all other days   Weekly warfarin total:   32.5 mg   No change documented:   Elo Sarkar RN   Plan last modified:   Stefani Kaminski RN (5/29/2018)   Next INR check:   2/19/2019   Target end date:   Indefinite    Indications    Paroxysmal atrial fibrillation [I48.0]  Long term current use of anticoagulants with INR goal of 2.0-3.0 [Z79.01]             Anticoagulation Episode Summary     INR check location:       Preferred lab:       Send INR reminders to:   [facility] HEART INR NURSE    Comments:         Anticoagulation Care Providers     Provider Role Specialty Phone number    Sebastián Bermudez MD Responsible Cardiology [phone]            See the Encounter Report to view Anticoagulation Flowsheet and Dosing Calendar (Go to Encounters tab in chart review, and find the Anticoagulation Therapy Visit)    INR 3.1. No changes to dosing. Pt will eat either broccoli or brussel sprouts today. Diet stable- eats greens every day or every other day. Denies unusual bleeding or bruising. Next appt in 5 weeks. Dosage adjustment made based on physician directed care plan.    Elo Sarkar, RN

## 2019-02-19 ENCOUNTER — ANTICOAGULATION THERAPY VISIT (OUTPATIENT)
Dept: CARDIOLOGY | Facility: CLINIC | Age: 79
End: 2019-02-19
Payer: MEDICARE

## 2019-02-19 DIAGNOSIS — Z79.01 LONG TERM CURRENT USE OF ANTICOAGULANTS WITH INR GOAL OF 2.0-3.0: ICD-10-CM

## 2019-02-19 DIAGNOSIS — I48.0 PAROXYSMAL ATRIAL FIBRILLATION (H): ICD-10-CM

## 2019-02-19 LAB — INR POINT OF CARE: 3.9 (ref 0.86–1.14)

## 2019-02-19 PROCEDURE — 99207 ZZC NO CHARGE NURSE ONLY: CPT | Performed by: INTERNAL MEDICINE

## 2019-02-19 PROCEDURE — 85610 PROTHROMBIN TIME: CPT | Mod: QW | Performed by: INTERNAL MEDICINE

## 2019-02-19 PROCEDURE — 36416 COLLJ CAPILLARY BLOOD SPEC: CPT | Performed by: INTERNAL MEDICINE

## 2019-02-19 NOTE — PROGRESS NOTES
ANTICOAGULATION FOLLOW-UP CLINIC VISIT    Patient Name:  Aniket Macias  Date:  2/19/2019  Contact Type:  Face to Face    SUBJECTIVE:     Patient Findings     Positives:   Change in diet/appetite (has not had his every other day spinach smoothie for over a week)           OBJECTIVE    INR Protime   Date Value Ref Range Status   02/19/2019 3.9 (A) 0.86 - 1.14 Final       ASSESSMENT / PLAN  INR assessment SUPRA    Recheck INR In: 2 WEEKS    INR Location Clinic      Anticoagulation Summary  As of 2/19/2019    INR goal:   2.0-3.0   TTR:   69.8 % (2.7 y)   INR used for dosing:   3.9! (2/19/2019)   Warfarin maintenance plan:   2.5 mg (5 mg x 0.5) every Tue, Fri; 5 mg (5 mg x 1) all other days   Full warfarin instructions:   2/19: Hold; Otherwise 2.5 mg every Tue, Fri; 5 mg all other days   Weekly warfarin total:   30 mg   Plan last modified:   Stefani Kaminski RN (2/19/2019)   Next INR check:   3/5/2019   Target end date:   Indefinite    Indications    Paroxysmal atrial fibrillation [I48.0]  Long term current use of anticoagulants with INR goal of 2.0-3.0 [Z79.01]             Anticoagulation Episode Summary     INR check location:       Preferred lab:       Send INR reminders to:   [facility] HEART INR NURSE    Comments:         Anticoagulation Care Providers     Provider Role Specialty Phone number    Sebastián Bermudez MD Responsible Cardiology [phone]            See the Encounter Report to view Anticoagulation Flowsheet and Dosing Calendar (Go to Encounters tab in chart review, and find the Anticoagulation Therapy Visit)      INR 3.9 His diet has been sporadic recently due to his sister in laws hospitalization. Usually he drinks a spinach smoothie every other day but hasn't had one for over a week. No change in meds. No abnormal bleeding or bruising. His INR's have been at the upper end of his range recently and he would prefer that his INR be in the lower end of his therapeutic range so will hold today's dose then  decrease dosing schedule to 2.5 mg TuF and 5 mg all other days with recheck in 2 weeks. He will resume drinking the spinach smoothie every other day. Dosage adjustment made based on physician directed care plan.    Stefani Kaminski RN

## 2019-03-05 ENCOUNTER — ANTICOAGULATION THERAPY VISIT (OUTPATIENT)
Dept: CARDIOLOGY | Facility: CLINIC | Age: 79
End: 2019-03-05
Payer: MEDICARE

## 2019-03-05 DIAGNOSIS — I48.0 PAROXYSMAL ATRIAL FIBRILLATION (H): ICD-10-CM

## 2019-03-05 DIAGNOSIS — Z79.01 LONG TERM CURRENT USE OF ANTICOAGULANTS WITH INR GOAL OF 2.0-3.0: ICD-10-CM

## 2019-03-05 LAB — INR POINT OF CARE: 2.5 (ref 0.86–1.14)

## 2019-03-05 PROCEDURE — 36416 COLLJ CAPILLARY BLOOD SPEC: CPT | Performed by: INTERNAL MEDICINE

## 2019-03-05 PROCEDURE — 85610 PROTHROMBIN TIME: CPT | Mod: QW | Performed by: INTERNAL MEDICINE

## 2019-03-05 NOTE — PROGRESS NOTES
ANTICOAGULATION FOLLOW-UP CLINIC VISIT    Patient Name:  Aniket Macias  Date:  3/5/2019  Contact Type:  Face to Face    SUBJECTIVE:     Patient Findings     Positives:   Change in diet/appetite (drinking 2 spinach smoothies a week and eating broccoli every other day)           OBJECTIVE    INR Protime   Date Value Ref Range Status   2019 2.5 (A) 0.86 - 1.14 Final       ASSESSMENT / PLAN  INR assessment THER    Recheck INR In: 3 WEEKS    INR Location Clinic      Anticoagulation Summary  As of 3/5/2019    INR goal:   2.0-3.0   TTR:   69.3 % (2.7 y)   INR used for dosin.5 (3/5/2019)   Warfarin maintenance plan:   2.5 mg (5 mg x 0.5) every Tue, Fri; 5 mg (5 mg x 1) all other days   Full warfarin instructions:   2.5 mg every Tue, Fri; 5 mg all other days   Weekly warfarin total:   30 mg   No change documented:   Stefani Kaminski RN   Plan last modified:   Stefani Kaminski RN (2019)   Next INR check:   3/28/2019   Target end date:   Indefinite    Indications    Paroxysmal atrial fibrillation [I48.0]  Long term current use of anticoagulants with INR goal of 2.0-3.0 [Z79.01]             Anticoagulation Episode Summary     INR check location:       Preferred lab:       Send INR reminders to:   [facility] HEART INR NURSE    Comments:         Anticoagulation Care Providers     Provider Role Specialty Phone number    Sebastián Bermudez MD Responsible Cardiology [phone]            See the Encounter Report to view Anticoagulation Flowsheet and Dosing Calendar (Go to Encounters tab in chart review, and find the Anticoagulation Therapy Visit)    INR 2.5 INR back in range with decrease in dosing and resuming eating green veggies. He now drinks the spinach smoothies 2x/wk and eats broccoli every other day. No change in other meds. No abnormal bleeding or bruising. Will continue current dosing of 2.5 mg TuF and 5 mg all other days with recheck in 3 weeks. He hopes to resume 5 week intervals if INR still in range at  next appt.   Stefani Kaminski RN

## 2019-03-26 ENCOUNTER — OFFICE VISIT (OUTPATIENT)
Dept: FAMILY MEDICINE | Facility: CLINIC | Age: 79
End: 2019-03-26
Payer: MEDICARE

## 2019-03-26 VITALS
TEMPERATURE: 97.2 F | HEART RATE: 61 BPM | SYSTOLIC BLOOD PRESSURE: 138 MMHG | BODY MASS INDEX: 27.55 KG/M2 | OXYGEN SATURATION: 95 % | DIASTOLIC BLOOD PRESSURE: 82 MMHG | WEIGHT: 192 LBS

## 2019-03-26 DIAGNOSIS — J20.9 ACUTE BRONCHITIS WITH SYMPTOMS > 10 DAYS: Primary | ICD-10-CM

## 2019-03-26 PROCEDURE — 99214 OFFICE O/P EST MOD 30 MIN: CPT | Performed by: FAMILY MEDICINE

## 2019-03-26 RX ORDER — AMOXICILLIN 500 MG/1
500 CAPSULE ORAL 3 TIMES DAILY
Qty: 21 CAPSULE | Refills: 0 | Status: SHIPPED | OUTPATIENT
Start: 2019-03-26 | End: 2019-04-25

## 2019-03-26 NOTE — PROGRESS NOTES
SUBJECTIVE:   Aniket Macias is a 78 year old male who presents to clinic today for the following health issues:      Acute Illness   Acute illness concerns: cough  Onset: 8-9 days    Fever: no    Chills/Sweats: no    Headache (location?): no    Sinus Pressure:no    Conjunctivitis:  no    Ear Pain: no    Rhinorrhea: no    Congestion: YES    Sore Throat: no     Cough: YES    Wheeze: YES    Decreased Appetite: no    Nausea: no    Vomiting: no    Diarrhea:  no    Dysuria/Freq.: no    Fatigue/Achiness: no    Sick/Strep Exposure: no     Therapies Tried and outcome: benadryl, tyl and otc cough syrup           Problem list and histories reviewed & adjusted, as indicated.  Additional history: as documented    Patient Active Problem List   Diagnosis     Paroxysmal atrial fibrillation     Benign essential hypertension; goal < 140/90     Hyperlipidemia     Glaucoma     Benign neoplasm of colon     Benign non-nodular prostatic hyperplasia with lower urinary tract symptoms     Long-term (current) use of anticoagulants [Z79.01]     History of basal cell carcinoma     Rosacea     Spinal stenosis of lumbar region with neurogenic claudication     Long term current use of anticoagulants with INR goal of 2.0-3.0     Past Surgical History:   Procedure Laterality Date     BACK SURGERY       COLONOSCOPY  11/19/15    hx polyps     SURGICAL HISTORY OF -       Laminectomy     SURGICAL HISTORY OF -       biopsy of prostate     TONSILLECTOMY & ADENOIDECTOMY         Social History     Tobacco Use     Smoking status: Former Smoker     Packs/day: 1.00     Years: 20.00     Pack years: 20.00     Types: Cigarettes     Smokeless tobacco: Never Used     Tobacco comment: quit age 54   Substance Use Topics     Alcohol use: Yes     Alcohol/week: 0.0 oz     Comment: 1-2 drinks per week     Family History   Problem Relation Age of Onset     Cerebrovascular Disease Mother      Hypertension Mother      Cerebrovascular Disease Father      Myocardial  Infarction Father      Hypertension Father      Hypertension Sister      Myocardial Infarction Sister          Current Outpatient Medications   Medication Sig Dispense Refill     amoxicillin (AMOXIL) 500 MG capsule Take 1 capsule (500 mg) by mouth 3 times daily for 7 days 21 capsule 0     atorvastatin (LIPITOR) 40 MG tablet Take 1 tablet (40 mg) by mouth daily 90 tablet 3     latanoprost (XALATAN) 0.005 % ophthalmic solution Place 1 drop into both eyes At Bedtime       lisinopril (PRINIVIL/ZESTRIL) 40 MG tablet Take 1 tablet (40 mg) by mouth daily 90 tablet 3     metoprolol tartrate (LOPRESSOR) 50 MG tablet Take 1 tablet (50 mg) by mouth 2 times daily 180 tablet 3     Multiple Vitamins-Minerals (MULTIVITAMIN PO) Take 1 tablet by mouth daily        terazosin (HYTRIN) 5 MG capsule Take 1 capsule (5 mg) by mouth At Bedtime 90 capsule 3     UNABLE TO FIND MEDICATION NAME: Pro-racia    OTC rosacea cream.       warfarin (JANTOVEN) 5 MG tablet Take 5 mg by mouth daily       ACETAMINOPHEN PO Take 1,000 mg by mouth every 6 hours as needed for pain       hydrocortisone (CORTAID) 1 % cream Apply topically daily as needed         Reviewed and updated as needed this visit by clinical staff  Tobacco  Allergies       Reviewed and updated as needed this visit by Provider         ROS:  CONSTITUTIONAL: NEGATIVE for fever, chills, change in weight  ENT/MOUTH: NEGATIVE for ear, mouth and throat problems  RESP:POSITIVE for cough-non productive  CV: NEGATIVE for chest pain, palpitations or peripheral edema    OBJECTIVE:                                                    /82   Pulse 61   Temp 97.2  F (36.2  C) (Tympanic)   Wt 87.1 kg (192 lb)   SpO2 95%   BMI 27.55 kg/m    Body mass index is 27.55 kg/m .   GENERAL: healthy, alert, well nourished, well hydrated, no distress  HENT: ear canals- normal; TMs- normal; Nose- normal; Mouth- no ulcers, no lesions  NECK: no tenderness, no adenopathy, no asymmetry, no masses, no  stiffness; thyroid- normal to palpation  RESP: lungs clear to auscultation - no rales, no rhonchi, no wheezes  CV: regular rates and rhythm, normal S1 S2, no S3 or S4 and no murmur, no click or rub -         ASSESSMENT/PLAN:                                                        ICD-10-CM    1. Acute bronchitis with symptoms > 10 days J20.9 amoxicillin (AMOXIL) 500 MG capsule       Patient has upper respiratory symptoms most likely bronchitis.  He is also taking warfarin.  Suggested antibiotic amoxicillin due to duration of symptoms.  If symptoms does not improve or worsening he is advised to follow-up.  He may have some postnasal drainage with some sinus issues as well.    Basilio Gregorio MD  [facility] [place]

## 2019-03-28 ENCOUNTER — ANTICOAGULATION THERAPY VISIT (OUTPATIENT)
Dept: CARDIOLOGY | Facility: CLINIC | Age: 79
End: 2019-03-28
Payer: MEDICARE

## 2019-03-28 DIAGNOSIS — Z79.01 LONG TERM CURRENT USE OF ANTICOAGULANTS WITH INR GOAL OF 2.0-3.0: ICD-10-CM

## 2019-03-28 DIAGNOSIS — I48.0 PAROXYSMAL ATRIAL FIBRILLATION (H): ICD-10-CM

## 2019-03-28 LAB — INR POINT OF CARE: 2.1 (ref 0.86–1.14)

## 2019-03-28 PROCEDURE — 36416 COLLJ CAPILLARY BLOOD SPEC: CPT | Performed by: INTERNAL MEDICINE

## 2019-03-28 PROCEDURE — 85610 PROTHROMBIN TIME: CPT | Mod: QW | Performed by: INTERNAL MEDICINE

## 2019-03-28 NOTE — PROGRESS NOTES
ANTICOAGULATION FOLLOW-UP CLINIC VISIT    Patient Name:  Aniket Macias  Date:  3/28/2019  Contact Type:  Face to Face    SUBJECTIVE:     Patient Findings     Positives:   Change in medications (taking Amoxicillin for URI)           OBJECTIVE    INR Protime   Date Value Ref Range Status   2019 2.1 (A) 0.86 - 1.14 Final       ASSESSMENT / PLAN  INR assessment THER    Recheck INR In: 5 WEEKS    INR Location Clinic      Anticoagulation Summary  As of 3/28/2019    INR goal:   2.0-3.0   TTR:   70.0 % (2.8 y)   INR used for dosin.1 (3/28/2019)   Warfarin maintenance plan:   2.5 mg (5 mg x 0.5) every Tue, Fri; 5 mg (5 mg x 1) all other days   Full warfarin instructions:   2.5 mg every Tue, Fri; 5 mg all other days   Weekly warfarin total:   30 mg   No change documented:   Stefani Kaminski RN   Plan last modified:   Stefani Kaminski RN (2019)   Next INR check:   2019   Target end date:   Indefinite    Indications    Paroxysmal atrial fibrillation [I48.0]  Long term current use of anticoagulants with INR goal of 2.0-3.0 [Z79.01]             Anticoagulation Episode Summary     INR check location:       Preferred lab:       Send INR reminders to:   [facility] HEART INR NURSE    Comments:         Anticoagulation Care Providers     Provider Role Specialty Phone number    Sebastián Bermudez MD [facility] Cardiology [phone]            See the Encounter Report to view Anticoagulation Flowsheet and Dosing Calendar (Go to Encounters tab in chart review, and find the Anticoagulation Therapy Visit)    INR 2.1 INR now at lower end of range with decreased dosing schedule. Taking Amoxicillin for URI and is feeling better. Still drinks spinach smoothies twice a week and broccoli at least twice a week. No abnormal bleeding or bruising. Will continue current dosing of 2.5 mg TuF and 5 mg all other days with recheck in 4 1/2 weeks.    Stefani Kaminski RN

## 2019-04-04 ENCOUNTER — TELEPHONE (OUTPATIENT)
Dept: FAMILY MEDICINE | Facility: CLINIC | Age: 79
End: 2019-04-04

## 2019-04-04 DIAGNOSIS — E78.5 HYPERLIPIDEMIA LDL GOAL <100: ICD-10-CM

## 2019-04-04 DIAGNOSIS — I10 BENIGN ESSENTIAL HYPERTENSION: Chronic | ICD-10-CM

## 2019-04-04 DIAGNOSIS — N40.1 BENIGN NON-NODULAR PROSTATIC HYPERPLASIA WITH LOWER URINARY TRACT SYMPTOMS: Chronic | ICD-10-CM

## 2019-04-04 DIAGNOSIS — I48.0 PAROXYSMAL ATRIAL FIBRILLATION (H): Chronic | ICD-10-CM

## 2019-04-04 RX ORDER — TERAZOSIN 5 MG/1
5 CAPSULE ORAL AT BEDTIME
Qty: 90 CAPSULE | Refills: 3 | Status: SHIPPED | OUTPATIENT
Start: 2019-04-04 | End: 2020-04-14

## 2019-04-04 RX ORDER — LISINOPRIL 40 MG/1
40 TABLET ORAL DAILY
Qty: 90 TABLET | Refills: 3 | Status: SHIPPED | OUTPATIENT
Start: 2019-04-04 | End: 2020-05-19

## 2019-04-04 RX ORDER — METOPROLOL TARTRATE 50 MG
50 TABLET ORAL 2 TIMES DAILY
Qty: 180 TABLET | Refills: 3 | Status: SHIPPED | OUTPATIENT
Start: 2019-04-04 | End: 2020-04-14

## 2019-04-04 RX ORDER — ATORVASTATIN CALCIUM 40 MG/1
40 TABLET, FILM COATED ORAL DAILY
Qty: 90 TABLET | Refills: 3 | Status: SHIPPED | OUTPATIENT
Start: 2019-04-04 | End: 2020-05-11

## 2019-04-04 NOTE — TELEPHONE ENCOUNTER
Lowell was here today with his wife for her appt.  He plans to transfer care from Dr. Mishra to myself.  Will schedule a physical in the next month or two, but is close to running out of a couple meds, which I have refilled.      He also plans to transfer his INR monitoring to .  Routing to [facility] pool.      Maribeth Haney MD

## 2019-04-04 NOTE — TELEPHONE ENCOUNTER
Rescheduled patient's INR appointment for 4/30 to [facility] and added to our [facility] patient list. Shazia Marquez RN

## 2019-04-22 ASSESSMENT — ACTIVITIES OF DAILY LIVING (ADL): CURRENT_FUNCTION: NO ASSISTANCE NEEDED

## 2019-04-24 ASSESSMENT — ACTIVITIES OF DAILY LIVING (ADL): CURRENT_FUNCTION: NO ASSISTANCE NEEDED

## 2019-04-24 NOTE — PATIENT INSTRUCTIONS
Preventive Health Recommendations:     See your health care provider every year to    Review health changes.     Discuss preventive care.      Review your medicines if your doctor has prescribed any.      Talk with your health care provider about whether you should have a test to screen for prostate cancer (PSA).    Every 3 years, have a diabetes test (fasting glucose). If you are at risk for diabetes, you should have this test more often.    Every 5 years, have a cholesterol test. Have this test more often if you are at risk for high cholesterol or heart disease.     Every 10 years, have a colonoscopy. Or, have a yearly FIT test (stool test). These exams will check for colon cancer.    Talk to with your health care provider about screening for Abdominal Aortic Aneurysm if you have a family history of AAA or have a history of smoking.    Shots:     Get a flu shot each year.     Get a tetanus shot every 10 years.     Talk to your doctor about your pneumonia vaccines. There are now two you should receive - Pneumovax (PPSV 23) and Prevnar (PCV 13).     Talk to your pharmacist about a shingles vaccine.     Talk to your doctor about the hepatitis B vaccine.  Nutrition:     Eat at least 5 servings of fruits and vegetables each day.     Eat whole-grain bread, whole-wheat pasta and brown rice instead of white grains and rice.     Get adequate Calcium and Vitamin D.   Lifestyle    Exercise for at least 150 minutes a week (30 minutes a day, 5 days a week). This will help you control your weight and prevent disease.     Limit alcohol to one drink per day.     No smoking.     Wear sunscreen to prevent skin cancer.    See your dentist every six months for an exam and cleaning.    See your eye doctor every 1 to 2 years to screen for conditions such as glaucoma, macular degeneration, cataracts, etc.    Personalized Prevention Plan  You are due for the preventive services outlined below.  Your care team is available to assist you  in scheduling these services.  If you have already completed any of these items, please share that information with your care team to update in your medical record.  Health Maintenance Due   Topic Date Due     Zoster (Shingles) Vaccine (1 of 2) 06/09/1989     INR CLINIC REFERRAL - yearly  09/09/2016     FALL RISK ASSESSMENT  04/23/2019     Cholesterol Lab - yearly  05/22/2019

## 2019-04-24 NOTE — PROGRESS NOTES
"SUBJECTIVE:   Aniket Macias is a 78 year old male who presents for Preventive Visit.    Lives with his wife.   They have two grown children, Ann-Marie lives outside of [place], [place] and Eugene lives near [place].  He is retired from working as a CPA.    Are you in the first 12 months of your Medicare coverage?  No    Healthy Habits:     In general, how would you rate your overall health?  Good    Frequency of exercise:  1 day/week    Duration of exercise:  Other    Do you usually eat at least 4 servings of fruit and vegetables a day, include whole grains    & fiber and avoid regularly eating high fat or \"junk\" foods?  Yes    Taking medications regularly:  Yes    Medication side effects:  None    Ability to successfully perform activities of daily living:  No assistance needed    Home Safety:  No safety concerns identified    Hearing Impairment:  No hearing concerns    In the past 6 months, have you been bothered by leaking of urine? Yes    In general, how would you rate your overall mental or emotional health?  Good      PHQ-2 Total Score: 0    Additional concerns today:  Yes    Do you feel safe in your environment? Yes    Do you have a Health Care Directive? Yes: Patient states has Advance Directive and will bring in a copy to clinic.      Fall risk  Fallen 2 or more times in the past year?: No  Any fall with injury in the past year?: No    Cognitive Screening   1) Repeat 3 items (Leader, Season, Table)    2) Clock draw: NORMAL  3) 3 item recall: Recalls 2 objects   Results: NORMAL clock, 1-2 items recalled: COGNITIVE IMPAIRMENT LESS LIKELY    Mini-CogTM Copyright QUENTIN Alexander. Licensed by the author for use in [facility]; reprinted with permission (gerda@.[facility]). All rights reserved.      Do you have sleep apnea, excessive snoring or daytime drowsiness?: no    Reviewed and updated as needed this visit by clinical staff  Tobacco  Allergies  Meds  Problems  Med Hx  Surg Hx  Fam Hx  Soc Hx      "     Reviewed and updated as needed this visit by Provider  Tobacco  Meds  Problems  Med Hx  Surg Hx  Fam Hx  Soc Hx       Social History     Tobacco Use     Smoking status: Former Smoker     Packs/day: 1.00     Years: 20.00     Pack years: 20.00     Types: Cigarettes     Smokeless tobacco: Never Used     Tobacco comment: quit age 54   Substance Use Topics     Alcohol use: Yes     Alcohol/week: 0.0 oz     Comment: 1-2 drinks per week     If you drink alcohol do you typically have >3 drinks per day or >7 drinks per week? No    Alcohol Use 4/25/2019   Prescreen: >3 drinks/day or >7 drinks/week? -   Prescreen: >3 drinks/day or >7 drinks/week? No           Current providers sharing in care for this patient include:   Patient Care Team:  Maribeth Haney MD as PCP - General (Internal Medicine)  Jennifer Mishra DO as PCP - Assigned PCP  Jennifer Mishra DO as Assigned PCP    The following health maintenance items are reviewed in Epic and correct as of today:  Health Maintenance   Topic Date Due     ZOSTER IMMUNIZATION (1 of 2) 06/09/1989     OP ANNUAL INR REFERRAL  09/09/2016     FALL RISK ASSESSMENT  04/23/2019     LIPID MONITORING Q1 YEAR  05/22/2019     MEDICARE ANNUAL WELLNESS VISIT  04/25/2020     COLONOSCOPY Q3 YR  11/29/2021     ADVANCE DIRECTIVE PLANNING Q5 YRS  11/29/2023     DTAP/TDAP/TD IMMUNIZATION (2 - Td) 02/04/2024     PHQ-2  Completed     INFLUENZA VACCINE  Completed     IPV IMMUNIZATION  Aged Out     MENINGITIS IMMUNIZATION  Aged Out     Patient Active Problem List   Diagnosis     Paroxysmal atrial fibrillation     Benign essential hypertension; goal < 140/90     Hyperlipidemia     Glaucoma     Benign neoplasm of colon     Benign non-nodular prostatic hyperplasia with lower urinary tract symptoms     Long-term (current) use of anticoagulants [Z79.01]     History of basal cell carcinoma     Rosacea     Spinal stenosis of lumbar region with neurogenic claudication     Long term current use of  anticoagulants with INR goal of 2.0-3.0     Past Surgical History:   Procedure Laterality Date     BACK SURGERY       COLONOSCOPY  11/19/15    hx polyps     SURGICAL HISTORY OF -       Laminectomy     SURGICAL HISTORY OF -       biopsy of prostate     TONSILLECTOMY & ADENOIDECTOMY         Social History     Tobacco Use     Smoking status: Former Smoker     Packs/day: 1.00     Years: 20.00     Pack years: 20.00     Types: Cigarettes     Smokeless tobacco: Never Used     Tobacco comment: quit age 54   Substance Use Topics     Alcohol use: Yes     Alcohol/week: 0.0 oz     Comment: 1-2 drinks per week     Family History   Problem Relation Age of Onset     Cerebrovascular Disease Mother      Hypertension Mother      Cerebrovascular Disease Father      Myocardial Infarction Father      Hypertension Father      Hypertension Sister      Myocardial Infarction Sister          Current Outpatient Medications   Medication Sig Dispense Refill     ACETAMINOPHEN PO Take 1,000 mg by mouth every 6 hours as needed for pain       atorvastatin (LIPITOR) 40 MG tablet Take 1 tablet (40 mg) by mouth daily 90 tablet 3     hydrocortisone (CORTAID) 1 % cream Apply topically daily as needed       latanoprost (XALATAN) 0.005 % ophthalmic solution Place 1 drop into both eyes At Bedtime       lisinopril (PRINIVIL/ZESTRIL) 40 MG tablet Take 1 tablet (40 mg) by mouth daily 90 tablet 3     metoprolol tartrate (LOPRESSOR) 50 MG tablet Take 1 tablet (50 mg) by mouth 2 times daily 180 tablet 3     Multiple Vitamins-Minerals (MULTIVITAMIN PO) Take 1 tablet by mouth daily        terazosin (HYTRIN) 5 MG capsule Take 1 capsule (5 mg) by mouth At Bedtime 90 capsule 3     UNABLE TO FIND MEDICATION NAME: Pro-racia    OTC rosacea cream.       warfarin (JANTOVEN) 5 MG tablet Take 5 mg by mouth daily           Review of Systems  Constitutional, HEENT, cardiovascular, pulmonary, GI, , musculoskeletal, neuro, skin, endocrine and psych systems reviewed. Positive  "for urinary urgency, hesitancy, and occasional leakage which is chronic,  otherwise negative unless noted above.      OBJECTIVE:   /80 (Patient Position: Chair, Cuff Size: Adult Regular)   Pulse 58   Temp 94.7  F (34.8  C) (Tympanic)   Resp 11   Ht 1.745 m (5' 8.7\")   Wt 86.2 kg (190 lb)   SpO2 96%   BMI 28.30 kg/m   Estimated body mass index is 28.3 kg/m  as calculated from the following:    Height as of this encounter: 1.745 m (5' 8.7\").    Weight as of this encounter: 86.2 kg (190 lb).  Physical Exam     GENERAL: healthy, alert and no distress  EYES: Eyes grossly normal to inspection, PERRL and conjunctivae and sclerae normal  HENT: ear canals and TM's normal, mouth without ulcers or lesions  NECK: no adenopathy, no asymmetry, masses, or scars and thyroid normal to palpation  RESP: lungs clear to auscultation - no rales, rhonchi or wheezes  CV: regular rate and rhythm, normal S1 S2, no S3 or S4, no murmur, click or rub, no peripheral edema and peripheral pulses strong  ABDOMEN: soft, nontender, no hepatosplenomegaly, no masses and bowel sounds normal  MS: no gross musculoskeletal defects noted, no edema  SKIN: no suspicious lesions or rashes  NEURO: Normal strength and tone, mentation intact and speech normal  PSYCH: mentation appears normal, affect normal/bright        ASSESSMENT / PLAN:   1. Encounter for routine adult medical exam with abnormal findings  Healthy 78 year old.     2. Benign essential hypertension; goal < 140/90  Well controlled on lisinopril and metoprolol.   - Basic metabolic panel  (Ca, Cl, CO2, Creat, Gluc, K, Na, BUN)    3. Long term current use of anticoagulants with INR goal of 2.0-3.0  - CBC with platelets    4. Mixed hyperlipidemia  On atorvastatin   - Lipid panel reflex to direct LDL Fasting    5. Paroxysmal atrial fibrillation  On warfarin.  Due for annual cardiology visit, he would like to transfer to see cardiology here at [place], referral ordered   - CARDIOLOGY " "EVAL ADULT REFERRAL    6. Benign non-nodular prostatic hyperplasia with lower urinary tract symptoms  On terazosin.  He declines referral to urology.        End of Life Planning:  Patient currently has an advanced directive: Yes.  Practitioner is supportive of decision.    COUNSELING:  Reviewed preventive health counseling, as reflected in patient instructions  Special attention given to:       Regular exercise       Healthy diet/nutrition       Dental care    BP Readings from Last 1 Encounters:   04/25/19 127/80     Estimated body mass index is 28.3 kg/m  as calculated from the following:    Height as of this encounter: 1.745 m (5' 8.7\").    Weight as of this encounter: 86.2 kg (190 lb).           reports that he has quit smoking. His smoking use included cigarettes. He has a 20.00 pack-year smoking history. He has never used smokeless tobacco.      Appropriate preventive services were discussed with this patient, including applicable screening as appropriate for cardiovascular disease, diabetes, osteopenia/osteoporosis, and glaucoma.  As appropriate for age/gender, discussed screening for colorectal cancer, prostate cancer, breast cancer, and cervical cancer. Checklist reviewing preventive services available has been given to the patient.    Reviewed patients plan of care and provided an AVS. The Basic Care Plan (routine screening as documented in Health Maintenance) for Aniket meets the Care Plan requirement. This Care Plan has been established and reviewed with the Patient.    Counseling Resources:  ATP IV Guidelines  Pooled Cohorts Equation Calculator  Breast Cancer Risk Calculator  FRAX Risk Assessment  ICSI Preventive Guidelines  Dietary Guidelines for Americans, 2010  USDA's MyPlate  ASA Prophylaxis  Lung CA Screening    Maribeth Haney MD  [facility] [place]    Identified Health Risks:  "

## 2019-04-25 ENCOUNTER — OFFICE VISIT (OUTPATIENT)
Dept: FAMILY MEDICINE | Facility: CLINIC | Age: 79
End: 2019-04-25
Payer: MEDICARE

## 2019-04-25 VITALS
WEIGHT: 190 LBS | OXYGEN SATURATION: 96 % | HEART RATE: 58 BPM | RESPIRATION RATE: 11 BRPM | HEIGHT: 69 IN | BODY MASS INDEX: 28.14 KG/M2 | TEMPERATURE: 94.7 F | SYSTOLIC BLOOD PRESSURE: 127 MMHG | DIASTOLIC BLOOD PRESSURE: 80 MMHG

## 2019-04-25 DIAGNOSIS — Z00.01 ENCOUNTER FOR ROUTINE ADULT MEDICAL EXAM WITH ABNORMAL FINDINGS: Primary | ICD-10-CM

## 2019-04-25 DIAGNOSIS — E78.2 MIXED HYPERLIPIDEMIA: Chronic | ICD-10-CM

## 2019-04-25 DIAGNOSIS — N40.1 BENIGN NON-NODULAR PROSTATIC HYPERPLASIA WITH LOWER URINARY TRACT SYMPTOMS: Chronic | ICD-10-CM

## 2019-04-25 DIAGNOSIS — I10 BENIGN ESSENTIAL HYPERTENSION: Chronic | ICD-10-CM

## 2019-04-25 DIAGNOSIS — I48.0 PAROXYSMAL ATRIAL FIBRILLATION (H): Chronic | ICD-10-CM

## 2019-04-25 DIAGNOSIS — Z79.01 LONG TERM CURRENT USE OF ANTICOAGULANTS WITH INR GOAL OF 2.0-3.0: ICD-10-CM

## 2019-04-25 LAB
ANION GAP SERPL CALCULATED.3IONS-SCNC: 6 MMOL/L (ref 3–14)
BUN SERPL-MCNC: 23 MG/DL (ref 7–30)
CALCIUM SERPL-MCNC: 8.8 MG/DL (ref 8.5–10.1)
CHLORIDE SERPL-SCNC: 110 MMOL/L (ref 94–109)
CHOLEST SERPL-MCNC: 115 MG/DL
CO2 SERPL-SCNC: 26 MMOL/L (ref 20–32)
CREAT SERPL-MCNC: 1.07 MG/DL (ref 0.66–1.25)
ERYTHROCYTE [DISTWIDTH] IN BLOOD BY AUTOMATED COUNT: 12.9 % (ref 10–15)
GFR SERPL CREATININE-BSD FRML MDRD: 65 ML/MIN/{1.73_M2}
GLUCOSE SERPL-MCNC: 111 MG/DL (ref 70–99)
HCT VFR BLD AUTO: 42.1 % (ref 40–53)
HDLC SERPL-MCNC: 51 MG/DL
HGB BLD-MCNC: 14.1 G/DL (ref 13.3–17.7)
LDLC SERPL CALC-MCNC: 55 MG/DL
MCH RBC QN AUTO: 33 PG (ref 26.5–33)
MCHC RBC AUTO-ENTMCNC: 33.5 G/DL (ref 31.5–36.5)
MCV RBC AUTO: 99 FL (ref 78–100)
NONHDLC SERPL-MCNC: 64 MG/DL
PLATELET # BLD AUTO: 188 10E9/L (ref 150–450)
POTASSIUM SERPL-SCNC: 4.5 MMOL/L (ref 3.4–5.3)
RBC # BLD AUTO: 4.27 10E12/L (ref 4.4–5.9)
SODIUM SERPL-SCNC: 142 MMOL/L (ref 133–144)
TRIGL SERPL-MCNC: 45 MG/DL
WBC # BLD AUTO: 9.4 10E9/L (ref 4–11)

## 2019-04-25 PROCEDURE — 85027 COMPLETE CBC AUTOMATED: CPT | Performed by: INTERNAL MEDICINE

## 2019-04-25 PROCEDURE — 80048 BASIC METABOLIC PNL TOTAL CA: CPT | Performed by: INTERNAL MEDICINE

## 2019-04-25 PROCEDURE — G0439 PPPS, SUBSEQ VISIT: HCPCS | Performed by: INTERNAL MEDICINE

## 2019-04-25 PROCEDURE — 36415 COLL VENOUS BLD VENIPUNCTURE: CPT | Performed by: INTERNAL MEDICINE

## 2019-04-25 PROCEDURE — 80061 LIPID PANEL: CPT | Performed by: INTERNAL MEDICINE

## 2019-04-25 ASSESSMENT — MIFFLIN-ST. JEOR: SCORE: 1562.46

## 2019-04-30 ENCOUNTER — ANTICOAGULATION THERAPY VISIT (OUTPATIENT)
Dept: NURSING | Facility: CLINIC | Age: 79
End: 2019-04-30
Payer: MEDICARE

## 2019-04-30 DIAGNOSIS — I48.0 PAROXYSMAL ATRIAL FIBRILLATION (H): ICD-10-CM

## 2019-04-30 DIAGNOSIS — Z79.01 LONG TERM CURRENT USE OF ANTICOAGULANTS WITH INR GOAL OF 2.0-3.0: ICD-10-CM

## 2019-04-30 LAB — INR POINT OF CARE: 2.6 (ref 0.86–1.14)

## 2019-04-30 PROCEDURE — 36416 COLLJ CAPILLARY BLOOD SPEC: CPT

## 2019-04-30 PROCEDURE — 99207 ZZC NO CHARGE NURSE ONLY: CPT

## 2019-04-30 PROCEDURE — 85610 PROTHROMBIN TIME: CPT | Mod: QW

## 2019-04-30 NOTE — PROGRESS NOTES
ANTICOAGULATION FOLLOW-UP CLINIC VISIT    Patient Name:  Aniket Macias  Date:  2019  Contact Type:  Face to Face    SUBJECTIVE:     Patient Findings            OBJECTIVE    INR Protime   Date Value Ref Range Status   2019 2.6 (A) 0.86 - 1.14 Final       ASSESSMENT / PLAN  INR assessment THER    Recheck INR In: 5 WEEKS    INR Location Clinic      Anticoagulation Summary  As of 2019    INR goal:   2.0-3.0   TTR:   71.0 % (2.9 y)   INR used for dosin.6 (2019)   Warfarin maintenance plan:   2.5 mg (5 mg x 0.5) every Tue, Fri; 5 mg (5 mg x 1) all other days   Full warfarin instructions:   2.5 mg every Tue, Fri; 5 mg all other days   Weekly warfarin total:   30 mg   Plan last modified:   Shazia Marquez RN (2019)   Next INR check:   2019   Target end date:   Indefinite    Indications    Paroxysmal atrial fibrillation [I48.0]  Long term current use of anticoagulants with INR goal of 2.0-3.0 [Z79.01]             Anticoagulation Episode Summary     INR check location:       Preferred lab:       Send INR reminders to:   [facility] HEART INR NURSE    Comments:         Anticoagulation Care Providers     Provider Role Specialty Phone number    Sebastián Bermudez MD [facility] Cardiology [phone]            See the Encounter Report to view Anticoagulation Flowsheet and Dosing Calendar (Go to Encounters tab in chart review, and find the Anticoagulation Therapy Visit)    INR  therapeutic at 2.6 today.  Continue warfarin 30 mg weekly.  Recheck in 5 weeks or sooner if problems/concerns.       Shazia Marquez RN

## 2019-05-22 ENCOUNTER — OFFICE VISIT (OUTPATIENT)
Dept: CARDIOLOGY | Facility: CLINIC | Age: 79
End: 2019-05-22
Payer: MEDICARE

## 2019-05-22 VITALS
OXYGEN SATURATION: 98 % | BODY MASS INDEX: 29.06 KG/M2 | DIASTOLIC BLOOD PRESSURE: 76 MMHG | HEIGHT: 69 IN | WEIGHT: 196.2 LBS | SYSTOLIC BLOOD PRESSURE: 134 MMHG | HEART RATE: 62 BPM

## 2019-05-22 DIAGNOSIS — Z79.01 LONG TERM CURRENT USE OF ANTICOAGULANTS WITH INR GOAL OF 2.0-3.0: ICD-10-CM

## 2019-05-22 DIAGNOSIS — I50.33 ACUTE ON CHRONIC DIASTOLIC HEART FAILURE (H): ICD-10-CM

## 2019-05-22 DIAGNOSIS — I10 BENIGN ESSENTIAL HYPERTENSION: Chronic | ICD-10-CM

## 2019-05-22 DIAGNOSIS — R60.0 BILATERAL LEG EDEMA: ICD-10-CM

## 2019-05-22 DIAGNOSIS — E78.5 HYPERLIPIDEMIA, UNSPECIFIED HYPERLIPIDEMIA TYPE: Chronic | ICD-10-CM

## 2019-05-22 DIAGNOSIS — I48.20 CHRONIC ATRIAL FIBRILLATION (H): Primary | ICD-10-CM

## 2019-05-22 PROCEDURE — 99214 OFFICE O/P EST MOD 30 MIN: CPT | Performed by: INTERNAL MEDICINE

## 2019-05-22 RX ORDER — FUROSEMIDE 20 MG
20 TABLET ORAL DAILY
Qty: 30 TABLET | Refills: 11 | Status: SHIPPED | OUTPATIENT
Start: 2019-05-22 | End: 2019-06-19

## 2019-05-22 ASSESSMENT — MIFFLIN-ST. JEOR: SCORE: 1590.57

## 2019-05-22 NOTE — LETTER
5/22/2019    Maribeth Haney MD  [address] [facility] [address]    RE: Aniket Macias       Dear Colleague,    I had the pleasure of seeing Aniket Macias in the [facility].    HPI and Plan:    Mr. Macias is a pleasant 77-year-old gentleman with history of hypertension, skin cancer and permanent atrial fibrillation, who is here for routine followup.       He is a patient of Dr. Bermudez but will start seeing me as I can see him in the [facility].  He is not much aware of his arrhythmia.  He remains on Coumadin for stroke prophylaxis.    His last Holter in 2017 revealed adequate rate control with the longest pause of 2.8 seconds.  Last echo in 2016 revealed normal LV systolic function with aortic valve sclerosis.    Lately, he has noticed leg swelling, left leg more than the right.  It has not been much bothered him.  There is no orthopnea or PND.  Denies any chest pain or shortness of breath.    Physical exam reveals positive hepatojugular reflux with 1+ leg edema, left greater than right.  Chest was clear to auscultation.        ASSESSMENT AND PLAN:   1.  Chronic/permanent atrial fibrillation.   Continue Coumadin for stroke prophylaxis given his chads vascular score is at least 3.  Continue rate control approach.  Would recommend a follow-up Holter to reassess adequacy of rate control.  Continue metoprolol.    2.  Edema, possible acute on chronic diastolic heart failure  Does have slightly elevated hepatojugular reflux.  He may also have element of venous insufficiency.  We will start Lasix 20 mg daily and have instructed him to have half a banana to replenish potassium.  Check BMP in 2 weeks with N-terminal proBNP.  I will also recommend an echocardiogram to assess wall motion, ejection fraction and aortic valve function.    3.  Hypertension  Continue lisinopril.    4.  Benign prostate hypertrophy, on terazosin.    5.  Hyperlipidemia, continue  atorvastatin.    Thank you for allowing us to participate in the care of this nice patient.  I will see him back in follow-up next month after the echo and Holter.    Sincerely,    Marlo Joseph MD  Orders Placed This Encounter   Procedures     Basic metabolic panel     N terminal pro BNP outpatient     Follow-Up with Cardiologist     Holter Monitor 24 hour Adult Pediatric     Echocardiogram Complete       Orders Placed This Encounter   Medications     furosemide (LASIX) 20 MG tablet     Sig: Take 1 tablet (20 mg) by mouth daily     Dispense:  30 tablet     Refill:  11       There are no discontinued medications.      Encounter Diagnoses   Name Primary?     Chronic atrial fibrillation (H) Yes     Hyperlipidemia, unspecified hyperlipidemia type      Benign essential hypertension; goal < 140/90      Long term current use of anticoagulants with INR goal of 2.0-3.0      Bilateral leg edema      Acute on chronic diastolic heart failure (H)        CURRENT MEDICATIONS:  Current Outpatient Medications   Medication Sig Dispense Refill     ACETAMINOPHEN PO Take 1,000 mg by mouth every 6 hours as needed for pain       atorvastatin (LIPITOR) 40 MG tablet Take 1 tablet (40 mg) by mouth daily 90 tablet 3     furosemide (LASIX) 20 MG tablet Take 1 tablet (20 mg) by mouth daily 30 tablet 11     latanoprost (XALATAN) 0.005 % ophthalmic solution Place 1 drop into both eyes At Bedtime       lisinopril (PRINIVIL/ZESTRIL) 40 MG tablet Take 1 tablet (40 mg) by mouth daily 90 tablet 3     metoprolol tartrate (LOPRESSOR) 50 MG tablet Take 1 tablet (50 mg) by mouth 2 times daily 180 tablet 3     Multiple Vitamins-Minerals (MULTIVITAMIN PO) Take 1 tablet by mouth daily        terazosin (HYTRIN) 5 MG capsule Take 1 capsule (5 mg) by mouth At Bedtime 90 capsule 3     UNABLE TO FIND MEDICATION NAME: Pro-racia    OTC rosacea cream.       warfarin (JANTOVEN) 5 MG tablet Take 5 mg by mouth daily       hydrocortisone (CORTAID) 1 % cream Apply  topically daily as needed         ALLERGIES   No Active Allergies    PAST MEDICAL HISTORY:  Past Medical History:   Diagnosis Date     Basal cell carcinoma      BPH (benign prostatic hypertrophy)      Diverticulosis      Glaucoma      Hemorrhoids      HTN (hypertension)      Hyperlipidemia      Obesity      Persistent atrial fibrillation (H)      PVC (premature ventricular contraction)      Squamous cell carcinoma in situ of skin      Syncope 4/11/2016       PAST SURGICAL HISTORY:  Past Surgical History:   Procedure Laterality Date     BACK SURGERY       COLONOSCOPY  11/19/15    hx polyps     SURGICAL HISTORY OF -       Laminectomy     SURGICAL HISTORY OF -       biopsy of prostate     TONSILLECTOMY & ADENOIDECTOMY         FAMILY HISTORY:  Family History   Problem Relation Age of Onset     Cerebrovascular Disease Mother      Hypertension Mother      Cerebrovascular Disease Father      Myocardial Infarction Father      Hypertension Father      Hypertension Sister      Myocardial Infarction Sister        SOCIAL HISTORY:  Social History     Socioeconomic History     Marital status:      Spouse name: None     Number of children: None     Years of education: None     Highest education level: None   Occupational History     None   Social Needs     Financial resource strain: None     Food insecurity:     Worry: None     Inability: None     Transportation needs:     Medical: None     Non-medical: None   Tobacco Use     Smoking status: Former Smoker     Packs/day: 1.00     Years: 20.00     Pack years: 20.00     Types: Cigarettes     Smokeless tobacco: Never Used     Tobacco comment: quit age 54   Substance and Sexual Activity     Alcohol use: Yes     Alcohol/week: 0.0 oz     Comment: 1-2 drinks per week     Drug use: No     Sexual activity: Not Currently     Partners: Female   Lifestyle     Physical activity:     Days per week: None     Minutes per session: None     Stress: None   Relationships     Social connections:  "    Talks on phone: None     Gets together: None     Attends Denominational service: None     Active member of club or organization: None     Attends meetings of clubs or organizations: None     Relationship status: None     Intimate partner violence:     Fear of current or ex partner: None     Emotionally abused: None     Physically abused: None     Forced sexual activity: None   Other Topics Concern      Service Not Asked     Blood Transfusions Not Asked     Caffeine Concern Yes     Comment: 2 cups coffee per day     Occupational Exposure Not Asked     Hobby Hazards Not Asked     Sleep Concern No     Stress Concern No     Weight Concern No     Special Diet Not Asked     Back Care Not Asked     Exercise No     Bike Helmet Not Asked     Seat Belt Not Asked     Self-Exams Not Asked     Parent/sibling w/ CABG, MI or angioplasty before 65F 55M? Not Asked   Social History Narrative     None       Review of Systems:  Skin:  Negative       Eyes:         ENT:  Positive for tinnitus    Respiratory:  Negative       Cardiovascular:  palpitations;chest pain;syncope or near-syncope;cyanosis;exercise intolerance;fatigue;lightheadedness;dizziness edema;Positive for slight swelling in right leg   Gastroenterology: Negative      Genitourinary:  Negative      Musculoskeletal:  Negative      Neurologic:  Negative      Psychiatric:  Negative      Heme/Lymph/Imm:  Negative      Endocrine:  Negative        Physical Exam:  Vitals: /76 (BP Location: Left arm, Patient Position: Sitting, Cuff Size: Adult Large)   Pulse 62   Ht 1.745 m (5' 8.7\")   Wt 89 kg (196 lb 3.2 oz)   SpO2 98%   BMI 29.23 kg/m       Constitutional:  in no acute distress        Skin:  warm and dry to the touch          Head:  normocephalic, no masses or lesions        Eyes:  sclera white        Lymph:      ENT:           Neck:  JVP normal        Respiratory:  clear to auscultation         Cardiac:   irregularly irregular rhythm     systolic ejection " murmur;RUSB;grade 1;grade 2                                                 GI:           Extremities and Muscular Skeletal:  not assessed this visit   bilateral LE edema;1+          Neurological:  no gross motor deficits        Psych:  Alert and Oriented x 3        CC  Maribeth Haney MD  [address] [place] [id number]                          Thank you for allowing me to participate in the care of your patient.      Sincerely,     Marlo Joseph MD     [facility]

## 2019-05-22 NOTE — PROGRESS NOTES
HPI and Plan:    Mr. Macias is a pleasant 77-year-old gentleman with history of hypertension, skin cancer and permanent atrial fibrillation, who is here for routine followup.       He is a patient of Dr. Bermudez but will start seeing me as I can see him in the [place] clinic.  He is not much aware of his arrhythmia.  He remains on Coumadin for stroke prophylaxis.    His last Holter in 2017 revealed adequate rate control with the longest pause of 2.8 seconds.  Last echo in 2016 revealed normal LV systolic function with aortic valve sclerosis.    Lately, he has noticed leg swelling, left leg more than the right.  It has not been much bothered him.  There is no orthopnea or PND.  Denies any chest pain or shortness of breath.    Physical exam reveals positive hepatojugular reflux with 1+ leg edema, left greater than right.  Chest was clear to auscultation.        ASSESSMENT AND PLAN:   1.  Chronic/permanent atrial fibrillation.   Continue Coumadin for stroke prophylaxis given his chads vascular score is at least 3.  Continue rate control approach.  Would recommend a follow-up Holter to reassess adequacy of rate control.  Continue metoprolol.    2.  Edema, possible acute on chronic diastolic heart failure  Does have slightly elevated hepatojugular reflux.  He may also have element of venous insufficiency.  We will start Lasix 20 mg daily and have instructed him to have half a banana to replenish potassium.  Check BMP in 2 weeks with N-terminal proBNP.  I will also recommend an echocardiogram to assess wall motion, ejection fraction and aortic valve function.    3.  Hypertension  Continue lisinopril.    4.  Benign prostate hypertrophy, on terazosin.    5.  Hyperlipidemia, continue atorvastatin.    Thank you for allowing us to participate in the care of this nice patient.  I will see him back in follow-up next month after the echo and Holter.    Sincerely,    Marlo Joseph MD  Orders Placed This Encounter   Procedures      Basic metabolic panel     N terminal pro BNP outpatient     Follow-Up with Cardiologist     Holter Monitor 24 hour Adult Pediatric     Echocardiogram Complete       Orders Placed This Encounter   Medications     furosemide (LASIX) 20 MG tablet     Sig: Take 1 tablet (20 mg) by mouth daily     Dispense:  30 tablet     Refill:  11       There are no discontinued medications.      Encounter Diagnoses   Name Primary?     Chronic atrial fibrillation (H) Yes     Hyperlipidemia, unspecified hyperlipidemia type      Benign essential hypertension; goal < 140/90      Long term current use of anticoagulants with INR goal of 2.0-3.0      Bilateral leg edema      Acute on chronic diastolic heart failure (H)        CURRENT MEDICATIONS:  Current Outpatient Medications   Medication Sig Dispense Refill     ACETAMINOPHEN PO Take 1,000 mg by mouth every 6 hours as needed for pain       atorvastatin (LIPITOR) 40 MG tablet Take 1 tablet (40 mg) by mouth daily 90 tablet 3     furosemide (LASIX) 20 MG tablet Take 1 tablet (20 mg) by mouth daily 30 tablet 11     latanoprost (XALATAN) 0.005 % ophthalmic solution Place 1 drop into both eyes At Bedtime       lisinopril (PRINIVIL/ZESTRIL) 40 MG tablet Take 1 tablet (40 mg) by mouth daily 90 tablet 3     metoprolol tartrate (LOPRESSOR) 50 MG tablet Take 1 tablet (50 mg) by mouth 2 times daily 180 tablet 3     Multiple Vitamins-Minerals (MULTIVITAMIN PO) Take 1 tablet by mouth daily        terazosin (HYTRIN) 5 MG capsule Take 1 capsule (5 mg) by mouth At Bedtime 90 capsule 3     UNABLE TO FIND MEDICATION NAME: Pro-racia    OTC rosacea cream.       warfarin (JANTOVEN) 5 MG tablet Take 5 mg by mouth daily       hydrocortisone (CORTAID) 1 % cream Apply topically daily as needed         ALLERGIES   No Active Allergies    PAST MEDICAL HISTORY:  Past Medical History:   Diagnosis Date     Basal cell carcinoma      BPH (benign prostatic hypertrophy)      Diverticulosis      Glaucoma      Hemorrhoids       HTN (hypertension)      Hyperlipidemia      Obesity      Persistent atrial fibrillation (H)      PVC (premature ventricular contraction)      Squamous cell carcinoma in situ of skin      Syncope 4/11/2016       PAST SURGICAL HISTORY:  Past Surgical History:   Procedure Laterality Date     BACK SURGERY       COLONOSCOPY  11/19/15    hx polyps     SURGICAL HISTORY OF -       Laminectomy     SURGICAL HISTORY OF -       biopsy of prostate     TONSILLECTOMY & ADENOIDECTOMY         FAMILY HISTORY:  Family History   Problem Relation Age of Onset     Cerebrovascular Disease Mother      Hypertension Mother      Cerebrovascular Disease Father      Myocardial Infarction Father      Hypertension Father      Hypertension Sister      Myocardial Infarction Sister        SOCIAL HISTORY:  Social History     Socioeconomic History     Marital status:      Spouse name: None     Number of children: None     Years of education: None     Highest education level: None   Occupational History     None   Social Needs     Financial resource strain: None     Food insecurity:     Worry: None     Inability: None     Transportation needs:     Medical: None     Non-medical: None   Tobacco Use     Smoking status: Former Smoker     Packs/day: 1.00     Years: 20.00     Pack years: 20.00     Types: Cigarettes     Smokeless tobacco: Never Used     Tobacco comment: quit age 54   Substance and Sexual Activity     Alcohol use: Yes     Alcohol/week: 0.0 oz     Comment: 1-2 drinks per week     Drug use: No     Sexual activity: Not Currently     Partners: Female   Lifestyle     Physical activity:     Days per week: None     Minutes per session: None     Stress: None   Relationships     Social connections:     Talks on phone: None     Gets together: None     Attends Faith service: None     Active member of club or organization: None     Attends meetings of clubs or organizations: None     Relationship status: None     Intimate partner violence:  "    Fear of current or ex partner: None     Emotionally abused: None     Physically abused: None     Forced sexual activity: None   Other Topics Concern      Service Not Asked     Blood Transfusions Not Asked     Caffeine Concern Yes     Comment: 2 cups coffee per day     Occupational Exposure Not Asked     Hobby Hazards Not Asked     Sleep Concern No     Stress Concern No     Weight Concern No     Special Diet Not Asked     Back Care Not Asked     Exercise No     Bike Helmet Not Asked     Seat Belt Not Asked     Self-Exams Not Asked     Parent/sibling w/ CABG, MI or angioplasty before 65F 55M? Not Asked   Social History Narrative     None       Review of Systems:  Skin:  Negative       Eyes:         ENT:  Positive for tinnitus    Respiratory:  Negative       Cardiovascular:  palpitations;chest pain;syncope or near-syncope;cyanosis;exercise intolerance;fatigue;lightheadedness;dizziness edema;Positive for slight swelling in right leg   Gastroenterology: Negative      Genitourinary:  Negative      Musculoskeletal:  Negative      Neurologic:  Negative      Psychiatric:  Negative      Heme/Lymph/Imm:  Negative      Endocrine:  Negative        Physical Exam:  Vitals: /76 (BP Location: Left arm, Patient Position: Sitting, Cuff Size: Adult Large)   Pulse 62   Ht 1.745 m (5' 8.7\")   Wt 89 kg (196 lb 3.2 oz)   SpO2 98%   BMI 29.23 kg/m      Constitutional:  in no acute distress        Skin:  warm and dry to the touch          Head:  normocephalic, no masses or lesions        Eyes:  sclera white        Lymph:      ENT:           Neck:  JVP normal        Respiratory:  clear to auscultation         Cardiac:   irregularly irregular rhythm     systolic ejection murmur;RUSB;grade 1;grade 2                                                 GI:           Extremities and Muscular Skeletal:  not assessed this visit   bilateral LE edema;1+          Neurological:  no gross motor deficits        Psych:  Alert and " Oriented x 3        CC  Maribeth Haney MD  [address] [place] [address]

## 2019-05-23 DIAGNOSIS — I48.20 CHRONIC A-FIB (H): Primary | ICD-10-CM

## 2019-06-05 ENCOUNTER — ANTICOAGULATION THERAPY VISIT (OUTPATIENT)
Dept: NURSING | Facility: CLINIC | Age: 79
End: 2019-06-05
Payer: MEDICARE

## 2019-06-05 DIAGNOSIS — I48.20 CHRONIC ATRIAL FIBRILLATION (H): ICD-10-CM

## 2019-06-05 DIAGNOSIS — I50.33 ACUTE ON CHRONIC DIASTOLIC HEART FAILURE (H): ICD-10-CM

## 2019-06-05 DIAGNOSIS — Z79.01 LONG TERM CURRENT USE OF ANTICOAGULANTS WITH INR GOAL OF 2.0-3.0: ICD-10-CM

## 2019-06-05 DIAGNOSIS — I48.0 PAROXYSMAL ATRIAL FIBRILLATION (H): ICD-10-CM

## 2019-06-05 LAB
ANION GAP SERPL CALCULATED.3IONS-SCNC: 11 MMOL/L (ref 3–14)
BUN SERPL-MCNC: 16 MG/DL (ref 7–30)
CALCIUM SERPL-MCNC: 8.7 MG/DL (ref 8.5–10.1)
CHLORIDE SERPL-SCNC: 109 MMOL/L (ref 94–109)
CO2 SERPL-SCNC: 23 MMOL/L (ref 20–32)
CREAT SERPL-MCNC: 1.02 MG/DL (ref 0.66–1.25)
GFR SERPL CREATININE-BSD FRML MDRD: 69 ML/MIN/{1.73_M2}
GLUCOSE SERPL-MCNC: 102 MG/DL (ref 70–99)
INR POINT OF CARE: 3 (ref 0.86–1.14)
NT-PROBNP SERPL-MCNC: 1505 PG/ML (ref 0–450)
POTASSIUM SERPL-SCNC: 4.3 MMOL/L (ref 3.4–5.3)
SODIUM SERPL-SCNC: 143 MMOL/L (ref 133–144)

## 2019-06-05 PROCEDURE — 99207 ZZC NO CHARGE NURSE ONLY: CPT

## 2019-06-05 PROCEDURE — 83880 ASSAY OF NATRIURETIC PEPTIDE: CPT | Performed by: INTERNAL MEDICINE

## 2019-06-05 PROCEDURE — 36416 COLLJ CAPILLARY BLOOD SPEC: CPT

## 2019-06-05 PROCEDURE — 85610 PROTHROMBIN TIME: CPT | Mod: QW

## 2019-06-05 PROCEDURE — 80048 BASIC METABOLIC PNL TOTAL CA: CPT | Performed by: INTERNAL MEDICINE

## 2019-06-05 RX ORDER — WARFARIN SODIUM 5 MG/1
5 TABLET ORAL DAILY
Qty: 90 TABLET | Refills: 1 | Status: SHIPPED | OUTPATIENT
Start: 2019-06-05 | End: 2020-01-20

## 2019-06-05 NOTE — PROGRESS NOTES
ANTICOAGULATION FOLLOW-UP CLINIC VISIT    Patient Name:  Aniket Macias  Date:  6/5/2019  Contact Type:  Face to Face    SUBJECTIVE:  Patient Findings     Comments:   The patient was assessed for diet, medication, and activity level changes, missed or extra doses, bruising or bleeding, with no problem findings.          Clinical Outcomes     Negatives:   Major bleeding event, Thromboembolic event, Anticoagulation-related hospital admission, Anticoagulation-related ED visit, Anticoagulation-related fatality    Comments:   The patient was assessed for diet, medication, and activity level changes, missed or extra doses, bruising or bleeding, with no problem findings.             OBJECTIVE    INR Protime   Date Value Ref Range Status   06/05/2019 3.0 (A) 0.86 - 1.14 Final       ASSESSMENT / PLAN  INR assessment THER    Recheck INR In: 5 WEEKS    INR Location Clinic      Anticoagulation Summary  As of 6/5/2019    INR goal:   2.0-3.0   TTR:   71.9 % (3 y)   INR used for dosing:   3.0 (6/5/2019)   Warfarin maintenance plan:   2.5 mg (5 mg x 0.5) every Tue, Fri; 5 mg (5 mg x 1) all other days   Full warfarin instructions:   2.5 mg every Tue, Fri; 5 mg all other days   Weekly warfarin total:   30 mg   No change documented:   Radha Seymour RN   Plan last modified:   Shazia Marquez RN (4/30/2019)   Next INR check:   7/10/2019   Target end date:   Indefinite    Indications    Paroxysmal atrial fibrillation [I48.0]  Long term current use of anticoagulants with INR goal of 2.0-3.0 [Z79.01]             Anticoagulation Episode Summary     INR check location:       Preferred lab:       Send INR reminders to:   [facility] HEART INR NURSE    Comments:         Anticoagulation Care Providers     Provider Role Specialty Phone number    Sebastián Bermudez MD Responsible Cardiology [phone]            See the Encounter Report to view Anticoagulation Flowsheet and Dosing Calendar (Go to Encounters tab in chart review, and  find the Anticoagulation Therapy Visit)    Patient INR is therapeutic.  Patient will continue to take 30 mg weekly dosing and follow up in 5 weeks or sooner for any problems or concerns.        Radha Seymour RN

## 2019-06-06 ENCOUNTER — HOSPITAL ENCOUNTER (OUTPATIENT)
Dept: CARDIOLOGY | Facility: CLINIC | Age: 79
Discharge: HOME | End: 2019-06-06
Attending: INTERNAL MEDICINE | Admitting: INTERNAL MEDICINE
Payer: MEDICARE

## 2019-06-06 DIAGNOSIS — I50.33 ACUTE ON CHRONIC DIASTOLIC HEART FAILURE (H): ICD-10-CM

## 2019-06-06 DIAGNOSIS — I48.20 CHRONIC A-FIB (H): ICD-10-CM

## 2019-06-06 PROCEDURE — 93306 TTE W/DOPPLER COMPLETE: CPT

## 2019-06-06 PROCEDURE — 93227 XTRNL ECG REC<48 HR R&I: CPT | Performed by: INTERNAL MEDICINE

## 2019-06-06 PROCEDURE — 93225 XTRNL ECG REC<48 HRS REC: CPT

## 2019-06-06 PROCEDURE — 93306 TTE W/DOPPLER COMPLETE: CPT | Mod: 26 | Performed by: INTERNAL MEDICINE

## 2019-06-19 ENCOUNTER — OFFICE VISIT (OUTPATIENT)
Dept: CARDIOLOGY | Facility: CLINIC | Age: 79
End: 2019-06-19
Attending: INTERNAL MEDICINE
Payer: MEDICARE

## 2019-06-19 VITALS
BODY MASS INDEX: 27.91 KG/M2 | SYSTOLIC BLOOD PRESSURE: 139 MMHG | HEART RATE: 71 BPM | DIASTOLIC BLOOD PRESSURE: 84 MMHG | HEIGHT: 69 IN | WEIGHT: 188.4 LBS | OXYGEN SATURATION: 96 %

## 2019-06-19 DIAGNOSIS — I50.32 CHRONIC DIASTOLIC HEART FAILURE (H): ICD-10-CM

## 2019-06-19 DIAGNOSIS — I87.2 VENOUS (PERIPHERAL) INSUFFICIENCY: ICD-10-CM

## 2019-06-19 DIAGNOSIS — I48.20 CHRONIC ATRIAL FIBRILLATION (H): ICD-10-CM

## 2019-06-19 DIAGNOSIS — I10 BENIGN ESSENTIAL HYPERTENSION: Chronic | ICD-10-CM

## 2019-06-19 DIAGNOSIS — I47.29 NON-SUSTAINED VENTRICULAR TACHYCARDIA (H): Primary | ICD-10-CM

## 2019-06-19 DIAGNOSIS — E78.5 HYPERLIPIDEMIA, UNSPECIFIED HYPERLIPIDEMIA TYPE: Chronic | ICD-10-CM

## 2019-06-19 PROCEDURE — 99214 OFFICE O/P EST MOD 30 MIN: CPT | Performed by: INTERNAL MEDICINE

## 2019-06-19 PROCEDURE — 93000 ELECTROCARDIOGRAM COMPLETE: CPT | Performed by: INTERNAL MEDICINE

## 2019-06-19 RX ORDER — FUROSEMIDE 20 MG
40 TABLET ORAL DAILY
Qty: 90 TABLET | Refills: 3 | Status: SHIPPED | OUTPATIENT
Start: 2019-06-19 | End: 2020-01-27

## 2019-06-19 ASSESSMENT — MIFFLIN-ST. JEOR: SCORE: 1550.19

## 2019-06-19 NOTE — PROGRESS NOTES
HPI and Plan:    Mr. Macias is a pleasant 77-year-old gentleman with history of hypertension, skin cancer and permanent atrial fibrillation, who is here for routine followup.       Today, we discussed the Holter results which showed adequate rate control with the longest pause of 3.18 seconds.  There was an 8 beat run of nonsustained ventricular tachycardia but he was asymptomatic.    Echo revealed normal ejection fraction but with severe atrial enlargement.    He continues to have leg edema despite Lasix 20 mg daily.  He did not lose much weight.  His gait is normal creatinine is normal.    N-terminal proBNP was 1505 which is expected for his age range.          ASSESSMENT AND PLAN:   1.  Chronic/permanent atrial fibrillation.   Continue Coumadin for stroke prophylaxis given his chads vascular score is at least 3.  Continue rate control approach.  To metoprolol the same dose.  He will need a repeat Holter in a year to 2 years.  We will have to continue to monitor for any significant pauses.     2.  Edema, possible chronic diastolic heart failure and venous insufficiency  Will increase Lasix to 40 mg/day.  Continue eating 1 banana a day to replenish potassium.  Check BMP in a month.  We will also do venous ultrasound to assess for competency.  I also advised compression stockings.  Salt restriction was discussed.     3.  Hypertension  Continue lisinopril.     4.  Benign prostate hypertrophy, on terazosin.     5.  Hyperlipidemia, continue atorvastatin.    6.  Nonsustained ventricular tachycardia  Asymptomatic.  However, we should exclude CAD.  Since he is not able to exercise much, we will do a stress MRI to rule out ischemia.    He will return to see me in follow-up after the above tests.  EKG today reviewed with him and reveals atrial fibrillation with controlled rate     Thank you for allowing us to participate in the care of this nice patient.  I will see him back in follow-up next month after the echo and  Holter.     Sincerely,     Marlo Joseph MD    Orders Placed This Encounter   Procedures     EKG 12-lead complete w/read - Clinics (performed today)       No orders of the defined types were placed in this encounter.      There are no discontinued medications.      Encounter Diagnoses   Name Primary?     Hyperlipidemia, unspecified hyperlipidemia type Yes     Paroxysmal atrial fibrillation      Benign essential hypertension; goal < 140/90      Chronic atrial fibrillation (H)        CURRENT MEDICATIONS:  Current Outpatient Medications   Medication Sig Dispense Refill     atorvastatin (LIPITOR) 40 MG tablet Take 1 tablet (40 mg) by mouth daily 90 tablet 3     furosemide (LASIX) 20 MG tablet Take 1 tablet (20 mg) by mouth daily 30 tablet 11     latanoprost (XALATAN) 0.005 % ophthalmic solution Place 1 drop into both eyes At Bedtime       lisinopril (PRINIVIL/ZESTRIL) 40 MG tablet Take 1 tablet (40 mg) by mouth daily 90 tablet 3     metoprolol tartrate (LOPRESSOR) 50 MG tablet Take 1 tablet (50 mg) by mouth 2 times daily 180 tablet 3     Multiple Vitamins-Minerals (MULTIVITAMIN PO) Take 1 tablet by mouth daily        terazosin (HYTRIN) 5 MG capsule Take 1 capsule (5 mg) by mouth At Bedtime 90 capsule 3     UNABLE TO FIND MEDICATION NAME: Pro-racia    OTC rosacea cream.       warfarin (JANTOVEN) 5 MG tablet Take 1 tablet (5 mg) by mouth daily 90 tablet 1     ACETAMINOPHEN PO Take 1,000 mg by mouth every 6 hours as needed for pain       hydrocortisone (CORTAID) 1 % cream Apply topically daily as needed         ALLERGIES   No Known Allergies    PAST MEDICAL HISTORY:  Past Medical History:   Diagnosis Date     Basal cell carcinoma      BPH (benign prostatic hypertrophy)      Diverticulosis      Glaucoma      Hemorrhoids      HTN (hypertension)      Hyperlipidemia      Obesity      Persistent atrial fibrillation (H)      PVC (premature ventricular contraction)      Squamous cell carcinoma in situ of skin      Syncope  4/11/2016       PAST SURGICAL HISTORY:  Past Surgical History:   Procedure Laterality Date     BACK SURGERY       COLONOSCOPY  11/19/15    hx polyps     SURGICAL HISTORY OF -       Laminectomy     SURGICAL HISTORY OF -       biopsy of prostate     TONSILLECTOMY & ADENOIDECTOMY         FAMILY HISTORY:  Family History   Problem Relation Age of Onset     Cerebrovascular Disease Mother      Hypertension Mother      Cerebrovascular Disease Father      Myocardial Infarction Father      Hypertension Father      Hypertension Sister      Myocardial Infarction Sister        SOCIAL HISTORY:  Social History     Socioeconomic History     Marital status:      Spouse name: None     Number of children: None     Years of education: None     Highest education level: None   Occupational History     None   Social Needs     Financial resource strain: None     Food insecurity:     Worry: None     Inability: None     Transportation needs:     Medical: None     Non-medical: None   Tobacco Use     Smoking status: Former Smoker     Packs/day: 1.00     Years: 20.00     Pack years: 20.00     Types: Cigarettes     Smokeless tobacco: Never Used     Tobacco comment: quit age 54   Substance and Sexual Activity     Alcohol use: Yes     Alcohol/week: 0.0 oz     Comment: 1-2 drinks per week     Drug use: No     Sexual activity: Not Currently     Partners: Female   Lifestyle     Physical activity:     Days per week: None     Minutes per session: None     Stress: None   Relationships     Social connections:     Talks on phone: None     Gets together: None     Attends Baptism service: None     Active member of club or organization: None     Attends meetings of clubs or organizations: None     Relationship status: None     Intimate partner violence:     Fear of current or ex partner: None     Emotionally abused: None     Physically abused: None     Forced sexual activity: None   Other Topics Concern      Service Not Asked     Blood  "Transfusions Not Asked     Caffeine Concern Yes     Comment: 2 cups coffee per day     Occupational Exposure Not Asked     Hobby Hazards Not Asked     Sleep Concern No     Stress Concern No     Weight Concern No     Special Diet Not Asked     Back Care Not Asked     Exercise No     Bike Helmet Not Asked     Seat Belt Not Asked     Self-Exams Not Asked     Parent/sibling w/ CABG, MI or angioplasty before 65F 55M? Not Asked   Social History Narrative     None       Review of Systems:  Skin:  Negative       Eyes:  Negative      ENT:  Positive for tinnitus    Respiratory:  Negative       Cardiovascular:  chest pain;syncope or near-syncope;cyanosis;exercise intolerance;fatigue;lightheadedness;dizziness palpitations;Positive for;edema    Gastroenterology: Negative      Genitourinary:  Positive for prostate problem    Musculoskeletal:  Positive for arthritis    Neurologic:  Negative      Psychiatric:  Negative      Heme/Lymph/Imm:  Positive for easy bruising    Endocrine:  Negative        Physical Exam:  Vitals: /84 (BP Location: Left arm, Patient Position: Sitting, Cuff Size: Adult Regular)   Pulse 71   Ht 1.745 m (5' 8.7\")   Wt 85.5 kg (188 lb 6.4 oz)   SpO2 96%   BMI 28.07 kg/m      Constitutional:  in no acute distress        Skin:  warm and dry to the touch          Head:  normocephalic, no masses or lesions        Eyes:  sclera white        Lymph:      ENT:           Neck:  JVP normal        Respiratory:  clear to auscultation         Cardiac:   irregularly irregular rhythm     systolic ejection murmur;RUSB;grade 1;grade 2                                                 GI:  not assessed this visit        Extremities and Muscular Skeletal:  not assessed this visit   bilateral LE edema;1+          Neurological:  no gross motor deficits        Psych:  Alert and Oriented x 3        CC  Marlo Joseph MD  [id number]  [id number]  [place] [id number]              "

## 2019-06-19 NOTE — LETTER
6/19/2019    Maribeth Haney MD  [address] [place] [id number]    RE: Aniket Macias       Dear Colleague,    I had the pleasure of seeing Aniket Macias in the [facility].    HPI and Plan:    Mr. Macias is a pleasant 77-year-old gentleman with history of hypertension, skin cancer and permanent atrial fibrillation, who is here for routine followup.       Today, we discussed the Holter results which showed adequate rate control with the longest pause of 3.18 seconds.  There was an 8 beat run of nonsustained ventricular tachycardia but he was asymptomatic.    Echo revealed normal ejection fraction but with severe atrial enlargement.    He continues to have leg edema despite Lasix 20 mg daily.  He did not lose much weight.  His gait is normal creatinine is normal.    N-terminal proBNP was 1505 which is expected for his age range.          ASSESSMENT AND PLAN:   1.  Chronic/permanent atrial fibrillation.   Continue Coumadin for stroke prophylaxis given his chads vascular score is at least 3.  Continue rate control approach.  To metoprolol the same dose.  He will need a repeat Holter in a year to 2 years.  We will have to continue to monitor for any significant pauses.     2.  Edema, possible chronic diastolic heart failure and venous insufficiency  Will increase Lasix to 40 mg/day.  Continue eating 1 banana a day to replenish potassium.  Check BMP in a month.  We will also do venous ultrasound to assess for competency.  I also advised compression stockings.  Salt restriction was discussed.     3.  Hypertension  Continue lisinopril.     4.  Benign prostate hypertrophy, on terazosin.     5.  Hyperlipidemia, continue atorvastatin.    6.  Nonsustained ventricular tachycardia  Asymptomatic.  However, we should exclude CAD.  Since he is not able to exercise much, we will do a stress MRI to rule out ischemia.    He will return to see me in follow-up after the above tests.  EKG  today reviewed with him and reveals atrial fibrillation with controlled rate     Thank you for allowing us to participate in the care of this nice patient.  I will see him back in follow-up next month after the echo and Holter.     Sincerely,     Marlo Joseph MD    Orders Placed This Encounter   Procedures     EKG 12-lead complete w/read - Clinics (performed today)       No orders of the defined types were placed in this encounter.      There are no discontinued medications.      Encounter Diagnoses   Name Primary?     Hyperlipidemia, unspecified hyperlipidemia type Yes     Paroxysmal atrial fibrillation      Benign essential hypertension; goal < 140/90      Chronic atrial fibrillation (H)        CURRENT MEDICATIONS:  Current Outpatient Medications   Medication Sig Dispense Refill     atorvastatin (LIPITOR) 40 MG tablet Take 1 tablet (40 mg) by mouth daily 90 tablet 3     furosemide (LASIX) 20 MG tablet Take 1 tablet (20 mg) by mouth daily 30 tablet 11     latanoprost (XALATAN) 0.005 % ophthalmic solution Place 1 drop into both eyes At Bedtime       lisinopril (PRINIVIL/ZESTRIL) 40 MG tablet Take 1 tablet (40 mg) by mouth daily 90 tablet 3     metoprolol tartrate (LOPRESSOR) 50 MG tablet Take 1 tablet (50 mg) by mouth 2 times daily 180 tablet 3     Multiple Vitamins-Minerals (MULTIVITAMIN PO) Take 1 tablet by mouth daily        terazosin (HYTRIN) 5 MG capsule Take 1 capsule (5 mg) by mouth At Bedtime 90 capsule 3     UNABLE TO FIND MEDICATION NAME: Pro-racia    OTC rosacea cream.       warfarin (JANTOVEN) 5 MG tablet Take 1 tablet (5 mg) by mouth daily 90 tablet 1     ACETAMINOPHEN PO Take 1,000 mg by mouth every 6 hours as needed for pain       hydrocortisone (CORTAID) 1 % cream Apply topically daily as needed         ALLERGIES   No Known Allergies    PAST MEDICAL HISTORY:  Past Medical History:   Diagnosis Date     Basal cell carcinoma      BPH (benign prostatic hypertrophy)      Diverticulosis      Glaucoma       Hemorrhoids      HTN (hypertension)      Hyperlipidemia      Obesity      Persistent atrial fibrillation (H)      PVC (premature ventricular contraction)      Squamous cell carcinoma in situ of skin      Syncope 4/11/2016       PAST SURGICAL HISTORY:  Past Surgical History:   Procedure Laterality Date     BACK SURGERY       COLONOSCOPY  11/19/15    hx polyps     SURGICAL HISTORY OF -       Laminectomy     SURGICAL HISTORY OF -       biopsy of prostate     TONSILLECTOMY & ADENOIDECTOMY         FAMILY HISTORY:  Family History   Problem Relation Age of Onset     Cerebrovascular Disease Mother      Hypertension Mother      Cerebrovascular Disease Father      Myocardial Infarction Father      Hypertension Father      Hypertension Sister      Myocardial Infarction Sister        SOCIAL HISTORY:  Social History     Socioeconomic History     Marital status:      Spouse name: None     Number of children: None     Years of education: None     Highest education level: None   Occupational History     None   Social Needs     Financial resource strain: None     Food insecurity:     Worry: None     Inability: None     Transportation needs:     Medical: None     Non-medical: None   Tobacco Use     Smoking status: Former Smoker     Packs/day: 1.00     Years: 20.00     Pack years: 20.00     Types: Cigarettes     Smokeless tobacco: Never Used     Tobacco comment: quit age 54   Substance and Sexual Activity     Alcohol use: Yes     Alcohol/week: 0.0 oz     Comment: 1-2 drinks per week     Drug use: No     Sexual activity: Not Currently     Partners: Female   Lifestyle     Physical activity:     Days per week: None     Minutes per session: None     Stress: None   Relationships     Social connections:     Talks on phone: None     Gets together: None     Attends Orthodox service: None     Active member of club or organization: None     Attends meetings of clubs or organizations: None     Relationship status: None     Intimate  "partner violence:     Fear of current or ex partner: None     Emotionally abused: None     Physically abused: None     Forced sexual activity: None   Other Topics Concern      Service Not Asked     Blood Transfusions Not Asked     Caffeine Concern Yes     Comment: 2 cups coffee per day     Occupational Exposure Not Asked     Hobby Hazards Not Asked     Sleep Concern No     Stress Concern No     Weight Concern No     Special Diet Not Asked     Back Care Not Asked     Exercise No     Bike Helmet Not Asked     Seat Belt Not Asked     Self-Exams Not Asked     Parent/sibling w/ CABG, MI or angioplasty before 65F 55M? Not Asked   Social History Narrative     None       Review of Systems:  Skin:  Negative       Eyes:  Negative      ENT:  Positive for tinnitus    Respiratory:  Negative       Cardiovascular:  chest pain;syncope or near-syncope;cyanosis;exercise intolerance;fatigue;lightheadedness;dizziness palpitations;Positive for;edema    Gastroenterology: Negative      Genitourinary:  Positive for prostate problem    Musculoskeletal:  Positive for arthritis    Neurologic:  Negative      Psychiatric:  Negative      Heme/Lymph/Imm:  Positive for easy bruising    Endocrine:  Negative        Physical Exam:  Vitals: /84 (BP Location: Left arm, Patient Position: Sitting, Cuff Size: Adult Regular)   Pulse 71   Ht 1.745 m (5' 8.7\")   Wt 85.5 kg (188 lb 6.4 oz)   SpO2 96%   BMI 28.07 kg/m       Constitutional:  in no acute distress        Skin:  warm and dry to the touch          Head:  normocephalic, no masses or lesions        Eyes:  sclera white        Lymph:      ENT:           Neck:  JVP normal        Respiratory:  clear to auscultation         Cardiac:   irregularly irregular rhythm     systolic ejection murmur;RUSB;grade 1;grade 2                                                 GI:  not assessed this visit        Extremities and Muscular Skeletal:  not assessed this visit   bilateral LE edema;1+      "     Neurological:  no gross motor deficits        Psych:  Alert and Oriented x 3          Thank you for allowing me to participate in the care of your patient.    Sincerely,     Marlo Joseph MD     [facility]

## 2019-06-27 ENCOUNTER — ANCILLARY PROCEDURE (OUTPATIENT)
Dept: VASCULAR ULTRASOUND | Facility: CLINIC | Age: 79
End: 2019-06-27
Attending: INTERNAL MEDICINE
Payer: MEDICARE

## 2019-06-27 DIAGNOSIS — I87.2 VENOUS (PERIPHERAL) INSUFFICIENCY: ICD-10-CM

## 2019-06-27 PROCEDURE — 93970 EXTREMITY STUDY: CPT | Performed by: INTERNAL MEDICINE

## 2019-06-28 ENCOUNTER — TELEPHONE (OUTPATIENT)
Dept: FAMILY MEDICINE | Facility: CLINIC | Age: 79
End: 2019-06-28

## 2019-06-28 DIAGNOSIS — I50.32 CHRONIC DIASTOLIC HEART FAILURE (H): Primary | ICD-10-CM

## 2019-06-28 RX ORDER — FUROSEMIDE 40 MG
40 TABLET ORAL DAILY
Qty: 90 TABLET | Refills: 3 | Status: SHIPPED | OUTPATIENT
Start: 2019-06-28 | End: 2020-07-30

## 2019-06-28 NOTE — TELEPHONE ENCOUNTER
Note from pharmacy stating pt is looking for a 90 day supply of the 40mg Furosemide.  Last rx was 6/19/19 20mg 2 tabs daily #90

## 2019-07-10 ENCOUNTER — ANTICOAGULATION THERAPY VISIT (OUTPATIENT)
Dept: NURSING | Facility: CLINIC | Age: 79
End: 2019-07-10
Payer: MEDICARE

## 2019-07-10 ENCOUNTER — HOSPITAL ENCOUNTER (OUTPATIENT)
Dept: CARDIOLOGY | Facility: CLINIC | Age: 79
Discharge: HOME | End: 2019-07-10
Attending: INTERNAL MEDICINE | Admitting: INTERNAL MEDICINE
Payer: MEDICARE

## 2019-07-10 VITALS — HEART RATE: 68 BPM | DIASTOLIC BLOOD PRESSURE: 69 MMHG | SYSTOLIC BLOOD PRESSURE: 130 MMHG | OXYGEN SATURATION: 97 %

## 2019-07-10 DIAGNOSIS — I47.29 NON-SUSTAINED VENTRICULAR TACHYCARDIA (H): ICD-10-CM

## 2019-07-10 DIAGNOSIS — Z79.01 LONG TERM CURRENT USE OF ANTICOAGULANTS WITH INR GOAL OF 2.0-3.0: ICD-10-CM

## 2019-07-10 LAB — INR POINT OF CARE: 3.1 (ref 0.86–1.14)

## 2019-07-10 PROCEDURE — 93017 CV STRESS TEST TRACING ONLY: CPT

## 2019-07-10 PROCEDURE — A9585 GADOBUTROL INJECTION: HCPCS | Performed by: INTERNAL MEDICINE

## 2019-07-10 PROCEDURE — 93016 CV STRESS TEST SUPVJ ONLY: CPT | Performed by: INTERNAL MEDICINE

## 2019-07-10 PROCEDURE — 93018 CV STRESS TEST I&R ONLY: CPT | Performed by: INTERNAL MEDICINE

## 2019-07-10 PROCEDURE — 25000128 H RX IP 250 OP 636: Performed by: INTERNAL MEDICINE

## 2019-07-10 PROCEDURE — 25500064 ZZH RX 255 OP 636: Performed by: INTERNAL MEDICINE

## 2019-07-10 PROCEDURE — 99207 ZZC NO CHARGE NURSE ONLY: CPT

## 2019-07-10 PROCEDURE — 85610 PROTHROMBIN TIME: CPT | Mod: QW

## 2019-07-10 PROCEDURE — 36416 COLLJ CAPILLARY BLOOD SPEC: CPT

## 2019-07-10 PROCEDURE — 75563 CARD MRI W/STRESS IMG & DYE: CPT | Mod: 26 | Performed by: INTERNAL MEDICINE

## 2019-07-10 RX ORDER — AMINOPHYLLINE 25 MG/ML
100 INJECTION, SOLUTION INTRAVENOUS ONCE
Status: DISCONTINUED | OUTPATIENT
Start: 2019-07-10 | End: 2019-07-11 | Stop reason: HOSPADM

## 2019-07-10 RX ORDER — DIPHENHYDRAMINE HYDROCHLORIDE 50 MG/ML
25-50 INJECTION INTRAMUSCULAR; INTRAVENOUS
Status: DISCONTINUED | OUTPATIENT
Start: 2019-07-10 | End: 2019-07-11 | Stop reason: HOSPADM

## 2019-07-10 RX ORDER — REGADENOSON 0.08 MG/ML
0.4 INJECTION, SOLUTION INTRAVENOUS ONCE
Status: COMPLETED | OUTPATIENT
Start: 2019-07-10 | End: 2019-07-10

## 2019-07-10 RX ORDER — ALBUTEROL SULFATE 90 UG/1
2 AEROSOL, METERED RESPIRATORY (INHALATION) EVERY 5 MIN PRN
Status: DISCONTINUED | OUTPATIENT
Start: 2019-07-10 | End: 2019-07-11 | Stop reason: HOSPADM

## 2019-07-10 RX ORDER — GADOBUTROL 604.72 MG/ML
5-65 INJECTION INTRAVENOUS ONCE
Status: COMPLETED | OUTPATIENT
Start: 2019-07-10 | End: 2019-07-10

## 2019-07-10 RX ORDER — METHYLPREDNISOLONE SODIUM SUCCINATE 125 MG/2ML
125 INJECTION, POWDER, LYOPHILIZED, FOR SOLUTION INTRAMUSCULAR; INTRAVENOUS
Status: DISCONTINUED | OUTPATIENT
Start: 2019-07-10 | End: 2019-07-11 | Stop reason: HOSPADM

## 2019-07-10 RX ORDER — ACYCLOVIR 200 MG/1
0-1 CAPSULE ORAL
Status: DISCONTINUED | OUTPATIENT
Start: 2019-07-10 | End: 2019-07-11 | Stop reason: HOSPADM

## 2019-07-10 RX ORDER — CAFFEINE CITRATE 20 MG/ML
60 SOLUTION INTRAVENOUS
Status: DISCONTINUED | OUTPATIENT
Start: 2019-07-10 | End: 2019-07-11 | Stop reason: HOSPADM

## 2019-07-10 RX ORDER — CAFFEINE 200 MG
200 TABLET ORAL
Status: DISCONTINUED | OUTPATIENT
Start: 2019-07-10 | End: 2019-07-11 | Stop reason: HOSPADM

## 2019-07-10 RX ORDER — ONDANSETRON 2 MG/ML
4 INJECTION INTRAMUSCULAR; INTRAVENOUS
Status: DISCONTINUED | OUTPATIENT
Start: 2019-07-10 | End: 2019-07-11 | Stop reason: HOSPADM

## 2019-07-10 RX ORDER — DIPHENHYDRAMINE HCL 25 MG
25 CAPSULE ORAL
Status: DISCONTINUED | OUTPATIENT
Start: 2019-07-10 | End: 2019-07-11 | Stop reason: HOSPADM

## 2019-07-10 RX ORDER — DIAZEPAM 5 MG
5 TABLET ORAL EVERY 30 MIN PRN
Status: DISCONTINUED | OUTPATIENT
Start: 2019-07-10 | End: 2019-07-11 | Stop reason: HOSPADM

## 2019-07-10 RX ADMIN — REGADENOSON 0.4 MG: 0.08 INJECTION, SOLUTION INTRAVENOUS at 11:52

## 2019-07-10 RX ADMIN — GADOBUTROL 22 ML: 604.72 INJECTION INTRAVENOUS at 12:29

## 2019-07-10 NOTE — PROGRESS NOTES
ANTICOAGULATION FOLLOW-UP CLINIC VISIT    Patient Name:  Aniket Macias  Date:  7/10/2019  Contact Type:  Face to Face    SUBJECTIVE:  Patient Findings     Positives:   Change in diet/appetite (not eating as many greens)        Clinical Outcomes     Negatives:   Major bleeding event, Thromboembolic event, Anticoagulation-related hospital admission, Anticoagulation-related ED visit, Anticoagulation-related fatality           OBJECTIVE    INR Protime   Date Value Ref Range Status   07/10/2019 3.1 (A) 0.86 - 1.14 Final       ASSESSMENT / PLAN  INR assessment SUPRA    Recheck INR In: 2 WEEKS    INR Location Clinic      Anticoagulation Summary  As of 7/10/2019    INR goal:   2.0-3.0   TTR:   69.7 % (3.1 y)   INR used for dosing:   3.1! (7/10/2019)   Warfarin maintenance plan:   2.5 mg (5 mg x 0.5) every Tue, Fri; 5 mg (5 mg x 1) all other days   Full warfarin instructions:   7/10: Hold; Otherwise 2.5 mg every Tue, Fri; 5 mg all other days   Weekly warfarin total:   30 mg   Plan last modified:   Shazia Marquez RN (4/30/2019)   Next INR check:   7/29/2019   Target end date:   Indefinite    Indications    Paroxysmal atrial fibrillation (Resolved) [I48.0]  Long term current use of anticoagulants with INR goal of 2.0-3.0 [Z79.01]             Anticoagulation Episode Summary     INR check location:       Preferred lab:       Send INR reminders to:   [facility]    Comments:         Anticoagulation Care Providers     Provider Role Specialty Phone number    Sebastián Bermudez MD [facility] Cardiology [phone]            See the Encounter Report to view Anticoagulation Flowsheet and Dosing Calendar (Go to Encounters tab in chart review, and find the Anticoagulation Therapy Visit)    Patient INR is supra therapeutic today.  Patient will adjust dosing today by holding today and then resume maintenance dosing of 30 mg and follow up in 2 weeks or sooner if there are any concerns or problems.    Signs of bleeding  and when appropriate to seek care for symptoms reviewed.    Adjustment Rational: 16.7%  Dosage adjustment made based on physician directed care plan.      Susannah Hines RN

## 2019-07-11 ENCOUNTER — TELEPHONE (OUTPATIENT)
Dept: CARDIOLOGY | Facility: CLINIC | Age: 79
End: 2019-07-11

## 2019-07-11 NOTE — PROGRESS NOTES
Marlo Joseph MD Gaustad, Kristine             Stress mri protocol    Contrast administered per weight based protocol.

## 2019-07-11 NOTE — TELEPHONE ENCOUNTER
RN called patient with cMRI and venous comp results. Patient verbalized understanding and had no questions at this time. Patient requested RN bethhart him the information for scheduling as he is currently at at meeting and unable to schedule apt. RN will send patient Kwicrhart message with info.           ----- Message from Marlo Joseph MD sent at 7/10/2019  1:15 PM CDT -----  Call pt w results - no ischemia  Venous doppler reveals significant venous incompetence, Recommend follow-up with Dr De Souza or trinh ALFONSO in Vascular to discuss next steps.

## 2019-07-11 NOTE — TELEPHONE ENCOUNTER
----- Message from Marlo Joseph MD sent at 7/10/2019  1:15 PM CDT -----  Call pt w results - no ischemia  Venous doppler reveals significant venous incompetence, Recommend follow-up with Dr De Souza or trinh ALFONSO in Vascular to discuss next steps.

## 2019-07-29 ENCOUNTER — ANTICOAGULATION THERAPY VISIT (OUTPATIENT)
Dept: NURSING | Facility: CLINIC | Age: 79
End: 2019-07-29
Payer: MEDICARE

## 2019-07-29 DIAGNOSIS — I48.20 CHRONIC ATRIAL FIBRILLATION (H): ICD-10-CM

## 2019-07-29 DIAGNOSIS — Z79.01 LONG TERM CURRENT USE OF ANTICOAGULANTS WITH INR GOAL OF 2.0-3.0: ICD-10-CM

## 2019-07-29 LAB
ANION GAP SERPL CALCULATED.3IONS-SCNC: 4 MMOL/L (ref 3–14)
BUN SERPL-MCNC: 20 MG/DL (ref 7–30)
CALCIUM SERPL-MCNC: 8.7 MG/DL (ref 8.5–10.1)
CHLORIDE SERPL-SCNC: 108 MMOL/L (ref 94–109)
CO2 SERPL-SCNC: 29 MMOL/L (ref 20–32)
CREAT SERPL-MCNC: 1.1 MG/DL (ref 0.66–1.25)
GFR SERPL CREATININE-BSD FRML MDRD: 63 ML/MIN/{1.73_M2}
GLUCOSE SERPL-MCNC: 100 MG/DL (ref 70–99)
INR POINT OF CARE: 2.8 (ref 0.86–1.14)
POTASSIUM SERPL-SCNC: 4.3 MMOL/L (ref 3.4–5.3)
SODIUM SERPL-SCNC: 141 MMOL/L (ref 133–144)

## 2019-07-29 PROCEDURE — 85610 PROTHROMBIN TIME: CPT | Mod: QW

## 2019-07-29 PROCEDURE — 99207 ZZC NO CHARGE NURSE ONLY: CPT

## 2019-07-29 PROCEDURE — 80048 BASIC METABOLIC PNL TOTAL CA: CPT | Performed by: INTERNAL MEDICINE

## 2019-07-29 PROCEDURE — 36416 COLLJ CAPILLARY BLOOD SPEC: CPT

## 2019-07-29 NOTE — PROGRESS NOTES
ANTICOAGULATION FOLLOW-UP CLINIC VISIT    Patient Name:  Aniket Macias  Date:  2019  Contact Type:  Face to Face    SUBJECTIVE:  Patient Findings     Comments:   The patient was assessed for diet, medication, and activity level changes, missed or extra doses, bruising or bleeding, with no problem findings.          Clinical Outcomes     Negatives:   Major bleeding event, Thromboembolic event, Anticoagulation-related hospital admission, Anticoagulation-related ED visit, Anticoagulation-related fatality    Comments:   The patient was assessed for diet, medication, and activity level changes, missed or extra doses, bruising or bleeding, with no problem findings.             OBJECTIVE    INR Protime   Date Value Ref Range Status   2019 2.8 (A) 0.86 - 1.14 Final       ASSESSMENT / PLAN  INR assessment THER    Recheck INR In: 5 WEEKS    INR Location Clinic      Anticoagulation Summary  As of 2019    INR goal:   2.0-3.0   TTR:   69.6 % (3.1 y)   INR used for dosin.8 (2019)   Warfarin maintenance plan:   2.5 mg (5 mg x 0.5) every Tue, Fri; 5 mg (5 mg x 1) all other days   Full warfarin instructions:   2.5 mg every Tue, Fri; 5 mg all other days   Weekly warfarin total:   30 mg   No change documented:   Radha Seymour RN   Plan last modified:   Shazia Marquez RN (2019)   Next INR check:   2019   Target end date:   Indefinite    Indications    Paroxysmal atrial fibrillation (Resolved) [I48.0]  Long term current use of anticoagulants with INR goal of 2.0-3.0 [Z79.01]             Anticoagulation Episode Summary     INR check location:       Preferred lab:       Send INR reminders to:   [facility]    Comments:         Anticoagulation Care Providers     Provider Role Specialty Phone number    Sebastián Bermudez MD Responsible Cardiology [phone]            See the Encounter Report to view Anticoagulation Flowsheet and Dosing Calendar (Go to Encounters tab in chart  review, and find the Anticoagulation Therapy Visit)    Patient INR is therapeutic.  Patient will continue to take 30 mg weekly dosing and follow up in 5 weeks or sooner for any problems or concerns.    Advised patient if therapeutic at next check can extend visits to 6 weeks.     Radha Seymour RN

## 2019-07-30 ENCOUNTER — TELEPHONE (OUTPATIENT)
Dept: CARDIOLOGY | Facility: CLINIC | Age: 79
End: 2019-07-30

## 2019-07-30 NOTE — TELEPHONE ENCOUNTER
Pt informed of BMP results:   Component      Latest Ref Rng & Units 7/29/2019   Sodium      133 - 144 mmol/L 141   Potassium      3.4 - 5.3 mmol/L 4.3   Chloride      94 - 109 mmol/L 108   Carbon Dioxide      20 - 32 mmol/L 29   Anion Gap      3 - 14 mmol/L 4   Glucose      70 - 99 mg/dL 100 (H)   Urea Nitrogen      7 - 30 mg/dL 20   Creatinine      0.66 - 1.25 mg/dL 1.10   GFR Estimate      >60 mL/min/1.73:m2 63   GFR Estimate If Black      >60 mL/min/1.73:m2 73   Calcium      8.5 - 10.1 mg/dL 8.7   Stable after med change to lasix 40 mg.

## 2019-08-07 ENCOUNTER — APPOINTMENT (OUTPATIENT)
Age: 79
Setting detail: DERMATOLOGY
End: 2019-08-07

## 2019-08-07 DIAGNOSIS — D485 NEOPLASM OF UNCERTAIN BEHAVIOR OF SKIN: ICD-10-CM

## 2019-08-07 DIAGNOSIS — L82.1 OTHER SEBORRHEIC KERATOSIS: ICD-10-CM

## 2019-08-07 DIAGNOSIS — L81.4 OTHER MELANIN HYPERPIGMENTATION: ICD-10-CM

## 2019-08-07 DIAGNOSIS — L82.0 INFLAMED SEBORRHEIC KERATOSIS: ICD-10-CM

## 2019-08-07 DIAGNOSIS — D18.0 HEMANGIOMA: ICD-10-CM

## 2019-08-07 DIAGNOSIS — Z85.828 PERSONAL HISTORY OF OTHER MALIGNANT NEOPLASM OF SKIN: ICD-10-CM

## 2019-08-07 DIAGNOSIS — L57.0 ACTINIC KERATOSIS: ICD-10-CM

## 2019-08-07 PROBLEM — D18.01 HEMANGIOMA OF SKIN AND SUBCUTANEOUS TISSUE: Status: ACTIVE | Noted: 2019-08-07

## 2019-08-07 PROBLEM — D23.61 OTHER BENIGN NEOPLASM OF SKIN OF RIGHT UPPER LIMB, INCLUDING SHOULDER: Status: ACTIVE | Noted: 2019-08-07

## 2019-08-07 PROBLEM — D48.5 NEOPLASM OF UNCERTAIN BEHAVIOR OF SKIN: Status: ACTIVE | Noted: 2019-08-07

## 2019-08-07 PROCEDURE — 99214 OFFICE O/P EST MOD 30 MIN: CPT | Mod: 25

## 2019-08-07 PROCEDURE — 17000 DESTRUCT PREMALG LESION: CPT | Mod: 59

## 2019-08-07 PROCEDURE — 11102 TANGNTL BX SKIN SINGLE LES: CPT | Mod: 59

## 2019-08-07 PROCEDURE — OTHER COUNSELING: OTHER

## 2019-08-07 PROCEDURE — 17003 DESTRUCT PREMALG LES 2-14: CPT | Mod: 59

## 2019-08-07 PROCEDURE — 17110 DESTRUCT B9 LESION 1-14: CPT

## 2019-08-07 PROCEDURE — OTHER BIOPSY BY SHAVE METHOD: OTHER

## 2019-08-07 PROCEDURE — OTHER MIPS QUALITY: OTHER

## 2019-08-07 PROCEDURE — OTHER LIQUID NITROGEN: OTHER

## 2019-08-07 ASSESSMENT — LOCATION ZONE DERM
LOCATION ZONE: FACE
LOCATION ZONE: LIP
LOCATION ZONE: EYELID
LOCATION ZONE: FACE
LOCATION ZONE: NOSE
LOCATION ZONE: NOSE
LOCATION ZONE: ARM
LOCATION ZONE: TRUNK

## 2019-08-07 ASSESSMENT — LOCATION DETAILED DESCRIPTION DERM
LOCATION DETAILED: RIGHT UPPER CUTANEOUS LIP
LOCATION DETAILED: SUPERIOR LUMBAR SPINE
LOCATION DETAILED: INFERIOR MID FOREHEAD
LOCATION DETAILED: LEFT VENTRAL DISTAL FOREARM
LOCATION DETAILED: LEFT MEDIAL SUPERIOR EYELID
LOCATION DETAILED: NASAL ROOT
LOCATION DETAILED: RIGHT CENTRAL TEMPLE
LOCATION DETAILED: LEFT MEDIAL CANTHUS
LOCATION DETAILED: RIGHT INFERIOR MEDIAL UPPER BACK
LOCATION DETAILED: LEFT NASAL ALA

## 2019-08-07 ASSESSMENT — LOCATION SIMPLE DESCRIPTION DERM
LOCATION SIMPLE: LEFT SUPERIOR EYELID
LOCATION SIMPLE: INFERIOR FOREHEAD
LOCATION SIMPLE: LEFT FOREARM
LOCATION SIMPLE: RIGHT LIP
LOCATION SIMPLE: NOSE
LOCATION SIMPLE: RIGHT TEMPLE
LOCATION SIMPLE: LEFT EYELID
LOCATION SIMPLE: LOWER BACK
LOCATION SIMPLE: RIGHT UPPER BACK
LOCATION SIMPLE: LEFT NOSE

## 2019-08-07 NOTE — PROCEDURE: LIQUID NITROGEN
Post-Care Instructions: I reviewed with the patient in detail post-care instructions. Patient is to wear sunprotection, and avoid picking at any of the treated lesions. Pt may apply Vaseline to crusted or scabbing areas.
Number Of Freeze-Thaw Cycles: 1 freeze-thaw cycle
Duration Of Freeze Thaw-Cycle (Seconds): 15-20
Consent: The patient's consent was obtained including but not limited to risks of crusting, scabbing, blistering, scarring, darker or lighter pigmentary change, recurrence, incomplete removal and infection.
Render Post-Care Instructions In Note?: yes
Include Z78.9 (Other Specified Conditions Influencing Health Status) As An Associated Diagnosis?: No
Medical Necessity Clause: This procedure was medically necessary because the lesions that were treated were:
Detail Level: Simple
Duration Of Freeze Thaw-Cycle (Seconds): 10
Detail Level: Detailed
Medical Necessity Information: It is in your best interest to select a reason for this procedure from the list below. All of these items fulfill various CMS LCD requirements except the new and changing color options.

## 2019-08-07 NOTE — PROCEDURE: BIOPSY BY SHAVE METHOD
Electrodesiccation Text: The wound bed was treated with electrodesiccation after the biopsy was performed.
Depth Of Biopsy: dermis
Render Path Notes In Note?: No
Body Location Override (Optional - Billing Will Still Be Based On Selected Body Map Location If Applicable): Left mid radial forearm
Anesthesia Volume In Cc (Will Not Render If 0): 0.6
Electrodesiccation And Curettage Text: The wound bed was treated with electrodesiccation and curettage after the biopsy was performed.
Hemostasis: Electrocautery
Post-Care Instructions: I verbally reviewed with the patient in detail post-care instructions. Patient is to keep the biopsy site dry overnight, and then apply H2O2, Vaseline and a bandage daily until healed.
Curettage Text: The wound bed was treated with curettage after the biopsy was performed.
Notification Instructions: Patient will be notified of biopsy results. However, patient instructed to call the office if not contacted within 2 weeks.
Additional Anesthesia Volume In Cc (Will Not Render If 0): 0
Consent: Verbal consent was obtained and risks were reviewed including but not limited to scarring, infection, bleeding, scabbing, incomplete removal, nerve damage and allergy to anesthesia.
Was A Bandage Applied: Yes
Detail Level: Detailed
X Size Of Lesion In Cm: 0.4
Wound Care: Vaseline
Silver Nitrate Text: The wound bed was treated with silver nitrate after the biopsy was performed.
Dressing: Band-Aid
Billing Type: Third-Party Bill
Anesthesia Type: 1% Xylocaine with [id number] epinephrine and sodium bicarbonate
Biopsy Method: double edge Personna blade
Biopsy Type: H and E
Cryotherapy Text: The wound bed was treated with cryotherapy after the biopsy was performed.
Type Of Destruction Used: Electrodesiccation
Size Of Lesion In Cm: 0.5
Path Notes (To The Dermatopathologist): Please check margins

## 2019-08-07 NOTE — PROCEDURE: MIPS QUALITY
Quality 226: Preventive Care And Screening: Tobacco Use: Screening And Cessation Intervention: Patient screened for tobacco and is an ex-smoker
Quality 130: Documentation Of Current Medications In The Medical Record: Current Medications Documented
Quality 431: Preventive Care And Screening: Unhealthy Alcohol Use - Screening: Patient screened for unhealthy alcohol use using a single question and scores less than 2 times per year
Quality 131: Pain Assessment And Follow-Up: Pain assessment using a standardized tool is documented as negative, no follow-up plan required
Detail Level: Detailed
Quality 474: Zoster Vaccination Status: Shingrix Vaccination not Administered or Previously Received, Reason not Otherwise Specified
Quality 110: Preventive Care And Screening: Influenza Immunization: Influenza Immunization previously received during influenza season
Quality 265: Biopsy Follow-Up: Biopsy results reviewed, communicated, tracked, and documented

## 2019-09-05 ENCOUNTER — ANTICOAGULATION THERAPY VISIT (OUTPATIENT)
Dept: NURSING | Facility: CLINIC | Age: 79
End: 2019-09-05
Payer: MEDICARE

## 2019-09-05 DIAGNOSIS — Z79.01 LONG TERM CURRENT USE OF ANTICOAGULANTS WITH INR GOAL OF 2.0-3.0: ICD-10-CM

## 2019-09-05 LAB — INR POINT OF CARE: 3.1 (ref 0.86–1.14)

## 2019-09-05 PROCEDURE — 99207 ZZC NO CHARGE NURSE ONLY: CPT

## 2019-09-05 PROCEDURE — 36416 COLLJ CAPILLARY BLOOD SPEC: CPT

## 2019-09-05 PROCEDURE — 85610 PROTHROMBIN TIME: CPT | Mod: QW

## 2019-09-05 NOTE — PROGRESS NOTES
ANTICOAGULATION FOLLOW-UP CLINIC VISIT    Patient Name:  Aniket Macias  Date:  9/5/2019  Contact Type:  Face to Face    SUBJECTIVE:  Patient Findings     Positives:   Change in diet/appetite (not eating greens and eating out more than usual)        Clinical Outcomes     Negatives:   Major bleeding event, Thromboembolic event, Anticoagulation-related hospital admission, Anticoagulation-related ED visit, Anticoagulation-related fatality           OBJECTIVE    INR Protime   Date Value Ref Range Status   09/05/2019 3.1 (A) 0.86 - 1.14 Final       ASSESSMENT / PLAN  INR assessment SUPRA    Recheck INR In: 2 WEEKS    INR Location Clinic      Anticoagulation Summary  As of 9/5/2019    INR goal:   2.0-3.0   TTR:   69.5 % (3.2 y)   INR used for dosing:   3.1! (9/5/2019)   Warfarin maintenance plan:   2.5 mg (5 mg x 0.5) every Tue, Fri; 5 mg (5 mg x 1) all other days   Full warfarin instructions:   9/5: 2.5 mg; Otherwise 2.5 mg every Tue, Fri; 5 mg all other days   Weekly warfarin total:   30 mg   Plan last modified:   Shazia Marquez RN (4/30/2019)   Next INR check:   9/18/2019   Target end date:   Indefinite    Indications    Paroxysmal atrial fibrillation (Resolved) [I48.0]  Long term current use of anticoagulants with INR goal of 2.0-3.0 [Z79.01]             Anticoagulation Episode Summary     INR check location:       Preferred lab:       Send INR reminders to:   [facility]    Comments:         Anticoagulation Care Providers     Provider Role Specialty Phone number    Sebastián Bermudez MD [facility] [phone]            See the Encounter Report to view Anticoagulation Flowsheet and Dosing Calendar (Go to Encounters tab in chart review, and find the Anticoagulation Therapy Visit)    Patient INR is supra therapeutic today.  Patient will adjust dosing today by taking 2.5 mg instead of 5 mg and then resume maintenance dosing of 30 mg and follow up in 2 weeks or sooner if there are any  concerns or problems.    Signs of bleeding and when appropriate to seek care for symptoms reviewed.    Adjustment Rational: 8.3%  Dosage adjustment made based on physician directed care plan.      Susannah Hines RN

## 2019-09-09 ENCOUNTER — OFFICE VISIT (OUTPATIENT)
Dept: CARDIOLOGY | Facility: CLINIC | Age: 79
End: 2019-09-09
Payer: MEDICARE

## 2019-09-09 VITALS
DIASTOLIC BLOOD PRESSURE: 82 MMHG | HEIGHT: 68 IN | BODY MASS INDEX: 29.25 KG/M2 | SYSTOLIC BLOOD PRESSURE: 132 MMHG | HEART RATE: 68 BPM | WEIGHT: 193 LBS

## 2019-09-09 DIAGNOSIS — I50.32 CHRONIC DIASTOLIC HEART FAILURE (H): ICD-10-CM

## 2019-09-09 DIAGNOSIS — I48.20 CHRONIC ATRIAL FIBRILLATION (H): ICD-10-CM

## 2019-09-09 DIAGNOSIS — I47.29 NON-SUSTAINED VENTRICULAR TACHYCARDIA (H): Primary | ICD-10-CM

## 2019-09-09 PROCEDURE — 99214 OFFICE O/P EST MOD 30 MIN: CPT | Performed by: NURSE PRACTITIONER

## 2019-09-09 ASSESSMENT — MIFFLIN-ST. JEOR: SCORE: 1559.94

## 2019-09-09 NOTE — PATIENT INSTRUCTIONS
Today's Recommendations    1. Leg elevation, compression stockings, low salt, exercise and water tablet  2. No ischemia or lack of blood flow to your heart muscle from heart artery disease  3. Continue all medications without changes.  4. Please follow up with Dr. Joseph in 1 year in [place].    Please send a [employer] message or call [phone] with questions or concerns.     Scheduling number [phone].

## 2019-09-09 NOTE — PROGRESS NOTES
Cardiology Clinic Progress Note  Aniket Macias MRN# [id number]   YOB: 1940 Age: 79 year old         History of Presenting Illness:      Primary cardiologist: Dr. Joseph    Reason for visit:     1. Lower extremity edema with left venous insufficiency   2. Chronic atrial fibrillation  3. Hypertension  4. Hyperlipidemia  5. NSVT  6. Chronic diastolic heart failure    Past medical history includes:    1. BPH  2. Skin cancer  3. Glaucoma  4. Spinal stenosis  5. Colon cancer    Aniket Macias, a pleasant 79 year old patient who presents today for a review of his venous insufficiency study.  He was recently evaluated by Dr. Joseph with complaints of increased lower extremity edema despite 20 mg of Lasix daily.  He recommended that he increase Lasix to 40 mg daily, continue 1 banana daily for potassium replacement, wear compression stockings and restrict sodium as well as undergo a venous competency study. The study showed no evidence of bilateral DVT. Left great saphenous vein with significant reflux below the knee with competent right great saphenous vein.      Today in clinic he presents with his wife, Whitney. Discussed the venous insufficiency study and cardiac stress MRI with them in detail. The patient states the with the use of compression stockings, leg elevation, low sodium diet and increased diuretics his symptoms are improved.          Diagnostic studies:    Venous Competency Study (6/27/2019): No evidence of bilateral DVT. Left great saphenous vein with significant reflux below the knee with competent right great saphenous vein.     Stress cardiac MRI (7/10/2019): LV normal in size and wall thickness with LVEF 67% and no regional wall motion abnormalities. RVEF 65%, biatrial moderate to severe dilation, Possible bicuspid aortic valve, but functionally appears tricuspid. No ischemia noted on atress portion.      Echocardiogram (6/6/2019): LVEF 60-65% with mild concentric LVH, no regional wall motion  "abnormalities, aortic sclerosis without stenosis, LA severely dilated.    Holter (6/2019): Rate controlled in atrial fibrillation with a 8 beat run of nonsustained ventricular tachycardia (symptom medic).  Longest pause of 3.18 seconds.         Assessment and Plan:     ASSESSMENT:    1. Lower extremity edema    Venous reflux of the left great saphenous vein below the knee and incompetent right great saphenous vein    Symptomatic improvement with increased diuretic, compression stockings, elevation and low sodium diet    No venous stasis changes of ulcerations.     2. Hypertension    Well controlled    Lisinopril 40 mg daily    3. Hyperlipidemia    Fasting lipids 4/2019: Total cholesterol 115, HDL 51, LDL 55, Trigs 45    Atorvastatin 40 mg daily    4. Chronic atrial fibrillation    CHADS2-VASc score 3 (age ++ and hypertension)    Anticoagulated on Warfarin and rate controlled on metoprolol 50 mg BID    5. NSVT    No ischemia on cardiac stress MRI    PLAN:     1. Continue conservative management, but if symptoms progress consider venous procedure  2. Follow up with Dr. Joseph in 1 year or sooner if needed            Review of Systems:     Review of Systems:  Skin:  Negative     Eyes:  Positive for glasses;glaucoma  ENT:  Positive for tinnitus  Respiratory:  Negative    Cardiovascular:    Positive for;palpitations;edema  Gastroenterology: Negative    Genitourinary:  not assessed    Musculoskeletal:  Positive for arthritis  Neurologic:       Psychiatric:  Negative    Heme/Lymph/Imm:  Negative    Endocrine:  Negative              Physical Exam:     Vitals: /82   Pulse 68   Ht 1.727 m (5' 8\")   Wt 87.5 kg (193 lb)   BMI 29.35 kg/m    Constitutional:  in no acute distress        Skin:  warm and dry to the touch        Head:  normocephalic, no masses or lesions        Eyes:  sclera white        Neck:  no stiffness        Chest:  clear to auscultation        Cardiac:   irregularly irregular rhythm     systolic " ejection murmur;RUSB;grade 1;grade 2          Abdomen:  not assessed this visit        Extremities and Back:        left lower extremity edema trace    Neurological:  no gross motor deficits             Medications:     Current Outpatient Medications   Medication Sig Dispense Refill     ACETAMINOPHEN PO Take 1,000 mg by mouth every 6 hours as needed for pain       atorvastatin (LIPITOR) 40 MG tablet Take 1 tablet (40 mg) by mouth daily 90 tablet 3     furosemide (LASIX) 20 MG tablet Take 2 tablets (40 mg) by mouth daily 90 tablet 3     furosemide (LASIX) 40 MG tablet Take 1 tablet (40 mg) by mouth daily 90 tablet 3     hydrocortisone (CORTAID) 1 % cream Apply topically daily as needed       latanoprost (XALATAN) 0.005 % ophthalmic solution Place 1 drop into both eyes At Bedtime       lisinopril (PRINIVIL/ZESTRIL) 40 MG tablet Take 1 tablet (40 mg) by mouth daily 90 tablet 3     metoprolol tartrate (LOPRESSOR) 50 MG tablet Take 1 tablet (50 mg) by mouth 2 times daily 180 tablet 3     Multiple Vitamins-Minerals (MULTIVITAMIN PO) Take 1 tablet by mouth daily        terazosin (HYTRIN) 5 MG capsule Take 1 capsule (5 mg) by mouth At Bedtime 90 capsule 3     UNABLE TO FIND MEDICATION NAME: Pro-racia    OTC rosacea cream.       warfarin (JANTOVEN) 5 MG tablet Take 1 tablet (5 mg) by mouth daily 90 tablet 1       Family History   Problem Relation Age of Onset     Cerebrovascular Disease Mother      Hypertension Mother      Cerebrovascular Disease Father      Myocardial Infarction Father      Hypertension Father      Hypertension Sister      Myocardial Infarction Sister        Social History     Socioeconomic History     Marital status:      Spouse name: Not on file     Number of children: Not on file     Years of education: Not on file     Highest education level: Not on file   Occupational History     Not on file   Social Needs     Financial resource strain: Not on file     Food insecurity:     Worry: Not on file      Inability: Not on file     Transportation needs:     Medical: Not on file     Non-medical: Not on file   Tobacco Use     Smoking status: Former Smoker     Packs/day: 1.00     Years: 20.00     Pack years: 20.00     Types: Cigarettes     Smokeless tobacco: Never Used     Tobacco comment: quit age 54   Substance and Sexual Activity     Alcohol use: Yes     Alcohol/week: 0.0 oz     Comment: 1-2 drinks per week     Drug use: No     Sexual activity: Not Currently     Partners: Female   Lifestyle     Physical activity:     Days per week: Not on file     Minutes per session: Not on file     Stress: Not on file   Relationships     Social connections:     Talks on phone: Not on file     Gets together: Not on file     Attends Jain service: Not on file     Active member of club or organization: Not on file     Attends meetings of clubs or organizations: Not on file     Relationship status: Not on file     Intimate partner violence:     Fear of current or ex partner: Not on file     Emotionally abused: Not on file     Physically abused: Not on file     Forced sexual activity: Not on file   Other Topics Concern      Service Not Asked     Blood Transfusions Not Asked     Caffeine Concern Yes     Comment: 2 cups coffee per day     Occupational Exposure Not Asked     Hobby Hazards Not Asked     Sleep Concern No     Stress Concern No     Weight Concern No     Special Diet Not Asked     Back Care Not Asked     Exercise No     Bike Helmet Not Asked     Seat Belt Not Asked     Self-Exams Not Asked     Parent/sibling w/ CABG, MI or angioplasty before 65F 55M? Not Asked   Social History Narrative     Not on file            Past Medical History:     Past Medical History:   Diagnosis Date     Basal cell carcinoma      BPH (benign prostatic hypertrophy)      Diverticulosis      Glaucoma      Hemorrhoids      HTN (hypertension)      Hyperlipidemia      Obesity      Persistent atrial fibrillation (H)      PVC (premature  ventricular contraction)      Squamous cell carcinoma in situ of skin      Syncope 4/11/2016              Past Surgical History:     Past Surgical History:   Procedure Laterality Date     BACK SURGERY       COLONOSCOPY  11/19/15    hx polyps     SURGICAL HISTORY OF -       Laminectomy     SURGICAL HISTORY OF -       biopsy of prostate     TONSILLECTOMY & ADENOIDECTOMY                Allergies:   Patient has no known allergies.       Data:   All laboratory data reviewed:    Recent Labs   Lab Test 06/05/19  0820 04/25/19  0848 05/22/18  1023 02/21/17  1117   LDL  --  55 47 68   HDL  --  51 53 48   NHDL  --  64 57 81   CHOL  --  115 110 129   TRIG  --  45 52 63   TSH  --   --   --  2.36   NTBNP 1,505*  --   --   --        Lab Results   Component Value Date    WBC 9.4 04/25/2019    RBC 4.27 (L) 04/25/2019    HGB 14.1 04/25/2019    HCT 42.1 04/25/2019    MCV 99 04/25/2019    MCH 33.0 04/25/2019    MCHC 33.5 04/25/2019    RDW 12.9 04/25/2019     04/25/2019       Lab Results   Component Value Date     07/29/2019    POTASSIUM 4.3 07/29/2019    CHLORIDE 108 07/29/2019    CO2 29 07/29/2019    ANIONGAP 4 07/29/2019     (H) 07/29/2019    BUN 20 07/29/2019    CR 1.10 07/29/2019    GFRESTIMATED 63 07/29/2019    GFRESTBLACK 73 07/29/2019    MCKAYLA 8.7 07/29/2019      Lab Results   Component Value Date    AST 28 05/22/2018    ALT 47 05/22/2018       No results found for: A1C    Lab Results   Component Value Date    INR 3.1 (A) 09/05/2019    INR 2.8 (A) 07/29/2019    INR 1.22 (H) 07/10/2017    INR 2.13 (H) 04/12/2016         MAHNAZ العلي [facility]  Pager: [phone]  RN phone: [phone]

## 2019-09-09 NOTE — LETTER
9/9/2019    Maribeth Haney MD  [address] [place] [id number]    RE: Aniket Macias       Dear Colleague,    I had the pleasure of seeing Aniket Macias in the [facility].    Cardiology Clinic Progress Note  Aniket Macias MRN# [id number]   YOB: 1940 Age: 79 year old         History of Presenting Illness:      Primary cardiologist: Dr. Joseph    Reason for visit:     1. Lower extremity edema with left venous insufficiency   2. Chronic atrial fibrillation  3. Hypertension  4. Hyperlipidemia  5. NSVT  6. Chronic diastolic heart failure    Past medical history includes:    1. BPH  2. Skin cancer  3. Glaucoma  4. Spinal stenosis  5. Colon cancer    Aniket Macias, a pleasant 79 year old patient who presents today for a review of his venous insufficiency study.  He was recently evaluated by Dr. Joseph with complaints of increased lower extremity edema despite 20 mg of Lasix daily.  He recommended that he increase Lasix to 40 mg daily, continue 1 banana daily for potassium replacement, wear compression stockings and restrict sodium as well as undergo a venous competency study. The study showed no evidence of bilateral DVT. Left great saphenous vein with significant reflux below the knee with competent right great saphenous vein.      Today in clinic he presents with his wife, Whitney. Discussed the venous insufficiency study and cardiac stress MRI with them in detail. The patient states the with the use of compression stockings, leg elevation, low sodium diet and increased diuretics his symptoms are improved.          Diagnostic studies:    Venous Competency Study (6/27/2019): No evidence of bilateral DVT. Left great saphenous vein with significant reflux below the knee with competent right great saphenous vein.     Stress cardiac MRI (7/10/2019): LV normal in size and wall thickness with LVEF 67% and no regional wall motion abnormalities. RVEF 65%, biatrial moderate to  "severe dilation, Possible bicuspid aortic valve, but functionally appears tricuspid. No ischemia noted on atress portion.      Echocardiogram (6/6/2019): LVEF 60-65% with mild concentric LVH, no regional wall motion abnormalities, aortic sclerosis without stenosis, LA severely dilated.    Holter (6/2019): Rate controlled in atrial fibrillation with a 8 beat run of nonsustained ventricular tachycardia (symptom medic).  Longest pause of 3.18 seconds.         Assessment and Plan:     ASSESSMENT:    1. Lower extremity edema    Venous reflux of the left great saphenous vein below the knee and incompetent right great saphenous vein    Symptomatic improvement with increased diuretic, compression stockings, elevation and low sodium diet    No venous stasis changes of ulcerations.     2. Hypertension    Well controlled    Lisinopril 40 mg daily    3. Hyperlipidemia    Fasting lipids 4/2019: Total cholesterol 115, HDL 51, LDL 55, Trigs 45    Atorvastatin 40 mg daily    4. Chronic atrial fibrillation    CHADS2-VASc score 3 (age ++ and hypertension)    Anticoagulated on Warfarin and rate controlled on metoprolol 50 mg BID    5. NSVT    No ischemia on cardiac stress MRI    PLAN:     1. Continue conservative management, but if symptoms progress consider venous procedure  2. Follow up with Dr. Joseph in 1 year or sooner if needed            Review of Systems:     Review of Systems:  Skin:  Negative     Eyes:  Positive for glasses;glaucoma  ENT:  Positive for tinnitus  Respiratory:  Negative    Cardiovascular:    Positive for;palpitations;edema  Gastroenterology: Negative    Genitourinary:  not assessed    Musculoskeletal:  Positive for arthritis  Neurologic:       Psychiatric:  Negative    Heme/Lymph/Imm:  Negative    Endocrine:  Negative              Physical Exam:     Vitals: /82   Pulse 68   Ht 1.727 m (5' 8\")   Wt 87.5 kg (193 lb)   BMI 29.35 kg/m     Constitutional:  in no acute distress        Skin:  warm and dry to " the touch        Head:  normocephalic, no masses or lesions        Eyes:  sclera white        Neck:  no stiffness        Chest:  clear to auscultation        Cardiac:   irregularly irregular rhythm     systolic ejection murmur;RUSB;grade 1;grade 2          Abdomen:  not assessed this visit        Extremities and Back:        left lower extremity edema trace    Neurological:  no gross motor deficits             Medications:     Current Outpatient Medications   Medication Sig Dispense Refill     ACETAMINOPHEN PO Take 1,000 mg by mouth every 6 hours as needed for pain       atorvastatin (LIPITOR) 40 MG tablet Take 1 tablet (40 mg) by mouth daily 90 tablet 3     furosemide (LASIX) 20 MG tablet Take 2 tablets (40 mg) by mouth daily 90 tablet 3     furosemide (LASIX) 40 MG tablet Take 1 tablet (40 mg) by mouth daily 90 tablet 3     hydrocortisone (CORTAID) 1 % cream Apply topically daily as needed       latanoprost (XALATAN) 0.005 % ophthalmic solution Place 1 drop into both eyes At Bedtime       lisinopril (PRINIVIL/ZESTRIL) 40 MG tablet Take 1 tablet (40 mg) by mouth daily 90 tablet 3     metoprolol tartrate (LOPRESSOR) 50 MG tablet Take 1 tablet (50 mg) by mouth 2 times daily 180 tablet 3     Multiple Vitamins-Minerals (MULTIVITAMIN PO) Take 1 tablet by mouth daily        terazosin (HYTRIN) 5 MG capsule Take 1 capsule (5 mg) by mouth At Bedtime 90 capsule 3     UNABLE TO FIND MEDICATION NAME: Pro-racia    OTC rosacea cream.       warfarin (JANTOVEN) 5 MG tablet Take 1 tablet (5 mg) by mouth daily 90 tablet 1       Family History   Problem Relation Age of Onset     Cerebrovascular Disease Mother      Hypertension Mother      Cerebrovascular Disease Father      Myocardial Infarction Father      Hypertension Father      Hypertension Sister      Myocardial Infarction Sister        Social History     Socioeconomic History     Marital status:      Spouse name: Not on file     Number of children: Not on file     Years  of education: Not on file     Highest education level: Not on file   Occupational History     Not on file   Social Needs     Financial resource strain: Not on file     Food insecurity:     Worry: Not on file     Inability: Not on file     Transportation needs:     Medical: Not on file     Non-medical: Not on file   Tobacco Use     Smoking status: Former Smoker     Packs/day: 1.00     Years: 20.00     Pack years: 20.00     Types: Cigarettes     Smokeless tobacco: Never Used     Tobacco comment: quit age 54   Substance and Sexual Activity     Alcohol use: Yes     Alcohol/week: 0.0 oz     Comment: 1-2 drinks per week     Drug use: No     Sexual activity: Not Currently     Partners: Female   Lifestyle     Physical activity:     Days per week: Not on file     Minutes per session: Not on file     Stress: Not on file   Relationships     Social connections:     Talks on phone: Not on file     Gets together: Not on file     Attends Scientologist service: Not on file     Active member of club or organization: Not on file     Attends meetings of clubs or organizations: Not on file     Relationship status: Not on file     Intimate partner violence:     Fear of current or ex partner: Not on file     Emotionally abused: Not on file     Physically abused: Not on file     Forced sexual activity: Not on file   Other Topics Concern      Service Not Asked     Blood Transfusions Not Asked     Caffeine Concern Yes     Comment: 2 cups coffee per day     Occupational Exposure Not Asked     Hobby Hazards Not Asked     Sleep Concern No     Stress Concern No     Weight Concern No     Special Diet Not Asked     Back Care Not Asked     Exercise No     Bike Helmet Not Asked     Seat Belt Not Asked     Self-Exams Not Asked     Parent/sibling w/ CABG, MI or angioplasty before 65F 55M? Not Asked   Social History Narrative     Not on file            Past Medical History:     Past Medical History:   Diagnosis Date     Basal cell carcinoma       BPH (benign prostatic hypertrophy)      Diverticulosis      Glaucoma      Hemorrhoids      HTN (hypertension)      Hyperlipidemia      Obesity      Persistent atrial fibrillation (H)      PVC (premature ventricular contraction)      Squamous cell carcinoma in situ of skin      Syncope 4/11/2016              Past Surgical History:     Past Surgical History:   Procedure Laterality Date     BACK SURGERY       COLONOSCOPY  11/19/15    hx polyps     SURGICAL HISTORY OF -       Laminectomy     SURGICAL HISTORY OF -       biopsy of prostate     TONSILLECTOMY & ADENOIDECTOMY                Allergies:   Patient has no known allergies.       Data:   All laboratory data reviewed:    Recent Labs   Lab Test 06/05/19  0820 04/25/19  0848 05/22/18  1023 02/21/17  1117   LDL  --  55 47 68   HDL  --  51 53 48   NHDL  --  64 57 81   CHOL  --  115 110 129   TRIG  --  45 52 63   TSH  --   --   --  2.36   NTBNP 1,505*  --   --   --        Lab Results   Component Value Date    WBC 9.4 04/25/2019    RBC 4.27 (L) 04/25/2019    HGB 14.1 04/25/2019    HCT 42.1 04/25/2019    MCV 99 04/25/2019    MCH 33.0 04/25/2019    MCHC 33.5 04/25/2019    RDW 12.9 04/25/2019     04/25/2019       Lab Results   Component Value Date     07/29/2019    POTASSIUM 4.3 07/29/2019    CHLORIDE 108 07/29/2019    CO2 29 07/29/2019    ANIONGAP 4 07/29/2019     (H) 07/29/2019    BUN 20 07/29/2019    CR 1.10 07/29/2019    GFRESTIMATED 63 07/29/2019    GFRESTBLACK 73 07/29/2019    MCKAYLA 8.7 07/29/2019      Lab Results   Component Value Date    AST 28 05/22/2018    ALT 47 05/22/2018       No results found for: A1C    Lab Results   Component Value Date    INR 3.1 (A) 09/05/2019    INR 2.8 (A) 07/29/2019    INR 1.22 (H) 07/10/2017    INR 2.13 (H) 04/12/2016         MAHNAZ العلي [facility]  Pager: [phone]  RN phone: [phone]    Thank you for allowing me to participate in the care of your patient.    Sincerely,     MAHNAZ العلي  CNP     [facility]

## 2019-09-18 ENCOUNTER — ANTICOAGULATION THERAPY VISIT (OUTPATIENT)
Dept: NURSING | Facility: CLINIC | Age: 79
End: 2019-09-18
Payer: MEDICARE

## 2019-09-18 DIAGNOSIS — Z79.01 LONG TERM CURRENT USE OF ANTICOAGULANTS WITH INR GOAL OF 2.0-3.0: ICD-10-CM

## 2019-09-18 LAB — INR POINT OF CARE: 2.4 (ref 0.86–1.14)

## 2019-09-18 PROCEDURE — 36416 COLLJ CAPILLARY BLOOD SPEC: CPT

## 2019-09-18 PROCEDURE — 99207 ZZC NO CHARGE NURSE ONLY: CPT

## 2019-09-18 PROCEDURE — 85610 PROTHROMBIN TIME: CPT | Mod: QW

## 2019-09-18 NOTE — PROGRESS NOTES
ANTICOAGULATION FOLLOW-UP CLINIC VISIT    Patient Name:  Aniket Macias  Date:  2019  Contact Type:  Face to Face    SUBJECTIVE:  Patient Findings     Comments:   The patient was assessed for diet, medication, and activity level changes, missed or extra doses, bruising or bleeding, with no problem findings.          Clinical Outcomes     Negatives:   Major bleeding event, Thromboembolic event, Anticoagulation-related hospital admission, Anticoagulation-related ED visit, Anticoagulation-related fatality    Comments:   The patient was assessed for diet, medication, and activity level changes, missed or extra doses, bruising or bleeding, with no problem findings.             OBJECTIVE    INR Protime   Date Value Ref Range Status   2019 2.4 (A) 0.86 - 1.14 Final       ASSESSMENT / PLAN  INR assessment THER    Recheck INR In: 4 WEEKS    INR Location Clinic      Anticoagulation Summary  As of 2019    INR goal:   2.0-3.0   TTR:   69.7 % (3.3 y)   INR used for dosin.4 (2019)   Warfarin maintenance plan:   2.5 mg (5 mg x 0.5) every Tue, Fri; 5 mg (5 mg x 1) all other days   Full warfarin instructions:   2.5 mg every Tue, Fri; 5 mg all other days   Weekly warfarin total:   30 mg   No change documented:   Radha Seymour RN   Plan last modified:   Shazia Marquez RN (2019)   Next INR check:   10/17/2019   Target end date:   Indefinite    Indications    Paroxysmal atrial fibrillation (Resolved) [I48.0]  Long term current use of anticoagulants with INR goal of 2.0-3.0 [Z79.01]             Anticoagulation Episode Summary     INR check location:       Preferred lab:       Send INR reminders to:   [facility]    Comments:         Anticoagulation Care Providers     Provider Role Specialty Phone number    Sebastián Bermudez MD Responsible Cardiology [phone]            See the Encounter Report to view Anticoagulation Flowsheet and Dosing Calendar (Go to Encounters tab in  chart review, and find the Anticoagulation Therapy Visit)    Patient INR is therapeutic.  Patient will continue to take 30 mg weekly dosing and follow up in 4 weeks or sooner for any problems or concerns.    Patient returning in 4 weeks because he will be in [place] from mid November to mid December.     Radha Seymour RN

## 2019-10-17 ENCOUNTER — ANTICOAGULATION THERAPY VISIT (OUTPATIENT)
Dept: NURSING | Facility: CLINIC | Age: 79
End: 2019-10-17
Payer: MEDICARE

## 2019-10-17 DIAGNOSIS — Z79.01 LONG TERM CURRENT USE OF ANTICOAGULANTS WITH INR GOAL OF 2.0-3.0: ICD-10-CM

## 2019-10-17 LAB — INR POINT OF CARE: 3 (ref 0.86–1.14)

## 2019-10-17 PROCEDURE — 36416 COLLJ CAPILLARY BLOOD SPEC: CPT

## 2019-10-17 PROCEDURE — 85610 PROTHROMBIN TIME: CPT | Mod: QW

## 2019-10-17 PROCEDURE — 99207 ZZC NO CHARGE NURSE ONLY: CPT

## 2019-10-17 NOTE — PROGRESS NOTES
ANTICOAGULATION FOLLOW-UP CLINIC VISIT    Patient Name:  Aniket Macias  Date:  10/17/2019  Contact Type:  Face to Face    SUBJECTIVE:  Patient Findings     Comments:   The patient was assessed for diet, medication, and activity level changes, missed or extra doses, bruising or bleeding, with no problem findings.          Clinical Outcomes     Negatives:   Major bleeding event, Thromboembolic event, Anticoagulation-related hospital admission, Anticoagulation-related ED visit, Anticoagulation-related fatality    Comments:   The patient was assessed for diet, medication, and activity level changes, missed or extra doses, bruising or bleeding, with no problem findings.             OBJECTIVE    INR Protime   Date Value Ref Range Status   10/17/2019 3.0 (A) 0.86 - 1.14 Final       ASSESSMENT / PLAN  INR assessment THER    Recheck INR In: 3 WEEKS    INR Location Clinic      Anticoagulation Summary  As of 10/17/2019    INR goal:   2.0-3.0   TTR:   70.4 % (3.4 y)   INR used for dosing:   3.0 (10/17/2019)   Warfarin maintenance plan:   2.5 mg (5 mg x 0.5) every Tue, Fri; 5 mg (5 mg x 1) all other days   Full warfarin instructions:   2.5 mg every Tue, Fri; 5 mg all other days   Weekly warfarin total:   30 mg   No change documented:   Susannah Hines RN   Plan last modified:   Shazia Marquez RN (4/30/2019)   Next INR check:   11/7/2019   Target end date:   Indefinite    Indications    Paroxysmal atrial fibrillation (Resolved) [I48.0]  Long term current use of anticoagulants with INR goal of 2.0-3.0 [Z79.01]             Anticoagulation Episode Summary     INR check location:       Preferred lab:       Send INR reminders to:   [facility]    Comments:         Anticoagulation Care Providers     Provider Role Specialty Phone number    Sebastián Bermudez MD Responsible Cardiology [phone]            See the Encounter Report to view Anticoagulation Flowsheet and Dosing Calendar (Go to Encounters tab in chart  review, and find the Anticoagulation Therapy Visit)    Patient INR is therapeutic.  Patient will continue to take 30 mg weekly dosing and follow up in 3 weeks or sooner for any problems or concerns.    Susannah Hines RN

## 2019-10-31 ENCOUNTER — HEALTH MAINTENANCE LETTER (OUTPATIENT)
Age: 79
End: 2019-10-31

## 2019-11-07 ENCOUNTER — ANTICOAGULATION THERAPY VISIT (OUTPATIENT)
Dept: NURSING | Facility: CLINIC | Age: 79
End: 2019-11-07
Payer: MEDICARE

## 2019-11-07 DIAGNOSIS — Z23 NEED FOR PROPHYLACTIC VACCINATION AND INOCULATION AGAINST INFLUENZA: Primary | ICD-10-CM

## 2019-11-07 DIAGNOSIS — Z79.01 LONG TERM CURRENT USE OF ANTICOAGULANTS WITH INR GOAL OF 2.0-3.0: ICD-10-CM

## 2019-11-07 LAB — INR POINT OF CARE: 2.4 (ref 0.86–1.14)

## 2019-11-07 PROCEDURE — 36416 COLLJ CAPILLARY BLOOD SPEC: CPT

## 2019-11-07 PROCEDURE — 85610 PROTHROMBIN TIME: CPT | Mod: QW

## 2019-11-07 PROCEDURE — G0008 ADMIN INFLUENZA VIRUS VAC: HCPCS

## 2019-11-07 PROCEDURE — 90662 IIV NO PRSV INCREASED AG IM: CPT

## 2019-11-07 NOTE — PROGRESS NOTES
ANTICOAGULATION FOLLOW-UP CLINIC VISIT    Patient Name:  Aniket Macias  Date:  2019  Contact Type:  Face to Face    SUBJECTIVE:  Patient Findings     Comments:   The patient was assessed for diet, medication, and activity level changes, missed or extra doses, bruising or bleeding, with no problem findings.          Clinical Outcomes     Negatives:   Major bleeding event, Thromboembolic event, Anticoagulation-related hospital admission, Anticoagulation-related ED visit, Anticoagulation-related fatality    Comments:   The patient was assessed for diet, medication, and activity level changes, missed or extra doses, bruising or bleeding, with no problem findings.             OBJECTIVE    INR Protime   Date Value Ref Range Status   2019 2.4 (A) 0.86 - 1.14 Final       ASSESSMENT / PLAN  INR assessment THER    Recheck INR In: 6 WEEKS    INR Location Clinic      Anticoagulation Summary  As of 2019    INR goal:   2.0-3.0   TTR:   70.9 % (3.4 y)   INR used for dosin.4 (2019)   Warfarin maintenance plan:   2.5 mg (5 mg x 0.5) every Tue, Fri; 5 mg (5 mg x 1) all other days   Full warfarin instructions:   2.5 mg every Tue, Fri; 5 mg all other days   Weekly warfarin total:   30 mg   No change documented:   Susannah Hines RN   Plan last modified:   Shazia Marquez RN (2019)   Next INR check:   2019   Target end date:   Indefinite    Indications    Paroxysmal atrial fibrillation (Resolved) [I48.0]  Long term current use of anticoagulants with INR goal of 2.0-3.0 [Z79.01]             Anticoagulation Episode Summary     INR check location:       Preferred lab:       Send INR reminders to:   [facility]    Comments:         Anticoagulation Care Providers     Provider Role Specialty Phone number    Sebastián Bermudez MD Responsible Cardiology [phone]            See the Encounter Report to view Anticoagulation Flowsheet and Dosing Calendar (Go to Encounters tab in chart review,  and find the Anticoagulation Therapy Visit)    Patient INR is therapeutic.  Patient will continue to take 30 mg weekly dosing and follow up in 6 weeks or sooner for any problems or concerns.    Pt received flu shot today.    Susannah Hines RN

## 2019-12-19 ENCOUNTER — TELEPHONE (OUTPATIENT)
Dept: FAMILY MEDICINE | Facility: CLINIC | Age: 79
End: 2019-12-19

## 2019-12-19 ENCOUNTER — ANTICOAGULATION THERAPY VISIT (OUTPATIENT)
Dept: NURSING | Facility: CLINIC | Age: 79
End: 2019-12-19
Payer: MEDICARE

## 2019-12-19 DIAGNOSIS — Z79.01 LONG TERM CURRENT USE OF ANTICOAGULANTS WITH INR GOAL OF 2.0-3.0: ICD-10-CM

## 2019-12-19 DIAGNOSIS — I48.20 CHRONIC ATRIAL FIBRILLATION (H): ICD-10-CM

## 2019-12-19 DIAGNOSIS — Z79.01 LONG TERM CURRENT USE OF ANTICOAGULANTS WITH INR GOAL OF 2.0-3.0: Primary | ICD-10-CM

## 2019-12-19 LAB — INR POINT OF CARE: 2.6 (ref 0.86–1.14)

## 2019-12-19 PROCEDURE — 85610 PROTHROMBIN TIME: CPT | Mod: QW

## 2019-12-19 PROCEDURE — 36416 COLLJ CAPILLARY BLOOD SPEC: CPT

## 2019-12-19 PROCEDURE — 99207 ZZC NO CHARGE NURSE ONLY: CPT

## 2019-12-19 NOTE — TELEPHONE ENCOUNTER
Has the patient previously taken warfarin? yes  If yes, for what indication? A Fib    Does the patient have any of the following indications for a higher range of 2.5-3.5:    Mitral position mechanical valve? no    Zuri-Shiley, Ball and Cage or Monoleaflet valve (regardless of position) no    Other (if yes, please explain) no

## 2019-12-19 NOTE — TELEPHONE ENCOUNTER
Has the patient previously taken warfarin? yes  If yes, for what indication? A-fib    Does the patient have any of the following indications for a higher range of 2.5-3.5:    Mitral position mechanical valve? no    Zuri-Shiley, Ball and Cage or Monoleaflet valve (regardless of position) no    Other (if yes, please explain) no    Order pended.      Susannah HINOJOSA RN  EP Triage

## 2019-12-19 NOTE — PROGRESS NOTES
ANTICOAGULATION FOLLOW-UP CLINIC VISIT    Patient Name:  Aniket Macias  Date:  2019  Contact Type:  Face to Face    SUBJECTIVE:  Patient Findings     Comments:   The patient was assessed for diet, medication, and activity level changes, missed or extra doses, bruising or bleeding, with no problem findings.          Clinical Outcomes     Negatives:   Major bleeding event, Thromboembolic event, Anticoagulation-related hospital admission, Anticoagulation-related ED visit, Anticoagulation-related fatality    Comments:   The patient was assessed for diet, medication, and activity level changes, missed or extra doses, bruising or bleeding, with no problem findings.             OBJECTIVE    INR Protime   Date Value Ref Range Status   2019 2.6 (A) 0.86 - 1.14 Final       ASSESSMENT / PLAN  INR assessment THER    Recheck INR In: 6 WEEKS    INR Location Clinic      Anticoagulation Summary  As of 2019    INR goal:   2.0-3.0   TTR:   70.6 % (1 y)   INR used for dosin.6 (2019)   Warfarin maintenance plan:   2.5 mg (5 mg x 0.5) every Tue, Fri; 5 mg (5 mg x 1) all other days   Full warfarin instructions:   2.5 mg every Tue, Fri; 5 mg all other days   Weekly warfarin total:   30 mg   No change documented:   Susannah Hines RN   Plan last modified:   Shazia Marquez RN (2019)   Next INR check:   2020   Target end date:   Indefinite    Indications    Paroxysmal atrial fibrillation (Resolved) [I48.0]  Long term current use of anticoagulants with INR goal of 2.0-3.0 [Z79.01]             Anticoagulation Episode Summary     INR check location:       Preferred lab:       Send INR reminders to:   [facility]    Comments:         Anticoagulation Care Providers     Provider Role Specialty Phone number    Sebastián Bermudez MD Responsible Cardiology [phone]            See the Encounter Report to view Anticoagulation Flowsheet and Dosing Calendar (Go to Encounters tab in chart review,  and find the Anticoagulation Therapy Visit)    Patient INR is therapeutic.  Patient will continue to take 30 mg weekly dosing and follow up in 6 weeks or sooner for any problems or concerns.        Susannah Hines RN

## 2020-01-19 DIAGNOSIS — I48.0 PAROXYSMAL ATRIAL FIBRILLATION (H): ICD-10-CM

## 2020-01-19 DIAGNOSIS — Z79.01 LONG TERM CURRENT USE OF ANTICOAGULANTS WITH INR GOAL OF 2.0-3.0: ICD-10-CM

## 2020-01-20 RX ORDER — WARFARIN SODIUM 5 MG/1
TABLET ORAL
Qty: 90 TABLET | Refills: 0 | Status: SHIPPED | OUTPATIENT
Start: 2020-01-20 | End: 2020-04-14

## 2020-01-20 NOTE — TELEPHONE ENCOUNTER
"Requested Prescriptions   Pending Prescriptions Disp Refills     JANTOVEN ANTICOAGULANT 5 MG tablet [Pharmacy Med Name: Jantoven Oral Tablet 5 MG] 90 tablet 0     Sig: TAKE ONE TABLET BY MOUTH ONE TIME DAILY       Vitamin K Antagonists Failed - 1/19/2020  1:49 PM        Failed - INR is within goal in the past 6 weeks     Confirm INR is within goal in the past 6 weeks.     Recent Labs   Lab Test 12/19/19   INR 2.6*                       Failed - Medication is active on med list        Passed - Recent (12 mo) or future (30 days) visit within the authorizing provider's specialty     Patient has had an office visit with the authorizing provider or a provider within the authorizing providers department within the previous 12 mos or has a future within next 30 days. See \"Patient Info\" tab in inbasket, or \"Choose Columns\" in Meds & Orders section of the refill encounter.              Passed - Patient is 18 years of age or older        warfarin (JANTOVEN) 5 MG tablet 90 tablet 1 6/5/2019       Last Written Prescription Date:  6/5/2019  Last Fill Quantity: 90,  # refills: 1   Last office visit: 12/19/2019 with prescribing provider:  Dr. Haney   Future Office Visit:  Unknown     "

## 2020-01-20 NOTE — TELEPHONE ENCOUNTER
Prescription approved per [facility] Refill Protocol.  Ellyn Seymour RN   [facility] - Triage

## 2020-01-27 ENCOUNTER — ANCILLARY PROCEDURE (OUTPATIENT)
Dept: GENERAL RADIOLOGY | Facility: CLINIC | Age: 80
End: 2020-01-27
Attending: PHYSICIAN ASSISTANT
Payer: MEDICARE

## 2020-01-27 ENCOUNTER — OFFICE VISIT (OUTPATIENT)
Dept: FAMILY MEDICINE | Facility: CLINIC | Age: 80
End: 2020-01-27
Payer: MEDICARE

## 2020-01-27 VITALS
WEIGHT: 191 LBS | DIASTOLIC BLOOD PRESSURE: 58 MMHG | OXYGEN SATURATION: 94 % | HEART RATE: 70 BPM | TEMPERATURE: 97.4 F | SYSTOLIC BLOOD PRESSURE: 100 MMHG | HEIGHT: 70 IN | BODY MASS INDEX: 27.35 KG/M2

## 2020-01-27 DIAGNOSIS — J18.9 PNEUMONIA OF LEFT LOWER LOBE DUE TO INFECTIOUS ORGANISM: ICD-10-CM

## 2020-01-27 DIAGNOSIS — R05.9 COUGH: Primary | ICD-10-CM

## 2020-01-27 DIAGNOSIS — R05.9 COUGH: ICD-10-CM

## 2020-01-27 PROCEDURE — 99214 OFFICE O/P EST MOD 30 MIN: CPT | Performed by: PHYSICIAN ASSISTANT

## 2020-01-27 PROCEDURE — 71046 X-RAY EXAM CHEST 2 VIEWS: CPT | Mod: FY

## 2020-01-27 RX ORDER — AZITHROMYCIN 250 MG/1
TABLET, FILM COATED ORAL
Qty: 6 TABLET | Refills: 0 | Status: SHIPPED | OUTPATIENT
Start: 2020-01-27 | End: 2020-02-19

## 2020-01-27 ASSESSMENT — MIFFLIN-ST. JEOR: SCORE: 1582.62

## 2020-01-27 NOTE — PROGRESS NOTES
Subjective     Aniket Macias is a 79 year old male who presents to clinic today for the following health issues:    HPI   Acute Illness   Acute illness concerns: cough  Onset: x 4-5 days    Fever: no    Chills/Sweats: no    Headache (location?): no    Sinus Pressure:YES    Conjunctivitis:  no    Ear Pain: no    Rhinorrhea: YES    Congestion: YES    Sore Throat: no     Cough: YES-Productive    Wheeze: YES    Decreased Appetite: no    Nausea: no    Vomiting: no    Diarrhea:  YES    Dysuria/Freq.: no    Fatigue/Achiness: YES    Sick/Strep Exposure: no     Therapies Tried and outcome: acetaminophen, nyquil, vicks       Lowell has been experiencing a deep, productive cough over the past 1 week.  This is worse with laying flat and at night. He has had associated nasal congestion, He has not had a fever.  He does have hx of CHF and atrial fibrillation.  No orthopnea or PND, no LE edema. No CP or SOB with these symptoms.  His symptoms began as a URI 1 weeks ago but his cough has become worse in the past few days.        Patient Active Problem List   Diagnosis     Benign essential hypertension; goal < 140/90     Hyperlipidemia     Glaucoma     Benign neoplasm of colon     Benign non-nodular prostatic hyperplasia with lower urinary tract symptoms     Long-term (current) use of anticoagulants [Z79.01]     History of basal cell carcinoma     Rosacea     Spinal stenosis of lumbar region with neurogenic claudication     Long term current use of anticoagulants with INR goal of 2.0-3.0     Bilateral leg edema     Non-sustained ventricular tachycardia (H)     Chronic atrial fibrillation     Chronic diastolic heart failure (H)     Past Surgical History:   Procedure Laterality Date     BACK SURGERY       COLONOSCOPY  11/19/15    hx polyps     SURGICAL HISTORY OF -       Laminectomy     SURGICAL HISTORY OF -       biopsy of prostate     TONSILLECTOMY & ADENOIDECTOMY         Social History     Tobacco Use     Smoking status: Former Smoker      Packs/day: 1.00     Years: 20.00     Pack years: 20.00     Types: Cigarettes     Smokeless tobacco: Never Used     Tobacco comment: quit age 54   Substance Use Topics     Alcohol use: Yes     Alcohol/week: 0.0 standard drinks     Comment: 1-2 drinks per week     Family History   Problem Relation Age of Onset     Cerebrovascular Disease Mother      Hypertension Mother      Cerebrovascular Disease Father      Myocardial Infarction Father      Hypertension Father      Hypertension Sister      Myocardial Infarction Sister          Current Outpatient Medications   Medication Sig Dispense Refill     amoxicillin-clavulanate (AUGMENTIN) 875-125 MG tablet Take 1 tablet by mouth 2 times daily for 7 days 14 tablet 0     azithromycin (ZITHROMAX) 250 MG tablet Two tablets first day, then one tablet daily for four days. 6 tablet 0     ACETAMINOPHEN PO Take 1,000 mg by mouth every 6 hours as needed for pain       atorvastatin (LIPITOR) 40 MG tablet Take 1 tablet (40 mg) by mouth daily 90 tablet 3     furosemide (LASIX) 40 MG tablet Take 1 tablet (40 mg) by mouth daily 90 tablet 3     JANTOVEN ANTICOAGULANT 5 MG tablet TAKE ONE TABLET BY MOUTH ONE TIME DAILY  90 tablet 0     latanoprost (XALATAN) 0.005 % ophthalmic solution Place 1 drop into both eyes At Bedtime       lisinopril (PRINIVIL/ZESTRIL) 40 MG tablet Take 1 tablet (40 mg) by mouth daily 90 tablet 3     metoprolol tartrate (LOPRESSOR) 50 MG tablet Take 1 tablet (50 mg) by mouth 2 times daily 180 tablet 3     Multiple Vitamins-Minerals (MULTIVITAMIN PO) Take 1 tablet by mouth daily        terazosin (HYTRIN) 5 MG capsule Take 1 capsule (5 mg) by mouth At Bedtime 90 capsule 3     UNABLE TO FIND MEDICATION NAME: Pro-racia    OTC rosacea cream.       No Known Allergies      Reviewed and updated as needed this visit by Provider         Review of Systems   ROS COMP: Constitutional, HEENT, cardiovascular, pulmonary, GI, , musculoskeletal, neuro, skin, endocrine and psych  "systems are negative, except as otherwise noted.      Objective    /58   Pulse 70   Temp 97.4  F (36.3  C) (Tympanic)   Ht 1.778 m (5' 10\")   Wt 86.6 kg (191 lb)   SpO2 94%   BMI 27.41 kg/m    Body mass index is 27.41 kg/m .  Physical Exam   GENERAL: healthy, alert and no distress  EYES: Eyes grossly normal to inspection, PERRL and conjunctivae and sclerae normal  HENT: ear canals and TM's normal, nose and mouth without ulcers or lesions  NECK: no adenopathy  RESP: lungs clear to auscultation - no rales, rhonchi or wheezes  CV: regular rate and rhythm, normal S1 S2, no S3 or S4, no murmur, click or rub, no peripheral edema and peripheral pulses strong    Diagnostic Test Results:  Labs reviewed in Epic        Assessment & Plan     1. Cough  - XR Chest 2 Views; Future    2. Pneumonia of left lower lobe due to infectious organism (H)  Chest x ray obtained today as his 02 is lower than baseline ( 94% today).  Per radiology read there is a focal infiltrate in his left lower/mid lung space.  Due to his age and comorbidities, will treat with Augmentin and azithromycin for CAP.  He is hemodynamically stable today.  He will follow up at the end of the week for INR check.   - amoxicillin-clavulanate (AUGMENTIN) 875-125 MG tablet; Take 1 tablet by mouth 2 times daily for 7 days  Dispense: 14 tablet; Refill: 0  - azithromycin (ZITHROMAX) 250 MG tablet; Two tablets first day, then one tablet daily for four days.  Dispense: 6 tablet; Refill: 0     BMI:   Estimated body mass index is 27.41 kg/m  as calculated from the following:    Height as of this encounter: 1.778 m (5' 10\").    Weight as of this encounter: 86.6 kg (191 lb).           Follow up as above    No follow-ups on file.    Allan Gonsalves PA-C  [facility] [place]      "

## 2020-01-30 ENCOUNTER — ANTICOAGULATION THERAPY VISIT (OUTPATIENT)
Dept: NURSING | Facility: CLINIC | Age: 80
End: 2020-01-30
Payer: MEDICARE

## 2020-01-30 DIAGNOSIS — Z79.01 LONG TERM CURRENT USE OF ANTICOAGULANTS WITH INR GOAL OF 2.0-3.0: ICD-10-CM

## 2020-01-30 DIAGNOSIS — I48.20 CHRONIC ATRIAL FIBRILLATION (H): ICD-10-CM

## 2020-01-30 LAB — INR POINT OF CARE: 2.3 (ref 0.86–1.14)

## 2020-01-30 PROCEDURE — 99207 ZZC NO CHARGE NURSE ONLY: CPT

## 2020-01-30 PROCEDURE — 36416 COLLJ CAPILLARY BLOOD SPEC: CPT

## 2020-01-30 PROCEDURE — 85610 PROTHROMBIN TIME: CPT | Mod: QW

## 2020-01-30 NOTE — PROGRESS NOTES
ANTICOAGULATION FOLLOW-UP CLINIC VISIT    Patient Name:  Aniket Macias  Date:  2020  Contact Type:  Face to Face    SUBJECTIVE:  Patient Findings     Positives:   Change in medications (on abx zithromax and augmenting for pneumonia)        Clinical Outcomes     Negatives:   Major bleeding event, Thromboembolic event, Anticoagulation-related hospital admission, Anticoagulation-related ED visit, Anticoagulation-related fatality           OBJECTIVE    INR Protime   Date Value Ref Range Status   2020 2.3 (A) 0.86 - 1.14 Final       ASSESSMENT / PLAN  INR assessment THER    Recheck INR In: 2 WEEKS    INR Location Clinic      Anticoagulation Summary  As of 2020    INR goal:   2.0-3.0   TTR:   76.8 % (1 y)   INR used for dosin.3 (2020)   Warfarin maintenance plan:   2.5 mg (5 mg x 0.5) every Tue, Fri; 5 mg (5 mg x 1) all other days   Full warfarin instructions:   2.5 mg every Tue, Fri; 5 mg all other days   Weekly warfarin total:   30 mg   No change documented:   Susannah Hines RN   Plan last modified:   Shazia Marquez RN (2019)   Next INR check:   2020   Target end date:   Indefinite    Indications    Paroxysmal atrial fibrillation (Resolved) [I48.0]  Long term current use of anticoagulants with INR goal of 2.0-3.0 [Z79.01]  Chronic atrial fibrillation [I48.20]             Anticoagulation Episode Summary     INR check location:       Preferred lab:       Send INR reminders to:   [facility]    Comments:         Anticoagulation Care Providers     Provider Role Specialty Phone number    Maribeth Haney MD Referring Internal Medicine [phone]    Sebastián Bermudez MD Responsible Cardiology [phone]            See the Encounter Report to view Anticoagulation Flowsheet and Dosing Calendar (Go to Encounters tab in chart review, and find the Anticoagulation Therapy Visit)    Patient INR is therapeutic.  Patient will continue to take 30 mg weekly dosing and  follow up in 2 weeks or sooner for any problems or concerns.    Susannah Hines,   EP ACC RN

## 2020-02-03 ENCOUNTER — TELEPHONE (OUTPATIENT)
Dept: FAMILY MEDICINE | Facility: CLINIC | Age: 80
End: 2020-02-03

## 2020-02-03 NOTE — TELEPHONE ENCOUNTER
Reason for Call:  Other call back    Detailed comments: pt called and stated that he is done with med's which order for his cough and kink of pneumonia. And pt is wondering if he need to come and checked again to make sure everything is ok or he need to continue with the med.    Per pt he still having cough sort of     Phone Number Patient can be reached at: Cell number on file:    Telephone Information:   Mobile [phone]       Best Time: anytime     Can we leave a detailed message on this number? YES    Call taken on 2/3/2020 at 12:17 PM by CARISSA MOSES

## 2020-02-03 NOTE — TELEPHONE ENCOUNTER
S/w pt who states he finished the augmentin this morning and still has a little bit of a cough.  Wondering if needs to be seen again?    Advised the augmentin is still working in body for another 7-10 days even though not taking daily.  Will also have a lingering cough for another 2-3 weeks which is normal with pneumonia.  The cough should sound loose but if the cough starts to sound before he was seen should call for an appt.  Pt states understanding.    Susannah HINOJOSA RN  EP Triage

## 2020-02-14 ENCOUNTER — ANTICOAGULATION THERAPY VISIT (OUTPATIENT)
Dept: NURSING | Facility: CLINIC | Age: 80
End: 2020-02-14
Payer: MEDICARE

## 2020-02-14 DIAGNOSIS — Z79.01 LONG TERM CURRENT USE OF ANTICOAGULANTS WITH INR GOAL OF 2.0-3.0: ICD-10-CM

## 2020-02-14 DIAGNOSIS — I48.20 CHRONIC ATRIAL FIBRILLATION (H): ICD-10-CM

## 2020-02-14 LAB — INR POINT OF CARE: 3.5 (ref 0.86–1.14)

## 2020-02-14 PROCEDURE — 85610 PROTHROMBIN TIME: CPT | Mod: QW

## 2020-02-14 PROCEDURE — 99207 ZZC NO CHARGE NURSE ONLY: CPT

## 2020-02-14 PROCEDURE — 36416 COLLJ CAPILLARY BLOOD SPEC: CPT

## 2020-02-14 NOTE — PROGRESS NOTES
ANTICOAGULATION FOLLOW-UP CLINIC VISIT    Patient Name:  Aniket Macias  Date:  2/14/2020  Contact Type:  Face to Face    SUBJECTIVE:  Patient Findings     Positives:   Change in medications (Done with antibiotics for pneumoniz. States that his breathing is fine. ), Change in diet/appetite (States that he has not been eating as many greens.)        Clinical Outcomes     Negatives:   Major bleeding event, Thromboembolic event, Anticoagulation-related hospital admission, Anticoagulation-related ED visit, Anticoagulation-related fatality           OBJECTIVE    INR Protime   Date Value Ref Range Status   02/14/2020 3.5 (A) 0.86 - 1.14 Final       ASSESSMENT / PLAN  INR assessment SUPRA    Recheck INR In: 2 WEEKS    INR Location Clinic      Anticoagulation Summary  As of 2/14/2020    INR goal:   2.0-3.0   TTR:   79.1 % (1 y)   INR used for dosing:   3.5! (2/14/2020)   Warfarin maintenance plan:   2.5 mg (5 mg x 0.5) every Tue, Fri; 5 mg (5 mg x 1) all other days   Full warfarin instructions:   2/14: Hold; Otherwise 2.5 mg every Tue, Fri; 5 mg all other days   Weekly warfarin total:   30 mg   Plan last modified:   Shazia Marquez RN (4/30/2019)   Next INR check:   2/26/2020   Target end date:   Indefinite    Indications    Paroxysmal atrial fibrillation (Resolved) [I48.0]  Long term current use of anticoagulants with INR goal of 2.0-3.0 [Z79.01]  Chronic atrial fibrillation [I48.20]             Anticoagulation Episode Summary     INR check location:       Preferred lab:       Send INR reminders to:   [facility]    Comments:         Anticoagulation Care Providers     Provider Role Specialty Phone number    Maribeth Haney MD Referring Internal Medicine [phone]    Sebastián Bermudez MD Responsible Cardiology [phone]            See the Encounter Report to view Anticoagulation Flowsheet and Dosing Calendar (Go to Encounters tab in chart review, and find the Anticoagulation Therapy Visit)    INR  is supra therapeutic today at 3.5. Patient states that he has not eaten as many greens as he usual. States that he had pneumonia 2 weeks ago. Antibiotic therapy is complete.  Patient to hold warfarin today and eat some greens.  Resume maintenance dose and his regular diet. Recheck INR in 2 weeks.       Shazia Marquez RN

## 2020-02-19 ENCOUNTER — ANCILLARY PROCEDURE (OUTPATIENT)
Dept: GENERAL RADIOLOGY | Facility: CLINIC | Age: 80
End: 2020-02-19
Attending: INTERNAL MEDICINE
Payer: MEDICARE

## 2020-02-19 ENCOUNTER — OFFICE VISIT (OUTPATIENT)
Dept: FAMILY MEDICINE | Facility: CLINIC | Age: 80
End: 2020-02-19
Payer: MEDICARE

## 2020-02-19 VITALS
TEMPERATURE: 98.2 F | HEART RATE: 70 BPM | WEIGHT: 189 LBS | OXYGEN SATURATION: 99 % | SYSTOLIC BLOOD PRESSURE: 121 MMHG | DIASTOLIC BLOOD PRESSURE: 70 MMHG | BODY MASS INDEX: 27.12 KG/M2

## 2020-02-19 DIAGNOSIS — R93.89 ABNORMAL CXR: ICD-10-CM

## 2020-02-19 DIAGNOSIS — R06.02 SHORTNESS OF BREATH: Primary | ICD-10-CM

## 2020-02-19 DIAGNOSIS — R06.09 DYSPNEA ON EXERTION: ICD-10-CM

## 2020-02-19 DIAGNOSIS — R06.02 SHORTNESS OF BREATH: ICD-10-CM

## 2020-02-19 DIAGNOSIS — I50.32 CHRONIC DIASTOLIC HEART FAILURE (H): ICD-10-CM

## 2020-02-19 PROCEDURE — 99214 OFFICE O/P EST MOD 30 MIN: CPT | Performed by: INTERNAL MEDICINE

## 2020-02-19 PROCEDURE — 71046 X-RAY EXAM CHEST 2 VIEWS: CPT | Mod: FY

## 2020-02-19 NOTE — PROGRESS NOTES
Subjective     Aniket Macias is a 79 year old male who presents to clinic today for the following health issues:    LINDA Gonzalez was seen 1/27 with productive cough and SpO2 94%.  CXR showed infiltrate in left midlung.  He was treated with augmentin and azithromycin for CAP.  Most of his symptoms from pneumonia have resolved, (cough and congestion) but he continues to feel short of breath.  He notices this within just a few steps after getting up from sitting.  Denies any chest pain, though his ribs are sore from all the coughing he was doing.  He has also felt dizzy/lightheaded when standing up too quickly.  Used to get up 3 flights of stairs from garage to apartment, and rest before going up the 4th flight. Now has trouble getting up one flight. He can't recall if this all started after his diagnosis of PNA or was there prior.        He sees cardiology for chronic Afib.  He had an echocardiogram and stress nuclear study done this summer which showed no changes and was negative for ischemia.  He also had a Holter monitor done and this showed rate controlled atrial fibrillation.  The chest x-ray done in January was the only chest imaging we have in our system.  It is read as background of fibrosis and emphysema.  He has no known diagnosis of lung issues.  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  He did smoke for 20 or 30 years, at the most 1/2 to 2 packs/day.  He quit over 20 years ago.    He also has some questions about left leg swelling - this has been present since the summer and evaluated by cardiology. There is left venous incompetence.       Echcardiogram 6/2019  1. Rate controlled atrial fibrillation during study.  2. Left ventricular systolic function is normal. The visual ejection fraction  is estimated at 60-65%.  3. There is mild concentric left ventricular hypertrophy.  4. No regional wall motion abnormalities noted.  5. The right ventricle is normal in structure, function and size.  6. The left  atrium is severely dilated.  7. The aortic valve is sclerotic without significant stenosis.        Reviewed and updated as needed this visit by Provider         Review of Systems   Const, HEENT, CV, pulm, neuro reviewed,  otherwise negative unless noted above.        Objective    /70   Pulse 70   Temp 98.2  F (36.8  C) (Oral)   Wt 85.7 kg (189 lb)   SpO2 99%   BMI 27.12 kg/m    Body mass index is 27.12 kg/m .  Physical Exam   Gen: well appearing, pleasant elderly gentleman, no distress  Pulm: breathing comfortably, CTAB, no wheezes or rales  CV: irregularly irregular rhythm, normal S1 and S2, no murmurs  Ext: moderate LLE edema, minimal RLE edema     Diagnostic Test Results:  CXR - the left midlung opacity appears resolved on CXR today         Assessment & Plan       ICD-10-CM    1. Shortness of breath R06.02 XR Chest 2 Views   2. Dyspnea on exertion R06.09 General PFT Lab (Please always keep checked)     Pulmonary Function Test     CT Chest w Contrast   3. Abnormal CXR R93.89 General PFT Lab (Please always keep checked)     Pulmonary Function Test     CT Chest w Contrast   4. Chronic diastolic heart failure (H) I50.32      Lowell is here with lightheadedness and shortness of breath with pretty minimal exertion.  He does have a history of CHF, but full cardiac work-up was showed no acute abnormalities 8 months ago.  His chest x-ray shows likely underlying lung disease, which may be contributing to his shortness of breath.  Recommended getting a CT scan for further evaluation and pulmonary function testing.  If this is unrevealing, would revisit cardiac etiology for symptoms.       Return in about 2 weeks (around 3/4/2020) for pending lung evaluation .    Maribeth Haney MD  [facility] – [place]

## 2020-02-27 ENCOUNTER — ANTICOAGULATION THERAPY VISIT (OUTPATIENT)
Dept: NURSING | Facility: CLINIC | Age: 80
End: 2020-02-27
Payer: MEDICARE

## 2020-02-27 DIAGNOSIS — I48.20 CHRONIC ATRIAL FIBRILLATION (H): ICD-10-CM

## 2020-02-27 DIAGNOSIS — Z79.01 LONG TERM CURRENT USE OF ANTICOAGULANTS WITH INR GOAL OF 2.0-3.0: ICD-10-CM

## 2020-02-27 LAB — INR POINT OF CARE: 2.8 (ref 0.86–1.14)

## 2020-02-27 PROCEDURE — 99207 ZZC NO CHARGE NURSE ONLY: CPT

## 2020-02-27 PROCEDURE — 36416 COLLJ CAPILLARY BLOOD SPEC: CPT

## 2020-02-27 PROCEDURE — 85610 PROTHROMBIN TIME: CPT | Mod: QW

## 2020-02-27 NOTE — PROGRESS NOTES
ANTICOAGULATION FOLLOW-UP CLINIC VISIT    Patient Name:  Aniket Macias  Date:  2020  Contact Type:  Face to Face    SUBJECTIVE:  Patient Findings     Comments:   The patient was assessed for diet, medication, and activity level changes, missed or extra doses, bruising or bleeding, with no problem findings.          Clinical Outcomes     Negatives:   Major bleeding event, Thromboembolic event, Anticoagulation-related hospital admission, Anticoagulation-related ED visit, Anticoagulation-related fatality    Comments:   The patient was assessed for diet, medication, and activity level changes, missed or extra doses, bruising or bleeding, with no problem findings.             OBJECTIVE    INR Protime   Date Value Ref Range Status   2020 2.8 (A) 0.86 - 1.14 Final       ASSESSMENT / PLAN  INR assessment THER    Recheck INR In: 6 WEEKS    INR Location Clinic      Anticoagulation Summary  As of 2020    INR goal:   2.0-3.0   TTR:   80.2 % (1 y)   INR used for dosin.8 (2020)   Warfarin maintenance plan:   2.5 mg (5 mg x 0.5) every Tue, Fri; 5 mg (5 mg x 1) all other days   Full warfarin instructions:   2.5 mg every Tue, Fri; 5 mg all other days   Weekly warfarin total:   30 mg   No change documented:   Susannah Hines RN   Plan last modified:   Shazia Marquez RN (2019)   Next INR check:   2020   Target end date:   Indefinite    Indications    Paroxysmal atrial fibrillation (Resolved) [I48.0]  Long term current use of anticoagulants with INR goal of 2.0-3.0 [Z79.01]  Chronic atrial fibrillation [I48.20]             Anticoagulation Episode Summary     INR check location:       Preferred lab:       Send INR reminders to:   [facility]    Comments:         Anticoagulation Care Providers     Provider Role Specialty Phone number    Maribeth Haney MD Referring Internal Medicine [phone]    Sebastián Bermudez MD Responsible Cardiology [phone]            See the  Encounter Report to view Anticoagulation Flowsheet and Dosing Calendar (Go to Encounters tab in chart review, and find the Anticoagulation Therapy Visit)    Patient INR is therapeutic.  Patient will continue to take 30 mg weekly dosing and follow up in 6 weeks or sooner for any problems or concerns.    Pt finished abxs for pneumonia.    MADAI Payan ACC RN

## 2020-03-17 ENCOUNTER — HOSPITAL ENCOUNTER (OUTPATIENT)
Dept: CT IMAGING | Facility: CLINIC | Age: 80
Discharge: HOME | End: 2020-03-17
Attending: INTERNAL MEDICINE | Admitting: INTERNAL MEDICINE
Payer: MEDICARE

## 2020-03-17 DIAGNOSIS — R93.89 ABNORMAL CXR: ICD-10-CM

## 2020-03-17 DIAGNOSIS — R06.09 DYSPNEA ON EXERTION: ICD-10-CM

## 2020-03-17 PROCEDURE — 71250 CT THORAX DX C-: CPT

## 2020-03-18 ENCOUNTER — TELEPHONE (OUTPATIENT)
Dept: FAMILY MEDICINE | Facility: CLINIC | Age: 80
End: 2020-03-18

## 2020-03-18 DIAGNOSIS — J84.112 UIP (USUAL INTERSTITIAL PNEUMONITIS) (H): ICD-10-CM

## 2020-03-18 DIAGNOSIS — J43.9 PULMONARY EMPHYSEMA, UNSPECIFIED EMPHYSEMA TYPE (H): Primary | ICD-10-CM

## 2020-03-18 NOTE — TELEPHONE ENCOUNTER
I called and spoke with Lowell regarding his CT scan results.  Thankfully the shortness of breath he was feeling when I saw him last month is mostly resolved.  Advised that he needs to see a pulmonologist.  Referral placed, given phone number.  He is UTD with both flu vaccin and prevnar and pneumovax.  Questions answered to the best of my ability.                CT CHEST HI-RESOLUTION WITHOUT CONTRAST                                                      IMPRESSION:   1.  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     REFERENCE:  An Official ATS/ERS/JRS/ALAT Statement: Idiopathic Pulmonary Fibrosis:  Evidence-based Guidelines for Diagnosis and Management. Am J Respir  Crit Care Med Vol 183. pp 788-824, 2011     UIP Pattern (all four features)  1. Subpleural, basal predominance.  2. Reticular abnormality.  3. Honeycombing with or without traction bronchiectasis.  4. Absence of features inconsistent with UIP pattern.      Maribeth Haney MD

## 2020-03-20 ENCOUNTER — PATIENT OUTREACH (OUTPATIENT)
Dept: PULMONOLOGY | Facility: CLINIC | Age: 80
End: 2020-03-20

## 2020-03-20 NOTE — PROGRESS NOTES
Contacted patient regarding referral received for patient to be seen in [facility] clinic.   Patient states that since he is feeling okay and not experiencing breathlessness. He is concerned about the findings of a UIP pattern on his recent chest CT.  Patient would like an appointment but is okay with a phone appointment during the COVID19 pandemic.

## 2020-03-25 DIAGNOSIS — J84.9 ILD (INTERSTITIAL LUNG DISEASE) (H): Primary | ICD-10-CM

## 2020-04-01 ENCOUNTER — TELEPHONE (OUTPATIENT)
Dept: FAMILY MEDICINE | Facility: CLINIC | Age: 80
End: 2020-04-01

## 2020-04-01 DIAGNOSIS — Z79.01 LONG TERM CURRENT USE OF ANTICOAGULANTS WITH INR GOAL OF 2.0-3.0: ICD-10-CM

## 2020-04-01 DIAGNOSIS — I48.20 CHRONIC ATRIAL FIBRILLATION (H): Primary | ICD-10-CM

## 2020-04-09 ENCOUNTER — ANTICOAGULATION THERAPY VISIT (OUTPATIENT)
Dept: FAMILY MEDICINE | Facility: CLINIC | Age: 80
End: 2020-04-09

## 2020-04-09 DIAGNOSIS — Z79.01 LONG TERM CURRENT USE OF ANTICOAGULANTS WITH INR GOAL OF 2.0-3.0: ICD-10-CM

## 2020-04-09 DIAGNOSIS — I48.20 CHRONIC ATRIAL FIBRILLATION (H): ICD-10-CM

## 2020-04-09 LAB
CAPILLARY BLOOD COLLECTION: NORMAL
INR BLD: 3.2 (ref 0.86–1.14)

## 2020-04-09 PROCEDURE — 36416 COLLJ CAPILLARY BLOOD SPEC: CPT | Performed by: INTERNAL MEDICINE

## 2020-04-09 PROCEDURE — 99207 ZZC NO CHARGE NURSE ONLY: CPT | Performed by: INTERNAL MEDICINE

## 2020-04-09 PROCEDURE — 85610 PROTHROMBIN TIME: CPT | Mod: QW | Performed by: INTERNAL MEDICINE

## 2020-04-09 NOTE — PROGRESS NOTES
ANTICOAGULATION FOLLOW-UP CLINIC VISIT    Patient Name:  Aniket Macias  Date:  4/9/2020  Contact Type:  Telephone/ Lowell    SUBJECTIVE:  Patient Findings     Positives:   Change in diet/appetite    Comments:   Because of Covid has not been eating greens but went and bought some today in hopes to resume his normal intake.         Clinical Outcomes     Negatives:   Major bleeding event, Thromboembolic event, Anticoagulation-related hospital admission, Anticoagulation-related ED visit, Anticoagulation-related fatality    Comments:   Because of Covid has not been eating greens but went and bought some today in hopes to resume his normal intake.            OBJECTIVE    INR Point of Care   Date Value Ref Range Status   04/09/2020 3.2 (H) 0.86 - 1.14 Final     Comment:     This test is intended for monitoring Coumadin therapy.  Results are not   accurate in patients with prolonged INR due to factor deficiency.         ASSESSMENT / PLAN  INR assessment SUPRA    Recheck INR In: 6 WEEKS    INR Location Clinic      Anticoagulation Summary  As of 4/9/2020    INR goal:   2.0-3.0   TTR:   74.7 % (1 y)   INR used for dosing:   3.2! (4/9/2020)   Warfarin maintenance plan:   2.5 mg (5 mg x 0.5) every Tue, Fri; 5 mg (5 mg x 1) all other days   Full warfarin instructions:   2.5 mg every Tue, Fri; 5 mg all other days   Weekly warfarin total:   30 mg   No change documented:   Radha Seymour RN   Plan last modified:   Shazia Marquez RN (4/30/2019)   Next INR check:   5/21/2020   Priority:   Maintenance   Target end date:   Indefinite    Indications    Paroxysmal atrial fibrillation (Resolved) [I48.0]  Long term current use of anticoagulants with INR goal of 2.0-3.0 [Z79.01]  Chronic atrial fibrillation [I48.20]             Anticoagulation Episode Summary     INR check location:       Preferred lab:       Send INR reminders to:   [facility]    Comments:         Anticoagulation Care Providers     Provider Role  Specialty Phone number    Maribeth Haney MD Referring Internal Medicine [phone]    Sebastián Bermudez MD Responsible Cardiology [phone]            See the Encounter Report to view Anticoagulation Flowsheet and Dosing Calendar (Go to Encounters tab in chart review, and find the Anticoagulation Therapy Visit)    Patient INR is supra therapeutic today.  Patient will increase greens and continue maintenance dosing of 30 mg and follow up in 6 weeks or sooner if there are any concerns or problems.    Signs of bleeding and when appropriate to seek care for symptoms reviewed.    Adjustment Rational:  Dosage adjustment made based on physician directed care plan.      Radha Seymour RN

## 2020-05-03 NOTE — PROGRESS NOTES
"Aniket Macias is a 79 year old male who is being evaluated via a billable video visit.      The patient has been notified of following:     \"This video visit will be conducted via a call between you and your physician/provider. We have found that certain health care needs can be provided without the need for an in-person physical exam.  This service lets us provide the care you need with a video conversation.  If a prescription is necessary we can send it directly to your pharmacy.  If lab work is needed we can place an order for that and you can then stop by our lab to have the test done at a later time.    Video visits are billed at different rates depending on your insurance coverage.  Please reach out to your insurance provider with any questions.    If during the course of the call the physician/provider feels a video visit is not appropriate, you will not be charged for this service.\"    Patient has given verbal consent for Video visit? Yes    How would you like to obtain your AVS? Aidan    Patient would like the video invitation sent by: Send to e-mail at: [email or web address]    Will anyone else be joining your video visit? No        Video-Visit Details    Type of service:  Video Visit    Video Start Time: 3:44 PM  Video End Time: 4:45 PM    Originating Location (pt. 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     HPI:   In January 2020, Mr. Macias presented to his PCP's office with a cough, and was found to have SpO2 of 94%. A CXR was done, concerning for " left lung pneumonia. He was treated with Augmentin and azithromycin. This improved his cough, but he continued to feel short of breath, especially with exertion. He also noted some lightheadedness when he stood up quickly. He was complaining of dyspnea requiring him to stop after 1 flight of stairs (a change from baseline of 3 flights of stairs). He saw his PCP, Dr. Haney, for these complaints in February. A CT was done at that time, showing emphysema and pulmonary fibrosis in a possible UIP pattern. He was referred to ILD clinic.    Mr. Macias states that prior to January, he had no issue with shortness of breath or cough. In January, when he first got sick, he had a dry cough a that point. During the course of that illness, he feels he pulled a muscle in his back from coughing, which still hurts a bit (severity 2/10, mild and achy, non-radiating). He did see a chiropractor for this. Overall, he improved after his courses of antibiotics, but continues to have a lingering dry cough, which mostly occurs at night, but doesn't wake him up. He notices it most when he lies down flat. He denies cough after eating, or episodes of trouble swallowing or choking on food or drink. He does complain of occasional heartburn - not every day- treated with PRN TUMS. He does also have some rhinorrhea/sneezing, and thinks he has some seasonal allergies. He denies post nasal drip. No wheeze. No chest pain.     At baseline, he does have some palpitations from his atrial fibrillation, and some mild left ankle swelling which is chronic.     Currently, he does not feel limited by his breathing. Although he did have some exertional dyspnea above his baseline while he had his pneumonia, he is now back to his baseline, able to climb 3 flights of stairs without stopping (at a slow pace). On flat ground, he feels his exertion is not limited at all by breathing, and [place].      He is a former smoker, having smoked from about age 17 to 54 or so, up to 2 packs per day. He does not currently use any breathing medications.     No family history of lung disease or autoimmune disease.     Exposure History:  Tobacco: Former use, quit about 25 years ago, used 1/2-2 ppd for 35 years or so, up to 70 pack years.   Other inhaled substances: No pipes, vaping, marijuana.   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   Pets: No pets, no birds.   Allergies: seasonal allergies  Hobbies: enjoys being on the computer. Wal[place] around [place]    Travel: Nov 2019 to [place] on [facility] border (previously lived in [place] from 1995 to [place]).   Home: [place]. No feather pillows or down comforters. No mold in apartment, no hot tub use. No humidifier. No musical instrument use.     ROS: Complete 10 point ROS negative unless mentioned in HPI    Allergies and current medications: Reviewed in EHR  Past medical, surgical, social, and family histories: Personally reviewed and updated (if needed) during this visit.     Constitutional - looks well, in no apparent distress  Eyes - no redness or discharg  Respiratory -breathing appears comfortable.  No cough.  Skin - No appreciable discoloration or lesions (very limited exam)  Neurological - No apparent tremors. Speech fluent and articlate  Psychiatric - no signs of delirium or anxiety     Exam limited to that easily identified on a virtual visit. The rest of a comprehensive physical examination is deferred due to PHE (public health emergency) video visit restrictions.    Labs and Radiology: All new data personally reviewed by me. I have reviewed the results with the patient today. New results summarized below:  Laboratory data:  7/29/19 K 4.3 Cr 1.10  Cardiac studies:   7/10/19 MRI cardiac: Normal LV/RV size and systolic function. The LV EF is 67%. Regadenoson  stress perfusion imaging shows no ischemia. Both atria are moderate to severely dilated.   6/6/19 echo: 1. Rate controlled atrial fibrillation during study. 2. Left ventricular systolic function is normal. The visual ejection fraction is estimated at 60-65%. 3. There is mild concentric left ventricular hypertrophy. 4. No regional wall motion abnormalities noted. 5. The right ventricle is normal in structure, function and size. 6. The left atrium is severely dilated. 7. The aortic valve is sclerotic without significant stenosis.  Imaging studies:   3/17/20 CT chest: 1.  There is both severe emphysema and pulmonary fibrosis in a UIP pattern.  2/19/20 CXR: Moderate fibrotic change. No consolidation. The cardiac silhouette is not enlarged. Pulmonary vasculature is unremarkable.  1/27/20 CXR: No pleural fluid or pneumothorax. Heterogeneous opacity throughout the lungs related to a background of fibrosis and emphysema. A subtle opacity in the left midlung on the frontal view could represent a small focus of airspace disease, however. The cardiomediastinal silhouette is normal in size. There is aortic Calcification.  4/11/16 CT chest PE study: 1. No visualized thoracic aortic dissection or pulmonary embolus. 2. No evidence of active pulmonary disease. 3. Mild irregular interstitial opacities in the periphery of both lungs with associated mild traction bronchiectasis. These findings are suggestive of fibrosis. 4. A few nonspecific mildly enlarged mediastinal and bilateral hilar lymph nodes. 5. Nonspecific 2 cm cyst in the pancreatic head. Recommend comparison with prior imaging studies, if available. If not available, a followup noncontrast MR could be considered in 12 months for reevaluation    Pulmonary Function Testing: personally reviewed.   No PFT testing in system at this point.     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 We will screen for other causes of ILD, and obtain PFTs (hopefully this week) to classify disease severity (the patient declined to have these done prior to visit as ordered, but is willing to come in now). The large cysts on his CT are most likely emphysema-related, but autoimmune ILD should be ruled out as well. Although he feels he is at his baseline, I question if initiation of therapy for his underlying emphysema may improve his exercise tolerance and quality of life. Anti-fibrotic medications may also be indicated, and were discussed with the patient briefly, however I would like to see his PFTs before making this decision, and pending discussion with the patient and disease severity, it could be appropriate to follow their trend over several months before starting anti-fibrotic meds, given he is relatively asymptomatic and the significant side effects these medications can have.   -Check ILD panel   -Check myositis panel  -Check CBC and CMP in case of initiation of therapy   -Will plan to discuss in ILD conference, and contact patient afterward with results  -Pneumonia vaccinations (13 and 23 valent) up to date (2015 and 2017, respectively)  -Should have annual influenza immunization (up to date)  -Encouraged ongoing activity/exercise  -Discussed anti-fibrotic medications briefly today  -Consider pulmonary rehab referral pending results of PFTs  -Pending PFTs, would likely start inhaler therapy for COPD component of disease, likely with Anoro 62.5/25 mcg/inh 1 puff daily, for at least a 2 month trial.   -Depending on disease severity and results of  testing/ILD conference discussion, may also consider starting anti-fibrotic therapy, vs monitoring for progression for 3 months.   -Follow up in 3 months with PFTs    I spent 61 minutes on this patient interaction (face-to-face), with >50% of time in counseling/coordination of care.     Cesar Treviño MD   of Medicine  Division of Pulmonary, Critical Care, Allergy, and Sleep Medicine    Addendum:   Discussed in ILD conference on 5/11/20. Imaging shows emphysema plus some fibrosis. It is indeterminate vs early UIP. 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 Given relatively asymptomatic state, will start with inhaler therapy, try Anoro 1 puff daily, plus albuterol PRN. Should try for 2-3 month trial at least. If PFTs worsening at follow up, would consider starting anti-fibrotics as well, though unclear how much benefit these have in CPFE. Pulmonary rehab would likely be helpful as well, once able to do this post-COVID.     Cesar Treviño MD

## 2020-05-04 ENCOUNTER — VIRTUAL VISIT (OUTPATIENT)
Dept: PULMONOLOGY | Facility: CLINIC | Age: 80
End: 2020-05-04
Attending: INTERNAL MEDICINE
Payer: MEDICARE

## 2020-05-04 DIAGNOSIS — J43.9 PULMONARY EMPHYSEMA, UNSPECIFIED EMPHYSEMA TYPE (H): ICD-10-CM

## 2020-05-04 DIAGNOSIS — J84.9 ILD (INTERSTITIAL LUNG DISEASE) (H): Primary | ICD-10-CM

## 2020-05-04 PROCEDURE — 86606 ASPERGILLUS ANTIBODY: CPT | Mod: GT | Performed by: INTERNAL MEDICINE

## 2020-05-04 PROCEDURE — 86038 ANTINUCLEAR ANTIBODIES: CPT | Mod: GT | Performed by: INTERNAL MEDICINE

## 2020-05-04 PROCEDURE — 86331 IMMUNODIFFUSION OUCHTERLONY: CPT | Mod: GT | Performed by: INTERNAL MEDICINE

## 2020-05-04 PROCEDURE — 82787 IGG 1 2 3 OR 4 EACH: CPT | Mod: GT | Performed by: INTERNAL MEDICINE

## 2020-05-04 PROCEDURE — 83516 IMMUNOASSAY NONANTIBODY: CPT | Mod: GT | Performed by: INTERNAL MEDICINE

## 2020-05-04 PROCEDURE — 86235 NUCLEAR ANTIGEN ANTIBODY: CPT | Mod: GT | Performed by: INTERNAL MEDICINE

## 2020-05-04 NOTE — PATIENT INSTRUCTIONS
I am concerned that you have interstitial lung disease, or ILD. The pattern looks most like combined pulmonary fibrosis and emphysema (CPFE), but I would like to do some more testing to ensure this is the correct diagnosis, and then we will discuss your case at our multidisciplinary Insterstitial lung disease conference.     We will contact you after this conference with the results. There are treatment options, including anti-fibrotic medications, and inhalers that may help you.     We will have you get breathing tests and lab work this week.     We will set you up for follow up in 3 months either way.

## 2020-05-06 ENCOUNTER — ANTICOAGULATION THERAPY VISIT (OUTPATIENT)
Dept: FAMILY MEDICINE | Facility: CLINIC | Age: 80
End: 2020-05-06
Payer: MEDICARE

## 2020-05-06 DIAGNOSIS — J84.9 ILD (INTERSTITIAL LUNG DISEASE) (H): Primary | ICD-10-CM

## 2020-05-06 DIAGNOSIS — J84.9 ILD (INTERSTITIAL LUNG DISEASE) (H): ICD-10-CM

## 2020-05-06 DIAGNOSIS — Z79.01 LONG TERM CURRENT USE OF ANTICOAGULANTS WITH INR GOAL OF 2.0-3.0: ICD-10-CM

## 2020-05-06 DIAGNOSIS — J43.9 PULMONARY EMPHYSEMA, UNSPECIFIED EMPHYSEMA TYPE (H): ICD-10-CM

## 2020-05-06 DIAGNOSIS — I48.20 CHRONIC ATRIAL FIBRILLATION (H): ICD-10-CM

## 2020-05-06 LAB
6 MIN WALK (FT): 1450 FT
6 MIN WALK (M): 442 M
ALBUMIN SERPL-MCNC: 3.5 G/DL (ref 3.4–5)
ALP SERPL-CCNC: 79 U/L (ref 40–150)
ALT SERPL W P-5'-P-CCNC: 40 U/L (ref 0–70)
ANION GAP SERPL CALCULATED.3IONS-SCNC: 5 MMOL/L (ref 3–14)
AST SERPL W P-5'-P-CCNC: 25 U/L (ref 0–45)
BASOPHILS # BLD AUTO: 0 10E9/L (ref 0–0.2)
BASOPHILS NFR BLD AUTO: 0.3 %
BILIRUB SERPL-MCNC: 0.8 MG/DL (ref 0.2–1.3)
BUN SERPL-MCNC: 18 MG/DL (ref 7–30)
CALCIUM SERPL-MCNC: 9 MG/DL (ref 8.5–10.1)
CHLORIDE SERPL-SCNC: 105 MMOL/L (ref 94–109)
CK SERPL-CCNC: 148 U/L (ref 30–300)
CO2 SERPL-SCNC: 27 MMOL/L (ref 20–32)
CREAT SERPL-MCNC: 1.03 MG/DL (ref 0.66–1.25)
CRP SERPL-MCNC: <2.9 MG/L (ref 0–8)
DIFFERENTIAL METHOD BLD: ABNORMAL
EOSINOPHIL # BLD AUTO: 0.2 10E9/L (ref 0–0.7)
EOSINOPHIL NFR BLD AUTO: 2.2 %
ERYTHROCYTE [DISTWIDTH] IN BLOOD BY AUTOMATED COUNT: 12.9 % (ref 10–15)
ERYTHROCYTE [SEDIMENTATION RATE] IN BLOOD BY WESTERGREN METHOD: 12 MM/H (ref 0–20)
GFR SERPL CREATININE-BSD FRML MDRD: 68 ML/MIN/{1.73_M2}
GLUCOSE SERPL-MCNC: 104 MG/DL (ref 70–99)
HCT VFR BLD AUTO: 42 % (ref 40–53)
HGB BLD-MCNC: 13.9 G/DL (ref 13.3–17.7)
IMM GRANULOCYTES # BLD: 0.1 10E9/L (ref 0–0.4)
IMM GRANULOCYTES NFR BLD: 0.7 %
INR PPP: 3.28 (ref 0.86–1.14)
LYMPHOCYTES # BLD AUTO: 1.4 10E9/L (ref 0.8–5.3)
LYMPHOCYTES NFR BLD AUTO: 15.5 %
MCH RBC QN AUTO: 33.6 PG (ref 26.5–33)
MCHC RBC AUTO-ENTMCNC: 33.1 G/DL (ref 31.5–36.5)
MCV RBC AUTO: 101 FL (ref 78–100)
MONOCYTES # BLD AUTO: 0.7 10E9/L (ref 0–1.3)
MONOCYTES NFR BLD AUTO: 7.6 %
NEUTROPHILS # BLD AUTO: 6.5 10E9/L (ref 1.6–8.3)
NEUTROPHILS NFR BLD AUTO: 73.7 %
NRBC # BLD AUTO: 0 10*3/UL
NRBC BLD AUTO-RTO: 0 /100
PLATELET # BLD AUTO: 191 10E9/L (ref 150–450)
POTASSIUM SERPL-SCNC: 4.3 MMOL/L (ref 3.4–5.3)
PROT SERPL-MCNC: 6.9 G/DL (ref 6.8–8.8)
RBC # BLD AUTO: 4.14 10E12/L (ref 4.4–5.9)
SODIUM SERPL-SCNC: 136 MMOL/L (ref 133–144)
WBC # BLD AUTO: 8.9 10E9/L (ref 4–11)

## 2020-05-06 PROCEDURE — 86038 ANTINUCLEAR ANTIBODIES: CPT | Performed by: INTERNAL MEDICINE

## 2020-05-06 PROCEDURE — 86235 NUCLEAR ANTIGEN ANTIBODY: CPT | Performed by: INTERNAL MEDICINE

## 2020-05-06 PROCEDURE — 86255 FLUORESCENT ANTIBODY SCREEN: CPT | Performed by: INTERNAL MEDICINE

## 2020-05-06 PROCEDURE — 86431 RHEUMATOID FACTOR QUANT: CPT | Performed by: INTERNAL MEDICINE

## 2020-05-06 PROCEDURE — 86039 ANTINUCLEAR ANTIBODIES (ANA): CPT | Performed by: INTERNAL MEDICINE

## 2020-05-06 PROCEDURE — 99207 ZZC NO CHARGE NURSE ONLY: CPT | Performed by: INTERNAL MEDICINE

## 2020-05-06 PROCEDURE — 82787 IGG 1 2 3 OR 4 EACH: CPT | Performed by: INTERNAL MEDICINE

## 2020-05-06 PROCEDURE — 86200 CCP ANTIBODY: CPT | Performed by: INTERNAL MEDICINE

## 2020-05-06 PROCEDURE — 82784 ASSAY IGA/IGD/IGG/IGM EACH: CPT | Performed by: INTERNAL MEDICINE

## 2020-05-06 NOTE — PROGRESS NOTES
ANTICOAGULATION FOLLOW-UP CLINIC VISIT    Patient Name:  Aniket Macias  Date:  5/6/2020  Contact Type:  Telephone/ [place]    SUBJECTIVE:  Patient Findings     Positives:   Change in diet/appetite    Comments:   Still not eating as many greens as previous before Covid.         Clinical Outcomes     Negatives:   Major bleeding event, Thromboembolic event, Anticoagulation-related hospital admission, Anticoagulation-related ED visit, Anticoagulation-related fatality    Comments:   Still not eating as many greens as previous before Covid.            OBJECTIVE    INR   Date Value Ref Range Status   05/06/2020 3.28 (H) 0.86 - 1.14 Final       ASSESSMENT / PLAN  INR assessment SUPRA    Recheck INR In: 6 WEEKS    INR Location Clinic      Anticoagulation Summary  As of 5/6/2020    INR goal:   2.0-3.0   TTR:   67.3 % (1 y)   INR used for dosing:   3.28! (5/6/2020)   Warfarin maintenance plan:   2.5 mg (5 mg x 0.5) every Mon, Wed, Fri; 5 mg (5 mg x 1) all other days   Full warfarin instructions:   2.5 mg every Mon, Wed, Fri; 5 mg all other days   Weekly warfarin total:   27.5 mg   Plan last modified:   Radha Seymour RN (5/6/2020)   Next INR check:   6/17/2020   Priority:   Maintenance   Target end date:   Indefinite    Indications    Paroxysmal atrial fibrillation (Resolved) [I48.0]  Long term current use of anticoagulants with INR goal of 2.0-3.0 [Z79.01]  Chronic atrial fibrillation [I48.20]             Anticoagulation Episode Summary     INR check location:       Preferred lab:       Send INR reminders to:   [facility]    Comments:         Anticoagulation Care Providers     Provider Role Specialty Phone number    Maribeth Haney MD Referring Internal Medicine [phone]    Sebastián Bermudez MD Responsible Cardiology [phone]            See the Encounter Report to view Anticoagulation Flowsheet and Dosing Calendar (Go to Encounters tab in chart review, and find the Anticoagulation Therapy  Visit)    Patient INR is supra therapeutic today.  Patient will decrease maintenance dosing to 27.5 mg and follow up in 6 weeks or sooner if there are any concerns or problems.    Signs of bleeding and when appropriate to seek care for symptoms reviewed.    Adjustment Rational: 8%  Dosage adjustment made based on physician directed care plan.      Radha Seymour RN

## 2020-05-07 LAB
ALDOLASE SERPL-CCNC: 4.6 U/L (ref 1.5–8.1)
ANA PAT SER IF-IMP: ABNORMAL
ANA SER QL IF: ABNORMAL
ANA TITR SER IF: ABNORMAL {TITER}
ANCA AB PATTERN SER IF-IMP: NORMAL
C-ANCA TITR SER IF: NORMAL {TITER}
DLCOUNC-%PRED-PRE: 57 %
DLCOUNC-PRE: 14.02 ML/MIN/MMHG
DLCOUNC-PRED: 24.4 ML/MIN/MMHG
ENA JO1 IGG SER-ACNC: <0.2 AI (ref 0–0.9)
ENA SCL70 IGG SER IA-ACNC: <0.2 AI (ref 0–0.9)
ENA SS-A IGG SER IA-ACNC: <0.2 AI (ref 0–0.9)
ENA SS-B IGG SER IA-ACNC: <0.2 AI (ref 0–0.9)
ERV-%PRED-PRE: 124 %
ERV-PRE: 1.12 L
ERV-PRED: 0.9 L
EXPTIME-PRE: 8.68 SEC
FEF2575-%PRED-PRE: 92 %
FEF2575-PRE: 1.89 L/SEC
FEF2575-PRED: 2.03 L/SEC
FEFMAX-%PRED-PRE: 99 %
FEFMAX-PRE: 7.19 L/SEC
FEFMAX-PRED: 7.23 L/SEC
FEV1-%PRED-PRE: 97 %
FEV1-PRE: 2.8 L
FEV1FEV6-PRE: 73 %
FEV1FEV6-PRED: 76 %
FEV1FVC-PRE: 72 %
FEV1FVC-PRED: 75 %
FEV1SVC-PRE: 70 %
FEV1SVC-PRED: 64 %
FIFMAX-PRE: 5.73 L/SEC
FRCPLETH-%PRED-PRE: 70 %
FRCPLETH-PRE: 2.67 L
FRCPLETH-PRED: 3.79 L
FVC-%PRED-PRE: 100 %
FVC-PRE: 3.9 L
FVC-PRED: 3.89 L
IC-%PRED-PRE: 79 %
IC-PRE: 2.87 L
IC-PRED: 3.63 L
RVPLETH-%PRED-PRE: 54 %
RVPLETH-PRE: 1.55 L
RVPLETH-PRED: 2.86 L
TLCPLETH-%PRED-PRE: 77 %
TLCPLETH-PRE: 5.54 L
TLCPLETH-PRED: 7.13 L
VA-%PRED-PRE: 75 %
VA-PRE: 4.76 L
VC-%PRED-PRE: 88 %
VC-PRE: 4 L
VC-PRED: 4.53 L

## 2020-05-08 LAB
CCP AB SER IA-ACNC: 2 U/ML
IGG SERPL-MCNC: 803 MG/DL (ref 610–1616)
IGG1 SER-MCNC: 337 MG/DL (ref 382–929)
IGG2 SER-MCNC: 427 MG/DL (ref 242–700)
IGG3 SER-MCNC: 92 MG/DL (ref 22–176)
IGG4 SER-MCNC: 28 MG/DL (ref 4–86)
RHEUMATOID FACT SER NEPH-ACNC: <7 IU/ML (ref 0–20)

## 2020-05-11 DIAGNOSIS — E78.5 HYPERLIPIDEMIA LDL GOAL <100: ICD-10-CM

## 2020-05-11 LAB
A FLAVUS AB SER QL ID: NORMAL
A FUMIGATUS1 AB SER QL ID: ABNORMAL
A FUMIGATUS2 AB SER QL ID: NORMAL
A FUMIGATUS3 AB SER QL ID: NORMAL
A FUMIGATUS6 AB SER QL ID: ABNORMAL
A PULLULANS AB SER QL ID: DETECTED
PIGEON DROP IGE QN: ABNORMAL
S RECTIVIRGULA AB SER QL ID: ABNORMAL
S VIRIDIS AB SER QL ID: NORMAL
T CANDIDUS AB SER QL: NORMAL
T VULGARIS AB SER QL ID: ABNORMAL

## 2020-05-11 RX ORDER — ATORVASTATIN CALCIUM 40 MG/1
TABLET, FILM COATED ORAL
Qty: 90 TABLET | Refills: 2 | Status: SHIPPED | OUTPATIENT
Start: 2020-05-11 | End: 2020-07-30

## 2020-05-11 NOTE — TELEPHONE ENCOUNTER
Routing refill request to provider for review/approval because:  Labs not current:  LDL    Tova Castrejon RN, BSN  [facility] [place]

## 2020-05-15 DIAGNOSIS — J43.9 PULMONARY EMPHYSEMA, UNSPECIFIED EMPHYSEMA TYPE (H): ICD-10-CM

## 2020-05-15 RX ORDER — ALBUTEROL SULFATE 90 UG/1
1-2 AEROSOL, METERED RESPIRATORY (INHALATION) EVERY 4 HOURS PRN
Qty: 1 INHALER | Refills: 11 | Status: SHIPPED | OUTPATIENT
Start: 2020-05-15 | End: 2023-08-22

## 2020-05-19 DIAGNOSIS — I10 BENIGN ESSENTIAL HYPERTENSION: Chronic | ICD-10-CM

## 2020-05-19 LAB
ANNOTATION COMMENT IMP: NORMAL
EJ AB SER QL: NEGATIVE
ENA JO1 AB TITR SER: 8 AU/ML (ref 0–40)
MDA5 (CADM 140) ABY: NEGATIVE
MI2 AB SER QL: NEGATIVE
NXP-2 (NUCLEAR MATRIX PROTEIN 2) ABY: NEGATIVE
OJ AB SER QL: NEGATIVE
P155/140 (TIF1-GAMMA) ANTIBODY: NEGATIVE
PL12 AB SER QL: NEGATIVE
PL7 AB SER QL: NEGATIVE
SAE1 (SUMO ACTIVATING ENZYME) ABY: NEGATIVE
SRP AB SERPL QL: NEGATIVE
TIF-1 GAMMA ANTIBODY: NEGATIVE

## 2020-05-19 RX ORDER — LISINOPRIL 40 MG/1
TABLET ORAL
Qty: 90 TABLET | Refills: 1 | Status: SHIPPED | OUTPATIENT
Start: 2020-05-19 | End: 2020-07-30

## 2020-05-19 NOTE — TELEPHONE ENCOUNTER
Prescription approved per [facility] [place] [facility] Refill Protocol.    Tova Castrejon RN, BSN  [facility] [place]

## 2020-06-17 ENCOUNTER — ANTICOAGULATION THERAPY VISIT (OUTPATIENT)
Dept: FAMILY MEDICINE | Facility: CLINIC | Age: 80
End: 2020-06-17

## 2020-06-17 DIAGNOSIS — Z79.01 LONG TERM CURRENT USE OF ANTICOAGULANTS WITH INR GOAL OF 2.0-3.0: ICD-10-CM

## 2020-06-17 DIAGNOSIS — I48.20 CHRONIC ATRIAL FIBRILLATION (H): ICD-10-CM

## 2020-06-17 LAB
CAPILLARY BLOOD COLLECTION: NORMAL
INR BLD: 2.7 (ref 0.86–1.14)

## 2020-06-17 PROCEDURE — 36416 COLLJ CAPILLARY BLOOD SPEC: CPT | Performed by: INTERNAL MEDICINE

## 2020-06-17 PROCEDURE — 85610 PROTHROMBIN TIME: CPT | Mod: QW | Performed by: INTERNAL MEDICINE

## 2020-06-17 PROCEDURE — 99207 ZZC NO CHARGE NURSE ONLY: CPT | Performed by: INTERNAL MEDICINE

## 2020-06-17 NOTE — PROGRESS NOTES
ANTICOAGULATION FOLLOW-UP CLINIC VISIT    Patient Name:  Aniket Macias  Date:  2020  Contact Type:  Telephone    SUBJECTIVE:  Patient Findings     Comments:   The patient was assessed for diet, medication, and activity level changes, missed or extra doses, bruising or bleeding, with no problem findings.          Clinical Outcomes     Negatives:   Major bleeding event, Thromboembolic event, Anticoagulation-related hospital admission, Anticoagulation-related ED visit, Anticoagulation-related fatality    Comments:   The patient was assessed for diet, medication, and activity level changes, missed or extra doses, bruising or bleeding, with no problem findings.             OBJECTIVE    Recent labs: (last 7 days)     20  0959   INR 2.7*       ASSESSMENT / PLAN  INR assessment THER    Recheck INR In: 6 WEEKS    INR Location Clinic      Anticoagulation Summary  As of 2020    INR goal:   2.0-3.0   TTR:   65.3 % (1 y)   INR used for dosin.7 (2020)   Warfarin maintenance plan:   2.5 mg (5 mg x 0.5) every Mon, Wed, Fri; 5 mg (5 mg x 1) all other days   Full warfarin instructions:   2.5 mg every Mon, Wed, Fri; 5 mg all other days   Weekly warfarin total:   27.5 mg   No change documented:   Susannah Hines RN   Plan last modified:   Radha Seymour RN (2020)   Next INR check:   2020   Priority:   Maintenance   Target end date:   Indefinite    Indications    Paroxysmal atrial fibrillation (Resolved) [I48.0]  Long term current use of anticoagulants with INR goal of 2.0-3.0 [Z79.01]  Chronic atrial fibrillation (H) [I48.20]             Anticoagulation Episode Summary     INR check location:       Preferred lab:       Send INR reminders to:   [facility]    Comments:         Anticoagulation Care Providers     Provider Role Specialty Phone number    Maribeth Haney MD Referring Internal Medicine [phone]    Sebastián Bermudez MD Responsible Cardiology [phone]             See the Encounter Report to view Anticoagulation Flowsheet and Dosing Calendar (Go to Encounters tab in chart review, and find the Anticoagulation Therapy Visit)    Patient INR is therapeutic.  Patient will continue to take 27.5 mg weekly dosing and follow up in 6 weeks or sooner for any problems or concerns.    Susannah Hines RN  [facility]

## 2020-07-09 ASSESSMENT — ACTIVITIES OF DAILY LIVING (ADL): CURRENT_FUNCTION: NO ASSISTANCE NEEDED

## 2020-07-30 ENCOUNTER — ANTICOAGULATION THERAPY VISIT (OUTPATIENT)
Dept: FAMILY MEDICINE | Facility: CLINIC | Age: 80
End: 2020-07-30

## 2020-07-30 ENCOUNTER — OFFICE VISIT (OUTPATIENT)
Dept: FAMILY MEDICINE | Facility: CLINIC | Age: 80
End: 2020-07-30
Payer: MEDICARE

## 2020-07-30 VITALS
DIASTOLIC BLOOD PRESSURE: 62 MMHG | HEIGHT: 70 IN | SYSTOLIC BLOOD PRESSURE: 118 MMHG | RESPIRATION RATE: 16 BRPM | WEIGHT: 185 LBS | TEMPERATURE: 98.4 F | HEART RATE: 62 BPM | BODY MASS INDEX: 26.48 KG/M2 | OXYGEN SATURATION: 96 %

## 2020-07-30 DIAGNOSIS — J84.112 UIP (USUAL INTERSTITIAL PNEUMONITIS) (H): ICD-10-CM

## 2020-07-30 DIAGNOSIS — I48.20 CHRONIC ATRIAL FIBRILLATION (H): ICD-10-CM

## 2020-07-30 DIAGNOSIS — E78.5 HYPERLIPIDEMIA LDL GOAL <100: ICD-10-CM

## 2020-07-30 DIAGNOSIS — J43.9 PULMONARY EMPHYSEMA, UNSPECIFIED EMPHYSEMA TYPE (H): ICD-10-CM

## 2020-07-30 DIAGNOSIS — I10 BENIGN ESSENTIAL HYPERTENSION: Chronic | ICD-10-CM

## 2020-07-30 DIAGNOSIS — Z12.5 SCREENING FOR PROSTATE CANCER: ICD-10-CM

## 2020-07-30 DIAGNOSIS — Z00.00 ENCOUNTER FOR MEDICARE ANNUAL WELLNESS EXAM: Primary | ICD-10-CM

## 2020-07-30 DIAGNOSIS — I50.32 CHRONIC DIASTOLIC HEART FAILURE (H): ICD-10-CM

## 2020-07-30 DIAGNOSIS — Z79.01 LONG TERM CURRENT USE OF ANTICOAGULANTS WITH INR GOAL OF 2.0-3.0: ICD-10-CM

## 2020-07-30 LAB
CAPILLARY BLOOD COLLECTION: NORMAL
CHOLEST SERPL-MCNC: 127 MG/DL
HDLC SERPL-MCNC: 60 MG/DL
INR BLD: 2.8 (ref 0.86–1.14)
LDLC SERPL CALC-MCNC: 55 MG/DL
NONHDLC SERPL-MCNC: 67 MG/DL
PSA SERPL-ACNC: 0.87 UG/L (ref 0–4)
TRIGL SERPL-MCNC: 62 MG/DL

## 2020-07-30 PROCEDURE — 36415 COLL VENOUS BLD VENIPUNCTURE: CPT | Performed by: INTERNAL MEDICINE

## 2020-07-30 PROCEDURE — 85610 PROTHROMBIN TIME: CPT | Mod: QW | Performed by: INTERNAL MEDICINE

## 2020-07-30 PROCEDURE — 80061 LIPID PANEL: CPT | Performed by: INTERNAL MEDICINE

## 2020-07-30 PROCEDURE — 99207 ZZC NO CHARGE NURSE ONLY: CPT | Performed by: INTERNAL MEDICINE

## 2020-07-30 PROCEDURE — G0103 PSA SCREENING: HCPCS | Performed by: INTERNAL MEDICINE

## 2020-07-30 PROCEDURE — G0439 PPPS, SUBSEQ VISIT: HCPCS | Performed by: INTERNAL MEDICINE

## 2020-07-30 RX ORDER — ATORVASTATIN CALCIUM 40 MG/1
40 TABLET, FILM COATED ORAL DAILY
Qty: 90 TABLET | Refills: 4 | Status: SHIPPED | OUTPATIENT
Start: 2020-07-30 | End: 2021-08-17

## 2020-07-30 RX ORDER — LISINOPRIL 40 MG/1
40 TABLET ORAL DAILY
Qty: 90 TABLET | Refills: 4 | Status: SHIPPED | OUTPATIENT
Start: 2020-07-30 | End: 2021-08-17

## 2020-07-30 RX ORDER — FUROSEMIDE 40 MG
40 TABLET ORAL DAILY
Qty: 90 TABLET | Refills: 4 | Status: SHIPPED | OUTPATIENT
Start: 2020-07-30 | End: 2021-08-17

## 2020-07-30 ASSESSMENT — MIFFLIN-ST. JEOR: SCORE: 1550.4

## 2020-07-30 NOTE — PROGRESS NOTES
"SUBJECTIVE:   Aniket Macias is a 80 year old male who presents for Preventive Visit.    [place] lives with his wife in a co-op.  He is on the financial committee, so has been busy working on the budget for the new year.      Are you in the first 12 months of your Medicare Part B coverage?  No    Physical Health:  Answers for HPI/ROS submitted by the patient on 2020   Annual Exam:  In general, how would you rate your overall physical health?: good  Frequency of exercise:: 2-3 days/week  Do you usually eat at least 4 servings of fruit and vegetables a day, include whole grains & fiber, and avoid regularly eating high fat or \"junk\" foods? : No  Taking medications regularly:: Yes  Medication side effects:: Not applicable  Activities of Daily Living: no assistance needed  Home safety: no safety concerns identified  Hearing Impairment:: difficulty following a conversation in a noisy restaurant or crowded room  In the past 6 months, have you been bothered by leaking of urine?: Yes  In general, how would you rate your overall mental or emotional health?: good  Additional concerns today:: Yes  Duration of exercise:: 15-30 minutes    Mental Health:    In general, how would you rate your overall mental or emotional health? good  PHQ-2 Score: 0    Do you feel safe in your environment? Yes    Have you ever done Advance Care Planning? (For example, a Health Directive, POLST, or a discussion with a medical provider or your loved ones about your wishes): Yes, advance care planning is on file.    Additional concerns to address?  YES  Interstitial lung disease - following with pulm, he currently has no respiratory complaints.    Afib and CHF - no chest pain, SOB, palpitations.  Leg swelling is pretty stable, not bothersome.      Fall risk:  Fallen 2 or more times in the past year?: No  Any fall with injury in the past year?: No    Cognitive Screenin) Repeat 3 items (Leader, Season, Table)    2) Clock draw: NORMAL  3) 3 item " recall: Recalls 3 objects  Results: 3 items recalled: COGNITIVE IMPAIRMENT LESS LIKELY    Mini-CogTM Copyright QUENTIN Alexander. Licensed by the author for use in [facility]; reprinted with permission (gerda@.[facility]). All rights reserved.      Do you have sleep apnea, excessive snoring or daytime drowsiness?: no            Reviewed and updated as needed this visit by clinical staff  Tobacco  Allergies  Meds  Med Hx  Surg Hx  Fam Hx  Soc Hx        Reviewed and updated as needed this visit by Provider        Social History     Tobacco Use     Smoking status: Former Smoker     Packs/day: 2.00     Years: 35.00     Pack years: 70.00     Types: Cigarettes     Last attempt to quit: 1995     Years since quittin.2     Smokeless tobacco: Never Used     Tobacco comment: quit age 54   Substance Use Topics     Alcohol use: Yes     Alcohol/week: 0.0 standard drinks     Comment: 1-2 drinks per week                           Current providers sharing in care for this patient include:   Patient Care Team:  Maribeth Haney MD as PCP - General (Internal Medicine)  Maribeth Haney MD as Assigned PCP    The following health maintenance items are reviewed in Epic and correct as of today:  Health Maintenance   Topic Date Due     COPD ACTION PLAN  1940     ZOSTER IMMUNIZATION (1 of 2) 1989     MEDICARE ANNUAL WELLNESS VISIT  2020     LIPID  2020     FALL RISK ASSESSMENT  2020     INFLUENZA VACCINE (1) 2020     COLORECTAL CANCER SCREENING  2021     ADVANCE CARE PLANNING  2023     DTAP/TDAP/TD IMMUNIZATION (2 - Td) 2024     SPIROMETRY  Completed     PHQ-2  Completed     PNEUMOCOCCAL IMMUNIZATION 65+ LOW/MEDIUM RISK  Completed     IPV IMMUNIZATION  Aged Out     MENINGITIS IMMUNIZATION  Aged Out     HEPATITIS B IMMUNIZATION  Aged Out     Patient Active Problem List   Diagnosis     Benign essential hypertension; goal < 140/90     Hyperlipidemia     Glaucoma      Benign neoplasm of colon     Benign non-nodular prostatic hyperplasia with lower urinary tract symptoms     History of basal cell carcinoma     Rosacea     Spinal stenosis of lumbar region with neurogenic claudication     Long term current use of anticoagulants with INR goal of 2.0-3.0     Bilateral leg edema     Non-sustained ventricular tachycardia (H)     Chronic atrial fibrillation (H)     Chronic diastolic heart failure (H)     Pulmonary emphysema, unspecified emphysema type (H)     UIP (usual interstitial pneumonitis) (H)     Past Surgical History:   Procedure Laterality Date     BACK SURGERY       COLONOSCOPY  11/19/15    hx polyps     SURGICAL HISTORY OF -       Laminectomy     SURGICAL HISTORY OF -       biopsy of prostate     TONSILLECTOMY & ADENOIDECTOMY         Social History     Tobacco Use     Smoking status: Former Smoker     Packs/day: 2.00     Years: 35.00     Pack years: 70.00     Types: Cigarettes     Last attempt to quit: 1995     Years since quittin.2     Smokeless tobacco: Never Used     Tobacco comment: quit age 54   Substance Use Topics     Alcohol use: Yes     Alcohol/week: 0.0 standard drinks     Comment: 1-2 drinks per week     Family History   Problem Relation Age of Onset     Cerebrovascular Disease Mother      Hypertension Mother      Cerebrovascular Disease Father      Myocardial Infarction Father      Hypertension Father      Hypertension Sister      Myocardial Infarction Sister      LUNG DISEASE No family hx of          Current Outpatient Medications   Medication Sig Dispense Refill     ACETAMINOPHEN PO Take 1,000 mg by mouth every 6 hours as needed for pain       atorvastatin (LIPITOR) 40 MG tablet Take 1 tablet (40 mg) by mouth daily 90 tablet 4     furosemide (LASIX) 40 MG tablet Take 1 tablet (40 mg) by mouth daily 90 tablet 4     latanoprost (XALATAN) 0.005 % ophthalmic solution Place 1 drop into both eyes At Bedtime       lisinopril (ZESTRIL) 40 MG tablet Take 1 tablet  "(40 mg) by mouth daily 90 tablet 4     metoprolol tartrate (LOPRESSOR) 50 MG tablet TAKE 1 TABLET BY MOUTH TWICE DAILY  180 tablet 2     Multiple Vitamins-Minerals (MULTIVITAMIN PO) Take 1 tablet by mouth daily        terazosin (HYTRIN) 5 MG capsule TAKE ONE CAPSULE BY MOUTH ONCE DAILY AT BEDTIME  90 capsule 2     UNABLE TO FIND MEDICATION NAME: Pro-racia    OTC rosacea cream.       warfarin ANTICOAGULANT (COUMADIN) 5 MG tablet Take 1 tablet (5 mg) by mouth daily Or as directed by [facility] clinic 90 tablet 1     albuterol (PROAIR HFA/PROVENTIL HFA/VENTOLIN HFA) 108 (90 Base) MCG/ACT inhaler Inhale 1-2 puffs into the lungs every 4 hours as needed for shortness of breath / dyspnea or wheezing (Patient not taking: Reported on 7/30/2020) 1 Inhaler 11         ROS:  Constitutional, HEENT, cardiovascular, pulmonary, GI, , musculoskeletal, neuro, skin, endocrine and psych systems are negative, except as otherwise noted.    OBJECTIVE:   /62 (BP Location: Left arm, Patient Position: Sitting, Cuff Size: Adult Large)   Pulse 62   Temp 98.4  F (36.9  C) (Oral)   Resp 16   Ht 1.778 m (5' 10\")   Wt 83.9 kg (185 lb)   SpO2 96%   BMI 26.54 kg/m   Estimated body mass index is 26.54 kg/m  as calculated from the following:    Height as of this encounter: 1.778 m (5' 10\").    Weight as of this encounter: 83.9 kg (185 lb).  EXAM:   GENERAL: healthy, alert and no distress  EYES: Eyes grossly normal to inspection, PERRL and conjunctivae and sclerae normal  HENT: ear canals and TM's normal, mouth without ulcers or lesions  NECK: no adenopathy  RESP: lungs clear to auscultation - no rales, rhonchi or wheezes  CV: regular rate and rhythm, normal S1 S2, no S3 or S4, no murmur, click or rub, no peripheral edema and peripheral pulses strong  ABDOMEN: soft, nontender, no hepatosplenomegaly, no masses and bowel sounds normal  MS: no gross musculoskeletal defects noted, no edema  SKIN: no suspicious lesions or rashes  NEURO: Normal " "strength and tone, mentation intact and speech normal  PSYCH: mentation appears normal, affect normal/bright    Diagnostic Test Results:  CMP and CBC done in May, wnl     ASSESSMENT / PLAN:   1. Encounter for Medicare annual wellness exam  80 year old with pulmonary fibrosis and afib/CHF, but really doing quite well symptomatically.  BP and HR are at goal.    He will check with insurance about cost for shingrix vaccine     2. Chronic diastolic heart failure (H)  Due for cardiology follow up soon   - furosemide (LASIX) 40 MG tablet; Take 1 tablet (40 mg) by mouth daily  Dispense: 90 tablet; Refill: 4    3. Benign essential hypertension; goal < 140/90  At goal   - lisinopril (ZESTRIL) 40 MG tablet; Take 1 tablet (40 mg) by mouth daily  Dispense: 90 tablet; Refill: 4    4. Pulmonary emphysema, unspecified emphysema type (H)  Following with pulm, not on any medications     5. UIP (usual interstitial pneumonitis) (H)  As above     6. Hyperlipidemia LDL goal <100  - Lipid panel reflex to direct LDL Fasting  - atorvastatin (LIPITOR) 40 MG tablet; Take 1 tablet (40 mg) by mouth daily  Dispense: 90 tablet; Refill: 4    7. Screening for prostate cancer  - PSA, screen    COUNSELING:  Reviewed preventive health counseling, as reflected in patient instructions       Regular exercise       Healthy diet/nutrition    Estimated body mass index is 26.54 kg/m  as calculated from the following:    Height as of this encounter: 1.778 m (5' 10\").    Weight as of this encounter: 83.9 kg (185 lb).         reports that he quit smoking about 25 years ago. His smoking use included cigarettes. He has a 70.00 pack-year smoking history. He has never used smokeless tobacco.      Appropriate preventive services were discussed with this patient, including applicable screening as appropriate for cardiovascular disease, diabetes, osteopenia/osteoporosis, and glaucoma.  As appropriate for age/gender, discussed screening for colorectal cancer, prostate " cancer, breast cancer, and cervical cancer. Checklist reviewing preventive services available has been given to the patient.    Reviewed patients plan of care and provided an AVS. The Basic Care Plan (routine screening as documented in Health Maintenance) for Aniket meets the Care Plan requirement. This Care Plan has been established and reviewed with the Patient.    Counseling Resources:  ATP IV Guidelines  Pooled Cohorts Equation Calculator  Breast Cancer Risk Calculator  FRAX Risk Assessment  ICSI Preventive Guidelines  Dietary Guidelines for Americans, 2010  USDA's MyPlate  ASA Prophylaxis  Lung CA Screening    Maribeth Haney MD  [facility] [place]

## 2020-07-30 NOTE — PATIENT INSTRUCTIONS
Patient Education   Personalized Prevention Plan  You are due for the preventive services outlined below.  Your care team is available to assist you in scheduling these services.  If you have already completed any of these items, please share that information with your care team to update in your medical record.  Health Maintenance Due   Topic Date Due     COPD Action Plan  1940     Zoster (Shingles) Vaccine (1 of 2) 06/09/1989     Annual Wellness Visit  04/25/2020     Cholesterol Lab  04/25/2020     FALL RISK ASSESSMENT  04/25/2020

## 2020-07-30 NOTE — PROGRESS NOTES
ANTICOAGULATION FOLLOW-UP CLINIC VISIT    Patient Name:  Aniket Macias  Date:  2020  Contact Type:  Telephone    SUBJECTIVE:  Patient Findings     Comments:   The patient was assessed for diet, medication, and activity level changes, missed or extra doses, bruising or bleeding, with no problem findings.          Clinical Outcomes     Negatives:   Major bleeding event, Thromboembolic event, Anticoagulation-related hospital admission, Anticoagulation-related ED visit, Anticoagulation-related fatality    Comments:   The patient was assessed for diet, medication, and activity level changes, missed or extra doses, bruising or bleeding, with no problem findings.             OBJECTIVE    Recent labs: (last 7 days)     20  1023   INR 2.8*       ASSESSMENT / PLAN  INR assessment THER    Recheck INR In: 6 WEEKS    INR Location Clinic      Anticoagulation Summary  As of 2020    INR goal:   2.0-3.0   TTR:   73.0 % (1 y)   INR used for dosin.8 (2020)   Warfarin maintenance plan:   2.5 mg (5 mg x 0.5) every Mon, Wed, Fri; 5 mg (5 mg x 1) all other days   Full warfarin instructions:   2.5 mg every Mon, Wed, Fri; 5 mg all other days   Weekly warfarin total:   27.5 mg   Plan last modified:   Radha Seymour RN (2020)   Next INR check:   9/10/2020   Priority:   Maintenance   Target end date:   Indefinite    Indications    Paroxysmal atrial fibrillation (Resolved) [I48.0]  Long term current use of anticoagulants with INR goal of 2.0-3.0 [Z79.01]  Chronic atrial fibrillation (H) [I48.20]             Anticoagulation Episode Summary     INR check location:       Preferred lab:       Send INR reminders to:   [facility]    Comments:         Anticoagulation Care Providers     Provider Role Specialty Phone number    Maribeth Haney MD Referring Internal Medicine [phone]    Sebastián Bermudez MD Responsible Cardiology [phone]            See the Encounter Report to view  Anticoagulation Flowsheet and Dosing Calendar (Go to Encounters tab in chart review, and find the Anticoagulation Therapy Visit)    Patient INR is therapeutic.  Patient will continue to take 27.5 mg weekly dosing and follow up in 6 weeks or sooner for any problems or concerns.    Susannah Hines RN  [facility]

## 2020-08-12 ENCOUNTER — APPOINTMENT (OUTPATIENT)
Dept: URBAN - METROPOLITAN AREA CLINIC 255 | Age: 80
Setting detail: DERMATOLOGY
End: 2020-08-13

## 2020-08-12 DIAGNOSIS — D22 MELANOCYTIC NEVI: ICD-10-CM

## 2020-08-12 DIAGNOSIS — D18.0 HEMANGIOMA: ICD-10-CM

## 2020-08-12 DIAGNOSIS — L81.4 OTHER MELANIN HYPERPIGMENTATION: ICD-10-CM

## 2020-08-12 DIAGNOSIS — L57.0 ACTINIC KERATOSIS: ICD-10-CM

## 2020-08-12 DIAGNOSIS — L82.1 OTHER SEBORRHEIC KERATOSIS: ICD-10-CM

## 2020-08-12 PROBLEM — D18.01 HEMANGIOMA OF SKIN AND SUBCUTANEOUS TISSUE: Status: ACTIVE | Noted: 2020-08-12

## 2020-08-12 PROBLEM — D23.72 OTHER BENIGN NEOPLASM OF SKIN OF LEFT LOWER LIMB, INCLUDING HIP: Status: ACTIVE | Noted: 2020-08-12

## 2020-08-12 PROBLEM — D22.5 MELANOCYTIC NEVI OF TRUNK: Status: ACTIVE | Noted: 2020-08-12

## 2020-08-12 PROCEDURE — 17000 DESTRUCT PREMALG LESION: CPT

## 2020-08-12 PROCEDURE — OTHER COUNSELING: OTHER

## 2020-08-12 PROCEDURE — OTHER LIQUID NITROGEN: OTHER

## 2020-08-12 PROCEDURE — 17003 DESTRUCT PREMALG LES 2-14: CPT

## 2020-08-12 PROCEDURE — 99214 OFFICE O/P EST MOD 30 MIN: CPT | Mod: 25

## 2020-08-12 PROCEDURE — OTHER MIPS QUALITY: OTHER

## 2020-08-12 ASSESSMENT — LOCATION SIMPLE DESCRIPTION DERM
LOCATION SIMPLE: RIGHT TEMPLE
LOCATION SIMPLE: LEFT CHEEK
LOCATION SIMPLE: LEFT UPPER BACK
LOCATION SIMPLE: RIGHT UPPER BACK
LOCATION SIMPLE: LEFT TEMPLE
LOCATION SIMPLE: RIGHT CHEEK

## 2020-08-12 ASSESSMENT — LOCATION DETAILED DESCRIPTION DERM
LOCATION DETAILED: LEFT SUPERIOR MEDIAL UPPER BACK
LOCATION DETAILED: RIGHT CENTRAL TEMPLE
LOCATION DETAILED: LEFT MEDIAL MALAR CHEEK
LOCATION DETAILED: RIGHT MEDIAL UPPER BACK
LOCATION DETAILED: RIGHT CENTRAL MALAR CHEEK
LOCATION DETAILED: RIGHT INFERIOR MEDIAL UPPER BACK
LOCATION DETAILED: LEFT MEDIAL TEMPLE

## 2020-08-12 ASSESSMENT — LOCATION ZONE DERM
LOCATION ZONE: TRUNK
LOCATION ZONE: FACE

## 2020-09-10 ENCOUNTER — ANTICOAGULATION THERAPY VISIT (OUTPATIENT)
Dept: NURSING | Facility: CLINIC | Age: 80
End: 2020-09-10
Payer: MEDICARE

## 2020-09-10 DIAGNOSIS — I48.20 CHRONIC ATRIAL FIBRILLATION (H): ICD-10-CM

## 2020-09-10 DIAGNOSIS — Z79.01 LONG TERM CURRENT USE OF ANTICOAGULANTS WITH INR GOAL OF 2.0-3.0: ICD-10-CM

## 2020-09-10 LAB
CAPILLARY BLOOD COLLECTION: NORMAL
INR PPP: 2.6 (ref 0.86–1.14)

## 2020-09-10 PROCEDURE — 36416 COLLJ CAPILLARY BLOOD SPEC: CPT | Performed by: INTERNAL MEDICINE

## 2020-09-10 PROCEDURE — 99207 ZZC NO CHARGE NURSE ONLY: CPT

## 2020-09-10 PROCEDURE — 85610 PROTHROMBIN TIME: CPT | Performed by: INTERNAL MEDICINE

## 2020-09-10 NOTE — PROGRESS NOTES
ANTICOAGULATION FOLLOW-UP CLINIC VISIT    Patient Name:  Aniket Macias  Date:  9/10/2020  Contact Type:  Telephone    SUBJECTIVE:  Patient Findings     Comments:   Called patient to discuss today's INR results: The patient was assessed for diet, medication, and activity level changes, missed or extra doses, bruising or bleeding, with no problem findings. Reviewed maintenance warfarin dosing with patient. Patient will remain on the same dose until next INR check. No other questions or concerns. Scheduled next lab-only INR in 6 weeks.  Jeanette ROUSE RN  Antico[facility] Clinic  [place]          Clinical Outcomes     Negatives:   Major bleeding event, Thromboembolic event, Anticoagulation-related hospital admission, Anticoagulation-related ED visit, Anticoagulation-related fatality    Comments:   Called patient to discuss today's INR results: The patient was assessed for diet, medication, and activity level changes, missed or extra doses, bruising or bleeding, with no problem findings. Reviewed maintenance warfarin dosing with patient. Patient will remain on the same dose until next INR check. No other questions or concerns. Scheduled next lab-only INR in 6 weeks.  Jeanette ROUSE RN  Anticoagulation Clinic  [place]             OBJECTIVE    Recent labs: (last 7 days)     09/10/20  0902   INR 2.60*       ASSESSMENT / PLAN  INR assessment THER    Recheck INR In: 6 WEEKS    INR Location Clinic      Anticoagulation Summary  As of 9/10/2020    INR goal:   2.0-3.0   TTR:   77.0 % (1 y)   INR used for dosin.60 (9/10/2020)   Warfarin maintenance plan:   2.5 mg (5 mg x 0.5) every Mon, Wed, Fri; 5 mg (5 mg x 1) all other days   Full warfarin instructions:   2.5 mg every Mon, Wed, Fri; 5 mg all other days   Weekly warfarin total:   27.5 mg   No change documented:   Jeanette Bender RN   Plan last modified:   Radha Seymour RN (2020)   Next INR check:   10/22/2020   Priority:   Maintenance   Target end date:    Indefinite    Indications    Paroxysmal atrial fibrillation (Resolved) [I48.0]  Long term current use of anticoagulants with INR goal of 2.0-3.0 [Z79.01]  Chronic atrial fibrillation (H) [I48.20]             Anticoagulation Episode Summary     INR check location:       Preferred lab:       Send INR reminders to:   [facility]    Comments:         Anticoagulation Care Providers     Provider Role Specialty Phone number    Maribeth Haney MD Referring Internal Medicine [phone]    Sebastián Bermudez MD Responsible Cardiology [phone]            See the Encounter Report to view Anticoagulation Flowsheet and Dosing Calendar (Go to Encounters tab in chart review, and find the Anticoagulation Therapy Visit)    Jeanette Bender RN

## 2020-09-30 ENCOUNTER — OFFICE VISIT (OUTPATIENT)
Dept: PULMONOLOGY | Facility: CLINIC | Age: 80
End: 2020-09-30
Attending: INTERNAL MEDICINE
Payer: MEDICARE

## 2020-09-30 VITALS
SYSTOLIC BLOOD PRESSURE: 127 MMHG | HEART RATE: 85 BPM | BODY MASS INDEX: 25.4 KG/M2 | RESPIRATION RATE: 18 BRPM | WEIGHT: 177 LBS | DIASTOLIC BLOOD PRESSURE: 66 MMHG | OXYGEN SATURATION: 96 %

## 2020-09-30 DIAGNOSIS — R93.89 ABNORMAL CXR: ICD-10-CM

## 2020-09-30 DIAGNOSIS — R06.09 DYSPNEA ON EXERTION: ICD-10-CM

## 2020-09-30 DIAGNOSIS — J43.9 PULMONARY EMPHYSEMA, UNSPECIFIED EMPHYSEMA TYPE (H): ICD-10-CM

## 2020-09-30 DIAGNOSIS — J84.9 ILD (INTERSTITIAL LUNG DISEASE) (H): ICD-10-CM

## 2020-09-30 DIAGNOSIS — J84.9 ILD (INTERSTITIAL LUNG DISEASE) (H): Primary | ICD-10-CM

## 2020-09-30 LAB
DLCOUNC-%PRED-PRE: 51 %
DLCOUNC-PRE: 12.5 ML/MIN/MMHG
DLCOUNC-PRED: 24.25 ML/MIN/MMHG
ERV-%PRED-PRE: 127 %
ERV-PRE: 1.23 L
ERV-PRED: 0.97 L
EXPTIME-PRE: 10.47 SEC
FEF2575-%PRED-PRE: 104 %
FEF2575-PRE: 2.08 L/SEC
FEF2575-PRED: 1.99 L/SEC
FEFMAX-%PRED-PRE: 122 %
FEFMAX-PRE: 8.71 L/SEC
FEFMAX-PRED: 7.11 L/SEC
FEV1-%PRED-PRE: 102 %
FEV1-PRE: 2.92 L
FEV1FEV6-PRE: 73 %
FEV1FEV6-PRED: 76 %
FEV1FVC-PRE: 73 %
FEV1FVC-PRED: 75 %
FEV1SVC-PRE: 71 %
FEV1SVC-PRED: 63 %
FIFMAX-PRE: 5.3 L/SEC
FRCPLETH-%PRED-PRE: 75 %
FRCPLETH-PRE: 2.85 L
FRCPLETH-PRED: 3.8 L
FVC-%PRED-PRE: 103 %
FVC-PRE: 4.01 L
FVC-PRED: 3.86 L
IC-%PRED-PRE: 80 %
IC-PRE: 2.86 L
IC-PRED: 3.54 L
RVPLETH-%PRED-PRE: 56 %
RVPLETH-PRE: 1.62 L
RVPLETH-PRED: 2.88 L
TLCPLETH-%PRED-PRE: 80 %
TLCPLETH-PRE: 5.71 L
TLCPLETH-PRED: 7.13 L
VA-%PRED-PRE: 74 %
VA-PRE: 4.65 L
VC-%PRED-PRE: 90 %
VC-PRE: 4.09 L
VC-PRED: 4.51 L

## 2020-09-30 PROCEDURE — G0463 HOSPITAL OUTPT CLINIC VISIT: HCPCS | Mod: 25,ZF

## 2020-09-30 ASSESSMENT — PAIN SCALES - GENERAL: PAINLEVEL: NO PAIN (0)

## 2020-09-30 NOTE — LETTER
9/30/2020         RE: Aniket Macias  [address]  [place] [address]        Dear Colleague,    Thank you for referring your patient, Aniket Macias, to the [facility]. Please see a copy of my visit note below.    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 Given asymptomatic nature, and good exercise tolerance, he is not currently on any medications (inhalers or antifibrotics) which I think is fine. If becomes symptomatic, would start Anoro, with albuterol PRN. If worsening, will consider antifibrotics, no need currently. ILD lab work up with borderline ALEKSEY (1:40), and one positive HP panel, without an HP pattern on CT.   -Pneumonia vaccinations (13 and 23 valent) up to date (2015 and 2017, respectively)  -Should have annual influenza immunization (up to date), appointment coming up next week.   -Encouraged ongoing activity/exercise  -Discussed anti-fibrotic medications briefly today, I don't think he should start these at this point.   -Consider pulmonary rehab referral in the future if worsening, no need curreently.   -Follow up in 3 months virtually, then 6 months with PFTs in person.     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     Brief summary (from prior visits):   I met him in May 2020. He initially developed a cough in Jan 2020. At his PCP's office with a cough, and was found to have SpO2 of 94%. A CXR was done, concerning for left lung pneumonia. He was treated with Augmentin and azithromycin. This improved his cough, but he continued to feel short of breath, especially with exertion. He also noted some lightheadedness when he stood up quickly. He was complaining of dyspnea requiring him to stop after 1 flight of stairs (a change from baseline of 3 flights of stairs). He saw his PCP, Dr. Haney, for these complaints in February. A CT was done at that time, showing emphysema and pulmonary fibrosis in a possible UIP pattern. He was referred to ILD clinic. 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 As he was relatively asymptomatic, he was started on Anoro only, plus albuterol PRN. If there is progression, antifibrotics will be considered.     Interval history:   Overall, Mr. Macias feels pretty well. He is not on Anoro, and has not used his albuterol as needed at all. He continues to be able to walk up the stairs without issue, and denies any dyspnea on exertion. When he walks, he can about 1.5 miles at a good clip, including stairs, without stopping or dyspnea. No real cough. He does have occasional sneezing. No nasal congestion/rhinorrhea. No fevers, chills, sore throat. No nausea, vomiting, abdominal pain. Weight relatively stable, good appetite. Some left sided LE edema, chronic. On lasix. Supposed to wear compression stockings, not using currently. No chest pain. Does have atrial fibrillation, with occasional palpitations. No other complaints currently.     Exposure History:  Tobacco: Former use, quit about 25 years ago, used 1/2-2 ppd for 35 years  or so, up to 70 pack years.   Other inhaled substances: No pipes, vaping, marijuana.   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   Pets: No pets, no birds.   Allergies: seasonal allergies  Hobbies: enjoys being on the computer. [place]    Travel: Nov 2019 to [place] on [facility] [place] (previously lived in [place] from 1995 to 2015).   Home: [place]. No feather pillows or down comforters. No mold in apartment, no hot tub use. No humidifier. No musical instrument use.     Allergies: Reviewed in EHR    Current medications: Reviewed in EHR    Histories: Personally reviewed during this visit.   Past Medical History:   Diagnosis Date     Basal cell carcinoma      BPH (benign prostatic hypertrophy)      Diverticulosis      Glaucoma      Hemorrhoids      HTN (hypertension)      Hyperlipidemia      Obesity      Persistent atrial fibrillation (H)      PVC (premature ventricular contraction)      Squamous cell carcinoma in situ of skin      Syncope 4/11/2016     Past Surgical History:   Procedure Laterality Date     BACK SURGERY       COLONOSCOPY  11/19/15    hx polyps     SURGICAL HISTORY OF -       Laminectomy     SURGICAL HISTORY OF -       biopsy of prostate     TONSILLECTOMY & ADENOIDECTOMY       Family History   Problem Relation Age of Onset     Cerebrovascular Disease Mother      Hypertension Mother      Cerebrovascular Disease Father      Myocardial Infarction Father      Hypertension Father      Hypertension Sister      Myocardial Infarction Sister      LUNG DISEASE No family hx of      Social History     Socioeconomic History     Marital status:      Spouse name: Not on file     Number of children: Not on file     Years of education: Not on file     Highest education level: Not on file   Occupational History     Not on file   Social Needs     Financial resource strain: Not on file      Food insecurity     Worry: Not on file     Inability: Not on file     Transportation needs     Medical: Not on file     Non-medical: Not on file   Tobacco Use     Smoking status: Former Smoker     Packs/day: 2.00     Years: 35.00     Pack years: 70.00     Types: Cigarettes     Last attempt to quit: 1995     Years since quittin.4     Smokeless tobacco: Never Used     Tobacco comment: quit age 54   Substance and Sexual Activity     Alcohol use: Yes     Alcohol/week: 0.0 standard drinks     Comment: 1-2 drinks per week     Drug use: No     Sexual activity: Not Currently     Partners: Female   Lifestyle     Physical activity     Days per week: Not on file     Minutes per session: Not on file     Stress: Not on file   Relationships     Social connections     Talks on phone: Not on file     Gets together: Not on file     Attends Quaker service: Not on file     Active member of club or organization: Not on file     Attends meetings of clubs or organizations: Not on file     Relationship status: Not on file     Intimate partner violence     Fear of current or ex partner: Not on file     Emotionally abused: Not on file     Physically abused: Not on file     Forced sexual activity: Not on file   Other Topics Concern      Service Not Asked     Blood Transfusions Not Asked     Caffeine Concern Yes     Comment: 2 cups coffee per day     Occupational Exposure Not Asked     Hobby Hazards Not Asked     Sleep Concern No     Stress Concern No     Weight Concern No     Special Diet Not Asked     Back Care Not Asked     Exercise No     Bike Helmet Not Asked     Seat Belt Not Asked     Self-Exams Not Asked     Parent/sibling w/ CABG, MI or angioplasty before 65F 55M? Not Asked   Social History Narrative     Not on file       ROS: Complete 12 point ROS negative unless mentioned in HPI    Physical Exam:  /66   Pulse 85   Resp 18   Wt 80.3 kg (177 lb)   SpO2 96%   BMI 25.40 kg/m      General: Sitting in the  chair in NAD  HEENT: anicteric  Neck: Trachea midline. Normal ROM.   Lymphatic: no cervical or supraclavicular lymphadenopathy   Chest: CTAB, no wheezing, rhonchi, crackles.   Cardiac: irregularly irregular, soft systolic murmurs  Abdomen: Soft, flat, non tender, active BS  Extremities: left trace to 1+ LE Edema, moving all extremities. No digital clubbing.   Neuro: A&Ox3, no focal defects. Speech/language wnl.   Skin: no rash noted on limited exam  Psych: normal affect.     Labs and Radiology: I have reviewed the results with the patient today.   All laboratory data reviewed   5/6/20: WBC 8.9. Hgb 13.9, Plts 191  K 4.3 Cr 1.03, LFTs normal.   Myositis panel negative  HP panel positive for Aureo Pullulans   IgG levels normal, apart from mildly reduced IgG1.   ALEKSEY 1:40 and speckled  ANCA, SSB, SSA, Scl-70, Sofia-1, RV, all negative. CCP 2  CRP Negative  ESR 12    Aldolase 4.6    All cardiac studies reviewed by me.   No new studies    All imaging studies reviewed by me.   No new imaging    PFT's:  Pulmonary Function Testing: personally reviewed.   Date and Site FEV1 FVC FEV1/FVC TLC RV DLCO   5/6/20 [facility] 2.8 (97%) 3.9 (100%) 72% 5.54 (77%) 1.55 (54%) 14.02 (57%)   9/30/20 [facility] 2.92  (102%) 4.01 (103%) 73% 5.71 (80%) 1.62 (56%) 12.5 (51%)   Today's PFT Interpretation: Mild restriction, stable from prior. Moderate diffusion defect.     5/6/20 Six minute walk: Walk completed on room air. Walk distance normal, 442 m vs  m. Significant desaturation without hypoxia, SpO2 dropped from 95% to 91% on room air walk.     Again, thank you for allowing me to participate in the care of your patient.      Sincerely,  Cesar Treviño MD

## 2020-09-30 NOTE — NURSING NOTE
Chief Complaint   Patient presents with     Interstitial Lung Disease (ILD)     CPFE follow up      Medications reviewed and updated.  Vitals taken  Vani Tellez CMA

## 2020-09-30 NOTE — PROGRESS NOTES
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 Given asymptomatic nature, and good exercise tolerance, he is not currently on any medications (inhalers or antifibrotics) which I think is fine. If becomes symptomatic, would start Anoro, with albuterol PRN. If worsening, will consider antifibrotics, no need currently. ILD lab work up with borderline ALEKSEY (1:40), and one positive HP panel, without an HP pattern on CT.   -Pneumonia vaccinations (13 and 23 valent) up to date (2015 and 2017, respectively)  -Should have annual influenza immunization (up to date), appointment coming up next week.   -Encouraged ongoing activity/exercise  -Discussed anti-fibrotic medications briefly today, I don't think he should start these at this point.   -Consider pulmonary rehab referral in the future if worsening, no need curreently.   -Follow up in 3 months virtually, then 6 months with PFTs in person.     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     Brief summary (from prior visits):   I met him in May 2020. He initially developed a cough in Jan 2020. At his PCP's office with a  cough, and was found to have SpO2 of 94%. A CXR was done, concerning for left lung pneumonia. He was treated with Augmentin and azithromycin. This improved his cough, but he continued to feel short of breath, especially with exertion. He also noted some lightheadedness when he stood up quickly. He was complaining of dyspnea requiring him to stop after 1 flight of stairs (a change from baseline of 3 flights of stairs). He saw his PCP, Dr. Haney, for these complaints in February. A CT was done at that time, showing emphysema and pulmonary fibrosis in a possible UIP pattern. He was referred to ILD clinic. 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 As he was relatively asymptomatic, he was started on Anoro only, plus albuterol PRN. If there is progression, antifibrotics will be considered.     Interval history:   Overall, Mr. Macias feels pretty well. He is not on Anoro, and has not used his albuterol as needed at all. He continues to be able to walk up the stairs without issue, and denies any dyspnea on exertion. When he walks, he can about 1.5 miles at a good clip, including stairs, without stopping or dyspnea. No real cough. He does have occasional sneezing. No nasal congestion/rhinorrhea. No fevers, chills, sore throat. No nausea, vomiting, abdominal pain. Weight relatively stable, good appetite. Some left sided LE edema, chronic. On lasix. Supposed to wear compression stockings, not using currently. No chest pain. Does have atrial fibrillation, with occasional palpitations. No other complaints currently.     Exposure History:  Tobacco: Former use, quit about 25 years ago, used 1/2-2 ppd for 35 years or so, up to 70 pack years.   Other inhaled substances: No pipes, vaping, marijuana.   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   Pets: No pets, no birds.   Allergies: seasonal allergies  Hobbies: enjoys being on the  computer. [place]    Travel: Nov 2019 to [place] (previously lived in [place] from 1995 to 2015).   Home: [place]. No feather pillows or down comforters. No mold in apartment, no hot tub use. No humidifier. No musical instrument use.     Allergies: Reviewed in EHR    Current medications: Reviewed in EHR    Histories: Personally reviewed during this visit.   Past Medical History:   Diagnosis Date     Basal cell carcinoma      BPH (benign prostatic hypertrophy)      Diverticulosis      Glaucoma      Hemorrhoids      HTN (hypertension)      Hyperlipidemia      Obesity      Persistent atrial fibrillation (H)      PVC (premature ventricular contraction)      Squamous cell carcinoma in situ of skin      Syncope 4/11/2016     Past Surgical History:   Procedure Laterality Date     BACK SURGERY       COLONOSCOPY  11/19/15    hx polyps     SURGICAL HISTORY OF -       Laminectomy     SURGICAL HISTORY OF -       biopsy of prostate     TONSILLECTOMY & ADENOIDECTOMY       Family History   Problem Relation Age of Onset     Cerebrovascular Disease Mother      Hypertension Mother      Cerebrovascular Disease Father      Myocardial Infarction Father      Hypertension Father      Hypertension Sister      Myocardial Infarction Sister      LUNG DISEASE No family hx of      Social History     Socioeconomic History     Marital status:      Spouse name: Not on file     Number of children: Not on file     Years of education: Not on file     Highest education level: Not on file   Occupational History     Not on file   Social Needs     Financial resource strain: Not on file     Food insecurity     Worry: Not on file     Inability: Not on file     Transportation needs     Medical: Not on file     Non-medical: Not on file   Tobacco Use     Smoking status: Former Smoker     Packs/day: 2.00     Years: 35.00     Pack years: 70.00     Types:  Cigarettes     Last attempt to quit: 1995     Years since quittin.4     Smokeless tobacco: Never Used     Tobacco comment: quit age 54   Substance and Sexual Activity     Alcohol use: Yes     Alcohol/week: 0.0 standard drinks     Comment: 1-2 drinks per week     Drug use: No     Sexual activity: Not Currently     Partners: Female   Lifestyle     Physical activity     Days per week: Not on file     Minutes per session: Not on file     Stress: Not on file   Relationships     Social connections     Talks on phone: Not on file     Gets together: Not on file     Attends [employer] service: Not on file     Active member of club or organization: Not on file     Attends meetings of clubs or organizations: Not on file     Relationship status: Not on file     Intimate partner violence     Fear of current or ex partner: Not on file     Emotionally abused: Not on file     Physically abused: Not on file     Forced sexual activity: Not on file   Other Topics Concern      Service Not Asked     Blood Transfusions Not Asked     Caffeine Concern Yes     Comment: 2 cups coffee per day     Occupational Exposure Not Asked     Hobby Hazards Not Asked     Sleep Concern No     Stress Concern No     Weight Concern No     Special Diet Not Asked     Back Care Not Asked     Exercise No     Bike Helmet Not Asked     Seat Belt Not Asked     Self-Exams Not Asked     Parent/sibling w/ CABG, MI or angioplasty before 65F 55M? Not Asked   Social History Narrative     Not on file       ROS: Complete 12 point ROS negative unless mentioned in HPI    Physical Exam:  /66   Pulse 85   Resp 18   Wt 80.3 kg (177 lb)   SpO2 96%   BMI 25.40 kg/m      General: Sitting in the chair in NAD  HEENT: anicteric  Neck: Trachea midline. Normal ROM.   Lymphatic: no cervical or supraclavicular lymphadenopathy   Chest: CTAB, no wheezing, rhonchi, crackles.   Cardiac: irregularly irregular, soft systolic murmurs  Abdomen: Soft, flat, non tender,  active BS  Extremities: left trace to 1+ LE Edema, moving all extremities. No digital clubbing.   Neuro: A&Ox3, no focal defects. Speech/language wnl.   Skin: no rash noted on limited exam  Psych: normal affect.     Labs and Radiology: I have reviewed the results with the patient today.   All laboratory data reviewed   5/6/20: WBC 8.9. Hgb 13.9, Plts 191  K 4.3 Cr 1.03, LFTs normal.   Myositis panel negative  HP panel positive for Aureo Pullulans   IgG levels normal, apart from mildly reduced IgG1.   ALEKSEY 1:40 and speckled  ANCA, SSB, SSA, Scl-70, Sofia-1, RV, all negative. CCP 2  CRP Negative  ESR 12    Aldolase 4.6    All cardiac studies reviewed by me.   No new studies    All imaging studies reviewed by me.   No new imaging    PFT's:  Pulmonary Function Testing: personally reviewed.   Date and Site FEV1 FVC FEV1/FVC TLC RV DLCO   5/6/20 [facility] 2.8 (97%) 3.9 (100%) 72% 5.54 (77%) 1.55 (54%) 14.02 (57%)   9/30/20 [facility] 2.92  (102%) 4.01 (103%) 73% 5.71 (80%) 1.62 (56%) 12.5 (51%)   Today's PFT Interpretation: Mild restriction, stable from prior. Moderate diffusion defect.     5/6/20 Six minute walk: Walk completed on room air. Walk distance normal, 442 m vs  m. Significant desaturation without hypoxia, SpO2 dropped from 95% to 91% on room air walk.

## 2020-09-30 NOTE — PATIENT INSTRUCTIONS
Your breathing tests are stable.     You have evidence of emphysema and fibrosis on imaging, and we need to follow this over time, as it can be progressive.     You do not need to use your inhalers, if you feel your exercise tolerance and breathing are fine.     We will plan to check in in 3 months virtually, and again in 6 months in person with breathing tests.

## 2020-10-06 ENCOUNTER — ALLIED HEALTH/NURSE VISIT (OUTPATIENT)
Dept: NURSING | Facility: CLINIC | Age: 80
End: 2020-10-06
Payer: MEDICARE

## 2020-10-06 DIAGNOSIS — Z23 NEED FOR PROPHYLACTIC VACCINATION AND INOCULATION AGAINST INFLUENZA: Primary | ICD-10-CM

## 2020-10-06 PROCEDURE — 99207 PR NO CHARGE NURSE ONLY: CPT

## 2020-10-06 PROCEDURE — 90662 IIV NO PRSV INCREASED AG IM: CPT

## 2020-10-06 PROCEDURE — G0008 ADMIN INFLUENZA VIRUS VAC: HCPCS

## 2020-10-22 ENCOUNTER — ANTICOAGULATION THERAPY VISIT (OUTPATIENT)
Dept: NURSING | Facility: CLINIC | Age: 80
End: 2020-10-22

## 2020-10-22 DIAGNOSIS — Z79.01 LONG TERM CURRENT USE OF ANTICOAGULANTS WITH INR GOAL OF 2.0-3.0: ICD-10-CM

## 2020-10-22 DIAGNOSIS — I48.20 CHRONIC ATRIAL FIBRILLATION (H): ICD-10-CM

## 2020-10-22 LAB
CAPILLARY BLOOD COLLECTION: NORMAL
INR PPP: 3.3 (ref 0.86–1.14)

## 2020-10-22 PROCEDURE — 85610 PROTHROMBIN TIME: CPT | Performed by: INTERNAL MEDICINE

## 2020-10-22 PROCEDURE — 99207 PR NO CHARGE NURSE ONLY: CPT

## 2020-10-22 PROCEDURE — 36416 COLLJ CAPILLARY BLOOD SPEC: CPT | Performed by: INTERNAL MEDICINE

## 2020-10-22 NOTE — PROGRESS NOTES
ANTICOAGULATION FOLLOW-UP CLINIC VISIT    Patient Name:  Aniket Macias  Date:  10/22/2020  Contact Type:  Telephone    SUBJECTIVE:  Patient Findings     Comments:  Called patient to discuss today's INR results: Patient denies any identifiable changes that caused the supratherapeutic INR. The patient was assessed for diet, medication, and activity level changes, missed or extra doses, bruising or bleeding, with no problem findings. Reviewed maintenance warfarin dosing with patient. Patient will remain on the same dose until next INR check. No other questions or concerns. Scheduled next lab-only INR in 2 weeks.  Jeanette ROUSE RN  Anticoagulation Clinic  [facility]              Clinical Outcomes     Negatives:  Major bleeding event, Thromboembolic event, Anticoagulation-related hospital admission, Anticoagulation-related ED visit, Anticoagulation-related fatality    Comments:  Called patient to discuss today's INR results: Patient denies any identifiable changes that caused the supratherapeutic INR. The patient was assessed for diet, medication, and activity level changes, missed or extra doses, bruising or bleeding, with no problem findings. Reviewed maintenance warfarin dosing with patient. Patient will remain on the same dose until next INR check. No other questions or concerns. Scheduled next lab-only INR in 2 weeks.  Jeanette ROUSE RN  Anticoagulation Clinic  [facility]                 OBJECTIVE    Recent labs: (last 7 days)     10/22/20  0858   INR 3.30*       ASSESSMENT / PLAN  INR assessment [facility]    Recheck INR In: 2 WEEKS    INR Location Clinic      Anticoagulation Summary  As of 10/22/2020    INR goal:  2.0-3.0   TTR:  72.1 % (1 y)   INR used for dosing:  3.30 (10/22/2020)   Warfarin maintenance plan:  2.5 mg (5 mg x 0.5) every Mon, Wed, Fri; 5 mg (5 mg x 1) all other days   Full warfarin instructions:  2.5 mg every Mon, Wed, Fri; 5 mg all other days   Weekly warfarin total:  27.5 mg   No change documented:   Jeanette Bender RN   Plan last modified:  Radha Seymour RN (5/6/2020)   Next INR check:  11/5/2020   Priority:  Maintenance   Target end date:  Indefinite    Indications    Paroxysmal atrial fibrillation (Resolved) [I48.0]  Long term current use of anticoagulants with INR goal of 2.0-3.0 [Z79.01]  Chronic atrial fibrillation (H) [I48.20]             Anticoagulation Episode Summary     INR check location:      Preferred lab:      Send INR reminders to:  [facility]    Comments:        Anticoagulation Care Providers     Provider Role Specialty Phone number    Maribeth Haney MD Referring Internal Medicine [phone]    Sebastián Bermudez MD Responsible Cardiology [phone]            See the Encounter Report to view Anticoagulation Flowsheet and Dosing Calendar (Go to Encounters tab in chart review, and find the Anticoagulation Therapy Visit)    Jeanette Bender, RN

## 2020-11-05 ENCOUNTER — ANTICOAGULATION THERAPY VISIT (OUTPATIENT)
Dept: NURSING | Facility: CLINIC | Age: 80
End: 2020-11-05
Payer: MEDICARE

## 2020-11-05 ENCOUNTER — TELEPHONE (OUTPATIENT)
Dept: FAMILY MEDICINE | Facility: CLINIC | Age: 80
End: 2020-11-05

## 2020-11-05 DIAGNOSIS — I48.20 CHRONIC ATRIAL FIBRILLATION (H): Primary | ICD-10-CM

## 2020-11-05 DIAGNOSIS — Z79.01 LONG TERM CURRENT USE OF ANTICOAGULANTS WITH INR GOAL OF 2.0-3.0: ICD-10-CM

## 2020-11-05 DIAGNOSIS — I48.20 CHRONIC ATRIAL FIBRILLATION (H): ICD-10-CM

## 2020-11-05 LAB
CAPILLARY BLOOD COLLECTION: NORMAL
INR PPP: 2.2 (ref 0.86–1.14)

## 2020-11-05 PROCEDURE — 99207 PR NO CHARGE NURSE ONLY: CPT

## 2020-11-05 PROCEDURE — 36416 COLLJ CAPILLARY BLOOD SPEC: CPT | Performed by: INTERNAL MEDICINE

## 2020-11-05 PROCEDURE — 85610 PROTHROMBIN TIME: CPT | Performed by: INTERNAL MEDICINE

## 2020-11-05 NOTE — TELEPHONE ENCOUNTER
ANTICOAGULATION MANAGEMENT      Aniket Macias due for annual renewal of referral to anticoagulation monitoring. Order pended for your review and signature.      ANTICOAGULATION SUMMARY      Warfarin indication(s)     Atrial fibrillation    Heart valve present?  NO       Current goal range   INR: 2.0-3.0     Goal appropriate for indication? Yes, INR 2-3 appropriate for hx of DVT, PE, hypercoagulable state, Afib, LVAD, or bileaflet AVR without risk factors     Current duration of therapy Indefinite/long term therapy   Time in Therapeutic Range (TTR)  (Goal > 60%) 71.0 %       Office visit with referring provider's group within last year yes on 7/30/20       Elo June RN

## 2020-11-05 NOTE — PROGRESS NOTES
ANTICOAGULATION FOLLOW-UP CLINIC VISIT    Patient Name:  Aniket Macias  Date:  2020  Contact Type:  Telephone/ Patient    SUBJECTIVE:  Patient Findings     Comments:  Pt advised that it is recommended that his INR be checked again in about 3-4 weeks since his INR 2 weeks ago was elevated. Pt understood but declined. Pt agreed to recheck the INR in 6 weeks which was scheduled.        Clinical Outcomes     Negatives:  Major bleeding event, Thromboembolic event, Anticoagulation-related hospital admission, Anticoagulation-related ED visit, Anticoagulation-related fatality    Comments:  Pt advised that it is recommended that his INR be checked again in about 3-4 weeks since his INR 2 weeks ago was elevated. Pt understood but declined. Pt agreed to recheck the INR in 6 weeks which was scheduled.           OBJECTIVE    Recent labs: (last 7 days)     20  0924   INR 2.20*       ASSESSMENT / PLAN  INR assessment THER    Recheck INR In: 6 WEEKS    INR Location Outside lab      Anticoagulation Summary  As of 2020    INR goal:  2.0-3.0   TTR:  71.0 % (1 y)   INR used for dosin.20 (2020)   Warfarin maintenance plan:  2.5 mg (5 mg x 0.5) every Mon, Wed, Fri; 5 mg (5 mg x 1) all other days   Full warfarin instructions:  2.5 mg every Mon, Wed, Fri; 5 mg all other days   Weekly warfarin total:  27.5 mg   No change documented:  Elo June RN   Plan last modified:  Radha Seymour RN (2020)   Next INR check:  2020   Priority:  Maintenance   Target end date:  Indefinite    Indications    Paroxysmal atrial fibrillation (Resolved) [I48.0]  Long term current use of anticoagulants with INR goal of 2.0-3.0 [Z79.01]  Chronic atrial fibrillation (H) [I48.20]             Anticoagulation Episode Summary     INR check location:      Preferred lab:      Send INR reminders to:  [facility]    Comments:        Anticoagulation Care Providers     Provider Role Specialty Phone number     Maribeth Haney MD Referring Internal Medicine - Pediatrics [phone]    Sebastián Bermudez MD Responsible Clinical Cardiac Electrophysiology [phone]            See the Encounter Report to view Anticoagulation Flowsheet and Dosing Calendar (Go to Encounters tab in chart review, and find the Anticoagulation Therapy Visit)    Pt INR is 2.2 today. See findings. Pt advised to continue taking 2.5 mg on Mondays, Wednesdays, Fridays and 5 mg all the other days. Pt declined scheduling a an INR recheck sooner than 6 weeks out. Pt scheduled on 12/17/20 at 9:30 am in [place]. [place]ell aware if signs of clotting (pain, tenderness, swelling, color change in leg or arm, SOB) and bleeding occur (blood in stool, urine, large bruising, bleeding gums, nosebleeds) to have INR check sooner. If sx severe report to ER or concerned for stroke call 911. If general questions or concerns arise, call clinic.         Elo June RN

## 2020-12-17 ENCOUNTER — ANTICOAGULATION THERAPY VISIT (OUTPATIENT)
Dept: FAMILY MEDICINE | Facility: CLINIC | Age: 80
End: 2020-12-17

## 2020-12-17 DIAGNOSIS — I48.20 CHRONIC ATRIAL FIBRILLATION (H): ICD-10-CM

## 2020-12-17 DIAGNOSIS — Z79.01 LONG TERM CURRENT USE OF ANTICOAGULANTS WITH INR GOAL OF 2.0-3.0: ICD-10-CM

## 2020-12-17 LAB
CAPILLARY BLOOD COLLECTION: NORMAL
INR PPP: 3 (ref 0.86–1.14)

## 2020-12-17 PROCEDURE — 36416 COLLJ CAPILLARY BLOOD SPEC: CPT | Performed by: INTERNAL MEDICINE

## 2020-12-17 PROCEDURE — 99207 PR NO CHARGE NURSE ONLY: CPT | Performed by: INTERNAL MEDICINE

## 2020-12-17 PROCEDURE — 85610 PROTHROMBIN TIME: CPT | Performed by: INTERNAL MEDICINE

## 2020-12-17 NOTE — PROGRESS NOTES
ANTICOAGULATION FOLLOW-UP CLINIC VISIT    Patient Name:  Aniket Macias  Date:  12/17/2020  Contact Type:  Telephone/ [place]    SUBJECTIVE:  Patient Findings     Comments:  The patient was assessed for diet, medication, and activity level changes, missed or extra doses, bruising or bleeding, with no problem findings.          Clinical Outcomes     Negatives:  Major bleeding event, Thromboembolic event, Anticoagulation-related hospital admission, Anticoagulation-related ED visit, Anticoagulation-related fatality    Comments:  The patient was assessed for diet, medication, and activity level changes, missed or extra doses, bruising or bleeding, with no problem findings.             OBJECTIVE    Recent labs: (last 7 days)     12/17/20  0924   INR 3.00*       ASSESSMENT / PLAN  INR assessment THER    Recheck INR In: 6 WEEKS    INR Location Clinic      Anticoagulation Summary  As of 12/17/2020    INR goal:  2.0-3.0   TTR:  71.0 % (1 y)   INR used for dosing:  3.00 (12/17/2020)   Warfarin maintenance plan:  2.5 mg (5 mg x 0.5) every Mon, Wed, Fri; 5 mg (5 mg x 1) all other days   Full warfarin instructions:  2.5 mg every Mon, Wed, Fri; 5 mg all other days   Weekly warfarin total:  27.5 mg   No change documented:  Radha Seymour RN   Plan last modified:  Radha Seymour RN (5/6/2020)   Next INR check:  1/28/2021   Priority:  Maintenance   Target end date:  Indefinite    Indications    Paroxysmal atrial fibrillation (Resolved) [I48.0]  Long term current use of anticoagulants with INR goal of 2.0-3.0 [Z79.01]  Chronic atrial fibrillation (H) [I48.20]             Anticoagulation Episode Summary     INR check location:      Preferred lab:      Send INR reminders to:  [facility]    Comments:        Anticoagulation Care Providers     Provider Role Specialty Phone number    Maribeth Haney MD Referring Internal Medicine - Pediatrics [phone]    Sebastián Bermudez MD Responsible Clinical  Cardiac Electrophysiology [phone]            See the Encounter Report to view Anticoagulation Flowsheet and Dosing Calendar (Go to Encounters tab in chart review, and find the Anticoagulation Therapy Visit)    Patient INR is therapeutic.  Patient will continue to take 27.5 mg weekly dosing and follow up in 6 weeks or sooner for any problems or concerns.        Radha Seymour RN

## 2020-12-17 NOTE — PROGRESS NOTES
Anticoagulation Management    Unable to reach [place] today.    Today's INR result of 3.0 is therapeutic (goal INR of 2.0-3.0).  Result received from: Clinic Lab    Follow up required to assess for changes     Left message to call [phone]. Transfer to [phone].      Anticoagulation clinic to follow up    Radha Seymour RN

## 2020-12-21 DIAGNOSIS — Z79.01 LONG TERM CURRENT USE OF ANTICOAGULANTS WITH INR GOAL OF 2.0-3.0: ICD-10-CM

## 2020-12-21 DIAGNOSIS — I48.0 PAROXYSMAL ATRIAL FIBRILLATION (H): ICD-10-CM

## 2020-12-21 RX ORDER — WARFARIN SODIUM 5 MG/1
TABLET ORAL
Qty: 90 TABLET | Refills: 1 | Status: SHIPPED | OUTPATIENT
Start: 2020-12-21 | End: 2021-08-17

## 2020-12-21 NOTE — TELEPHONE ENCOUNTER
Prescription approved per [facility] Refill Protocol.  Ellyn Seymour RN   [facility]  [place], [place], [place]

## 2021-01-09 DIAGNOSIS — N40.1 BENIGN NON-NODULAR PROSTATIC HYPERPLASIA WITH LOWER URINARY TRACT SYMPTOMS: Chronic | ICD-10-CM

## 2021-01-09 DIAGNOSIS — I48.0 PAROXYSMAL ATRIAL FIBRILLATION (H): Chronic | ICD-10-CM

## 2021-01-09 RX ORDER — METOPROLOL TARTRATE 50 MG
TABLET ORAL
Qty: 180 TABLET | Refills: 1 | Status: SHIPPED | OUTPATIENT
Start: 2021-01-09 | End: 2021-07-12

## 2021-01-09 RX ORDER — TERAZOSIN 5 MG/1
CAPSULE ORAL
Qty: 90 CAPSULE | Refills: 1 | Status: SHIPPED | OUTPATIENT
Start: 2021-01-09 | End: 2021-07-12

## 2021-01-15 NOTE — PROGRESS NOTES
Lowell is a 80 year old who is being evaluated via a billable video visit.      How would you like to obtain your AVS? Kulara WaterharLetMeHearYa  If the video visit is dropped, the invitation should be resent by: Other e-mail: [email or web address]  Will anyone else be joining your video visit? No      Video Start Time: 7:58 AM    Video-Visit Details    Type of service:  Video Visit    Video End Time:8:28 AM    Originating Location (pt. 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 Given asymptomatic nature, and good exercise tolerance, he is not currently on any medications (inhalers or antifibrotics). If becomes symptomatic, would start Anoro, with albuterol PRN. If worsening, will consider antifibrotics, no need currently. ILD lab work up with borderline ALEKSEY (1:40), and one positive HP panel, without an HP pattern on CT.   -Pneumonia vaccinations (13 and 23 valent) up to date (2015 and 2017, respectively)  -Should have annual influenza immunization (up to date for 2020-21 season)  -Encouraged ongoing activity/exercise  -again discussed anti-fibrotic medications today, I don't think he should start these at this point.   -Consider pulmonary rehab referral in the future if worsening, no need curreently.   -Follow up in 3 moths with PFTs in person.     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      Brief summary (from prior visits):   I met him in May 2020. He initially developed a cough in Jan 2020. At his PCP's office with a cough, and was found to have SpO2 of 94%. A CXR was done, concerning for left lung pneumonia. He was treated with Augmentin and azithromycin. This improved his cough, but he continued to feel short of breath, especially with exertion. He also noted some lightheadedness when he stood up quickly. He was complaining of dyspnea requiring him to stop after 1 flight of stairs (a change from baseline of 3 flights of stairs). He saw his PCP, Dr. Haney, for these complaints in February 2020. A CT was done at that time, showing emphysema and pulmonary fibrosis in a possible UIP pattern. He was referred to ILD clinic. 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 As he has been relatively asymptomatic, he was given a PRN albuterol. Anoro was discussed, but not started. If there is progression, antifibrotics will be considered.     Interval history: Wife is on call today as well.   Overall, Mr. Macias is doing well. He doesn't feel his breathing is changing, and feels at baseline. He does continue to have some dyspnea after 1 flight of stairs, but denies limitation due to breathing on flat ground. He can walk over 2 miles without issue at a normal pace. He does have some shortness of breath when carrying groceries or bending over. He has not used his albuterol, and never started Anoro. He denies any wheeze. No cough. No chest pain, palpitations, LE edema (has some chronic left leg swelling which is at baseline), no fevers, chills. Does have atrial fibrillation,  but is on metoprolol. He is on lasix for edema with good benefit. Wife notes no snoring at night, no apnea.     No other complaints today.     He is getting his first dose of COVID 19 vaccine today.     Exposure History: reviewed, no change.   Tobacco: Former use, quit about 25 years ago, used 1/2-2 ppd for 35 years or so, up to 70 pack years.   Other inhaled substances: No pipes, vaping, marijuana.   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   Pets: No pets, no birds.   Allergies: seasonal allergies  Hobbies: enjoys being on the computer. [place]    Travel: Nov 2019 to [place] on [facility] border (previously lived in [place] from 1995 to 2015).   Home: [place]. No feather pillows or down comforters. No mold in apartment, no hot tub use. No humidifier. No musical instrument use.     ROS: Complete 10 point ROS negative unless mentioned in HPI    Allergies and current medications: Reviewed in EHR  Past medical, surgical, social, and family histories: Personally reviewed and updated (if needed) during this visit.     Exam:   Constitutional - looks well, in no apparent distress  Eyes - no redness or discharge  Respiratory -breathing appears comfortable.  No cough.  Skin - No appreciable discoloration or lesions (very limited exam)  Neurological - No apparent tremors. Speech fluent and articlate  Psychiatric - no signs of delirium or anxiety     Exam limited to that easily identified on a virtual visit. The rest of a comprehensive physical examination is deferred due to PHE (public health emergency) video visit restrictions.    Labs and Radiology: All new data personally reviewed by me. I have reviewed the results with the patient today. New results summarized below:  Laboratory data:  No new labs  Cardiac studies:   No new studies  Imaging studies:   No new studies.     Pulmonary Function Testing:  personally reviewed.   Date and Site FEV1 FVC FEV1/FVC TLC RV DLCO   5/6/20 [facility] 2.8 (97%) 3.9 (100%) 72% 5.54 (77%) 1.55 (54%) 14.02 (57%)   9/30/20 [facility] 2.92  (102%) 4.01 (103%) 73% 5.71 (80%) 1.62 (56%) 12.5 (51%)   Most recent PFT Interpretation: Mild restriction, stable from prior. Moderate diffusion defect.      5/6/20 Six minute walk: Walk completed on room air. Walk distance normal, 442 m vs  m. Significant desaturation without hypoxia, SpO2 dropped from 95% to 91% on room air walk.

## 2021-01-20 ENCOUNTER — VIRTUAL VISIT (OUTPATIENT)
Dept: PULMONOLOGY | Facility: CLINIC | Age: 81
End: 2021-01-20
Attending: INTERNAL MEDICINE
Payer: MEDICARE

## 2021-01-20 DIAGNOSIS — J84.9 ILD (INTERSTITIAL LUNG DISEASE) (H): Primary | ICD-10-CM

## 2021-01-20 DIAGNOSIS — J43.9 PULMONARY EMPHYSEMA, UNSPECIFIED EMPHYSEMA TYPE (H): ICD-10-CM

## 2021-01-20 DIAGNOSIS — R06.09 DYSPNEA ON EXERTION: ICD-10-CM

## 2021-01-20 PROCEDURE — 99214 OFFICE O/P EST MOD 30 MIN: CPT | Mod: 95 | Performed by: INTERNAL MEDICINE

## 2021-01-20 NOTE — PATIENT INSTRUCTIONS
I'm glad you are doing well!    Feel free to try to use the albuterol some day, perhaps when you know you are getting groceries or exerting yourself more, to see if there is any benefit.     We will try to see you in person in May with breathing tests.     I'm glad you are getting your COVID shot! Stay safe.

## 2021-01-20 NOTE — LETTER
1/20/2021         RE: Aniket Macias  [address]  [place] [address]        Dear Colleague,    Thank you for referring your patient, Aniket Macias, to the [facility] LUNG SCIENCE AND [facility]. Please see a copy of my visit note below.    [place]ell is a 80 year old who is being evaluated via a billable video visit.      How would you like to obtain your AVS? BAC ON TRAChart  If the video visit is dropped, the invitation should be resent by: Other e-mail: [email or web address]  Will anyone else be joining your video visit? No      Video Start Time: 7:58 AM    Video-Visit Details    Type of service:  Video Visit    Video End Time:8:28 AM    Originating Location (pt. 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 Given asymptomatic nature, and good exercise tolerance, he is not currently on any medications (inhalers or antifibrotics). If becomes symptomatic, would start Anoro, with albuterol PRN. If worsening, will consider antifibrotics, no need currently. ILD lab work up with borderline ALEKSEY (1:40), and one positive HP panel, without an HP pattern on CT.   -Pneumonia vaccinations (13 and 23 valent) up to date (2015 and 2017, respectively)  -Should have annual influenza immunization (up to date for 2020-21 season)  -Encouraged ongoing activity/exercise  -again discussed anti-fibrotic medications today, I don't think he should  start these at this point.   -Consider pulmonary rehab referral in the future if worsening, no need curreently.   -Follow up in 3 moths with PFTs in person.     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      Brief summary (from prior visits):   I met him in May 2020. He initially developed a cough in Jan 2020. At his PCP's office with a cough, and was found to have SpO2 of 94%. A CXR was done, concerning for left lung pneumonia. He was treated with Augmentin and azithromycin. This improved his cough, but he continued to feel short of breath, especially with exertion. He also noted some lightheadedness when he stood up quickly. He was complaining of dyspnea requiring him to stop after 1 flight of stairs (a change from baseline of 3 flights of stairs). He saw his PCP, Dr. Haney, for these complaints in February 2020. A CT was done at that time, showing emphysema and pulmonary fibrosis in a possible UIP pattern. He was referred to ILD clinic. 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 As he has been relatively asymptomatic, he was given a PRN albuterol. Anoro was discussed, but not started. If there is progression, antifibrotics will be considered.     Interval history: Wife is on call today as well.   Overall, Mr. Macias is doing well. He doesn't feel his breathing is changing, and feels at baseline. He does continue to have some dyspnea after 1 flight of stairs, but denies limitation due to breathing on flat ground. He can walk over 2 miles without issue at a normal pace. He does have some shortness of breath  when carrying groceries or bending over. He has not used his albuterol, and never started Anoro. He denies any wheeze. No cough. No chest pain, palpitations, LE edema (has some chronic left leg swelling which is at baseline), no fevers, chills. Does have atrial fibrillation, but is on metoprolol. He is on lasix for edema with good benefit. Wife notes no snoring at night, no apnea.     No other complaints today.     He is getting his first dose of COVID 19 vaccine today.     Exposure History: reviewed, no change.   Tobacco: Former use, quit about 25 years ago, used 1/2-2 ppd for 35 years or so, up to 70 pack years.   Other inhaled substances: No pipes, vaping, marijuana.   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   Pets: No pets, no birds.   Allergies: seasonal allergies  Hobbies: enjoys being on the computer. [place]    Travel: Nov 2019 to [place] on [facility] border (previously lived in [place] from 1995 to [place]).   Home: [place]. No feather pillows or down comforters. No mold in apartment, no hot tub use. No humidifier. No musical instrument use.     ROS: Complete 10 point ROS negative unless mentioned in HPI    Allergies and current medications: Reviewed in EHR  Past medical, surgical, social, and family histories: Personally reviewed and updated (if needed) during this visit.     Exam:   Constitutional - looks well, in no apparent distress  Eyes - no redness or discharge  Respiratory -breathing appears comfortable.  No cough.  Skin - No appreciable discoloration or lesions (very limited exam)  Neurological - No apparent tremors. Speech fluent and articlate  Psychiatric - no signs of delirium or anxiety     Exam limited to that easily identified on a virtual visit. The rest of a comprehensive physical examination is deferred due to PHE (public health emergency) video visit  restrictions.    Labs and Radiology: All new data personally reviewed by me. I have reviewed the results with the patient today. New results summarized below:  Laboratory data:  No new labs  Cardiac studies:   No new studies  Imaging studies:   No new studies.     Pulmonary Function Testing: personally reviewed.   Date and Site FEV1 FVC FEV1/FVC TLC RV DLCO   5/6/20 [facility] 2.8 (97%) 3.9 (100%) 72% 5.54 (77%) 1.55 (54%) 14.02 (57%)   9/30/20 [facility] 2.92  (102%) 4.01 (103%) 73% 5.71 (80%) 1.62 (56%) 12.5 (51%)   Most recent PFT Interpretation: Mild restriction, stable from prior. Moderate diffusion defect.      5/6/20 Six minute walk: Walk completed on room air. Walk distance normal, 442 m vs  m. Significant desaturation without hypoxia, SpO2 dropped from 95% to 91% on room air walk.     Sincerely,    Cesar Treviño MD

## 2021-01-28 ENCOUNTER — ANTICOAGULATION THERAPY VISIT (OUTPATIENT)
Dept: FAMILY MEDICINE | Facility: CLINIC | Age: 81
End: 2021-01-28

## 2021-01-28 DIAGNOSIS — I48.20 CHRONIC ATRIAL FIBRILLATION (H): ICD-10-CM

## 2021-01-28 DIAGNOSIS — Z79.01 LONG TERM CURRENT USE OF ANTICOAGULANTS WITH INR GOAL OF 2.0-3.0: ICD-10-CM

## 2021-01-28 LAB
CAPILLARY BLOOD COLLECTION: NORMAL
INR PPP: 2.4 (ref 0.86–1.14)

## 2021-01-28 PROCEDURE — 36416 COLLJ CAPILLARY BLOOD SPEC: CPT | Performed by: INTERNAL MEDICINE

## 2021-01-28 PROCEDURE — 99207 PR NO CHARGE NURSE ONLY: CPT | Performed by: INTERNAL MEDICINE

## 2021-01-28 PROCEDURE — 85610 PROTHROMBIN TIME: CPT | Performed by: INTERNAL MEDICINE

## 2021-01-28 NOTE — PROGRESS NOTES
ANTICOAGULATION FOLLOW-UP CLINIC VISIT    Patient Name:  Aniket Macias  Date:  2021  Contact Type:  Telephone/ [place]    SUBJECTIVE:  Patient Findings     Comments:  The patient was assessed for diet, medication, and activity level changes, missed or extra doses, bruising or bleeding, with no problem findings.          Clinical Outcomes     Negatives:  Major bleeding event, Thromboembolic event, Anticoagulation-related hospital admission, Anticoagulation-related ED visit, Anticoagulation-related fatality    Comments:  The patient was assessed for diet, medication, and activity level changes, missed or extra doses, bruising or bleeding, with no problem findings.             OBJECTIVE    Recent labs: (last 7 days)     21  0938   INR 2.40*       ASSESSMENT / PLAN  INR assessment THER    Recheck INR In: 6 WEEKS    INR Location Clinic      Anticoagulation Summary  As of 2021    INR goal:  2.0-3.0   TTR:  71.0 % (1 y)   INR used for dosin.40 (2021)   Warfarin maintenance plan:  2.5 mg (5 mg x 0.5) every Mon, Wed, Fri; 5 mg (5 mg x 1) all other days   Full warfarin instructions:  2.5 mg every Mon, Wed, Fri; 5 mg all other days   Weekly warfarin total:  27.5 mg   No change documented:  Radha Seymour RN   Plan last modified:  Radha Seymour RN (2020)   Next INR check:  3/11/2021   Priority:  Maintenance   Target end date:  Indefinite    Indications    Paroxysmal atrial fibrillation (Resolved) [I48.0]  Long term current use of anticoagulants with INR goal of 2.0-3.0 [Z79.01]  Chronic atrial fibrillation (H) [I48.20]             Anticoagulation Episode Summary     INR check location:      Preferred lab:      Send INR reminders to:  [facility]    Comments:        Anticoagulation Care Providers     Provider Role Specialty Phone number    Maribeth Haney MD Referring Internal Medicine - Pediatrics [phone]    Sebastián Bermudez MD Responsible Clinical Cardiac  Electrophysiology [phone]            See the Encounter Report to view Anticoagulation Flowsheet and Dosing Calendar (Go to Encounters tab in chart review, and find the Anticoagulation Therapy Visit)    Patient INR is therapeutic.  Patient will continue to take 27.5 mg weekly dosing and follow up in 6 weeks or sooner for any problems or concerns.        Radha Seymour RN

## 2021-03-11 ENCOUNTER — ANTICOAGULATION THERAPY VISIT (OUTPATIENT)
Dept: FAMILY MEDICINE | Facility: CLINIC | Age: 81
End: 2021-03-11

## 2021-03-11 DIAGNOSIS — Z79.01 LONG TERM CURRENT USE OF ANTICOAGULANTS WITH INR GOAL OF 2.0-3.0: ICD-10-CM

## 2021-03-11 DIAGNOSIS — I48.20 CHRONIC ATRIAL FIBRILLATION (H): ICD-10-CM

## 2021-03-11 LAB
CAPILLARY BLOOD COLLECTION: NORMAL
INR PPP: 2.6 (ref 0.86–1.14)

## 2021-03-11 PROCEDURE — 85610 PROTHROMBIN TIME: CPT | Performed by: INTERNAL MEDICINE

## 2021-03-11 PROCEDURE — 99207 PR NO CHARGE NURSE ONLY: CPT | Performed by: INTERNAL MEDICINE

## 2021-03-11 PROCEDURE — 36416 COLLJ CAPILLARY BLOOD SPEC: CPT | Performed by: INTERNAL MEDICINE

## 2021-03-11 NOTE — PROGRESS NOTES
ANTICOAGULATION FOLLOW-UP CLINIC VISIT    Patient Name:  Aniket Macias  Date:  3/11/2021  Contact Type:  Telephone/ [place]    SUBJECTIVE:  Patient Findings     Comments:  The patient was assessed for diet, medication, and activity level changes, missed or extra doses, bruising or bleeding, with no problem findings.          Clinical Outcomes     Negatives:  Major bleeding event, Thromboembolic event, Anticoagulation-related hospital admission, Anticoagulation-related ED visit, Anticoagulation-related fatality    Comments:  The patient was assessed for diet, medication, and activity level changes, missed or extra doses, bruising or bleeding, with no problem findings.             OBJECTIVE    Recent labs: (last 7 days)     21  0934   INR 2.60*       ASSESSMENT / PLAN  INR assessment THER    Recheck INR In: 6 WEEKS    INR Location Clinic      Anticoagulation Summary  As of 3/11/2021    INR goal:  2.0-3.0   TTR:  75.3 % (1 y)   INR used for dosin.60 (3/11/2021)   Warfarin maintenance plan:  2.5 mg (5 mg x 0.5) every Mon, Wed, Fri; 5 mg (5 mg x 1) all other days   Full warfarin instructions:  2.5 mg every Mon, Wed, Fri; 5 mg all other days   Weekly warfarin total:  27.5 mg   No change documented:  Radha Seymour RN   Plan last modified:  Radha Seymour RN (2020)   Next INR check:  2021   Priority:  Maintenance   Target end date:  Indefinite    Indications    Paroxysmal atrial fibrillation (Resolved) [I48.0]  Long term current use of anticoagulants with INR goal of 2.0-3.0 [Z79.01]  Chronic atrial fibrillation (H) [I48.20]             Anticoagulation Episode Summary     INR check location:      Preferred lab:      Send INR reminders to:  [facility]    Comments:        Anticoagulation Care Providers     Provider Role Specialty Phone number    Maribeth Haney MD Referring Internal Medicine - Pediatrics [phone]    Sebastián Bermudez MD Responsible Clinical Cardiac  Electrophysiology [phone]            See the Encounter Report to view Anticoagulation Flowsheet and Dosing Calendar (Go to Encounters tab in chart review, and find the Anticoagulation Therapy Visit)    Patient INR is therapeutic.  Patient will continue to take 27.5 mg weekly dosing and follow up in 6 weeks or sooner for any problems or concerns.        Radha Seymour RN

## 2021-04-16 ENCOUNTER — TELEPHONE (OUTPATIENT)
Dept: LAB | Facility: CLINIC | Age: 81
End: 2021-04-16

## 2021-04-16 DIAGNOSIS — Z79.01 LONG TERM CURRENT USE OF ANTICOAGULANTS WITH INR GOAL OF 2.0-3.0: Primary | ICD-10-CM

## 2021-04-16 DIAGNOSIS — I48.20 CHRONIC ATRIAL FIBRILLATION (H): ICD-10-CM

## 2021-04-16 NOTE — TELEPHONE ENCOUNTER
New standing orders entered per protocol as patient's warfarin is managed by INR clinic.  Lia Santana RN

## 2021-04-16 NOTE — TELEPHONE ENCOUNTER
"Patient is scheduled to be seen in our lab 4/22/2021.  Appointment notes indicate \"INR\".  NO current FUTURE or STANDING ORDERS have been placed.  Please place current FUTURE or STANDING orders as needed.      Note that orders have a maximum duration of one (1) year.    Thank you.  Please call if you have any questions.     "

## 2021-04-22 ENCOUNTER — ANTICOAGULATION THERAPY VISIT (OUTPATIENT)
Dept: FAMILY MEDICINE | Facility: CLINIC | Age: 81
End: 2021-04-22

## 2021-04-22 DIAGNOSIS — I48.20 CHRONIC ATRIAL FIBRILLATION (H): ICD-10-CM

## 2021-04-22 DIAGNOSIS — Z79.01 LONG TERM CURRENT USE OF ANTICOAGULANTS WITH INR GOAL OF 2.0-3.0: ICD-10-CM

## 2021-04-22 LAB
CAPILLARY BLOOD COLLECTION: NORMAL
INR PPP: 3 (ref 0.86–1.14)

## 2021-04-22 PROCEDURE — 85610 PROTHROMBIN TIME: CPT | Performed by: INTERNAL MEDICINE

## 2021-04-22 PROCEDURE — 36416 COLLJ CAPILLARY BLOOD SPEC: CPT | Performed by: INTERNAL MEDICINE

## 2021-04-22 PROCEDURE — 99207 PR NO CHARGE NURSE ONLY: CPT | Performed by: INTERNAL MEDICINE

## 2021-04-22 NOTE — PROGRESS NOTES
ANTICOAGULATION FOLLOW-UP CLINIC VISIT    Patient Name:  Aniket Macias  Date:  4/22/2021  Contact Type:  Telephone    SUBJECTIVE:  Patient Findings     Positives:  Change in diet/appetite (less greens than usual)    Comments:  Called patient to discuss today's INR results: The patient was assessed for medication, and activity level changes, missed or extra doses, bruising or bleeding, with no problem findings. However, his diet of green veggies (he enjoys broccoli and spinach) has been less this week (more snacking and a birthday celebration for his wife). He plans to return to normal intake. Reviewed maintenance warfarin dosing with patient. Patient will remain on the same dose until next INR check. No other questions or concerns. Scheduled next lab-only INR in 6 weeks.  Jeanette ALFONSO RN  Anticoagulation Team            Clinical Outcomes     Negatives:  Major bleeding event, Thromboembolic event, Anticoagulation-related hospital admission, Anticoagulation-related ED visit, Anticoagulation-related fatality    Comments:  Called patient to discuss today's INR results: The patient was assessed for medication, and activity level changes, missed or extra doses, bruising or bleeding, with no problem findings. However, his diet of green veggies (he enjoys broccoli and spinach) has been less this week (more snacking and a birthday celebration for his wife). He plans to return to normal intake. Reviewed maintenance warfarin dosing with patient. Patient will remain on the same dose until next INR check. No other questions or concerns. Scheduled next lab-only INR in 6 weeks.  Jeanette ALFONSO RN  Anticoagulation Team               OBJECTIVE    Recent labs: (last 7 days)     04/22/21  0947   INR 3.00*       ASSESSMENT / PLAN  INR assessment THER    Recheck INR In: 6 WEEKS    INR Location Clinic      Anticoagulation Summary  As of 4/22/2021    INR goal:  2.0-3.0   TTR:  84.5 % (1 y)   INR used for dosing:  3.00 (4/22/2021)   Warfarin  maintenance plan:  2.5 mg (5 mg x 0.5) every Mon, Wed, Fri; 5 mg (5 mg x 1) all other days   Full warfarin instructions:  2.5 mg every Mon, Wed, Fri; 5 mg all other days   Weekly warfarin total:  27.5 mg   No change documented:  Jeanette Bender RN   Plan last modified:  Radha Seymour RN (5/6/2020)   Next INR check:  6/3/2021   Priority:  Maintenance   Target end date:  Indefinite    Indications    Paroxysmal atrial fibrillation (Resolved) [I48.0]  Long term current use of anticoagulants with INR goal of 2.0-3.0 [Z79.01]  Chronic atrial fibrillation (H) [I48.20]             Anticoagulation Episode Summary     INR check location:      Preferred lab:      Send INR reminders to:  [facility]    Comments:        Anticoagulation Care Providers     Provider Role Specialty Phone number    Maribeth Haney MD Referring Internal Medicine - Pediatrics [phone]    Sebastián Bermudez MD Responsible Clinical Cardiac Electrophysiology [phone]            See the Encounter Report to view Anticoagulation Flowsheet and Dosing Calendar (Go to Encounters tab in chart review, and find the Anticoagulation Therapy Visit)    Jeanette Bender, RN

## 2021-06-03 ENCOUNTER — ANTICOAGULATION THERAPY VISIT (OUTPATIENT)
Dept: FAMILY MEDICINE | Facility: CLINIC | Age: 81
End: 2021-06-03

## 2021-06-03 DIAGNOSIS — I48.20 CHRONIC ATRIAL FIBRILLATION (H): ICD-10-CM

## 2021-06-03 DIAGNOSIS — Z79.01 LONG TERM CURRENT USE OF ANTICOAGULANTS WITH INR GOAL OF 2.0-3.0: ICD-10-CM

## 2021-06-03 LAB
CAPILLARY BLOOD COLLECTION: NORMAL
INR PPP: 2.3 (ref 0.86–1.14)

## 2021-06-03 PROCEDURE — 99207 PR NO CHARGE NURSE ONLY: CPT | Performed by: INTERNAL MEDICINE

## 2021-06-03 PROCEDURE — 36416 COLLJ CAPILLARY BLOOD SPEC: CPT | Performed by: INTERNAL MEDICINE

## 2021-06-03 PROCEDURE — 85610 PROTHROMBIN TIME: CPT | Performed by: INTERNAL MEDICINE

## 2021-06-03 NOTE — PROGRESS NOTES
ANTICOAGULATION FOLLOW-UP CLINIC VISIT    Patient Name:  Aniket Macias  Date:  6/3/2021  Contact Type:  Telephone/ [place]    SUBJECTIVE:  Patient Findings     Comments:  The patient was assessed for diet, medication, and activity level changes, missed or extra doses, bruising or bleeding, with no problem findings.          Clinical Outcomes     Negatives:  Major bleeding event, Thromboembolic event, Anticoagulation-related hospital admission, Anticoagulation-related ED visit, Anticoagulation-related fatality    Comments:  The patient was assessed for diet, medication, and activity level changes, missed or extra doses, bruising or bleeding, with no problem findings.             OBJECTIVE    Recent labs: (last 7 days)     21  1001   INR 2.30*       ASSESSMENT / PLAN  INR assessment THER    Recheck INR In: 6 WEEKS    INR Location Clinic      Anticoagulation Summary  As of 6/3/2021    INR goal:  2.0-3.0   TTR:  94.0 % (1 y)   INR used for dosin.30 (6/3/2021)   Warfarin maintenance plan:  2.5 mg (5 mg x 0.5) every Mon, Wed, Fri; 5 mg (5 mg x 1) all other days   Full warfarin instructions:  2.5 mg every Mon, Wed, Fri; 5 mg all other days   Weekly warfarin total:  27.5 mg   No change documented:  Radha Seymour RN   Plan last modified:  Radha Seymour RN (2020)   Next INR check:  7/15/2021   Priority:  Maintenance   Target end date:  Indefinite    Indications    Paroxysmal atrial fibrillation (Resolved) [I48.0]  Long term current use of anticoagulants with INR goal of 2.0-3.0 [Z79.01]  Chronic atrial fibrillation (H) [I48.20]             Anticoagulation Episode Summary     INR check location:      Preferred lab:      Send INR reminders to:  [facility]    Comments:        Anticoagulation Care Providers     Provider Role Specialty Phone number    Maribeth Haney MD Referring Internal Medicine - Pediatrics [phone]    Sebastián Bermudez MD Responsible Clinical Cardiac  Electrophysiology [phone]            See the Encounter Report to view Anticoagulation Flowsheet and Dosing Calendar (Go to Encounters tab in chart review, and find the Anticoagulation Therapy Visit)    Patient INR is therapeutic.  Patient will continue to take 27.5 mg weekly dosing and follow up in 6 weeks or sooner for any problems or concerns.        Radha Seymour RN

## 2021-07-07 DIAGNOSIS — I48.0 PAROXYSMAL ATRIAL FIBRILLATION (H): Chronic | ICD-10-CM

## 2021-07-07 DIAGNOSIS — N40.1 BENIGN NON-NODULAR PROSTATIC HYPERPLASIA WITH LOWER URINARY TRACT SYMPTOMS: Chronic | ICD-10-CM

## 2021-07-12 RX ORDER — TERAZOSIN 5 MG/1
CAPSULE ORAL
Qty: 90 CAPSULE | Refills: 0 | Status: SHIPPED | OUTPATIENT
Start: 2021-07-12 | End: 2021-10-10

## 2021-07-12 RX ORDER — METOPROLOL TARTRATE 50 MG
TABLET ORAL
Qty: 180 TABLET | Refills: 0 | Status: SHIPPED | OUTPATIENT
Start: 2021-07-12 | End: 2021-10-10

## 2021-07-12 NOTE — TELEPHONE ENCOUNTER
Pt is calling and is almost of medication. Pre triage, routing to the providers to refill.  Keri Monge,

## 2021-07-14 DIAGNOSIS — I48.20 CHRONIC ATRIAL FIBRILLATION (H): Primary | ICD-10-CM

## 2021-07-15 ENCOUNTER — ANTICOAGULATION THERAPY VISIT (OUTPATIENT)
Dept: FAMILY MEDICINE | Facility: CLINIC | Age: 81
End: 2021-07-15

## 2021-07-15 ENCOUNTER — LAB (OUTPATIENT)
Dept: LAB | Facility: CLINIC | Age: 81
End: 2021-07-15
Payer: MEDICARE

## 2021-07-15 DIAGNOSIS — Z79.01 LONG TERM CURRENT USE OF ANTICOAGULANTS WITH INR GOAL OF 2.0-3.0: Primary | ICD-10-CM

## 2021-07-15 DIAGNOSIS — I48.20 CHRONIC ATRIAL FIBRILLATION (H): ICD-10-CM

## 2021-07-15 LAB — INR BLD: 1.9 (ref 0.9–1.1)

## 2021-07-15 PROCEDURE — 85610 PROTHROMBIN TIME: CPT

## 2021-07-15 PROCEDURE — 36416 COLLJ CAPILLARY BLOOD SPEC: CPT

## 2021-07-15 NOTE — PROGRESS NOTES
ANTICOAGULATION MANAGEMENT     Aniket Macias 81 year old male is on warfarin with subtherapeutic INR result. (Goal INR 2.0-3.0)    Recent labs: (last 7 days)     07/15/21  1108   INR 1.9*       ASSESSMENT     Source(s): Patient/Caregiver Call       Warfarin doses taken: Warfarin taken as instructed    Diet: Increased greens/vitamin K in diet; plans to resume previous intake    New illness, injury, or hospitalization: No    Medication/supplement changes: None noted    Signs or symptoms of bleeding or clotting: No    Previous INR: Therapeutic last 2(+) visits    Additional findings: None     PLAN     Recommended plan for temporary change(s) affecting INR     Dosing Instructions: Booster dose then continue your current warfarin dose with next INR in 5 weeks (wanted to recheck in 2 weeks but refused recheck at that time)     Summary  As of 7/15/2021    Full warfarin instructions:  7/15: 7.5 mg; Otherwise 2.5 mg every Mon, Wed, Fri; 5 mg all other days   Next INR check:  8/18/2021             Telephone call with Aniket who verbalizes understanding and agrees to plan    Lab visit scheduled    Education provided: Impact of vitamin K foods on INR and Importance of following up for INR monitoring at instructed interval    Plan made per [facility] anticoagulation protocol    Radha Seymour RN  Anticoagulation Clinic  7/15/2021    _______________________________________________________________________     Anticoagulation Episode Summary     Current INR goal:  2.0-3.0   TTR:  91.2 % (1 y)   Target end date:  Indefinite   Send INR reminders to:  [facility]    Indications    Paroxysmal atrial fibrillation (Resolved) [I48.0]  Long term current use of anticoagulants with INR goal of 2.0-3.0 [Z79.01]  Chronic atrial fibrillation (H) [I48.20]           Comments:           Anticoagulation Care Providers     Provider Role Specialty Phone number    Maribeth Haney MD Referring Internal Medicine - Pediatrics [phone]     Sebastián Bermudez MD Responsible Clinical Cardiac Electrophysiology [phone]

## 2021-08-16 ENCOUNTER — APPOINTMENT (OUTPATIENT)
Dept: URBAN - METROPOLITAN AREA CLINIC 255 | Age: 81
Setting detail: DERMATOLOGY
End: 2021-08-16

## 2021-08-16 ENCOUNTER — TELEPHONE (OUTPATIENT)
Dept: LAB | Facility: CLINIC | Age: 81
End: 2021-08-16

## 2021-08-16 DIAGNOSIS — D485 NEOPLASM OF UNCERTAIN BEHAVIOR OF SKIN: ICD-10-CM

## 2021-08-16 DIAGNOSIS — L81.4 OTHER MELANIN HYPERPIGMENTATION: ICD-10-CM

## 2021-08-16 DIAGNOSIS — Z85.828 PERSONAL HISTORY OF OTHER MALIGNANT NEOPLASM OF SKIN: ICD-10-CM

## 2021-08-16 DIAGNOSIS — D18.0 HEMANGIOMA: ICD-10-CM

## 2021-08-16 DIAGNOSIS — D22 MELANOCYTIC NEVI: ICD-10-CM

## 2021-08-16 DIAGNOSIS — L82.1 OTHER SEBORRHEIC KERATOSIS: ICD-10-CM

## 2021-08-16 DIAGNOSIS — L57.0 ACTINIC KERATOSIS: ICD-10-CM

## 2021-08-16 DIAGNOSIS — L82.0 INFLAMED SEBORRHEIC KERATOSIS: ICD-10-CM

## 2021-08-16 PROBLEM — D48.5 NEOPLASM OF UNCERTAIN BEHAVIOR OF SKIN: Status: ACTIVE | Noted: 2021-08-16

## 2021-08-16 PROBLEM — D18.01 HEMANGIOMA OF SKIN AND SUBCUTANEOUS TISSUE: Status: ACTIVE | Noted: 2021-08-16

## 2021-08-16 PROBLEM — D22.5 MELANOCYTIC NEVI OF TRUNK: Status: ACTIVE | Noted: 2021-08-16

## 2021-08-16 PROCEDURE — 17110 DESTRUCT B9 LESION 1-14: CPT

## 2021-08-16 PROCEDURE — 11103 TANGNTL BX SKIN EA SEP/ADDL: CPT | Mod: 59

## 2021-08-16 PROCEDURE — OTHER LIQUID NITROGEN: OTHER

## 2021-08-16 PROCEDURE — 17000 DESTRUCT PREMALG LESION: CPT | Mod: 59

## 2021-08-16 PROCEDURE — 17003 DESTRUCT PREMALG LES 2-14: CPT | Mod: 59

## 2021-08-16 PROCEDURE — OTHER COUNSELING: OTHER

## 2021-08-16 PROCEDURE — 99213 OFFICE O/P EST LOW 20 MIN: CPT | Mod: 25

## 2021-08-16 PROCEDURE — OTHER MIPS QUALITY: OTHER

## 2021-08-16 PROCEDURE — 11102 TANGNTL BX SKIN SINGLE LES: CPT | Mod: 59

## 2021-08-16 PROCEDURE — OTHER BIOPSY BY SHAVE METHOD: OTHER

## 2021-08-16 ASSESSMENT — LOCATION SIMPLE DESCRIPTION DERM
LOCATION SIMPLE: LEFT UPPER BACK
LOCATION SIMPLE: LEFT CHEEK
LOCATION SIMPLE: RIGHT LOWER BACK
LOCATION SIMPLE: SCALP
LOCATION SIMPLE: LEFT LOWER BACK
LOCATION SIMPLE: RIGHT LIP
LOCATION SIMPLE: GLABELLA
LOCATION SIMPLE: LEFT FOREHEAD
LOCATION SIMPLE: RIGHT UPPER BACK
LOCATION SIMPLE: RIGHT EYEBROW

## 2021-08-16 ASSESSMENT — LOCATION DETAILED DESCRIPTION DERM
LOCATION DETAILED: RIGHT NASOLABIAL FOLD
LOCATION DETAILED: LEFT MEDIAL MALAR CHEEK
LOCATION DETAILED: LEFT SUPERIOR MEDIAL UPPER BACK
LOCATION DETAILED: RIGHT MEDIAL UPPER BACK
LOCATION DETAILED: LEFT CENTRAL FRONTAL SCALP
LOCATION DETAILED: RIGHT INFERIOR MEDIAL MIDBACK
LOCATION DETAILED: RIGHT LATERAL EYEBROW
LOCATION DETAILED: LEFT SUPERIOR LATERAL LOWER BACK
LOCATION DETAILED: LEFT INFERIOR LATERAL FOREHEAD
LOCATION DETAILED: GLABELLA
LOCATION DETAILED: RIGHT INFERIOR MEDIAL UPPER BACK
LOCATION DETAILED: LEFT INFERIOR FRONTAL SCALP
LOCATION DETAILED: RIGHT SUPERIOR MEDIAL MIDBACK

## 2021-08-16 ASSESSMENT — LOCATION ZONE DERM
LOCATION ZONE: FACE
LOCATION ZONE: TRUNK
LOCATION ZONE: SCALP
LOCATION ZONE: LIP

## 2021-08-16 NOTE — TELEPHONE ENCOUNTER
Return Pt call he has not been seen by  Heart clinic for 2 years and asking for coumadin hold. Informed Pt PMD does dose his coumadin he is fine to discuss with PMD. Pt said he had appointment with PMD tomorrow. JAKOB Schultz RN

## 2021-08-16 NOTE — PROCEDURE: MIPS QUALITY
Quality 130: Documentation Of Current Medications In The Medical Record: Current Medications Documented
Quality 226: Preventive Care And Screening: Tobacco Use: Screening And Cessation Intervention: Patient screened for tobacco use and is an ex/non-smoker
Quality 265: Biopsy Follow-Up: Biopsy results reviewed, communicated, tracked, and documented
Quality 110: Preventive Care And Screening: Influenza Immunization: Influenza Immunization previously received during influenza season
Quality 431: Preventive Care And Screening: Unhealthy Alcohol Use - Screening: Patient screened for unhealthy alcohol use using a single question and scores less than 2 times per year
Detail Level: Detailed

## 2021-08-16 NOTE — PROCEDURE: BIOPSY BY SHAVE METHOD
Wound Care: Petrolatum
Type Of Destruction Used: Curettage
Anesthesia Type: 1% lidocaine with epinephrine and a 1:10 solution of 8.4% sodium bicarbonate
Biopsy Method: Personna blade
Was A Bandage Applied: Yes
Post-Care Instructions: I reviewed with the patient in detail post-care instructions. Patient is to keep the biopsy site dry overnight, and then apply bacitracin twice daily until healed. Patient may apply hydrogen peroxide soaks to remove any crusting.
Additional Anesthesia Volume In Cc (Will Not Render If 0): 0
Billing Type: Third-Party Bill
Hide Additional Size Dimension?: No
Curettage Text: The wound bed was treated with curettage after the biopsy was performed.
Information: Selecting Yes will display possible errors in your note based on the variables you have selected. This validation is only offered as a suggestion for you. PLEASE NOTE THAT THE VALIDATION TEXT WILL BE REMOVED WHEN YOU FINALIZE YOUR NOTE. IF YOU WANT TO FAX A PRELIMINARY NOTE YOU WILL NEED TO TOGGLE THIS TO 'NO' IF YOU DO NOT WANT IT IN YOUR FAXED NOTE.
Dressing: bandage
Silver Nitrate Text: The wound bed was treated with silver nitrate after the biopsy was performed.
Detail Level: Detailed
Size Of Lesion In Cm: 0.8
Consent: Written consent was obtained and risks were reviewed including but not limited to scarring, infection, bleeding, scabbing, incomplete removal, nerve damage and allergy to anesthesia.
Notification Instructions: Patient will be notified of biopsy results. However, patient instructed to call the office if not contacted within 2 weeks.
Cryotherapy Text: The wound bed was treated with cryotherapy after the biopsy was performed.
Depth Of Biopsy: dermis
Body Location Override (Optional - Billing Will Still Be Based On Selected Body Map Location If Applicable): Right Lateral Eyebrow
Body Location Override (Optional - Billing Will Still Be Based On Selected Body Map Location If Applicable): Right Glabella
Size Of Lesion In Cm: 0.6
Electrodesiccation And Curettage Text: The wound bed was treated with electrodesiccation and curettage after the biopsy was performed.
Size Of Lesion In Cm: 0.9
Anesthesia Volume In Cc (Will Not Render If 0): 0.5
Hemostasis: Electrodesiccation
Biopsy Type: H and E
Electrodesiccation Text: The wound bed was treated with electrodesiccation after the biopsy was performed.
Body Location Override (Optional - Billing Will Still Be Based On Selected Body Map Location If Applicable): Right Superior MLF
Body Location Override (Optional - Billing Will Still Be Based On Selected Body Map Location If Applicable): Left Medial Malar Cheek
Size Of Lesion In Cm: 0.3

## 2021-08-16 NOTE — PROCEDURE: LIQUID NITROGEN
Detail Level: Detailed
Duration Of Freeze Thaw-Cycle (Seconds): 15-20
Include Z78.9 (Other Specified Conditions Influencing Health Status) As An Associated Diagnosis?: No
Post-Care Instructions: I reviewed with the patient in detail post-care instructions. Patient is to wear sunprotection, and avoid picking at any of the treated lesions. Pt may apply Vaseline to crusted or scabbing areas.
Medical Necessity Clause: This procedure was medically necessary because the lesions that were treated were:
Show Applicator Variable?: Yes
Number Of Freeze-Thaw Cycles: 1 freeze-thaw cycle
Medical Necessity Information: It is in your best interest to select a reason for this procedure from the list below. All of these items fulfill various CMS LCD requirements except the new and changing color options.
Duration Of Freeze Thaw-Cycle (Seconds): 10
Consent: The patient's consent was obtained including but not limited to risks of crusting, scabbing, blistering, scarring, darker or lighter pigmentary change, recurrence, incomplete removal and infection.

## 2021-08-17 ENCOUNTER — OFFICE VISIT (OUTPATIENT)
Dept: FAMILY MEDICINE | Facility: CLINIC | Age: 81
End: 2021-08-17
Payer: MEDICARE

## 2021-08-17 ENCOUNTER — ANTICOAGULATION THERAPY VISIT (OUTPATIENT)
Dept: ANTICOAGULATION | Facility: CLINIC | Age: 81
End: 2021-08-17

## 2021-08-17 VITALS
HEIGHT: 68 IN | BODY MASS INDEX: 26.83 KG/M2 | DIASTOLIC BLOOD PRESSURE: 74 MMHG | OXYGEN SATURATION: 97 % | HEART RATE: 64 BPM | SYSTOLIC BLOOD PRESSURE: 136 MMHG | WEIGHT: 177 LBS | TEMPERATURE: 97.2 F

## 2021-08-17 DIAGNOSIS — I10 BENIGN ESSENTIAL HYPERTENSION: Chronic | ICD-10-CM

## 2021-08-17 DIAGNOSIS — I48.0 PAROXYSMAL ATRIAL FIBRILLATION (H): ICD-10-CM

## 2021-08-17 DIAGNOSIS — I48.20 CHRONIC ATRIAL FIBRILLATION (H): ICD-10-CM

## 2021-08-17 DIAGNOSIS — Z00.00 ENCOUNTER FOR ANNUAL PHYSICAL EXAM: ICD-10-CM

## 2021-08-17 DIAGNOSIS — I50.32 CHRONIC DIASTOLIC HEART FAILURE (H): ICD-10-CM

## 2021-08-17 DIAGNOSIS — Z79.01 LONG TERM CURRENT USE OF ANTICOAGULANTS WITH INR GOAL OF 2.0-3.0: ICD-10-CM

## 2021-08-17 DIAGNOSIS — E78.5 HYPERLIPIDEMIA LDL GOAL <100: Primary | ICD-10-CM

## 2021-08-17 DIAGNOSIS — Z79.01 LONG TERM CURRENT USE OF ANTICOAGULANTS WITH INR GOAL OF 2.0-3.0: Primary | ICD-10-CM

## 2021-08-17 LAB
HGB BLD-MCNC: 13.2 G/DL (ref 13.3–17.7)
INR BLD: 2.4 (ref 0.9–1.1)

## 2021-08-17 PROCEDURE — G0439 PPPS, SUBSEQ VISIT: HCPCS | Performed by: FAMILY MEDICINE

## 2021-08-17 PROCEDURE — 80053 COMPREHEN METABOLIC PANEL: CPT | Performed by: FAMILY MEDICINE

## 2021-08-17 PROCEDURE — 85018 HEMOGLOBIN: CPT | Performed by: FAMILY MEDICINE

## 2021-08-17 PROCEDURE — 99214 OFFICE O/P EST MOD 30 MIN: CPT | Mod: 25 | Performed by: FAMILY MEDICINE

## 2021-08-17 PROCEDURE — 36415 COLL VENOUS BLD VENIPUNCTURE: CPT | Performed by: FAMILY MEDICINE

## 2021-08-17 PROCEDURE — 85610 PROTHROMBIN TIME: CPT | Performed by: FAMILY MEDICINE

## 2021-08-17 PROCEDURE — 36416 COLLJ CAPILLARY BLOOD SPEC: CPT | Performed by: FAMILY MEDICINE

## 2021-08-17 PROCEDURE — 80061 LIPID PANEL: CPT | Performed by: FAMILY MEDICINE

## 2021-08-17 RX ORDER — ATORVASTATIN CALCIUM 40 MG/1
40 TABLET, FILM COATED ORAL DAILY
Qty: 90 TABLET | Refills: 3 | Status: SHIPPED | OUTPATIENT
Start: 2021-08-17 | End: 2022-08-17

## 2021-08-17 RX ORDER — FUROSEMIDE 40 MG
40 TABLET ORAL DAILY
Qty: 90 TABLET | Refills: 4 | Status: SHIPPED | OUTPATIENT
Start: 2021-08-17 | End: 2022-08-17

## 2021-08-17 RX ORDER — LISINOPRIL 40 MG/1
40 TABLET ORAL DAILY
Qty: 90 TABLET | Refills: 3 | Status: SHIPPED | OUTPATIENT
Start: 2021-08-17 | End: 2022-08-17

## 2021-08-17 RX ORDER — WARFARIN SODIUM 5 MG/1
TABLET ORAL
Qty: 90 TABLET | Refills: 1 | Status: SHIPPED | OUTPATIENT
Start: 2021-08-17 | End: 2022-03-08

## 2021-08-17 ASSESSMENT — ENCOUNTER SYMPTOMS
DYSURIA: 0
NAUSEA: 0
SORE THROAT: 0
SHORTNESS OF BREATH: 1
ARTHRALGIAS: 1
HEMATURIA: 0
EYE PAIN: 0
ABDOMINAL PAIN: 0
PALPITATIONS: 0
CONSTIPATION: 0
COUGH: 0
NERVOUS/ANXIOUS: 0
HEADACHES: 0
JOINT SWELLING: 0
MYALGIAS: 0
DIZZINESS: 0
FEVER: 0
CHILLS: 0
DIARRHEA: 0
HEMATOCHEZIA: 0
WEAKNESS: 0
FREQUENCY: 1
PARESTHESIAS: 0
HEARTBURN: 0

## 2021-08-17 ASSESSMENT — ACTIVITIES OF DAILY LIVING (ADL): CURRENT_FUNCTION: NO ASSISTANCE NEEDED

## 2021-08-17 ASSESSMENT — MIFFLIN-ST. JEOR: SCORE: 1477.37

## 2021-08-17 NOTE — PROGRESS NOTES
"SUBJECTIVE:   Aniket Macias is a 81 year old male who presents for Preventive Visit.    Patient has been advised of split billing requirements and indicates understanding: Yes   Are you in the first 12 months of your Medicare coverage?  No    Healthy Habits:     In general, how would you rate your overall health?  Good    Frequency of exercise:  2-3 days/week    Duration of exercise:  15-30 minutes    Do you usually eat at least 4 servings of fruit and vegetables a day, include whole grains    & fiber and avoid regularly eating high fat or \"junk\" foods?  No    Taking medications regularly:  Yes    Barriers to taking medications:  None    Medication side effects:  None    Ability to successfully perform activities of daily living:  No assistance needed    Home Safety:  No safety concerns identified    Hearing Impairment:  Difficulty following a conversation in a noisy restaurant or crowded room and need to ask people to speak up or repeat themselves    In the past 6 months, have you been bothered by leaking of urine? Yes    In general, how would you rate your overall mental or emotional health?  Good      PHQ-2 Total Score: 0    Additional concerns today:  Yes     Patient has history of atrial fibrillation which is stable.  Currently on warfarin.  INR has been in therapeutic range.  History of CHF use furosemide on a daily basis.  Denies any breathing difficulty.  Interstitial lung disease seeing pulmonology.  Do you feel safe in your environment? Yes    Have you ever done Advance Care Planning? (For example, a Health Directive, POLST, or a discussion with a medical provider or your loved ones about your wishes): Yes, advance care planning is on file.      Fall risk  Fallen 2 or more times in the past year?: No  Any fall with injury in the past year?: No  click delete button to remove this line now  Cognitive Screening   1) Repeat 3 items (Leader, Season, Table)    2) Clock draw: NORMAL  3) 3 item recall: Recalls 3 " objects  Results: 3 items recalled: COGNITIVE IMPAIRMENT LESS LIKELY    Mini-CogTM Copyright QUENTIN Alexander. Licensed by the author for use in [facility]; reprinted with permission (gerda@.[facility]). All rights reserved.      Do you have sleep apnea, excessive snoring or daytime drowsiness?: no    Reviewed and updated as needed this visit by clinical staff  Tobacco  Allergies  Meds              Reviewed and updated as needed this visit by Provider                Social History     Tobacco Use     Smoking status: Former Smoker     Packs/day: 2.00     Years: 35.00     Pack years: 70.00     Types: Cigarettes     Quit date: 1995     Years since quittin.3     Smokeless tobacco: Never Used     Tobacco comment: quit age 54   Substance Use Topics     Alcohol use: Yes     Alcohol/week: 0.0 standard drinks     Comment: 1-2 drinks per week     If you drink alcohol do you typically have >3 drinks per day or >7 drinks per week? No    Alcohol Use 2021   Prescreen: >3 drinks/day or >7 drinks/week? No   Prescreen: >3 drinks/day or >7 drinks/week? -   No flowsheet data found.        Current providers sharing in care for this patient include:   Patient Care Team:  Maribeth Haney MD as PCP - General (Internal Medicine)  Maribeth Haney MD as Assigned PCP  Cesar Treviño MD as Assigned Pulmonology Provider    The following health maintenance items are reviewed in Epic and correct as of today:  Health Maintenance Due   Topic Date Due     ANNUAL REVIEW OF HM ORDERS  Never done     COPD ACTION PLAN  Never done     ZOSTER IMMUNIZATION (1 of 2) Never done     LIPID  2021     FALL RISK ASSESSMENT  2021     Lab work is in process  Pneumonia Vaccine:Adults age 65+ who received Pneumovax (PPSV23) at 65 years or older: Should be given PCV13 > 1 year after their most recent PPSV23        Review of Systems  Constitutional, HEENT, cardiovascular, pulmonary, GI, , musculoskeletal, neuro, skin,  "endocrine and psych systems are negative, except as otherwise noted.    OBJECTIVE:   /74   Pulse 64   Temp 97.2  F (36.2  C) (Tympanic)   Ht 1.727 m (5' 8\")   Wt 80.3 kg (177 lb)   SpO2 97%   BMI 26.91 kg/m   Estimated body mass index is 26.91 kg/m  as calculated from the following:    Height as of this encounter: 1.727 m (5' 8\").    Weight as of this encounter: 80.3 kg (177 lb).  Physical Exam  GENERAL: healthy, alert and no distress  EYES: Eyes grossly normal to inspection, PERRL and conjunctivae and sclerae normal  HENT: ear canals and TM's normal, nose and mouth without ulcers or lesions  NECK: no adenopathy, no asymmetry, masses, or scars and thyroid normal to palpation  RESP: lungs clear to auscultation - no rales, rhonchi or wheezes  CV: regular rate and rhythm, normal S1 S2, no S3 or S4, no murmur, click or rub, no peripheral edema and peripheral pulses strong  ABDOMEN: soft, nontender, no hepatosplenomegaly, no masses and bowel sounds normal  MS: no gross musculoskeletal defects noted, no edema  SKIN: no suspicious lesions or rashes  NEURO: Normal strength and tone, mentation intact and speech normal  PSYCH: mentation appears normal, affect normal/bright    Diagnostic Test Results:  Labs reviewed in Epic    ASSESSMENT / PLAN:   1. Hyperlipidemia LDL goal <100    - Lipid panel reflex to direct LDL Fasting; Future  - atorvastatin (LIPITOR) 40 MG tablet; Take 1 tablet (40 mg) by mouth daily  Dispense: 90 tablet; Refill: 3  - Comprehensive metabolic panel; Future  - Lipid panel reflex to direct LDL Fasting  - Comprehensive metabolic panel    2. Benign essential hypertension; goal < 140/90    - lisinopril (ZESTRIL) 40 MG tablet; Take 1 tablet (40 mg) by mouth daily  Dispense: 90 tablet; Refill: 3    3. Long term current use of anticoagulants with INR goal of 2.0-3.0    - warfarin ANTICOAGULANT (COUMADIN) 5 MG tablet; 2.5 mg Mon, wed, fri and 5 mg all other days or as directed by [facility].  Dispense: 90 " "tablet; Refill: 1    4. Paroxysmal atrial fibrillation (H)    - warfarin ANTICOAGULANT (COUMADIN) 5 MG tablet; 2.5 mg Mon, wed, fri and 5 mg all other days or as directed by [facility].  Dispense: 90 tablet; Refill: 1  - INR point of care; Future  - INR point of care    5. Chronic diastolic heart failure (H)    - furosemide (LASIX) 40 MG tablet; Take 1 tablet (40 mg) by mouth daily  Dispense: 90 tablet; Refill: 4    6. Encounter for annual physical exam    - Lipid panel reflex to direct LDL Fasting; Future  - Comprehensive metabolic panel; Future  - Hemoglobin; Future  - Lipid panel reflex to direct LDL Fasting  - Comprehensive metabolic panel  - Hemoglobin    Patient has been advised of split billing requirements and indicates understanding: Yes  COUNSELING:  Reviewed preventive health counseling, as reflected in patient instructions       Regular exercise       Healthy diet/nutrition    Estimated body mass index is 26.91 kg/m  as calculated from the following:    Height as of this encounter: 1.727 m (5' 8\").    Weight as of this encounter: 80.3 kg (177 lb).        He reports that he quit smoking about 26 years ago. His smoking use included cigarettes. He has a 70.00 pack-year smoking history. He has never used smokeless tobacco.      Appropriate preventive services were discussed with this patient, including applicable screening as appropriate for cardiovascular disease, diabetes, osteopenia/osteoporosis, and glaucoma.  As appropriate for age/gender, discussed screening for colorectal cancer, prostate cancer, breast cancer, and cervical cancer. Checklist reviewing preventive services available has been given to the patient.    Reviewed patients plan of care and provided an AVS. The Intermediate Care Planfor Aniket meets the Care Plan requirement. This Care Plan has been established and reviewed with the Patient.    Counseling Resources:  ATP IV Guidelines  Pooled Cohorts Equation Calculator  Breast Cancer Risk " Calculator  Breast Cancer: Medication to Reduce Risk  FRAX Risk Assessment  ICSI Preventive Guidelines  Dietary Guidelines for Americans, 2010  [employer]'s MyPlate  ASA Prophylaxis  Lung CA Screening    Basilio Gregorio MD  [facility] [place]    Identified Health Risks:

## 2021-08-17 NOTE — PROGRESS NOTES
Anticoagulation Management    Unable to reach [place] today.    Today's INR result of 2.4 is therapeutic (goal INR of 2.0-3.0).  Result received from: Clinic Lab    Follow up required to confirm warfarin dose taken and assess for changes    Left message to continue current dose of warfarin 5 mg tonight. Encouraged callback at patient's earliest convenience.       Anticoagulation clinic to follow up    Yvette Guaman RN

## 2021-08-18 LAB
ALBUMIN SERPL-MCNC: 3.6 G/DL (ref 3.4–5)
ALP SERPL-CCNC: 74 U/L (ref 40–150)
ALT SERPL W P-5'-P-CCNC: 34 U/L (ref 0–70)
ANION GAP SERPL CALCULATED.3IONS-SCNC: 5 MMOL/L (ref 3–14)
AST SERPL W P-5'-P-CCNC: 27 U/L (ref 0–45)
BILIRUB SERPL-MCNC: 0.8 MG/DL (ref 0.2–1.3)
BUN SERPL-MCNC: 24 MG/DL (ref 7–30)
CALCIUM SERPL-MCNC: 9.2 MG/DL (ref 8.5–10.1)
CHLORIDE BLD-SCNC: 105 MMOL/L (ref 94–109)
CHOLEST SERPL-MCNC: 135 MG/DL
CO2 SERPL-SCNC: 29 MMOL/L (ref 20–32)
CREAT SERPL-MCNC: 1.17 MG/DL (ref 0.66–1.25)
FASTING STATUS PATIENT QL REPORTED: YES
GFR SERPL CREATININE-BSD FRML MDRD: 58 ML/MIN/1.73M2
GLUCOSE BLD-MCNC: 105 MG/DL (ref 70–99)
HDLC SERPL-MCNC: 60 MG/DL
LDLC SERPL CALC-MCNC: 66 MG/DL
NONHDLC SERPL-MCNC: 75 MG/DL
POTASSIUM BLD-SCNC: 4.3 MMOL/L (ref 3.4–5.3)
PROT SERPL-MCNC: 6.9 G/DL (ref 6.8–8.8)
SODIUM SERPL-SCNC: 139 MMOL/L (ref 133–144)
TRIGL SERPL-MCNC: 45 MG/DL

## 2021-08-18 NOTE — PROGRESS NOTES
ANTICOAGULATION MANAGEMENT     Aniket Macias 81 year old male is on warfarin with therapeutic INR result. (Goal INR 2.0-3.0)    Recent labs: (last 7 days)     08/17/21  1212   INR 2.4*       ASSESSMENT     Source(s): Chart Review and Patient/Caregiver Call       Warfarin doses taken: Warfarin taken as instructed    Diet: No new diet changes identified    New illness, injury, or hospitalization: No    Medication/supplement changes: None noted    Signs or symptoms of bleeding or clotting: No    Previous INR: Subtherapeutic    Additional findings: None. Has a colonoscopy scheduled for 11/30. Advised we will address further at next INR check and have pharmacy review plan at that time     PLAN     Recommended plan for no diet, medication or health factor changes affecting INR     Dosing Instructions: Continue your current warfarin dose with next INR in 6 weeks       Summary  As of 8/17/2021    Full warfarin instructions:  2.5 mg every Mon, Wed, Fri; 5 mg all other days   Next INR check:  9/14/2021             Telephone call with Aniket who verbalizes understanding and agrees to plan    Patient had already scheduled a recheck for 6 weeks out while in for INR    Education provided: Please call back if any changes to your diet, medications or how you've been taking warfarin    Plan made per [facility] anticoagulation protocol    Radha Seymour RN  Anticoagulation Clinic  8/18/2021    _______________________________________________________________________     Anticoagulation Episode Summary     Current INR goal:  2.0-3.0   TTR:  89.3 % (1 y)   Target end date:  Indefinite   Send INR reminders to:  [facility]    Indications    Paroxysmal atrial fibrillation (Resolved) [I48.0]  Long term current use of anticoagulants with INR goal of 2.0-3.0 [Z79.01]  Chronic atrial fibrillation (H) [I48.20]           Comments:           Anticoagulation Care Providers     Provider Role Specialty Phone number    Maribeth Haney,  MD Referring Internal Medicine - Pediatrics [phone]    Sebastián Bermudez MD Responsible Clinical Cardiac Electrophysiology [phone]

## 2021-08-21 ASSESSMENT — ENCOUNTER SYMPTOMS
HEMATURIA: 0
FLANK PAIN: 0
DYSURIA: 0
DIFFICULTY URINATING: 0
NAIL CHANGES: 0
POOR WOUND HEALING: 1
SKIN CHANGES: 0

## 2021-08-22 NOTE — PROGRESS NOTES
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 Remains asymptomatic and not limited by breathing. Given asymptomatic nature, and good exercise tolerance, he is not currently on any medications (inhalers or antifibrotics). If becomes symptomatic, would start Anoro, with albuterol PRN (did not feel he got benefit from PRN albuterol). If worsening, will consider antifibrotics, no need currently. ILD lab work up with borderline ALEKSEY (1:40), and one positive HP panel, without an HP pattern on CT.   -Pneumonia vaccinations (13 and 23 valent) up to date (2015 and 2017, respectively)  -Should have annual influenza immunization (up to date for 2020-21 season)  -up to date on COVID vaccination series.   -Encouraged ongoing activity/exercise  -again discussed anti-fibrotic medications today, I don't think he should start these at this point, given stability of disease and asymptomatic.   -Consider pulmonary rehab referral in the future if worsening, no need currently.   -follow up in 6-12 months. Prefers 12 months with PFTs.   -would consider repeating echocardiogram to look for pulmonary HTN, given fixed split s2 on exam and DLCO reduction out of proportion to restriction severity.     Spent 30 min on direct care and documentation today.     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      Brief summary (from prior visits):   I met him in May 2020. He initially developed a cough in Jan 2020. At his PCP's office with a cough, and was found to have SpO2 of 94%. A CXR was done, concerning for left lung pneumonia. He was treated with Augmentin and azithromycin. This improved his cough, but he continued to feel short of breath, especially with exertion. He also noted some lightheadedness when he stood up quickly. He was complaining of dyspnea requiring him to stop after 1 flight of stairs (a change from baseline of 3 flights of stairs). He saw his PCP, Dr. Haney, for these complaints in February 2020. A CT was done at that time, showing emphysema and pulmonary fibrosis in a possible UIP pattern. He was referred to ILD clinic. 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 As he has been relatively asymptomatic, he was given a PRN albuterol. Anoro was discussed, but not started. If there is progression, antifibrotics will be considered.      Interval history:   He was last seen virtually in January. Overall, he is feeling well. He denies any shortness of breath. He is not getting out of breath. He filled albuterol in May of 2020, and he used it 21 times since then. He didn't feel any benefit from it. He denies wheeze. The only time he notes dyspnea, is if he walks up from his garage up to the 4th floor. If he walks slow, he is fine, if he walks faster, he may have to stop on the third floor for 1-2 min. Otherwise, he does not feel at all limited by his breathing. He could \"walk all day\" on level ground. He does have to go slow with walking with his wife, because she is slower, but he feels he wants to walk faster. No chest pain, LE edema. He does have some atrial fibrillation, which he sometimes feels. Rare coughing and sneezing. Does feel he may have some seasonal " "allergies in the Spring/Summer. Daughter thinking of going to [place] next summer, and patient may join. No new joint pain, swelling, or new myalgias. 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 No new rashes.     Exposure History: reviewed, no change.   Tobacco: Former use, quit about 25 years ago, used 1/2-2 ppd for 35 years or so, up to 70 pack years.   Other inhaled substances: No pipes, vaping, marijuana.   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   Pets: No pets, no birds.   Allergies: seasonal allergies  Hobbies: enjoys being on the computer. [place] around [place]    Travel: No recent travel. (Previously lived in [place] from 1995 to 2015).   Home: [place]. No feather pillows or down comforters. No mold in apartment, no hot tub use. No humidifier. No musical instrument use.     ROS: Complete 10 point ROS negative unless mentioned in HPI    Allergies and current medications: Reviewed in EHR  Past medical, surgical, social, and family histories: Personally reviewed and updated (if needed) during this visit.     Physical Exam:  BP (!) 143/90   Pulse 74   Resp 17   Ht 1.727 m (5' 8\")   Wt 80.3 kg (177 lb)   SpO2 96%   BMI 26.91 kg/m      General: Sitting in the chair in NAD  HEENT: anicteric, mask in place  Neck: Trachea midline. Normal ROM.   Lymphatic: no cervical or supraclavicular lymphadenopathy   Chest: Very mild basilar crackles, worse on left. Normal Work of breathing. No cough or wheeze.   Cardiac: irregularly irregular. Possible fixed split S2.   Abdomen: Soft, flat, non tender, active BS  Extremities: No LE Edema, moving all extremities. No digital clubbing. No obvious synovitis.   Neuro: A&Ox3, no focal defects. Speech/language wnl.   Skin: no rash noted on limited exam  Psych: normal affect.     Labs and Radiology: All new data personally reviewed and summarized below. I have reviewed the results with " the patient today.   All laboratory data reviewed   8/17/21 Hgb 13.2 K 4.3 Cr 1.17, LFTs normal.   All cardiac studies reviewed by me.   No new studies  All imaging studies reviewed by me.   No new imaging.     PFT's:  Pulmonary Function Testing: personally reviewed.   Date and Site FEV1 FVC FEV1/FVC TLC RV DLCO   5/6/20 [facility] 2.8 (97%) 3.9 (100%) 72% 5.54 (77%) 1.55 (54%) 14.02 (57%)   9/30/20 [facility] 2.92  (102%) 4.01 (103%) 73% 5.71 (80%) 1.62 (56%) 12.5 (51%)   8/23/21 [facility] 2.93 (104%) 3.9 (101%) 75% 5.54 (77%) 1.53 (52%) 13.19 (54%)   Today's PFT Interpretation: mild restriction, moderate diffusion defect. Stable from prior.      5/6/20 Six minute walk: Walk completed on room air. Walk distance normal, 442 m vs  m. Significant desaturation without hypoxia, SpO2 dropped from 95% to 91% on room air walk.

## 2021-08-23 ENCOUNTER — OFFICE VISIT (OUTPATIENT)
Dept: PULMONOLOGY | Facility: CLINIC | Age: 81
End: 2021-08-23
Attending: INTERNAL MEDICINE
Payer: MEDICARE

## 2021-08-23 VITALS
HEART RATE: 74 BPM | WEIGHT: 177 LBS | SYSTOLIC BLOOD PRESSURE: 143 MMHG | RESPIRATION RATE: 17 BRPM | HEIGHT: 68 IN | OXYGEN SATURATION: 96 % | BODY MASS INDEX: 26.83 KG/M2 | DIASTOLIC BLOOD PRESSURE: 90 MMHG

## 2021-08-23 DIAGNOSIS — J84.9 ILD (INTERSTITIAL LUNG DISEASE) (H): Primary | ICD-10-CM

## 2021-08-23 DIAGNOSIS — J43.9 PULMONARY EMPHYSEMA, UNSPECIFIED EMPHYSEMA TYPE (H): ICD-10-CM

## 2021-08-23 DIAGNOSIS — J84.9 ILD (INTERSTITIAL LUNG DISEASE) (H): ICD-10-CM

## 2021-08-23 DIAGNOSIS — R06.09 DYSPNEA ON EXERTION: ICD-10-CM

## 2021-08-23 PROCEDURE — G0463 HOSPITAL OUTPT CLINIC VISIT: HCPCS | Mod: 25

## 2021-08-23 PROCEDURE — 94375 RESPIRATORY FLOW VOLUME LOOP: CPT | Performed by: INTERNAL MEDICINE

## 2021-08-23 PROCEDURE — 94729 DIFFUSING CAPACITY: CPT | Performed by: INTERNAL MEDICINE

## 2021-08-23 PROCEDURE — 99214 OFFICE O/P EST MOD 30 MIN: CPT | Mod: 25 | Performed by: INTERNAL MEDICINE

## 2021-08-23 PROCEDURE — 94726 PLETHYSMOGRAPHY LUNG VOLUMES: CPT | Performed by: INTERNAL MEDICINE

## 2021-08-23 ASSESSMENT — MIFFLIN-ST. JEOR: SCORE: 1477.37

## 2021-08-23 ASSESSMENT — PAIN SCALES - GENERAL: PAINLEVEL: NO PAIN (0)

## 2021-08-23 NOTE — LETTER
8/23/2021         RE: Aniket Macias  [address]  [place] [address]        Dear Colleague,    Thank you for referring your patient, Aniket Macias, to the [facility] [place]. Please see a copy of my visit note below.    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 Remains asymptomatic and not limited by breathing. Given asymptomatic nature, and good exercise tolerance, he is not currently on any medications (inhalers or antifibrotics). If becomes symptomatic, would start Anoro, with albuterol PRN (did not feel he got benefit from PRN albuterol). If worsening, will consider antifibrotics, no need currently. ILD lab work up with borderline ALEKSEY (1:40), and one positive HP panel, without an HP pattern on CT.   -Pneumonia vaccinations (13 and 23 valent) up to date (2015 and 2017, respectively)  -Should have annual influenza immunization (up to date for 2020-21 season)  -up to date on COVID vaccination series.   -Encouraged ongoing activity/exercise  -again discussed anti-fibrotic medications today, I don't think he should start these at this point, given stability of disease and asymptomatic.   -Consider pulmonary rehab referral in the future if worsening, no need currently.   -follow up in 6-12 months. Prefers 12 months with PFTs.   -would consider repeating echocardiogram to look for pulmonary HTN, given fixed split s2 on exam and DLCO reduction out of proportion to restriction severity.     Spent 30 min on direct care and documentation today.      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      Brief summary (from prior visits):   I met him in May 2020. He initially developed a cough in Jan 2020. At his PCP's office with a cough, and was found to have SpO2 of 94%. A CXR was done, concerning for left lung pneumonia. He was treated with Augmentin and azithromycin. This improved his cough, but he continued to feel short of breath, especially with exertion. He also noted some lightheadedness when he stood up quickly. He was complaining of dyspnea requiring him to stop after 1 flight of stairs (a change from baseline of 3 flights of stairs). He saw his PCP, Dr. Haney, for these complaints in February 2020. A CT was done at that time, showing emphysema and pulmonary fibrosis in a possible UIP pattern. He was referred to ILD clinic. 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 As he has been relatively asymptomatic, he was given a PRN albuterol. Anoro was discussed, but not started. If there is progression, antifibrotics will be considered.      Interval history:   He was last seen virtually in January. Overall, he is feeling well. He denies any shortness of breath. He is not getting out of breath. He filled albuterol in May of 2020, and he used it 21 times since then. He didn't feel any benefit from it. He denies wheeze. The only time he notes dyspnea, is if he walks up from his garage up to the 4th floor. If he walks slow, he is fine, if he walks faster, he may have to stop on the third floor for 1-2 min. Otherwise, he does not feel at all limited by his breathing. He could  "\"walk all day\" on level ground. He does have to go slow with walking with his wife, because she is slower, but he feels he wants to walk faster. No chest pain, LE edema. He does have some atrial fibrillation, which he sometimes feels. Rare coughing and sneezing. Does feel he may have some seasonal allergies in the Spring/Summer. Daughter thinking of going to [place] next summer, and patient may join. No new joint pain, swelling, or new myalgias. 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 No new rashes.     Exposure History: reviewed, no change.   Tobacco: Former use, quit about 25 years ago, used 1/2-2 ppd for 35 years or so, up to 70 pack years.   Other inhaled substances: No pipes, vaping, marijuana.   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   Pets: No pets, no birds.   Allergies: seasonal allergies  Hobbies: enjoys being on the computer. [place]    Travel: No recent travel. (Previously lived in [place] from 1995 to 2015).   Home: [place]. No feather pillows or down comforters. No mold in apartment, no hot tub use. No humidifier. No musical instrument use.     ROS: Complete 10 point ROS negative unless mentioned in HPI    Allergies and current medications: Reviewed in EHR  Past medical, surgical, social, and family histories: Personally reviewed and updated (if needed) during this visit.     Physical Exam:  BP (!) 143/90   Pulse 74   Resp 17   Ht 1.727 m (5' 8\")   Wt 80.3 kg (177 lb)   SpO2 96%   BMI 26.91 kg/m      General: Sitting in the chair in NAD  HEENT: anicteric, mask in place  Neck: Trachea midline. Normal ROM.   Lymphatic: no cervical or supraclavicular lymphadenopathy   Chest: Very mild basilar crackles, worse on left. Normal Work of breathing. No cough or wheeze.   Cardiac: irregularly irregular. Possible fixed split S2.   Abdomen: Soft, flat, non tender, active BS  Extremities: No LE " Edema, moving all extremities. No digital clubbing. No obvious synovitis.   Neuro: A&Ox3, no focal defects. Speech/language wnl.   Skin: no rash noted on limited exam  Psych: normal affect.     Labs and Radiology: All new data personally reviewed and summarized below. I have reviewed the results with the patient today.   All laboratory data reviewed   8/17/21 Hgb 13.2 K 4.3 Cr 1.17, LFTs normal.   All cardiac studies reviewed by me.   No new studies  All imaging studies reviewed by me.   No new imaging.     PFT's:  Pulmonary Function Testing: personally reviewed.   Date and Site FEV1 FVC FEV1/FVC TLC RV DLCO   5/6/20 [facility] 2.8 (97%) 3.9 (100%) 72% 5.54 (77%) 1.55 (54%) 14.02 (57%)   9/30/20 [facility] 2.92  (102%) 4.01 (103%) 73% 5.71 (80%) 1.62 (56%) 12.5 (51%)   8/23/21 [facility] 2.93 (104%) 3.9 (101%) 75% 5.54 (77%) 1.53 (52%) 13.19 (54%)   Today's PFT Interpretation: mild restriction, moderate diffusion defect. Stable from prior.      5/6/20 Six minute walk: Walk completed on room air. Walk distance normal, 442 m vs  m. Significant desaturation without hypoxia, SpO2 dropped from 95% to 91% on room air walk.              Again, thank you for allowing me to participate in the care of your patient.        Sincerely,        Cesar Treviño MD

## 2021-08-23 NOTE — NURSING NOTE
Chief Complaint   Patient presents with     RECHECK     Return Interstitial lung      Medications reviewed and vital signs taken.   Obinna Mckeon, CMA

## 2021-08-23 NOTE — PATIENT INSTRUCTIONS
Will have you follow up in 12 months, sooner if needed.     No current need for anti-fibrotic medications or inhalers, given stable lung function tests and good exercise tolerance.     Could consider repeating cardiac echocardiogram (ultrasound of heart) but can hold off. Due to low diffusion out of proportion to restriction on breathing tests.

## 2021-08-24 LAB
DLCOUNC-%PRED-PRE: 54 %
DLCOUNC-PRE: 13.19 ML/MIN/MMHG
DLCOUNC-PRED: 24.12 ML/MIN/MMHG
ERV-%PRED-PRE: 128 %
ERV-PRE: 1.28 L
ERV-PRED: 1 L
EXPTIME-PRE: 6.82 SEC
FEF2575-%PRED-PRE: 114 %
FEF2575-PRE: 2.24 L/SEC
FEF2575-PRED: 1.96 L/SEC
FEFMAX-%PRED-PRE: 122 %
FEFMAX-PRE: 8.54 L/SEC
FEFMAX-PRED: 6.98 L/SEC
FEV1-%PRED-PRE: 104 %
FEV1-PRE: 2.93 L
FEV1FEV6-PRE: 75 %
FEV1FEV6-PRED: 76 %
FEV1FVC-PRE: 75 %
FEV1FVC-PRED: 74 %
FEV1SVC-PRE: 73 %
FEV1SVC-PRED: 63 %
FIFMAX-PRE: 6.47 L/SEC
FRCPLETH-%PRED-PRE: 73 %
FRCPLETH-PRE: 2.81 L
FRCPLETH-PRED: 3.81 L
FVC-%PRED-PRE: 101 %
FVC-PRE: 3.9 L
FVC-PRED: 3.83 L
IC-%PRED-PRE: 78 %
IC-PRE: 2.73 L
IC-PRED: 3.49 L
RVPLETH-%PRED-PRE: 52 %
RVPLETH-PRE: 1.53 L
RVPLETH-PRED: 2.9 L
TLCPLETH-%PRED-PRE: 77 %
TLCPLETH-PRE: 5.54 L
TLCPLETH-PRED: 7.13 L
VA-%PRED-PRE: 77 %
VA-PRE: 4.87 L
VC-%PRED-PRE: 89 %
VC-PRE: 4.01 L
VC-PRED: 4.49 L

## 2021-09-01 ENCOUNTER — TELEPHONE (OUTPATIENT)
Dept: FAMILY MEDICINE | Facility: CLINIC | Age: 81
End: 2021-09-01

## 2021-09-01 DIAGNOSIS — I48.0 PAROXYSMAL ATRIAL FIBRILLATION (H): ICD-10-CM

## 2021-09-01 DIAGNOSIS — Z79.01 LONG TERM CURRENT USE OF ANTICOAGULANTS WITH INR GOAL OF 2.0-3.0: Primary | ICD-10-CM

## 2021-09-01 DIAGNOSIS — I48.20 CHRONIC ATRIAL FIBRILLATION (H): ICD-10-CM

## 2021-09-05 ENCOUNTER — HEALTH MAINTENANCE LETTER (OUTPATIENT)
Age: 81
End: 2021-09-05

## 2021-09-09 ENCOUNTER — APPOINTMENT (OUTPATIENT)
Dept: URBAN - METROPOLITAN AREA CLINIC 255 | Age: 81
Setting detail: DERMATOLOGY
End: 2021-09-14

## 2021-09-09 PROBLEM — C44.319 BASAL CELL CARCINOMA OF SKIN OF OTHER PARTS OF FACE: Status: ACTIVE | Noted: 2021-09-09

## 2021-09-09 PROCEDURE — OTHER SUPERFICIAL RADIATION TREATMENT: OTHER

## 2021-09-09 PROCEDURE — OTHER TREATMENT REGIMEN: OTHER

## 2021-09-09 PROCEDURE — OTHER FOLLOW UP FOR NEXT VISIT: OTHER

## 2021-09-09 PROCEDURE — 77263 THER RADIOLOGY TX PLNG CPLX: CPT

## 2021-09-09 PROCEDURE — 99213 OFFICE O/P EST LOW 20 MIN: CPT | Mod: 25

## 2021-09-09 PROCEDURE — 77290 THER RAD SIMULAJ FIELD CPLX: CPT

## 2021-09-09 PROCEDURE — G6001 ECHO GUIDANCE RADIOTHERAPY: HCPCS

## 2021-09-09 PROCEDURE — 77300 RADIATION THERAPY DOSE PLAN: CPT

## 2021-09-09 PROCEDURE — 77334 RADIATION TREATMENT AID(S): CPT

## 2021-09-09 NOTE — PROCEDURE: SUPERFICIAL RADIATION TREATMENT
Intro Statement (Will Not Render If Left Blank): The patient is undergoing superficial radiation therapy for skin cancer and presents for weekly evaluation and management.  Per protocol and as documented on the flow sheet, the patient was questioned as to subjective redness, pruritus, pain, drainage, fatigue, or any other symptoms.  Objectively, the radiation area was evaluated with regards to erythema, atrophy, scale, crusting, erosion, ulceration, edema, purpura, tenderness, warmth, drainage, and any other findings.  The plan was extensively reviewed including the dose, and dosing schedule.  The simulation and clinical setup was also reviewed as was the external and any internal shields and based on this review the appropriateness and sufficiency of treatment was determined.
Bill For Simulation And Treatment Device Design: Yes
Information: Selecting Yes will display possible errors in your note based on the variables you have selected. This validation is only offered as a suggestion for you. PLEASE NOTE THAT THE VALIDATION TEXT WILL BE REMOVED WHEN YOU FINALIZE YOUR NOTE. IF YOU WANT TO FAX A PRELIMINARY NOTE YOU WILL NEED TO TOGGLE THIS TO 'NO' IF YOU DO NOT WANT IT IN YOUR FAXED NOTE.
Dimensions-Y Axis In Cm: 0.5
Number Of Days Off Treatment: 1
Custom Shielding Afterword Text Will Not Be Included With Simple Simulations (X X Y Cm............): port to correlate with the lesion size, including treatment margin. The custom lead shield is adequate to accommodate the appropriate applicator and provide adequate shielding around the treatment site. Additional shielding (as noted below) is used to protect sensitive, normal tissues.
Shielding Size (Optional- Include Units): 1.5cm x 1.5cm
Computed Treatment Time In Min (Will Render The Same As Calculated Treatment Time If Left Blank): 0.41
Initial Radiation Treatment Planning (Will Render If Bill Simulation = Yes): The patient had a complete consultation regarding all applicable modalities for the treatment of their skin cancer and based on a variety of factors including the type of tumor, size, and location, the relevant medical history as well as local tissue factors, the functional status of the individual, the ability to perform necessary postoperative wound instructions and the need for simultaneous treatments as well as overall wound healing status, it was determined that the patient would begin radiation therapy treatment for skin cancer.  A full simulation and treatment device design was performed including the determination and formulation of appropriate simple and complex devices including lead shield of 0.762 mm thickness to form molded customized shielding to specifically correlate with the lesion size including treatment margin.  The custom lead shield is adequate to accommodate the appropriate applicator and provide adequate shielding around the treatment site.  The specific field applicator, shields, and devices both simple and complex as well as the specific patient setup is outlined below.  The patient was given a full consent for superficial radiation to both verbally and in writing and the full determination of patient's eligibility for treatment and selection is outlined on the patient eligibility and treatment selection form.  The specific superficial radiotherapy prescription was determined and was documented on the superficial radiotherapy prescription form.  A treatment calculation was also performed and documented on the treatment calculation form.  Based on the prescription, the patient was scheduled for a series of fractional treatments.
Render Additional Prescriptions In Note?: No
Energy (Optional-Please Include Units): 70KV
Total Dose (Optional-Please Include Units): 5321.60
Total Number Of Fractions Rx 2: 15
Daily Fractionated Dose (Optional- Include Units): 261.58 cGy
Field Size (Applicator): 2.0 cm
Fractions / Week Rx 3: 5
Energy (Optional-Please Include Units): 70kv
Energy (Include Units): 70kv
Simple Simulation Preamble Text Will Be Included With Simple Simulations (.......... Indications): Simple simulation was performed today for the following reasons:
Body Location Override (Optional): Right Inferior Nasal Cheek
Body Location Override (Optional): Left Medial Cheek
Time Dose Fractionation (Optional- Include Units If Applicable): 95
Additional Prescription Justification Text: If there is any interruption in treatment exceeding 5 days please see [place] and Dose Adjustment Calculation and complete treatment under Prescription 2, 3 or 4 as appropriate.
Field Number: 3
Field Number: 2
Dose Per Fractionation In Cgy (Optional): 261.58
Custom Shielding Preamble Text Will Not Be Included With Simple Simulations (.......... X X Y Cm): A lead shield of 0.762 mm thickness is utilized to form a molded, custom shield with a
Energy (Include Units): 70kv
Energy (Include Units): 70kv
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
Total Number Of Fractions: 20
Please Choose The Type Of Visit (Required): Treatment Planning Visit: Show Non-Treatment Variables
Energy (Optional-Please Include Units): 70 Kv
Functional Status: 0 (fully active)
Patient Positioning: Sitting
Detail Level: Zone
Fractionation Number: 0
Assessment: Appropriate reaction
Port Dimensions-Y Axis In Cm: 1.5
Treatment Time / Fractionation (Optional- Include Units): 0.41 Min
Fractions / Week: 4
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
Treatment Device Design After Initial Simulation Justification (Will Render If Bill For Treatment Devices = Yes): The patient is status post radiation simulation and is evaluated as to the use of additional devices for shielding and placement for radiation therapy.
Pathology Override (Pathology Will Render As Diagnosis Name If Left Blank): Primary Nodular Basal Cell Carcinoma on the Left Medial Malar Cheek.
Functional Status: 1 (ambulatory, light activity)
Total Dose (Optional-Please Include Units): 5231.60 cGy
Body Location Override (Optional): Right Glabella
Energy (Optional-Please Include Units): 70kv

## 2021-09-09 NOTE — PROCEDURE: TREATMENT REGIMEN
Plan: Per the request of  Dr. London, patient was seen today for Superficial Radiation Therapy requiring simulation (CPT® 64852) in preparation for treatment of specific diseased sites. Simulation is necessary to determine correct patient and treatment portal positioning, deliver safe and effective radiation therapy. A high frequency ultrasound image was acquired prior to treatment today for three dimensional evaluation of tumor volume and response to treatment, in addition, geometric accuracy of field placement (CPT® ). Physician evaluation of the ultrasound tumor depth will be ongoing through course of treatment, and is deemed medically necessary ensuring efficacy of treatment. Today’s image and setup was evaluated determining continuation of treatment with the current plan, or necessary changes as appropriate. All appropriate custom blocking and treatment parameters verified by radiation therapist according to initial simulation.\\n\\nUS image guidance and field placement prior to treatment delivery performed. US depth is 0.96 mm.\\n\\nPer Dr. London, continued daily US guidance and simulation is required for field placement, measurement of tumor depth, progress and edema monitoring.\\n\\nPer the request of Dr. London, continuing medical physics review as per radiotherapy standard of care post every 5th fraction for patient, including assessment of treatment parameters,  of dose delivery, and review of patient treatment documentation in support of the provider, is ordered, ensuring efficacy and continued safe delivery of radiotherapy. Included in physics check is review of patient setup information, all pertinent simulation and treatment photographs checks, prescription, dose calculation verification, daily dose charted correctly, elapsed days and treatment days correctly charted, cumulative dose correct, and review of any prescription changes. Continued medical physics review post every 5th fraction of therapy is requested by provider for appropriate radiotherapy management, and is deemed medically necessary and standard of care.\\n Plan: Per the request of  Dr. London, patient was seen today for Superficial Radiation Therapy requiring simulation (CPT® 02800) in preparation for treatment of specific diseased sites. Simulation is necessary to determine correct patient and treatment portal positioning, deliver safe and effective radiation therapy. A high frequency ultrasound image was acquired prior to treatment today for three dimensional evaluation of tumor volume and response to treatment, in addition, geometric accuracy of field placement (CPT® ). Physician evaluation of the ultrasound tumor depth will be ongoing through course of treatment, and is deemed medically necessary ensuring efficacy of treatment. Today’s image and setup was evaluated determining continuation of treatment with the current plan, or necessary changes as appropriate. All appropriate custom blocking and treatment parameters verified by radiation therapist according to initial simulation.\\n\\nUS image guidance and field placement prior to treatment delivery performed. US depth is 0.96 mm.\\n\\nPer Dr. London, continued daily US guidance and simulation is required for field placement, measurement of tumor depth, progress and edema monitoring.\\n\\nPer the request of Dr. London, continuing medical physics review as per radiotherapy standard of care post every 5th fraction for patient, including assessment of treatment parameters,  of dose delivery, and review of patient treatment documentation in support of the provider, is ordered, ensuring efficacy and continued safe delivery of radiotherapy. Included in physics check is review of patient setup information, all pertinent simulation and treatment photographs checks, prescription, dose calculation verification, daily dose charted correctly, elapsed days and treatment days correctly charted, cumulative dose correct, and review of any prescription changes. Continued medical physics review post every 5th fraction of therapy is requested by provider for appropriate radiotherapy management, and is deemed medically necessary and standard of care.\\n

## 2021-09-09 NOTE — PROCEDURE: TREATMENT REGIMEN
Plan: Per the request of  Dr. London, patient was seen today for Superficial Radiation Therapy requiring simulation (CPT® 97344) in preparation for treatment of specific diseased sites. Simulation is necessary to determine correct patient and treatment portal positioning, deliver safe and effective radiation therapy. A high frequency ultrasound image was acquired prior to treatment today for three dimensional evaluation of tumor volume and response to treatment, in addition, geometric accuracy of field placement (CPT® ). Physician evaluation of the ultrasound tumor depth will be ongoing through course of treatment, and is deemed medically necessary ensuring efficacy of treatment. Today’s image and setup was evaluated determining continuation of treatment with the current plan, or necessary changes as appropriate. All appropriate custom blocking and treatment parameters verified by radiation therapist according to initial simulation.\\n\\nUS image guidance and field placement prior to treatment delivery performed. US depth is  0.80 mm.\\n\\nPer Dr. London, continued daily US guidance and simulation is required for field placement, measurement of tumor depth, progress and edema monitoring.\\n\\nPer the request of Dr. London, continuing medical physics review as per radiotherapy standard of care post every 5th fraction for patient, including assessment of treatment parameters,  of dose delivery, and review of patient treatment documentation in support of the provider, is ordered, ensuring efficacy and continued safe delivery of radiotherapy. Included in physics check is review of patient setup information, all pertinent simulation and treatment photographs checks, prescription, dose calculation verification, daily dose charted correctly, elapsed days and treatment days correctly charted, cumulative dose correct, and review of any prescription changes. Continued medical physics review post every 5th fraction of therapy is requested by provider for appropriate radiotherapy management, and is deemed medically necessary and standard of care.\\n Plan: Per the request of  Dr. London, patient was seen today for Superficial Radiation Therapy requiring simulation (CPT® 39923) in preparation for treatment of specific diseased sites. Simulation is necessary to determine correct patient and treatment portal positioning, deliver safe and effective radiation therapy. A high frequency ultrasound image was acquired prior to treatment today for three dimensional evaluation of tumor volume and response to treatment, in addition, geometric accuracy of field placement (CPT® ). Physician evaluation of the ultrasound tumor depth will be ongoing through course of treatment, and is deemed medically necessary ensuring efficacy of treatment. Today’s image and setup was evaluated determining continuation of treatment with the current plan, or necessary changes as appropriate. All appropriate custom blocking and treatment parameters verified by radiation therapist according to initial simulation.\\n\\nUS image guidance and field placement prior to treatment delivery performed. US depth is  0.80 mm.\\n\\nPer Dr. London, continued daily US guidance and simulation is required for field placement, measurement of tumor depth, progress and edema monitoring.\\n\\nPer the request of Dr. London, continuing medical physics review as per radiotherapy standard of care post every 5th fraction for patient, including assessment of treatment parameters,  of dose delivery, and review of patient treatment documentation in support of the provider, is ordered, ensuring efficacy and continued safe delivery of radiotherapy. Included in physics check is review of patient setup information, all pertinent simulation and treatment photographs checks, prescription, dose calculation verification, daily dose charted correctly, elapsed days and treatment days correctly charted, cumulative dose correct, and review of any prescription changes. Continued medical physics review post every 5th fraction of therapy is requested by provider for appropriate radiotherapy management, and is deemed medically necessary and standard of care.\\n

## 2021-09-09 NOTE — PROCEDURE: TREATMENT REGIMEN
Plan: Per the request of  Dr. London, patient was seen today for Superficial Radiation Therapy requiring simulation (CPT® 02071) in preparation for treatment of specific diseased sites. Simulation is necessary to determine correct patient and treatment portal positioning, deliver safe and effective radiation therapy. A high frequency ultrasound image was acquired prior to treatment today for three dimensional evaluation of tumor volume and response to treatment, in addition, geometric accuracy of field placement (CPT® ). Physician evaluation of the ultrasound tumor depth will be ongoing through course of treatment, and is deemed medically necessary ensuring efficacy of treatment. Today’s image and setup was evaluated determining continuation of treatment with the current plan, or necessary changes as appropriate. All appropriate custom blocking and treatment parameters verified by radiation therapist according to initial simulation.\\n\\nUS image guidance and field placement prior to treatment delivery performed. US depth is 1.55 mm.\\n\\nPer Dr. London, continued daily US guidance and simulation is required for field placement, measurement of tumor depth, progress and edema monitoring.\\n\\nPer the request of Dr. London, continuing medical physics review as per radiotherapy standard of care post every 5th fraction for patient, including assessment of treatment parameters,  of dose delivery, and review of patient treatment documentation in support of the provider, is ordered, ensuring efficacy and continued safe delivery of radiotherapy. Included in physics check is review of patient setup information, all pertinent simulation and treatment photographs checks, prescription, dose calculation verification, daily dose charted correctly, elapsed days and treatment days correctly charted, cumulative dose correct, and review of any prescription changes. Continued medical physics review post every 5th fraction of therapy is requested by provider for appropriate radiotherapy management, and is deemed medically necessary and standard of care.\\n Plan: Per the request of  Dr. London, patient was seen today for Superficial Radiation Therapy requiring simulation (CPT® 77723) in preparation for treatment of specific diseased sites. Simulation is necessary to determine correct patient and treatment portal positioning, deliver safe and effective radiation therapy. A high frequency ultrasound image was acquired prior to treatment today for three dimensional evaluation of tumor volume and response to treatment, in addition, geometric accuracy of field placement (CPT® ). Physician evaluation of the ultrasound tumor depth will be ongoing through course of treatment, and is deemed medically necessary ensuring efficacy of treatment. Today’s image and setup was evaluated determining continuation of treatment with the current plan, or necessary changes as appropriate. All appropriate custom blocking and treatment parameters verified by radiation therapist according to initial simulation.\\n\\nUS image guidance and field placement prior to treatment delivery performed. US depth is 1.55 mm.\\n\\nPer Dr. London, continued daily US guidance and simulation is required for field placement, measurement of tumor depth, progress and edema monitoring.\\n\\nPer the request of Dr. London, continuing medical physics review as per radiotherapy standard of care post every 5th fraction for patient, including assessment of treatment parameters,  of dose delivery, and review of patient treatment documentation in support of the provider, is ordered, ensuring efficacy and continued safe delivery of radiotherapy. Included in physics check is review of patient setup information, all pertinent simulation and treatment photographs checks, prescription, dose calculation verification, daily dose charted correctly, elapsed days and treatment days correctly charted, cumulative dose correct, and review of any prescription changes. Continued medical physics review post every 5th fraction of therapy is requested by provider for appropriate radiotherapy management, and is deemed medically necessary and standard of care.\\n

## 2021-09-13 ENCOUNTER — APPOINTMENT (OUTPATIENT)
Dept: URBAN - METROPOLITAN AREA CLINIC 255 | Age: 81
Setting detail: DERMATOLOGY
End: 2021-09-14

## 2021-09-13 PROBLEM — C44.319 BASAL CELL CARCINOMA OF SKIN OF OTHER PARTS OF FACE: Status: ACTIVE | Noted: 2021-09-13

## 2021-09-13 PROCEDURE — OTHER TREATMENT REGIMEN: OTHER

## 2021-09-13 PROCEDURE — OTHER FOLLOW UP FOR NEXT VISIT: OTHER

## 2021-09-13 PROCEDURE — 77401 RADIATION TX DELIVERY SUPFC: CPT

## 2021-09-13 PROCEDURE — G6001 ECHO GUIDANCE RADIOTHERAPY: HCPCS

## 2021-09-13 PROCEDURE — 77280 THER RAD SIMULAJ FIELD SMPL: CPT

## 2021-09-13 PROCEDURE — OTHER SUPERFICIAL RADIATION TREATMENT: OTHER

## 2021-09-13 NOTE — PROCEDURE: SUPERFICIAL RADIATION TREATMENT
Intro Statement (Will Not Render If Left Blank): The patient is undergoing superficial radiation therapy for skin cancer and presents for weekly evaluation and management.  Per protocol and as documented on the flow sheet, the patient was questioned as to subjective redness, pruritus, pain, drainage, fatigue, or any other symptoms.  Objectively, the radiation area was evaluated with regards to erythema, atrophy, scale, crusting, erosion, ulceration, edema, purpura, tenderness, warmth, drainage, and any other findings.  The plan was extensively reviewed including the dose, and dosing schedule.  The simulation and clinical setup was also reviewed as was the external and any internal shields and based on this review the appropriateness and sufficiency of treatment was determined.
Number Of Days Off Treatment: 1
Shielding Size (Optional- Include Units): 1.5cm X 1.5cm
Total Number Of Fractions: 20
Include Rx 4 When Rendering Additional Prescriptions: Yes
Dimensions-Y Axis In Cm: 0.5
Total Number Of Fractions Rx 2: 15
Field Size (Applicator): 2.0 cm
Initial Radiation Treatment Planning (Will Render If Bill Simulation = Yes): The patient had a complete consultation regarding all applicable modalities for the treatment of their skin cancer and based on a variety of factors including the type of tumor, size, and location, the relevant medical history as well as local tissue factors, the functional status of the individual, the ability to perform necessary postoperative wound instructions and the need for simultaneous treatments as well as overall wound healing status, it was determined that the patient would begin radiation therapy treatment for skin cancer.  A full simulation and treatment device design was performed including the determination and formulation of appropriate simple and complex devices including lead shield of 0.762 mm thickness to form molded customized shielding to specifically correlate with the lesion size including treatment margin.  The custom lead shield is adequate to accommodate the appropriate applicator and provide adequate shielding around the treatment site.  The specific field applicator, shields, and devices both simple and complex as well as the specific patient setup is outlined below.  The patient was given a full consent for superficial radiation to both verbally and in writing and the full determination of patient's eligibility for treatment and selection is outlined on the patient eligibility and treatment selection form.  The specific superficial radiotherapy prescription was determined and was documented on the superficial radiotherapy prescription form.  A treatment calculation was also performed and documented on the treatment calculation form.  Based on the prescription, the patient was scheduled for a series of fractional treatments.
Treatment Time In Min (Optional): 0.41
Bill For Dosimetry/Render Decay And Dose Adjustment Calculation In Note: No
Daily Fractionated Dose (Optional- Include Units): 261.58 cGy
Total Dose (Optional-Please Include Units): 5321.60
Energy (Optional-Please Include Units): 70KV
Fractions / Week Rx 3: 5
Total Dose (Optional-Please Include Units): 5231.60 cGy
Energy (Optional-Please Include Units): 70kv
Dose / Tx In Cgy (Optional): 261.58
Simple Simulation Preamble Text Will Be Included With Simple Simulations (.......... Indications): Simple simulation was performed today for the following reasons:
Energy (Include Units): 70kv
Body Location Override (Optional): Left Medial Cheek
Day Of The Week Treatment Administered: Monday
Body Location Override (Optional): Right Glabella
Port Dimensions-X Axis In Cm: 1.5
Custom Shielding Afterword Text Will Not Be Included With Simple Simulations (X X Y Cm............): port to correlate with the lesion size, including treatment margin. The custom lead shield is adequate to accommodate the appropriate applicator and provide adequate shielding around the treatment site. Additional shielding (as noted below) is used to protect sensitive, normal tissues.
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
Field Number: 2
Energy (Include Units): 70kv
Custom Shielding Preamble Text Will Not Be Included With Simple Simulations (.......... X X Y Cm): A lead shield of 0.762 mm thickness is utilized to form a molded, custom shield with a
Field Number: 3
Energy (Include Units): 70kv
Treatment Time / Fractionation (Optional- Include Units): 0.41 Min
Time Dose Fractionation (Optional- Include Units If Applicable): 95
Additional Prescription Justification Text: If there is any interruption in treatment exceeding 5 days please see Decay and Dose Adjustment Calculation and complete treatment under Prescription 2, 3 or 4 as appropriate.
Patient Positioning: Sitting
Functional Status: 1 (ambulatory, light activity)
Please Choose The Type Of Visit (Required): Treatment Visit: Show Treatment Variables
Fractions / Week: 4
Assessment: Appropriate reaction
Detail Level: Zone
Information: Selecting Yes will display possible errors in your note based on the variables you have selected. This validation is only offered as a suggestion for you. PLEASE NOTE THAT THE VALIDATION TEXT WILL BE REMOVED WHEN YOU FINALIZE YOUR NOTE. IF YOU WANT TO FAX A PRELIMINARY NOTE YOU WILL NEED TO TOGGLE THIS TO 'NO' IF YOU DO NOT WANT IT IN YOUR FAXED NOTE.
Pathology Override (Pathology Will Render As Diagnosis Name If Left Blank): Primary Nodular Basal Cell Carcinoma on the Left Medial Malar Cheek.
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
Functional Status: 0 (fully active)
Treatment Device Design After Initial Simulation Justification (Will Render If Bill For Treatment Devices = Yes): The patient is status post radiation simulation and is evaluated as to the use of additional devices for shielding and placement for radiation therapy.
Energy (Optional-Please Include Units): 70kv
Energy (Optional-Please Include Units): 70 Kv
Body Location Override (Optional): Right Inferior Nasal Cheek

## 2021-09-13 NOTE — PROCEDURE: TREATMENT REGIMEN
Plan: This patient has been treated today with image guided superficial radiation therapy for non-melanoma skin cancer. Written informed consent has been previously obtained from this patient for this treatment. This consent is documented in the patient’s chart. The patient gave verbal consent to continue treatment today. The patient was treated with a specific radiation dose and setup as prescribed by the provider listed on this visit note. A Radiation Therapist performed administration of radiation under supervision of provider. The treatment parameters and cumulative dose are indicated above. Prior to administering the radiation, the patient underwent a verification therapeutic radiology simulation-aided field setting defining relevant normal and abnormal target anatomy and acquiring images with high frequency ultrasound in addition to data necessary developing optimal radiation treatment process for the patient. This process includes verification of the treatment port(s) and proper treatment positioning. All treatment ports were photographed within electronic medical record. The patient’s customized lead blocking along with gross tumor volume and margin was confirmed. Considering superficial radiotherapy is clinical in setup, this requires physician and radiation therapist to clarify location interest being treated against initial images, pathology and patient anatomy. Care was taken ensuring fields treated were geometrically accurate and properly positioned using therapeutic radiology simulation-aided field setting verification per fraction. This process is also utilized to determine if any prescription or setup changes are necessary. These steps are therefore medically necessary ensuring safe and effective administration of radiation. Ongoing therapeutic radiology simulation-aided field setting verification is ordered throughout course of therapy.\\n\\nA high frequency ultrasound image was acquired today for two-dimensional evaluation of the tumor volume and response to treatment, in addition to geometric accuracy of field placement. US depth Is 1.31 mm, which is 0.24 mm in difference from previous imaging. The field placement and ultrasound imaging, per fraction, is separate and distinct from the initial simulation, and is an important task in providing safe administration of superficial radiation therapy. Physician evaluation of the ultrasound tumor depth will be ongoing throughout the course of treatment, and is deemed medically necessary in order to ensure the efficacy of treatment and any necessary changes. Today’s image was evaluated for determination of continuation of treatment with the current plan or with necessary changes as appropriate. According to provider review of verification therapeutic radiology simulation-aided field setting and imaging, no change is required (if change required, please document specifics and changes. If further services rendered for changes, document and submit appropriate billable CPT® codes). \\n\\nAdditionally, the use of ultrasound visualization and targeted assessment allows the patient to be able to see their cancer(s) progress, encouraging patient to complete and maintain compliance through full course of radiotherapy. Per Dr. London, continued ultrasound guidance and therapeutic radiology simulation-aided field setting verification per fraction is required for field placement, measurement of tumor depth, progress and acute effect monitoring. \\n\\n
Plan: This patient has been treated today with image guided superficial radiation therapy for non-melanoma skin cancer. Written informed consent has been previously obtained from this patient for this treatment. This consent is documented in the patient’s chart. The patient gave verbal consent to continue treatment today. The patient was treated with a specific radiation dose and setup as prescribed by the provider listed on this visit note. A Radiation Therapist performed administration of radiation under supervision of provider. The treatment parameters and cumulative dose are indicated above. Prior to administering the radiation, the patient underwent a verification therapeutic radiology simulation-aided field setting defining relevant normal and abnormal target anatomy and acquiring images with high frequency ultrasound in addition to data necessary developing optimal radiation treatment process for the patient. This process includes verification of the treatment port(s) and proper treatment positioning. All treatment ports were photographed within electronic medical record. The patient’s customized lead blocking along with gross tumor volume and margin was confirmed. Considering superficial radiotherapy is clinical in setup, this requires physician and radiation therapist to clarify location interest being treated against initial images, pathology and patient anatomy. Care was taken ensuring fields treated were geometrically accurate and properly positioned using therapeutic radiology simulation-aided field setting verification per fraction. This process is also utilized to determine if any prescription or setup changes are necessary. These steps are therefore medically necessary ensuring safe and effective administration of radiation. Ongoing therapeutic radiology simulation-aided field setting verification is ordered throughout course of therapy.\\n\\nA high frequency ultrasound image was acquired today for two-dimensional evaluation of the tumor volume and response to treatment, in addition to geometric accuracy of field placement. US depth Is 0.62 mm, which is  0.18 mm in difference from previous imaging. The field placement and ultrasound imaging, per fraction, is separate and distinct from the initial simulation, and is an important task in providing safe administration of superficial radiation therapy. Physician evaluation of the ultrasound tumor depth will be ongoing throughout the course of treatment, and is deemed medically necessary in order to ensure the efficacy of treatment and any necessary changes. Today’s image was evaluated for determination of continuation of treatment with the current plan or with necessary changes as appropriate. According to provider review of verification therapeutic radiology simulation-aided field setting and imaging, no change is required (if change required, please document specifics and changes. If further services rendered for changes, document and submit appropriate billable CPT® codes). \\n\\nAdditionally, the use of ultrasound visualization and targeted assessment allows the patient to be able to see their cancer(s) progress, encouraging patient to complete and maintain compliance through full course of radiotherapy. Per Dr. London, continued ultrasound guidance and therapeutic radiology simulation-aided field setting verification per fraction is required for field placement, measurement of tumor depth, progress and acute effect monitoring. \\n\\n
Plan: This patient has been treated today with image guided superficial radiation therapy for non-melanoma skin cancer. Written informed consent has been previously obtained from this patient for this treatment. This consent is documented in the patient’s chart. The patient gave verbal consent to continue treatment today. The patient was treated with a specific radiation dose and setup as prescribed by the provider listed on this visit note. A Radiation Therapist performed administration of radiation under supervision of provider. The treatment parameters and cumulative dose are indicated above. Prior to administering the radiation, the patient underwent a verification therapeutic radiology simulation-aided field setting defining relevant normal and abnormal target anatomy and acquiring images with high frequency ultrasound in addition to data necessary developing optimal radiation treatment process for the patient. This process includes verification of the treatment port(s) and proper treatment positioning. All treatment ports were photographed within electronic medical record. The patient’s customized lead blocking along with gross tumor volume and margin was confirmed. Considering superficial radiotherapy is clinical in setup, this requires physician and radiation therapist to clarify location interest being treated against initial images, pathology and patient anatomy. Care was taken ensuring fields treated were geometrically accurate and properly positioned using therapeutic radiology simulation-aided field setting verification per fraction. This process is also utilized to determine if any prescription or setup changes are necessary. These steps are therefore medically necessary ensuring safe and effective administration of radiation. Ongoing therapeutic radiology simulation-aided field setting verification is ordered throughout course of therapy.\\n\\nA high frequency ultrasound image was acquired today for two-dimensional evaluation of the tumor volume and response to treatment, in addition to geometric accuracy of field placement. US depth Is 0.95 mm, which is 0.01 mm in difference from previous imaging. The field placement and ultrasound imaging, per fraction, is separate and distinct from the initial simulation, and is an important task in providing safe administration of superficial radiation therapy. Physician evaluation of the ultrasound tumor depth will be ongoing throughout the course of treatment, and is deemed medically necessary in order to ensure the efficacy of treatment and any necessary changes. Today’s image was evaluated for determination of continuation of treatment with the current plan or with necessary changes as appropriate. According to provider review of verification therapeutic radiology simulation-aided field setting and imaging, no change is required (if change required, please document specifics and changes. If further services rendered for changes, document and submit appropriate billable CPT® codes). \\n\\nAdditionally, the use of ultrasound visualization and targeted assessment allows the patient to be able to see their cancer(s) progress, encouraging patient to complete and maintain compliance through full course of radiotherapy. Per Dr. London, continued ultrasound guidance and therapeutic radiology simulation-aided field setting verification per fraction is required for field placement, measurement of tumor depth, progress and acute effect monitoring. \\n
Detail Level: Zone

## 2021-09-14 ENCOUNTER — APPOINTMENT (OUTPATIENT)
Dept: URBAN - METROPOLITAN AREA CLINIC 255 | Age: 81
Setting detail: DERMATOLOGY
End: 2021-09-18

## 2021-09-14 PROBLEM — C44.319 BASAL CELL CARCINOMA OF SKIN OF OTHER PARTS OF FACE: Status: ACTIVE | Noted: 2021-09-14

## 2021-09-14 PROCEDURE — OTHER SUPERFICIAL RADIATION TREATMENT: OTHER

## 2021-09-14 PROCEDURE — 77280 THER RAD SIMULAJ FIELD SMPL: CPT

## 2021-09-14 PROCEDURE — 77401 RADIATION TX DELIVERY SUPFC: CPT

## 2021-09-14 PROCEDURE — OTHER TREATMENT REGIMEN: OTHER

## 2021-09-14 PROCEDURE — OTHER FOLLOW UP FOR NEXT VISIT: OTHER

## 2021-09-14 PROCEDURE — G6001 ECHO GUIDANCE RADIOTHERAPY: HCPCS

## 2021-09-14 NOTE — PROCEDURE: TREATMENT REGIMEN
Plan: This patient has been treated today with image guided superficial radiation therapy for non-melanoma skin cancer. Written informed consent has been previously obtained from this patient for this treatment. This consent is documented in the patient’s chart. The patient gave verbal consent to continue treatment today. The patient was treated with a specific radiation dose and setup as prescribed by the provider listed on this visit note. A Radiation Therapist performed administration of radiation under supervision of provider. The treatment parameters and cumulative dose are indicated above. Prior to administering the radiation, the patient underwent a verification therapeutic radiology simulation-aided field setting defining relevant normal and abnormal target anatomy and acquiring images with high frequency ultrasound in addition to data necessary developing optimal radiation treatment process for the patient. This process includes verification of the treatment port(s) and proper treatment positioning. All treatment ports were photographed within electronic medical record. The patient’s customized lead blocking along with gross tumor volume and margin was confirmed. Considering superficial radiotherapy is clinical in setup, this requires physician and radiation therapist to clarify location interest being treated against initial images, pathology and patient anatomy. Care was taken ensuring fields treated were geometrically accurate and properly positioned using therapeutic radiology simulation-aided field setting verification per fraction. This process is also utilized to determine if any prescription or setup changes are necessary. These steps are therefore medically necessary ensuring safe and effective administration of radiation. Ongoing therapeutic radiology simulation-aided field setting verification is ordered throughout course of therapy.\\n\\nA high frequency ultrasound image was acquired today for two-dimensional evaluation of the tumor volume and response to treatment, in addition to geometric accuracy of field placement. US depth Is 0.67 mm, which is  0.05 mm in difference from previous imaging. The field placement and ultrasound imaging, per fraction, is separate and distinct from the initial simulation, and is an important task in providing safe administration of superficial radiation therapy. Physician evaluation of the ultrasound tumor depth will be ongoing throughout the course of treatment, and is deemed medically necessary in order to ensure the efficacy of treatment and any necessary changes. Today’s image was evaluated for determination of continuation of treatment with the current plan or with necessary changes as appropriate. According to provider review of verification therapeutic radiology simulation-aided field setting and imaging, no change is required (if change required, please document specifics and changes. If further services rendered for changes, document and submit appropriate billable CPT® codes). \\n\\nAdditionally, the use of ultrasound visualization and targeted assessment allows the patient to be able to see their cancer(s) progress, encouraging patient to complete and maintain compliance through full course of radiotherapy. Per Dr. London, continued ultrasound guidance and therapeutic radiology simulation-aided field setting verification per fraction is required for field placement, measurement of tumor depth, progress and acute effect monitoring. \\n\\n
Detail Level: Zone
Plan: This patient has been treated today with image guided superficial radiation therapy for non-melanoma skin cancer. Written informed consent has been previously obtained from this patient for this treatment. This consent is documented in the patient’s chart. The patient gave verbal consent to continue treatment today. The patient was treated with a specific radiation dose and setup as prescribed by the provider listed on this visit note. A Radiation Therapist performed administration of radiation under supervision of provider. The treatment parameters and cumulative dose are indicated above. Prior to administering the radiation, the patient underwent a verification therapeutic radiology simulation-aided field setting defining relevant normal and abnormal target anatomy and acquiring images with high frequency ultrasound in addition to data necessary developing optimal radiation treatment process for the patient. This process includes verification of the treatment port(s) and proper treatment positioning. All treatment ports were photographed within electronic medical record. The patient’s customized lead blocking along with gross tumor volume and margin was confirmed. Considering superficial radiotherapy is clinical in setup, this requires physician and radiation therapist to clarify location interest being treated against initial images, pathology and patient anatomy. Care was taken ensuring fields treated were geometrically accurate and properly positioned using therapeutic radiology simulation-aided field setting verification per fraction. This process is also utilized to determine if any prescription or setup changes are necessary. These steps are therefore medically necessary ensuring safe and effective administration of radiation. Ongoing therapeutic radiology simulation-aided field setting verification is ordered throughout course of therapy.\\n\\nA high frequency ultrasound image was acquired today for two-dimensional evaluation of the tumor volume and response to treatment, in addition to geometric accuracy of field placement. US depth Is 0.90 mm, which is 0.05 mm in difference from previous imaging. The field placement and ultrasound imaging, per fraction, is separate and distinct from the initial simulation, and is an important task in providing safe administration of superficial radiation therapy. Physician evaluation of the ultrasound tumor depth will be ongoing throughout the course of treatment, and is deemed medically necessary in order to ensure the efficacy of treatment and any necessary changes. Today’s image was evaluated for determination of continuation of treatment with the current plan or with necessary changes as appropriate. According to provider review of verification therapeutic radiology simulation-aided field setting and imaging, no change is required (if change required, please document specifics and changes. If further services rendered for changes, document and submit appropriate billable CPT® codes). \\n\\nAdditionally, the use of ultrasound visualization and targeted assessment allows the patient to be able to see their cancer(s) progress, encouraging patient to complete and maintain compliance through full course of radiotherapy. Per Dr. London, continued ultrasound guidance and therapeutic radiology simulation-aided field setting verification per fraction is required for field placement, measurement of tumor depth, progress and acute effect monitoring. \\n
Plan: This patient has been treated today with image guided superficial radiation therapy for non-melanoma skin cancer. Written informed consent has been previously obtained from this patient for this treatment. This consent is documented in the patient’s chart. The patient gave verbal consent to continue treatment today. The patient was treated with a specific radiation dose and setup as prescribed by the provider listed on this visit note. A Radiation Therapist performed administration of radiation under supervision of provider. The treatment parameters and cumulative dose are indicated above. Prior to administering the radiation, the patient underwent a verification therapeutic radiology simulation-aided field setting defining relevant normal and abnormal target anatomy and acquiring images with high frequency ultrasound in addition to data necessary developing optimal radiation treatment process for the patient. This process includes verification of the treatment port(s) and proper treatment positioning. All treatment ports were photographed within electronic medical record. The patient’s customized lead blocking along with gross tumor volume and margin was confirmed. Considering superficial radiotherapy is clinical in setup, this requires physician and radiation therapist to clarify location interest being treated against initial images, pathology and patient anatomy. Care was taken ensuring fields treated were geometrically accurate and properly positioned using therapeutic radiology simulation-aided field setting verification per fraction. This process is also utilized to determine if any prescription or setup changes are necessary. These steps are therefore medically necessary ensuring safe and effective administration of radiation. Ongoing therapeutic radiology simulation-aided field setting verification is ordered throughout course of therapy.\\n\\nA high frequency ultrasound image was acquired today for two-dimensional evaluation of the tumor volume and response to treatment, in addition to geometric accuracy of field placement. US depth Is 1.44 mm, which is 0.13 mm in difference from previous imaging. The field placement and ultrasound imaging, per fraction, is separate and distinct from the initial simulation, and is an important task in providing safe administration of superficial radiation therapy. Physician evaluation of the ultrasound tumor depth will be ongoing throughout the course of treatment, and is deemed medically necessary in order to ensure the efficacy of treatment and any necessary changes. Today’s image was evaluated for determination of continuation of treatment with the current plan or with necessary changes as appropriate. According to provider review of verification therapeutic radiology simulation-aided field setting and imaging, no change is required (if change required, please document specifics and changes. If further services rendered for changes, document and submit appropriate billable CPT® codes). \\n\\nAdditionally, the use of ultrasound visualization and targeted assessment allows the patient to be able to see their cancer(s) progress, encouraging patient to complete and maintain compliance through full course of radiotherapy. Per Dr. London, continued ultrasound guidance and therapeutic radiology simulation-aided field setting verification per fraction is required for field placement, measurement of tumor depth, progress and acute effect monitoring. \\n\\n

## 2021-09-14 NOTE — PROCEDURE: SUPERFICIAL RADIATION TREATMENT
Fractions / Week Rx 3: 5
Additional Prescription Justification Text: If there is any interruption in treatment exceeding 5 days please see Decay and Dose Adjustment Calculation and complete treatment under Prescription 2, 3 or 4 as appropriate.
Simple Simulation Preamble Text Will Be Included With Simple Simulations (.......... Indications): Simple simulation was performed today for the following reasons:
Dose / Tx In Cgy (Optional): 261.58
Day Of The Week Treatment Administered: Tuesday
Body Location Override (Optional): Left Medial Cheek
Patient Positioning: Sitting
Functional Status: 1 (ambulatory, light activity)
Include Rx 2 When Rendering Additional Prescriptions: No
Functional Status: 0 (fully active)
Please Choose The Type Of Visit (Required): Treatment Visit: Show Treatment Variables
Sebastian For Simulation Without Treatment Device Design (Simple Simulation): Yes
Port Dimensions-X Axis In Cm: 1.5
Simple Simulation Afterword Text Will Be Included With Simple Simulations (Indications............): The patient had a complete consultation regarding all applicable modalities for the treatment of their skin cancer and based on a variety of factors including the type of tumor, size, and location, the relevant medical history as well as local tissue factors, the functional status of the individual, the ability to perform necessary postoperative wound instructions and the need for simultaneous treatments as well as overall wound healing status, it was determined that the patient would begin radiation therapy treatment for skin cancer.  A full simulation and treatment device design was performed including the determination and formulation of appropriate simple and complex devices including lead shield of 0.762 mm thickness to form molded customized shielding to specifically correlate with the lesion size including treatment margin.  The custom lead shield is adequate to accommodate the appropriate applicator and provide adequate shielding around the treatment site.  The specific field applicator, shields, and devices both simple and complex as well as the specific patient setup is outlined below.  The patient was given a full consent for superficial radiation to both verbally and in writing and the full determination of patient's eligibility for treatment and selection is outlined on the patient eligibility and treatment selection form.  The specific superficial radiotherapy prescription was determined and was documented on the superficial radiotherapy prescription form.  A treatment calculation was also performed and documented on the treatment calculation form.  Based on the prescription, the patient was scheduled for a series of fractional treatments.
Detail Level: Zone
Fractions / Week: 4
Custom Shielding Preamble Text Will Not Be Included With Simple Simulations (.......... X X Y Cm): A lead shield of 0.762 mm thickness is utilized to form a molded, custom shield with a
Field Size (Applicator): 2.0 cm
Assessment: Appropriate reaction
Cumulative Dose In Cgy (Optional): 523.16
Fractionation Number: 2
Information: Selecting Yes will display possible errors in your note based on the variables you have selected. This validation is only offered as a suggestion for you. PLEASE NOTE THAT THE VALIDATION TEXT WILL BE REMOVED WHEN YOU FINALIZE YOUR NOTE. IF YOU WANT TO FAX A PRELIMINARY NOTE YOU WILL NEED TO TOGGLE THIS TO 'NO' IF YOU DO NOT WANT IT IN YOUR FAXED NOTE.
Treatment Time In Min (Optional): 0.41
Treatment Device Design After Initial Simulation Justification (Will Render If Bill For Treatment Devices = Yes): The patient is status post radiation simulation and is evaluated as to the use of additional devices for shielding and placement for radiation therapy.
Time Dose Fractionation (Optional- Include Units If Applicable): 95
Treatment Margins In Cm: 0.5
Energy (Optional-Please Include Units): 70kv
Total Dose (Optional-Please Include Units): 5231.60 cGy
Body Location Override (Optional): Right Glabella
Body Location Override (Optional): Right Inferior Nasal Cheek
Number Of Treatment Days: 1
Custom Shielding Afterword Text Will Not Be Included With Simple Simulations (X X Y Cm............): port to correlate with the lesion size, including treatment margin. The custom lead shield is adequate to accommodate the appropriate applicator and provide adequate shielding around the treatment site. Additional shielding (as noted below) is used to protect sensitive, normal tissues.
Shielding Size (Optional- Include Units): 1.5cm x 1.5cm
Total Number Of Fractions: 20
Total Number Of Fractions Rx 2: 15
Intro Statement (Will Not Render If Left Blank): The patient is undergoing superficial radiation therapy for skin cancer and presents for weekly evaluation and management.  Per protocol and as documented on the flow sheet, the patient was questioned as to subjective redness, pruritus, pain, drainage, fatigue, or any other symptoms.  Objectively, the radiation area was evaluated with regards to erythema, atrophy, scale, crusting, erosion, ulceration, edema, purpura, tenderness, warmth, drainage, and any other findings.  The plan was extensively reviewed including the dose, and dosing schedule.  The simulation and clinical setup was also reviewed as was the external and any internal shields and based on this review the appropriateness and sufficiency of treatment was determined.
Energy (Optional-Please Include Units): 70kv
Energy (Include Units): 70kv
Pathology Override (Pathology Will Render As Diagnosis Name If Left Blank): Primary Nodular Basal Cell Carcinoma on the Left Medial Malar Cheek.
Energy (Optional-Please Include Units): 70Kv
Total Dose (Optional-Please Include Units): 5321.60
Daily Fractionated Dose (Optional- Include Units): 261.58 cGy
Energy (Include Units): 70kv
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
Energy (Optional-Please Include Units): 70 Kv
Field Number: 3
Energy (Include Units): 70kv
Treatment Time / Fractionation (Optional- Include Units): 0.41 Min

## 2021-09-15 ENCOUNTER — APPOINTMENT (OUTPATIENT)
Dept: URBAN - METROPOLITAN AREA CLINIC 255 | Age: 81
Setting detail: DERMATOLOGY
End: 2021-09-18

## 2021-09-15 PROBLEM — C44.319 BASAL CELL CARCINOMA OF SKIN OF OTHER PARTS OF FACE: Status: ACTIVE | Noted: 2021-09-15

## 2021-09-15 PROCEDURE — 77401 RADIATION TX DELIVERY SUPFC: CPT

## 2021-09-15 PROCEDURE — OTHER FOLLOW UP FOR NEXT VISIT: OTHER

## 2021-09-15 PROCEDURE — G6001 ECHO GUIDANCE RADIOTHERAPY: HCPCS

## 2021-09-15 PROCEDURE — OTHER TREATMENT REGIMEN: OTHER

## 2021-09-15 PROCEDURE — 77280 THER RAD SIMULAJ FIELD SMPL: CPT

## 2021-09-15 PROCEDURE — OTHER SUPERFICIAL RADIATION TREATMENT: OTHER

## 2021-09-15 NOTE — PROCEDURE: TREATMENT REGIMEN
Plan: This patient has been treated today with image guided superficial radiation therapy for non-melanoma skin cancer. Written informed consent has been previously obtained from this patient for this treatment. This consent is documented in the patient’s chart. The patient gave verbal consent to continue treatment today. The patient was treated with a specific radiation dose and setup as prescribed by the provider listed on this visit note. A Radiation Therapist performed administration of radiation under supervision of provider. The treatment parameters and cumulative dose are indicated above. Prior to administering the radiation, the patient underwent a verification therapeutic radiology simulation-aided field setting defining relevant normal and abnormal target anatomy and acquiring images with high frequency ultrasound in addition to data necessary developing optimal radiation treatment process for the patient. This process includes verification of the treatment port(s) and proper treatment positioning. All treatment ports were photographed within electronic medical record. The patient’s customized lead blocking along with gross tumor volume and margin was confirmed. Considering superficial radiotherapy is clinical in setup, this requires physician and radiation therapist to clarify location interest being treated against initial images, pathology and patient anatomy. Care was taken ensuring fields treated were geometrically accurate and properly positioned using therapeutic radiology simulation-aided field setting verification per fraction. This process is also utilized to determine if any prescription or setup changes are necessary. These steps are therefore medically necessary ensuring safe and effective administration of radiation. Ongoing therapeutic radiology simulation-aided field setting verification is ordered throughout course of therapy.\\n\\nA high frequency ultrasound image was acquired today for two-dimensional evaluation of the tumor volume and response to treatment, in addition to geometric accuracy of field placement. US depth Is 1.21 mm, which is 0.23 mm in difference from previous imaging. The field placement and ultrasound imaging, per fraction, is separate and distinct from the initial simulation, and is an important task in providing safe administration of superficial radiation therapy. Physician evaluation of the ultrasound tumor depth will be ongoing throughout the course of treatment, and is deemed medically necessary in order to ensure the efficacy of treatment and any necessary changes. Today’s image was evaluated for determination of continuation of treatment with the current plan or with necessary changes as appropriate. According to provider review of verification therapeutic radiology simulation-aided field setting and imaging, no change is required (if change required, please document specifics and changes. If further services rendered for changes, document and submit appropriate billable CPT® codes). \\n\\nAdditionally, the use of ultrasound visualization and targeted assessment allows the patient to be able to see their cancer(s) progress, encouraging patient to complete and maintain compliance through full course of radiotherapy. Per Dr. London, continued ultrasound guidance and therapeutic radiology simulation-aided field setting verification per fraction is required for field placement, measurement of tumor depth, progress and acute effect monitoring. \\n\\n
Plan: This patient has been treated today with image guided superficial radiation therapy for non-melanoma skin cancer. Written informed consent has been previously obtained from this patient for this treatment. This consent is documented in the patient’s chart. The patient gave verbal consent to continue treatment today. The patient was treated with a specific radiation dose and setup as prescribed by the provider listed on this visit note. A Radiation Therapist performed administration of radiation under supervision of provider. The treatment parameters and cumulative dose are indicated above. Prior to administering the radiation, the patient underwent a verification therapeutic radiology simulation-aided field setting defining relevant normal and abnormal target anatomy and acquiring images with high frequency ultrasound in addition to data necessary developing optimal radiation treatment process for the patient. This process includes verification of the treatment port(s) and proper treatment positioning. All treatment ports were photographed within electronic medical record. The patient’s customized lead blocking along with gross tumor volume and margin was confirmed. Considering superficial radiotherapy is clinical in setup, this requires physician and radiation therapist to clarify location interest being treated against initial images, pathology and patient anatomy. Care was taken ensuring fields treated were geometrically accurate and properly positioned using therapeutic radiology simulation-aided field setting verification per fraction. This process is also utilized to determine if any prescription or setup changes are necessary. These steps are therefore medically necessary ensuring safe and effective administration of radiation. Ongoing therapeutic radiology simulation-aided field setting verification is ordered throughout course of therapy.\\n\\nA high frequency ultrasound image was acquired today for two-dimensional evaluation of the tumor volume and response to treatment, in addition to geometric accuracy of field placement. US depth Is 0.71 mm, which is 0.19 mm in difference from previous imaging. The field placement and ultrasound imaging, per fraction, is separate and distinct from the initial simulation, and is an important task in providing safe administration of superficial radiation therapy. Physician evaluation of the ultrasound tumor depth will be ongoing throughout the course of treatment, and is deemed medically necessary in order to ensure the efficacy of treatment and any necessary changes. Today’s image was evaluated for determination of continuation of treatment with the current plan or with necessary changes as appropriate. According to provider review of verification therapeutic radiology simulation-aided field setting and imaging, no change is required (if change required, please document specifics and changes. If further services rendered for changes, document and submit appropriate billable CPT® codes). \\n\\nAdditionally, the use of ultrasound visualization and targeted assessment allows the patient to be able to see their cancer(s) progress, encouraging patient to complete and maintain compliance through full course of radiotherapy. Per Dr. London, continued ultrasound guidance and therapeutic radiology simulation-aided field setting verification per fraction is required for field placement, measurement of tumor depth, progress and acute effect monitoring. \\n
Detail Level: Zone
Plan: This patient has been treated today with image guided superficial radiation therapy for non-melanoma skin cancer. Written informed consent has been previously obtained from this patient for this treatment. This consent is documented in the patient’s chart. The patient gave verbal consent to continue treatment today. The patient was treated with a specific radiation dose and setup as prescribed by the provider listed on this visit note. A Radiation Therapist performed administration of radiation under supervision of provider. The treatment parameters and cumulative dose are indicated above. Prior to administering the radiation, the patient underwent a verification therapeutic radiology simulation-aided field setting defining relevant normal and abnormal target anatomy and acquiring images with high frequency ultrasound in addition to data necessary developing optimal radiation treatment process for the patient. This process includes verification of the treatment port(s) and proper treatment positioning. All treatment ports were photographed within electronic medical record. The patient’s customized lead blocking along with gross tumor volume and margin was confirmed. Considering superficial radiotherapy is clinical in setup, this requires physician and radiation therapist to clarify location interest being treated against initial images, pathology and patient anatomy. Care was taken ensuring fields treated were geometrically accurate and properly positioned using therapeutic radiology simulation-aided field setting verification per fraction. This process is also utilized to determine if any prescription or setup changes are necessary. These steps are therefore medically necessary ensuring safe and effective administration of radiation. Ongoing therapeutic radiology simulation-aided field setting verification is ordered throughout course of therapy.\\n\\nA high frequency ultrasound image was acquired today for two-dimensional evaluation of the tumor volume and response to treatment, in addition to geometric accuracy of field placement. US depth Is 0.64 mm, which is  0.03 mm in difference from previous imaging. The field placement and ultrasound imaging, per fraction, is separate and distinct from the initial simulation, and is an important task in providing safe administration of superficial radiation therapy. Physician evaluation of the ultrasound tumor depth will be ongoing throughout the course of treatment, and is deemed medically necessary in order to ensure the efficacy of treatment and any necessary changes. Today’s image was evaluated for determination of continuation of treatment with the current plan or with necessary changes as appropriate. According to provider review of verification therapeutic radiology simulation-aided field setting and imaging, no change is required (if change required, please document specifics and changes. If further services rendered for changes, document and submit appropriate billable CPT® codes). \\n\\nAdditionally, the use of ultrasound visualization and targeted assessment allows the patient to be able to see their cancer(s) progress, encouraging patient to complete and maintain compliance through full course of radiotherapy. Per Dr. London, continued ultrasound guidance and therapeutic radiology simulation-aided field setting verification per fraction is required for field placement, measurement of tumor depth, progress and acute effect monitoring. \\n\\n

## 2021-09-15 NOTE — PROCEDURE: SUPERFICIAL RADIATION TREATMENT
Total Number Of Fractions Rx 2: 15
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
Field Size (Applicator): 2.0 cm
Additional Prescription Justification Text: If there is any interruption in treatment exceeding 5 days please see Decay and Dose Adjustment Calculation and complete treatment under Prescription 2, 3 or 4 as appropriate.
Daily Fractionated Dose (Optional- Include Units): 261.58
Energy (Optional-Please Include Units): 70Kv
Field Number: 2
Daily Fractionated Dose (Optional- Include Units): 261.58 cGy
Custom Shielding Preamble Text Will Not Be Included With Simple Simulations (.......... X X Y Cm): A lead shield of 0.762 mm thickness is utilized to form a molded, custom shield with a
Energy (Include Units): 70kv
Energy (Include Units): 70kv
Number Of Treatment Days: 1
Validate Note Data (See Information Below): Yes
Treatment Time / Fractionation (Optional- Include Units): 0.41 Min
Bill For Treatment Devices Only: No
Dimensions-X Axis In Cm: 0.5
Custom Shielding Afterword Text Will Not Be Included With Simple Simulations (X X Y Cm............): port to correlate with the lesion size, including treatment margin. The custom lead shield is adequate to accommodate the appropriate applicator and provide adequate shielding around the treatment site. Additional shielding (as noted below) is used to protect sensitive, normal tissues.
Time Dose Fractionation (Optional- Include Units If Applicable): 95
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
Energy (Optional-Please Include Units): 70 Kv
Fractions / Week Rx 4: 5
Functional Status: 0 (fully active)
Please Choose The Type Of Visit (Required): Treatment Visit: Show Treatment Variables
Cumulative Dose In Cgy (Optional): 784.74
Port Dimensions-X Axis In Cm: 1.5
Simple Simulation Afterword Text Will Be Included With Simple Simulations (Indications............): The patient had a complete consultation regarding all applicable modalities for the treatment of their skin cancer and based on a variety of factors including the type of tumor, size, and location, the relevant medical history as well as local tissue factors, the functional status of the individual, the ability to perform necessary postoperative wound instructions and the need for simultaneous treatments as well as overall wound healing status, it was determined that the patient would begin radiation therapy treatment for skin cancer.  A full simulation and treatment device design was performed including the determination and formulation of appropriate simple and complex devices including lead shield of 0.762 mm thickness to form molded customized shielding to specifically correlate with the lesion size including treatment margin.  The custom lead shield is adequate to accommodate the appropriate applicator and provide adequate shielding around the treatment site.  The specific field applicator, shields, and devices both simple and complex as well as the specific patient setup is outlined below.  The patient was given a full consent for superficial radiation to both verbally and in writing and the full determination of patient's eligibility for treatment and selection is outlined on the patient eligibility and treatment selection form.  The specific superficial radiotherapy prescription was determined and was documented on the superficial radiotherapy prescription form.  A treatment calculation was also performed and documented on the treatment calculation form.  Based on the prescription, the patient was scheduled for a series of fractional treatments.
Assessment: Appropriate reaction
Detail Level: Zone
Fractionation Number: 3
Fractions / Week: 4
Information: Selecting Yes will display possible errors in your note based on the variables you have selected. This validation is only offered as a suggestion for you. PLEASE NOTE THAT THE VALIDATION TEXT WILL BE REMOVED WHEN YOU FINALIZE YOUR NOTE. IF YOU WANT TO FAX A PRELIMINARY NOTE YOU WILL NEED TO TOGGLE THIS TO 'NO' IF YOU DO NOT WANT IT IN YOUR FAXED NOTE.
Treatment Device Design After Initial Simulation Justification (Will Render If Bill For Treatment Devices = Yes): The patient is status post radiation simulation and is evaluated as to the use of additional devices for shielding and placement for radiation therapy.
Pathology Override (Pathology Will Render As Diagnosis Name If Left Blank): Primary Nodular Basal Cell Carcinoma on the Left Medial Malar Cheek.
Functional Status: 1 (ambulatory, light activity)
Intro Statement (Will Not Render If Left Blank): The patient is undergoing superficial radiation therapy for skin cancer and presents for weekly evaluation and management.  Per protocol and as documented on the flow sheet, the patient was questioned as to subjective redness, pruritus, pain, drainage, fatigue, or any other symptoms.  Objectively, the radiation area was evaluated with regards to erythema, atrophy, scale, crusting, erosion, ulceration, edema, purpura, tenderness, warmth, drainage, and any other findings.  The plan was extensively reviewed including the dose, and dosing schedule.  The simulation and clinical setup was also reviewed as was the external and any internal shields and based on this review the appropriateness and sufficiency of treatment was determined.
Total Number Of Fractions: 20
Shielding Size (Optional- Include Units): 1.5cm x 1.5cm
Treatment Time In Min (Optional): 0.41
Energy (Optional-Please Include Units): 70kv
Total Dose (Optional-Please Include Units): 5231.60 cGy
Patient Positioning: Sitting
Body Location Override (Optional): Right Glabella
Simple Simulation Preamble Text Will Be Included With Simple Simulations (.......... Indications): Simple simulation was performed today for the following reasons:
Day Of The Week Treatment Administered: Wednesday
Body Location Override (Optional): Right Inferior Nasal Cheek
Energy (Include Units): 70kv
Total Dose (Optional-Please Include Units): 5321.60
Energy (Optional-Please Include Units): 70kv
Body Location Override (Optional): Left Medial Cheek

## 2021-09-17 ENCOUNTER — APPOINTMENT (OUTPATIENT)
Dept: URBAN - METROPOLITAN AREA CLINIC 255 | Age: 81
Setting detail: DERMATOLOGY
End: 2021-09-18

## 2021-09-17 PROBLEM — C44.319 BASAL CELL CARCINOMA OF SKIN OF OTHER PARTS OF FACE: Status: ACTIVE | Noted: 2021-09-17

## 2021-09-17 PROCEDURE — OTHER TREATMENT REGIMEN: OTHER

## 2021-09-17 PROCEDURE — OTHER FOLLOW UP FOR NEXT VISIT: OTHER

## 2021-09-17 PROCEDURE — 77280 THER RAD SIMULAJ FIELD SMPL: CPT

## 2021-09-17 PROCEDURE — 77401 RADIATION TX DELIVERY SUPFC: CPT

## 2021-09-17 PROCEDURE — G6001 ECHO GUIDANCE RADIOTHERAPY: HCPCS

## 2021-09-17 PROCEDURE — OTHER SUPERFICIAL RADIATION TREATMENT: OTHER

## 2021-09-17 NOTE — PROCEDURE: SUPERFICIAL RADIATION TREATMENT
Treatment Margins In Cm: 0.5
Dose / Tx In Cgy (Optional): 261.58
Energy (Include Units): 70kv
Fractions / Week Rx 4: 5
Patient Positioning: Sitting
Total Number Of Fractions Rx 4: 15
Body Location Override (Optional): Right Glabella
Simple Simulation Preamble Text Will Be Included With Simple Simulations (.......... Indications): Simple simulation was performed today for the following reasons:
Number Of Treatment Days: 1
Validate Note Data (See Information Below): Yes
Port Dimensions-X Axis In Cm: 1.5
Body Location Override (Optional): Right Inferior Nasal Cheek
Assessment: Appropriate reaction
Shielding Size (Optional- Include Units): 1.5cm X 1.5cm
Simple Simulation Afterword Text Will Be Included With Simple Simulations (Indications............): The patient had a complete consultation regarding all applicable modalities for the treatment of their skin cancer and based on a variety of factors including the type of tumor, size, and location, the relevant medical history as well as local tissue factors, the functional status of the individual, the ability to perform necessary postoperative wound instructions and the need for simultaneous treatments as well as overall wound healing status, it was determined that the patient would begin radiation therapy treatment for skin cancer.  A full simulation and treatment device design was performed including the determination and formulation of appropriate simple and complex devices including lead shield of 0.762 mm thickness to form molded customized shielding to specifically correlate with the lesion size including treatment margin.  The custom lead shield is adequate to accommodate the appropriate applicator and provide adequate shielding around the treatment site.  The specific field applicator, shields, and devices both simple and complex as well as the specific patient setup is outlined below.  The patient was given a full consent for superficial radiation to both verbally and in writing and the full determination of patient's eligibility for treatment and selection is outlined on the patient eligibility and treatment selection form.  The specific superficial radiotherapy prescription was determined and was documented on the superficial radiotherapy prescription form.  A treatment calculation was also performed and documented on the treatment calculation form.  Based on the prescription, the patient was scheduled for a series of fractional treatments.
Total Number Of Fractions: 20
Custom Shielding Afterword Text Will Not Be Included With Simple Simulations (X X Y Cm............): port to correlate with the lesion size, including treatment margin. The custom lead shield is adequate to accommodate the appropriate applicator and provide adequate shielding around the treatment site. Additional shielding (as noted below) is used to protect sensitive, normal tissues.
Cumulative Dose In Cgy (Optional): 1046.32
Detail Level: Zone
Information: Selecting Yes will display possible errors in your note based on the variables you have selected. This validation is only offered as a suggestion for you. PLEASE NOTE THAT THE VALIDATION TEXT WILL BE REMOVED WHEN YOU FINALIZE YOUR NOTE. IF YOU WANT TO FAX A PRELIMINARY NOTE YOU WILL NEED TO TOGGLE THIS TO 'NO' IF YOU DO NOT WANT IT IN YOUR FAXED NOTE.
Fractionation Number: 4
Intro Statement (Will Not Render If Left Blank): The patient is undergoing superficial radiation therapy for skin cancer and presents for weekly evaluation and management.  Per protocol and as documented on the flow sheet, the patient was questioned as to subjective redness, pruritus, pain, drainage, fatigue, or any other symptoms.  Objectively, the radiation area was evaluated with regards to erythema, atrophy, scale, crusting, erosion, ulceration, edema, purpura, tenderness, warmth, drainage, and any other findings.  The plan was extensively reviewed including the dose, and dosing schedule.  The simulation and clinical setup was also reviewed as was the external and any internal shields and based on this review the appropriateness and sufficiency of treatment was determined.
Treatment Time In Min (Optional): 0.41
Bill For Dosimetry/Render Decay And Dose Adjustment Calculation In Note: No
Treatment Device Design After Initial Simulation Justification (Will Render If Bill For Treatment Devices = Yes): The patient is status post radiation simulation and is evaluated as to the use of additional devices for shielding and placement for radiation therapy.
Energy (Optional-Please Include Units): 70KV
Total Dose (Optional-Please Include Units): 5321.60
Total Dose (Optional-Please Include Units): 5231.60 cGy
Energy (Optional-Please Include Units): 70kv
Treatment Time / Fractionation (Optional- Include Units): 0.41 Min
Custom Shielding Preamble Text Will Not Be Included With Simple Simulations (.......... X X Y Cm): A lead shield of 0.762 mm thickness is utilized to form a molded, custom shield with a
Day Of The Week Treatment Administered: Friday
Field Size (Applicator): 2.0 cm
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
Time Dose Fractionation (Optional- Include Units If Applicable): 95
Pathology Override (Pathology Will Render As Diagnosis Name If Left Blank): Primary Nodular Basal Cell Carcinoma on the Left Medial Malar Cheek.
Field Number: 2
Daily Fractionated Dose (Optional- Include Units): 261.58 cGy
Field Number: 3
Energy (Include Units): 70kv
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
Functional Status: 1 (ambulatory, light activity)
Additional Prescription Justification Text: If there is any interruption in treatment exceeding 5 days please see Decay and Dose Adjustment Calculation and complete treatment under Prescription 2, 3 or 4 as appropriate.
Energy (Include Units): 70kv
Energy (Optional-Please Include Units): 70kv
Energy (Optional-Please Include Units): 70 Kv
Body Location Override (Optional): Left Medial Cheek
Functional Status: 0 (fully active)
Please Choose The Type Of Visit (Required): Treatment Visit: Show Treatment Variables

## 2021-09-17 NOTE — PROCEDURE: TREATMENT REGIMEN
Detail Level: Zone
Plan: This patient has been treated today with image guided superficial radiation therapy for non-melanoma skin cancer. Written informed consent has been previously obtained from this patient for this treatment. This consent is documented in the patient’s chart. The patient gave verbal consent to continue treatment today. The patient was treated with a specific radiation dose and setup as prescribed by the provider listed on this visit note. A Radiation Therapist performed administration of radiation under supervision of provider. The treatment parameters and cumulative dose are indicated above. Prior to administering the radiation, the patient underwent a verification therapeutic radiology simulation-aided field setting defining relevant normal and abnormal target anatomy and acquiring images with high frequency ultrasound in addition to data necessary developing optimal radiation treatment process for the patient. This process includes verification of the treatment port(s) and proper treatment positioning. All treatment ports were photographed within electronic medical record. The patient’s customized lead blocking along with gross tumor volume and margin was confirmed. Considering superficial radiotherapy is clinical in setup, this requires physician and radiation therapist to clarify location interest being treated against initial images, pathology and patient anatomy. Care was taken ensuring fields treated were geometrically accurate and properly positioned using therapeutic radiology simulation-aided field setting verification per fraction. This process is also utilized to determine if any prescription or setup changes are necessary. These steps are therefore medically necessary ensuring safe and effective administration of radiation. Ongoing therapeutic radiology simulation-aided field setting verification is ordered throughout course of therapy.\\n\\nA high frequency ultrasound image was acquired today for two-dimensional evaluation of the tumor volume and response to treatment, in addition to geometric accuracy of field placement. US depth Is 0.82 mm, which is 0.11 mm in difference from previous imaging. The field placement and ultrasound imaging, per fraction, is separate and distinct from the initial simulation, and is an important task in providing safe administration of superficial radiation therapy. Physician evaluation of the ultrasound tumor depth will be ongoing throughout the course of treatment, and is deemed medically necessary in order to ensure the efficacy of treatment and any necessary changes. Today’s image was evaluated for determination of continuation of treatment with the current plan or with necessary changes as appropriate. According to provider review of verification therapeutic radiology simulation-aided field setting and imaging, no change is required (if change required, please document specifics and changes. If further services rendered for changes, document and submit appropriate billable CPT® codes). \\n\\nAdditionally, the use of ultrasound visualization and targeted assessment allows the patient to be able to see their cancer(s) progress, encouraging patient to complete and maintain compliance through full course of radiotherapy. Per Dr. London, continued ultrasound guidance and therapeutic radiology simulation-aided field setting verification per fraction is required for field placement, measurement of tumor depth, progress and acute effect monitoring. \\n
Plan: This patient has been treated today with image guided superficial radiation therapy for non-melanoma skin cancer. Written informed consent has been previously obtained from this patient for this treatment. This consent is documented in the patient’s chart. The patient gave verbal consent to continue treatment today. The patient was treated with a specific radiation dose and setup as prescribed by the provider listed on this visit note. A Radiation Therapist performed administration of radiation under supervision of provider. The treatment parameters and cumulative dose are indicated above. Prior to administering the radiation, the patient underwent a verification therapeutic radiology simulation-aided field setting defining relevant normal and abnormal target anatomy and acquiring images with high frequency ultrasound in addition to data necessary developing optimal radiation treatment process for the patient. This process includes verification of the treatment port(s) and proper treatment positioning. All treatment ports were photographed within electronic medical record. The patient’s customized lead blocking along with gross tumor volume and margin was confirmed. Considering superficial radiotherapy is clinical in setup, this requires physician and radiation therapist to clarify location interest being treated against initial images, pathology and patient anatomy. Care was taken ensuring fields treated were geometrically accurate and properly positioned using therapeutic radiology simulation-aided field setting verification per fraction. This process is also utilized to determine if any prescription or setup changes are necessary. These steps are therefore medically necessary ensuring safe and effective administration of radiation. Ongoing therapeutic radiology simulation-aided field setting verification is ordered throughout course of therapy.\\n\\nA high frequency ultrasound image was acquired today for two-dimensional evaluation of the tumor volume and response to treatment, in addition to geometric accuracy of field placement. US depth Is 1.37 mm, which is 0.16 mm in difference from previous imaging. The field placement and ultrasound imaging, per fraction, is separate and distinct from the initial simulation, and is an important task in providing safe administration of superficial radiation therapy. Physician evaluation of the ultrasound tumor depth will be ongoing throughout the course of treatment, and is deemed medically necessary in order to ensure the efficacy of treatment and any necessary changes. Today’s image was evaluated for determination of continuation of treatment with the current plan or with necessary changes as appropriate. According to provider review of verification therapeutic radiology simulation-aided field setting and imaging, no change is required (if change required, please document specifics and changes. If further services rendered for changes, document and submit appropriate billable CPT® codes). \\n\\nAdditionally, the use of ultrasound visualization and targeted assessment allows the patient to be able to see their cancer(s) progress, encouraging patient to complete and maintain compliance through full course of radiotherapy. Per Dr. London, continued ultrasound guidance and therapeutic radiology simulation-aided field setting verification per fraction is required for field placement, measurement of tumor depth, progress and acute effect monitoring. \\n\\n
Plan: This patient has been treated today with image guided superficial radiation therapy for non-melanoma skin cancer. Written informed consent has been previously obtained from this patient for this treatment. This consent is documented in the patient’s chart. The patient gave verbal consent to continue treatment today. The patient was treated with a specific radiation dose and setup as prescribed by the provider listed on this visit note. A Radiation Therapist performed administration of radiation under supervision of provider. The treatment parameters and cumulative dose are indicated above. Prior to administering the radiation, the patient underwent a verification therapeutic radiology simulation-aided field setting defining relevant normal and abnormal target anatomy and acquiring images with high frequency ultrasound in addition to data necessary developing optimal radiation treatment process for the patient. This process includes verification of the treatment port(s) and proper treatment positioning. All treatment ports were photographed within electronic medical record. The patient’s customized lead blocking along with gross tumor volume and margin was confirmed. Considering superficial radiotherapy is clinical in setup, this requires physician and radiation therapist to clarify location interest being treated against initial images, pathology and patient anatomy. Care was taken ensuring fields treated were geometrically accurate and properly positioned using therapeutic radiology simulation-aided field setting verification per fraction. This process is also utilized to determine if any prescription or setup changes are necessary. These steps are therefore medically necessary ensuring safe and effective administration of radiation. Ongoing therapeutic radiology simulation-aided field setting verification is ordered throughout course of therapy.\\n\\nA high frequency ultrasound image was acquired today for two-dimensional evaluation of the tumor volume and response to treatment, in addition to geometric accuracy of field placement. US depth Is 0.75 mm, which is  0.11 mm in difference from previous imaging. The field placement and ultrasound imaging, per fraction, is separate and distinct from the initial simulation, and is an important task in providing safe administration of superficial radiation therapy. Physician evaluation of the ultrasound tumor depth will be ongoing throughout the course of treatment, and is deemed medically necessary in order to ensure the efficacy of treatment and any necessary changes. Today’s image was evaluated for determination of continuation of treatment with the current plan or with necessary changes as appropriate. According to provider review of verification therapeutic radiology simulation-aided field setting and imaging, no change is required (if change required, please document specifics and changes. If further services rendered for changes, document and submit appropriate billable CPT® codes). \\n\\nAdditionally, the use of ultrasound visualization and targeted assessment allows the patient to be able to see their cancer(s) progress, encouraging patient to complete and maintain compliance through full course of radiotherapy. Per Dr. London, continued ultrasound guidance and therapeutic radiology simulation-aided field setting verification per fraction is required for field placement, measurement of tumor depth, progress and acute effect monitoring. \\n\\n

## 2021-09-21 ENCOUNTER — APPOINTMENT (OUTPATIENT)
Dept: URBAN - METROPOLITAN AREA CLINIC 255 | Age: 81
Setting detail: DERMATOLOGY
End: 2021-09-27

## 2021-09-21 PROBLEM — C44.319 BASAL CELL CARCINOMA OF SKIN OF OTHER PARTS OF FACE: Status: ACTIVE | Noted: 2021-09-21

## 2021-09-21 PROCEDURE — OTHER SUPERFICIAL RADIATION TREATMENT: OTHER

## 2021-09-21 PROCEDURE — 77401 RADIATION TX DELIVERY SUPFC: CPT

## 2021-09-21 PROCEDURE — OTHER FOLLOW UP FOR NEXT VISIT: OTHER

## 2021-09-21 PROCEDURE — 77280 THER RAD SIMULAJ FIELD SMPL: CPT

## 2021-09-21 PROCEDURE — G6001 ECHO GUIDANCE RADIOTHERAPY: HCPCS

## 2021-09-21 PROCEDURE — OTHER TREATMENT REGIMEN: OTHER

## 2021-09-21 NOTE — PROCEDURE: TREATMENT REGIMEN

## 2021-09-21 NOTE — PROCEDURE: TREATMENT REGIMEN

## 2021-09-21 NOTE — PROCEDURE: SUPERFICIAL RADIATION TREATMENT
Simple Simulation Afterword Text Will Be Included With Simple Simulations (Indications............): The patient had a complete consultation regarding all applicable modalities for the treatment of their skin cancer and based on a variety of factors including the type of tumor, size, and location, the relevant medical history as well as local tissue factors, the functional status of the individual, the ability to perform necessary postoperative wound instructions and the need for simultaneous treatments as well as overall wound healing status, it was determined that the patient would begin radiation therapy treatment for skin cancer.  A full simulation and treatment device design was performed including the determination and formulation of appropriate simple and complex devices including lead shield of 0.762 mm thickness to form molded customized shielding to specifically correlate with the lesion size including treatment margin.  The custom lead shield is adequate to accommodate the appropriate applicator and provide adequate shielding around the treatment site.  The specific field applicator, shields, and devices both simple and complex as well as the specific patient setup is outlined below.  The patient was given a full consent for superficial radiation to both verbally and in writing and the full determination of patient's eligibility for treatment and selection is outlined on the patient eligibility and treatment selection form.  The specific superficial radiotherapy prescription was determined and was documented on the superficial radiotherapy prescription form.  A treatment calculation was also performed and documented on the treatment calculation form.  Based on the prescription, the patient was scheduled for a series of fractional treatments.
Energy (Optional-Please Include Units): 70kv
Total Dose (Optional-Please Include Units): 5231.60 cGy
Treatment Margins In Cm: 0.5
Cumulative Dose In Cgy (Optional): 1307.90
Comments: RTOG 0
Validate Note Data (See Information Below): Yes
Number Of Treatment Days: 1
Treatment Time In Min (Optional): 0.41
Total Number Of Fractions Rx 2: 15
Dose / Tx In Cgy (Optional): 261.58
Fractions / Week Rx 4: 5
Patient Positioning: Sitting
Body Location Override (Optional): Left Medial Cheek
Fractions / Week: 4
Assessment: Appropriate reaction
Energy (Optional-Please Include Units): 70KV
Total Dose (Optional-Please Include Units): 5321.60
Render Additional Prescriptions In Note?: No
Functional Status: 0 (fully active)
Treatment Device Design After Initial Simulation Justification (Will Render If Bill For Treatment Devices = Yes): The patient is status post radiation simulation and is evaluated as to the use of additional devices for shielding and placement for radiation therapy.
Please Choose The Type Of Visit (Required): Treatment and Weekly Evaluation Visit: Show Treatment/Evaluation Variables
Intro Statement (Will Not Render If Left Blank): The patient is undergoing superficial radiation therapy for skin cancer and presents for weekly evaluation and management.  Per protocol and as documented on the flow sheet, the patient was questioned as to subjective redness, pruritus, pain, drainage, fatigue, or any other symptoms.  Objectively, the radiation area was evaluated with regards to erythema, atrophy, scale, crusting, erosion, ulceration, edema, purpura, tenderness, warmth, drainage, and any other findings.  The plan was extensively reviewed including the dose, and dosing schedule.  The simulation and clinical setup was also reviewed as was the external and any internal shields and based on this review the appropriateness and sufficiency of treatment was determined.
Time Dose Fractionation (Optional- Include Units If Applicable): 95
Detail Level: Zone
Day Of The Week Treatment Administered: Tuesday
Body Location Override (Optional): Right Inferior Nasal Cheek
Port Dimensions-Y Axis In Cm: 1.5
Additional Prescription Justification Text: If there is any interruption in treatment exceeding 5 days please see Decay and Dose Adjustment Calculation and complete treatment under Prescription 2, 3 or 4 as appropriate.
Total Number Of Fractions: 20
Shielding Size (Optional- Include Units): 1.5cm X 1.5cm
Functional Status: 1 (ambulatory, light activity)
Simple Simulation Preamble Text Will Be Included With Simple Simulations (.......... Indications): Simple simulation was performed today for the following reasons:
Field Number: 3
Energy (Optional-Please Include Units): 70kv
Energy (Include Units): 70kv
Field Size (Applicator): 2.0 cm
Body Location Override (Optional): Right Glabella
Information: Selecting Yes will display possible errors in your note based on the variables you have selected. This validation is only offered as a suggestion for you. PLEASE NOTE THAT THE VALIDATION TEXT WILL BE REMOVED WHEN YOU FINALIZE YOUR NOTE. IF YOU WANT TO FAX A PRELIMINARY NOTE YOU WILL NEED TO TOGGLE THIS TO 'NO' IF YOU DO NOT WANT IT IN YOUR FAXED NOTE.
Energy (Include Units): 70kv
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
Energy (Optional-Please Include Units): 70 Kv
Custom Shielding Preamble Text Will Not Be Included With Simple Simulations (.......... X X Y Cm): A lead shield of 0.762 mm thickness is utilized to form a molded, custom shield with a
Pathology Override (Pathology Will Render As Diagnosis Name If Left Blank): Primary Nodular Basal Cell Carcinoma on the Left Medial Malar Cheek.
Custom Shielding Afterword Text Will Not Be Included With Simple Simulations (X X Y Cm............): port to correlate with the lesion size, including treatment margin. The custom lead shield is adequate to accommodate the appropriate applicator and provide adequate shielding around the treatment site. Additional shielding (as noted below) is used to protect sensitive, normal tissues.
Energy (Include Units): 70kv
Treatment Time / Fractionation (Optional- Include Units): 0.41 Min
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
Daily Fractionated Dose (Optional- Include Units): 261.58 cGy
Field Number: 2

## 2021-09-21 NOTE — PROCEDURE: TREATMENT REGIMEN

## 2021-09-22 ENCOUNTER — APPOINTMENT (OUTPATIENT)
Dept: URBAN - METROPOLITAN AREA CLINIC 255 | Age: 81
Setting detail: DERMATOLOGY
End: 2021-09-27

## 2021-09-22 PROBLEM — C44.319 BASAL CELL CARCINOMA OF SKIN OF OTHER PARTS OF FACE: Status: ACTIVE | Noted: 2021-09-22

## 2021-09-22 PROCEDURE — 77401 RADIATION TX DELIVERY SUPFC: CPT

## 2021-09-22 PROCEDURE — G6001 ECHO GUIDANCE RADIOTHERAPY: HCPCS

## 2021-09-22 PROCEDURE — OTHER TREATMENT REGIMEN: OTHER

## 2021-09-22 PROCEDURE — 77280 THER RAD SIMULAJ FIELD SMPL: CPT

## 2021-09-22 PROCEDURE — OTHER SUPERFICIAL RADIATION TREATMENT: OTHER

## 2021-09-22 PROCEDURE — OTHER FOLLOW UP FOR NEXT VISIT: OTHER

## 2021-09-22 NOTE — PROCEDURE: TREATMENT REGIMEN

## 2021-09-22 NOTE — PROCEDURE: SUPERFICIAL RADIATION TREATMENT
Fractions / Week Rx 3: 5
Time Dose Fractionation (Optional- Include Units If Applicable): 95
Custom Shielding Afterword Text Will Not Be Included With Simple Simulations (X X Y Cm............): port to correlate with the lesion size, including treatment margin. The custom lead shield is adequate to accommodate the appropriate applicator and provide adequate shielding around the treatment site. Additional shielding (as noted below) is used to protect sensitive, normal tissues.
Dose / Tx In Cgy (Optional): 261.58
Information: Selecting Yes will display possible errors in your note based on the variables you have selected. This validation is only offered as a suggestion for you. PLEASE NOTE THAT THE VALIDATION TEXT WILL BE REMOVED WHEN YOU FINALIZE YOUR NOTE. IF YOU WANT TO FAX A PRELIMINARY NOTE YOU WILL NEED TO TOGGLE THIS TO 'NO' IF YOU DO NOT WANT IT IN YOUR FAXED NOTE.
Additional Prescription Justification Text: If there is any interruption in treatment exceeding 5 days please see Decay and Dose Adjustment Calculation and complete treatment under Prescription 2, 3 or 4 as appropriate.
Field Size (Applicator): 2.0 cm
Custom Shielding Preamble Text Will Not Be Included With Simple Simulations (.......... X X Y Cm): A lead shield of 0.762 mm thickness is utilized to form a molded, custom shield with a
Fractions / Week: 4
Total Number Of Fractions Rx 2: 15
Bill For Radiation Treatment: Yes
Bill For Dosimetry/Render Decay And Dose Adjustment Calculation In Note: No
Port Dimensions-X Axis In Cm: 1.5
Please Choose The Type Of Visit (Required): Treatment Visit: Show Treatment Variables
Initial Radiation Treatment Planning (Will Render If Bill Simulation = Yes): The patient had a complete consultation regarding all applicable modalities for the treatment of their skin cancer and based on a variety of factors including the type of tumor, size, and location, the relevant medical history as well as local tissue factors, the functional status of the individual, the ability to perform necessary postoperative wound instructions and the need for simultaneous treatments as well as overall wound healing status, it was determined that the patient would begin radiation therapy treatment for skin cancer.  A full simulation and treatment device design was performed including the determination and formulation of appropriate simple and complex devices including lead shield of 0.762 mm thickness to form molded customized shielding to specifically correlate with the lesion size including treatment margin.  The custom lead shield is adequate to accommodate the appropriate applicator and provide adequate shielding around the treatment site.  The specific field applicator, shields, and devices both simple and complex as well as the specific patient setup is outlined below.  The patient was given a full consent for superficial radiation to both verbally and in writing and the full determination of patient's eligibility for treatment and selection is outlined on the patient eligibility and treatment selection form.  The specific superficial radiotherapy prescription was determined and was documented on the superficial radiotherapy prescription form.  A treatment calculation was also performed and documented on the treatment calculation form.  Based on the prescription, the patient was scheduled for a series of fractional treatments.
Cumulative Dose In Cgy (Optional): 1569.48
Pathology Override (Pathology Will Render As Diagnosis Name If Left Blank): Primary Nodular Basal Cell Carcinoma on the Left Medial Malar Cheek.
Energy (Include Units): 70kv
Fractionation Number: 6
Day Of The Week Treatment Administered: Wednesday
Dimensions-X Axis In Cm: 0.5
Intro Statement (Will Not Render If Left Blank): The patient is undergoing superficial radiation therapy for skin cancer and presents for weekly evaluation and management.  Per protocol and as documented on the flow sheet, the patient was questioned as to subjective redness, pruritus, pain, drainage, fatigue, or any other symptoms.  Objectively, the radiation area was evaluated with regards to erythema, atrophy, scale, crusting, erosion, ulceration, edema, purpura, tenderness, warmth, drainage, and any other findings.  The plan was extensively reviewed including the dose, and dosing schedule.  The simulation and clinical setup was also reviewed as was the external and any internal shields and based on this review the appropriateness and sufficiency of treatment was determined.
Cumulative Dose In Cgy (Optional): 1569.48
Patient Positioning: Sitting
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
Assessment: Appropriate reaction
Comments: RTOG 0
Body Location Override (Optional): Right Inferior Nasal Cheek
Number Of Treatment Days: 1
Energy (Optional-Please Include Units): 70kv
Total Dose (Optional-Please Include Units): 5231.60 cGy
Cumulative Dose In Cgy (Optional): 1569.48
Treatment Device Design After Initial Simulation Justification (Will Render If Bill For Treatment Devices = Yes): The patient is status post radiation simulation and is evaluated as to the use of additional devices for shielding and placement for radiation therapy.
Computed Treatment Time In Min (Will Render The Same As Calculated Treatment Time If Left Blank): 0.41
Daily Fractionated Dose (Optional- Include Units): 261.58 cGy
Detail Level: Zone
Functional Status: 0 (fully active)
Energy (Optional-Please Include Units): 70Kv
Shielding Size (Optional- Include Units): 1.5cm x 1.5cm
Total Number Of Fractions: 20
Total Dose (Optional-Please Include Units): 5321.60
Energy (Include Units): 70kv
Simple Simulation Preamble Text Will Be Included With Simple Simulations (.......... Indications): Simple simulation was performed today for the following reasons:
Body Location Override (Optional): Right Glabella
Functional Status: 1 (ambulatory, light activity)
Field Number: 2
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
Energy (Include Units): 70kv
Energy (Optional-Please Include Units): 70 Kv
Body Location Override (Optional): Left Medial Cheek
Field Number: 3
Energy (Optional-Please Include Units): 70kv
Treatment Time / Fractionation (Optional- Include Units): 0.41 Min

## 2021-09-24 ENCOUNTER — APPOINTMENT (OUTPATIENT)
Dept: URBAN - METROPOLITAN AREA CLINIC 255 | Age: 81
Setting detail: DERMATOLOGY
End: 2021-09-27

## 2021-09-24 PROBLEM — C44.319 BASAL CELL CARCINOMA OF SKIN OF OTHER PARTS OF FACE: Status: ACTIVE | Noted: 2021-09-24

## 2021-09-24 PROCEDURE — OTHER SUPERFICIAL RADIATION TREATMENT: OTHER

## 2021-09-24 PROCEDURE — OTHER FOLLOW UP FOR NEXT VISIT: OTHER

## 2021-09-24 PROCEDURE — G6001 ECHO GUIDANCE RADIOTHERAPY: HCPCS

## 2021-09-24 PROCEDURE — OTHER TREATMENT REGIMEN: OTHER

## 2021-09-24 PROCEDURE — 77280 THER RAD SIMULAJ FIELD SMPL: CPT

## 2021-09-24 PROCEDURE — 77401 RADIATION TX DELIVERY SUPFC: CPT

## 2021-09-24 NOTE — PROCEDURE: TREATMENT REGIMEN
Plan: This patient has been treated today with image guided superficial radiation therapy for non-melanoma skin cancer. Written informed consent has been previously obtained from this patient for this treatment. This consent is documented in the patient’s chart. The patient gave verbal consent to continue treatment today. The patient was treated with a specific radiation dose and setup as prescribed by the provider listed on this visit note. A Radiation Therapist performed administration of radiation under supervision of provider. The treatment parameters and cumulative dose are indicated above. Prior to administering the radiation, the patient underwent a verification therapeutic radiology simulation-aided field setting defining relevant normal and abnormal target anatomy and acquiring images with high frequency ultrasound in addition to data necessary developing optimal radiation treatment process for the patient. This process includes verification of the treatment port(s) and proper treatment positioning. All treatment ports were photographed within electronic medical record. The patient’s customized lead blocking along with gross tumor volume and margin was confirmed. Considering superficial radiotherapy is clinical in setup, this requires physician and radiation therapist to clarify location interest being treated against initial images, pathology and patient anatomy. Care was taken ensuring fields treated were geometrically accurate and properly positioned using therapeutic radiology simulation-aided field setting verification per fraction. This process is also utilized to determine if any prescription or setup changes are necessary. These steps are therefore medically necessary ensuring safe and effective administration of radiation. Ongoing therapeutic radiology simulation-aided field setting verification is ordered throughout course of therapy.\\n\\nA high frequency ultrasound image was acquired today for two-dimensional evaluation of the tumor volume and response to treatment, in addition to geometric accuracy of field placement. US depth Is 0.84 mm, which is 0.00 mm in difference from previous imaging. The field placement and ultrasound imaging, per fraction, is separate and distinct from the initial simulation, and is an important task in providing safe administration of superficial radiation therapy. Physician evaluation of the ultrasound tumor depth will be ongoing throughout the course of treatment, and is deemed medically necessary in order to ensure the efficacy of treatment and any necessary changes. Today’s image was evaluated for determination of continuation of treatment with the current plan or with necessary changes as appropriate. According to provider review of verification therapeutic radiology simulation-aided field setting and imaging, no change is required (if change required, please document specifics and changes. If further services rendered for changes, document and submit appropriate billable CPT® codes). \\n\\nAdditionally, the use of ultrasound visualization and targeted assessment allows the patient to be able to see their cancer(s) progress, encouraging patient to complete and maintain compliance through full course of radiotherapy. Per Dr. London, continued ultrasound guidance and therapeutic radiology simulation-aided field setting verification per fraction is required for field placement, measurement of tumor depth, progress and acute effect monitoring. \\n
Detail Level: Zone
Plan: This patient has been treated today with image guided superficial radiation therapy for non-melanoma skin cancer. Written informed consent has been previously obtained from this patient for this treatment. This consent is documented in the patient’s chart. The patient gave verbal consent to continue treatment today. The patient was treated with a specific radiation dose and setup as prescribed by the provider listed on this visit note. A Radiation Therapist performed administration of radiation under supervision of provider. The treatment parameters and cumulative dose are indicated above. Prior to administering the radiation, the patient underwent a verification therapeutic radiology simulation-aided field setting defining relevant normal and abnormal target anatomy and acquiring images with high frequency ultrasound in addition to data necessary developing optimal radiation treatment process for the patient. This process includes verification of the treatment port(s) and proper treatment positioning. All treatment ports were photographed within electronic medical record. The patient’s customized lead blocking along with gross tumor volume and margin was confirmed. Considering superficial radiotherapy is clinical in setup, this requires physician and radiation therapist to clarify location interest being treated against initial images, pathology and patient anatomy. Care was taken ensuring fields treated were geometrically accurate and properly positioned using therapeutic radiology simulation-aided field setting verification per fraction. This process is also utilized to determine if any prescription or setup changes are necessary. These steps are therefore medically necessary ensuring safe and effective administration of radiation. Ongoing therapeutic radiology simulation-aided field setting verification is ordered throughout course of therapy.\\n\\nA high frequency ultrasound image was acquired today for two-dimensional evaluation of the tumor volume and response to treatment, in addition to geometric accuracy of field placement. US depth Is 1.27 mm, which is 0.00 mm in difference from previous imaging. The field placement and ultrasound imaging, per fraction, is separate and distinct from the initial simulation, and is an important task in providing safe administration of superficial radiation therapy. Physician evaluation of the ultrasound tumor depth will be ongoing throughout the course of treatment, and is deemed medically necessary in order to ensure the efficacy of treatment and any necessary changes. Today’s image was evaluated for determination of continuation of treatment with the current plan or with necessary changes as appropriate. According to provider review of verification therapeutic radiology simulation-aided field setting and imaging, no change is required (if change required, please document specifics and changes. If further services rendered for changes, document and submit appropriate billable CPT® codes). \\n\\nAdditionally, the use of ultrasound visualization and targeted assessment allows the patient to be able to see their cancer(s) progress, encouraging patient to complete and maintain compliance through full course of radiotherapy. Per Dr. London, continued ultrasound guidance and therapeutic radiology simulation-aided field setting verification per fraction is required for field placement, measurement of tumor depth, progress and acute effect monitoring. \\n
Plan: This patient has been treated today with image guided superficial radiation therapy for non-melanoma skin cancer. Written informed consent has been previously obtained from this patient for this treatment. This consent is documented in the patient’s chart. The patient gave verbal consent to continue treatment today. The patient was treated with a specific radiation dose and setup as prescribed by the provider listed on this visit note. A Radiation Therapist performed administration of radiation under supervision of provider. The treatment parameters and cumulative dose are indicated above. Prior to administering the radiation, the patient underwent a verification therapeutic radiology simulation-aided field setting defining relevant normal and abnormal target anatomy and acquiring images with high frequency ultrasound in addition to data necessary developing optimal radiation treatment process for the patient. This process includes verification of the treatment port(s) and proper treatment positioning. All treatment ports were photographed within electronic medical record. The patient’s customized lead blocking along with gross tumor volume and margin was confirmed. Considering superficial radiotherapy is clinical in setup, this requires physician and radiation therapist to clarify location interest being treated against initial images, pathology and patient anatomy. Care was taken ensuring fields treated were geometrically accurate and properly positioned using therapeutic radiology simulation-aided field setting verification per fraction. This process is also utilized to determine if any prescription or setup changes are necessary. These steps are therefore medically necessary ensuring safe and effective administration of radiation. Ongoing therapeutic radiology simulation-aided field setting verification is ordered throughout course of therapy.\\n\\nA high frequency ultrasound image was acquired today for two-dimensional evaluation of the tumor volume and response to treatment, in addition to geometric accuracy of field placement. US depth Is 0.76 mm, which is  0.06 mm in difference from previous imaging. The field placement and ultrasound imaging, per fraction, is separate and distinct from the initial simulation, and is an important task in providing safe administration of superficial radiation therapy. Physician evaluation of the ultrasound tumor depth will be ongoing throughout the course of treatment, and is deemed medically necessary in order to ensure the efficacy of treatment and any necessary changes. Today’s image was evaluated for determination of continuation of treatment with the current plan or with necessary changes as appropriate. According to provider review of verification therapeutic radiology simulation-aided field setting and imaging, no change is required (if change required, please document specifics and changes. If further services rendered for changes, document and submit appropriate billable CPT® codes). \\n\\nAdditionally, the use of ultrasound visualization and targeted assessment allows the patient to be able to see their cancer(s) progress, encouraging patient to complete and maintain compliance through full course of radiotherapy. Per Dr. London, continued ultrasound guidance and therapeutic radiology simulation-aided field setting verification per fraction is required for field placement, measurement of tumor depth, progress and acute effect monitoring.

## 2021-09-24 NOTE — PROCEDURE: SUPERFICIAL RADIATION TREATMENT
Include Rx 3 When Rendering Additional Prescriptions: Yes
Simple Simulation Afterword Text Will Be Included With Simple Simulations (Indications............): The patient had a complete consultation regarding all applicable modalities for the treatment of their skin cancer and based on a variety of factors including the type of tumor, size, and location, the relevant medical history as well as local tissue factors, the functional status of the individual, the ability to perform necessary postoperative wound instructions and the need for simultaneous treatments as well as overall wound healing status, it was determined that the patient would begin radiation therapy treatment for skin cancer.  A full simulation and treatment device design was performed including the determination and formulation of appropriate simple and complex devices including lead shield of 0.762 mm thickness to form molded customized shielding to specifically correlate with the lesion size including treatment margin.  The custom lead shield is adequate to accommodate the appropriate applicator and provide adequate shielding around the treatment site.  The specific field applicator, shields, and devices both simple and complex as well as the specific patient setup is outlined below.  The patient was given a full consent for superficial radiation to both verbally and in writing and the full determination of patient's eligibility for treatment and selection is outlined on the patient eligibility and treatment selection form.  The specific superficial radiotherapy prescription was determined and was documented on the superficial radiotherapy prescription form.  A treatment calculation was also performed and documented on the treatment calculation form.  Based on the prescription, the patient was scheduled for a series of fractional treatments.
Comments: RTOG 0
Number Of Treatment Days: 1
Total Number Of Fractions: 20
Fractionation Number: 7
Treatment Time In Min (Optional): 0.41
Detail Level: Zone
Port Dimensions-Y Axis In Cm: 1.5
Total Number Of Fractions Rx 2: 15
Dimensions-Y Axis In Cm: 0.5
Render Additional Prescriptions In Note?: No
Total Dose (Optional-Please Include Units): 5231.60 cGy
Energy (Optional-Please Include Units): 70kv
Field Size (Applicator): 2.0 cm
Fractions / Week Rx 3: 5
Energy (Optional-Please Include Units): 70KV
Total Dose (Optional-Please Include Units): 5321.60
Energy (Optional-Please Include Units): 70kv
Treatment Device Design After Initial Simulation Justification (Will Render If Bill For Treatment Devices = Yes): The patient is status post radiation simulation and is evaluated as to the use of additional devices for shielding and placement for radiation therapy.
Intro Statement (Will Not Render If Left Blank): The patient is undergoing superficial radiation therapy for skin cancer and presents for weekly evaluation and management.  Per protocol and as documented on the flow sheet, the patient was questioned as to subjective redness, pruritus, pain, drainage, fatigue, or any other symptoms.  Objectively, the radiation area was evaluated with regards to erythema, atrophy, scale, crusting, erosion, ulceration, edema, purpura, tenderness, warmth, drainage, and any other findings.  The plan was extensively reviewed including the dose, and dosing schedule.  The simulation and clinical setup was also reviewed as was the external and any internal shields and based on this review the appropriateness and sufficiency of treatment was determined.
Patient Positioning: Sitting
Dose / Tx In Cgy (Optional): 261.58
Please Choose The Type Of Visit (Required): Treatment Visit: Show Treatment Variables
Day Of The Week Treatment Administered: Friday
Body Location Override (Optional): Right Inferior Nasal Cheek
Shielding Size (Optional- Include Units): 1.5cm X 1.5cm
Daily Fractionated Dose (Optional- Include Units): 261.58 cGy
Field Number: 3
Energy (Include Units): 70kv
Simple Simulation Preamble Text Will Be Included With Simple Simulations (.......... Indications): Simple simulation was performed today for the following reasons:
Body Location Override (Optional): Right Glabella
Energy (Include Units): 70kv
Functional Status: 1 (ambulatory, light activity)
Body Location Override (Optional): Left Medial Cheek
Energy (Optional-Please Include Units): 70 Kv
Field Number: 2
Custom Shielding Preamble Text Will Not Be Included With Simple Simulations (.......... X X Y Cm): A lead shield of 0.762 mm thickness is utilized to form a molded, custom shield with a
Energy (Include Units): 70kv
Cumulative Dose In Cgy (Optional): 1831.06
Fractions / Week: 4
Treatment Time / Fractionation (Optional- Include Units): 0.41 Min
Custom Shielding Afterword Text Will Not Be Included With Simple Simulations (X X Y Cm............): port to correlate with the lesion size, including treatment margin. The custom lead shield is adequate to accommodate the appropriate applicator and provide adequate shielding around the treatment site. Additional shielding (as noted below) is used to protect sensitive, normal tissues.
Time Dose Fractionation (Optional- Include Units If Applicable): 95
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
Information: Selecting Yes will display possible errors in your note based on the variables you have selected. This validation is only offered as a suggestion for you. PLEASE NOTE THAT THE VALIDATION TEXT WILL BE REMOVED WHEN YOU FINALIZE YOUR NOTE. IF YOU WANT TO FAX A PRELIMINARY NOTE YOU WILL NEED TO TOGGLE THIS TO 'NO' IF YOU DO NOT WANT IT IN YOUR FAXED NOTE.
Assessment: Appropriate reaction
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
Additional Prescription Justification Text: If there is any interruption in treatment exceeding 5 days please see Decay and Dose Adjustment Calculation and complete treatment under Prescription 2, 3 or 4 as appropriate.
Pathology Override (Pathology Will Render As Diagnosis Name If Left Blank): Primary Nodular Basal Cell Carcinoma on the Left Medial Malar Cheek.
Functional Status: 0 (fully active)

## 2021-09-27 ENCOUNTER — APPOINTMENT (OUTPATIENT)
Dept: URBAN - METROPOLITAN AREA CLINIC 255 | Age: 81
Setting detail: DERMATOLOGY
End: 2021-10-08

## 2021-09-27 ENCOUNTER — APPOINTMENT (OUTPATIENT)
Dept: URBAN - METROPOLITAN AREA CLINIC 255 | Age: 81
Setting detail: DERMATOLOGY
End: 2021-10-09

## 2021-09-27 PROBLEM — C44.319 BASAL CELL CARCINOMA OF SKIN OF OTHER PARTS OF FACE: Status: ACTIVE | Noted: 2021-09-27

## 2021-09-27 PROBLEM — C44.91 BASAL CELL CARCINOMA OF SKIN, UNSPECIFIED: Status: ACTIVE | Noted: 2021-09-27

## 2021-09-27 PROCEDURE — 77401 RADIATION TX DELIVERY SUPFC: CPT

## 2021-09-27 PROCEDURE — OTHER SUPERFICIAL RADIATION TREATMENT: OTHER

## 2021-09-27 PROCEDURE — OTHER FOLLOW UP FOR NEXT VISIT: OTHER

## 2021-09-27 PROCEDURE — OTHER TREATMENT REGIMEN: OTHER

## 2021-09-27 PROCEDURE — 77280 THER RAD SIMULAJ FIELD SMPL: CPT

## 2021-09-27 PROCEDURE — G6001 ECHO GUIDANCE RADIOTHERAPY: HCPCS

## 2021-09-27 PROCEDURE — 77336 RADIATION PHYSICS CONSULT: CPT

## 2021-09-27 NOTE — PROCEDURE: SUPERFICIAL RADIATION TREATMENT
Time Dose Fractionation (Optional- Include Units If Applicable): 95
Daily Fractionated Dose (Optional- Include Units): 261.58 cGy
Custom Shielding Afterword Text Will Not Be Included With Simple Simulations (X X Y Cm............): port to correlate with the lesion size, including treatment margin. The custom lead shield is adequate to accommodate the appropriate applicator and provide adequate shielding around the treatment site. Additional shielding (as noted below) is used to protect sensitive, normal tissues.
Field Number: 2
Fractions / Week Rx 4: 5
Patient Positioning: Sitting
Dose / Tx In Cgy (Optional): 261.58
Additional Prescription Justification Text: If there is any interruption in treatment exceeding 5 days please see Decay and Dose Adjustment Calculation and complete treatment under Prescription 2, 3 or 4 as appropriate.
Custom Shielding Preamble Text Will Not Be Included With Simple Simulations (.......... X X Y Cm): A lead shield of 0.762 mm thickness is utilized to form a molded, custom shield with a
Field Size (Applicator): 2.0 cm
Bill For Dosimetry/Render Decay And Dose Adjustment Calculation In Note: No
Port Dimensions-X Axis In Cm: 1.5
S[place] For Simulation Without Treatment Device Design (Simple Simulation): Yes
Please Choose The Type Of Visit (Required): Treatment Visit: Show Treatment Variables
Dimensions-X Axis In Cm: 0.5
Total Number Of Fractions Rx 2: 15
Treatment Time In Min (Optional): 0.41
Initial Radiation Treatment Planning (Will Render If Bill Simulation = Yes): The patient had a complete consultation regarding all applicable modalities for the treatment of their skin cancer and based on a variety of factors including the type of tumor, size, and location, the relevant medical history as well as local tissue factors, the functional status of the individual, the ability to perform necessary postoperative wound instructions and the need for simultaneous treatments as well as overall wound healing status, it was determined that the patient would begin radiation therapy treatment for skin cancer.  A full simulation and treatment device design was performed including the determination and formulation of appropriate simple and complex devices including lead shield of 0.762 mm thickness to form molded customized shielding to specifically correlate with the lesion size including treatment margin.  The custom lead shield is adequate to accommodate the appropriate applicator and provide adequate shielding around the treatment site.  The specific field applicator, shields, and devices both simple and complex as well as the specific patient setup is outlined below.  The patient was given a full consent for superficial radiation to both verbally and in writing and the full determination of patient's eligibility for treatment and selection is outlined on the patient eligibility and treatment selection form.  The specific superficial radiotherapy prescription was determined and was documented on the superficial radiotherapy prescription form.  A treatment calculation was also performed and documented on the treatment calculation form.  Based on the prescription, the patient was scheduled for a series of fractional treatments.
Cumulative Dose In Cgy (Optional): 2092.64
Assessment: Appropriate reaction
Fractionation Number: 8
Information: Selecting Yes will display possible errors in your note based on the variables you have selected. This validation is only offered as a suggestion for you. PLEASE NOTE THAT THE VALIDATION TEXT WILL BE REMOVED WHEN YOU FINALIZE YOUR NOTE. IF YOU WANT TO FAX A PRELIMINARY NOTE YOU WILL NEED TO TOGGLE THIS TO 'NO' IF YOU DO NOT WANT IT IN YOUR FAXED NOTE.
Day Of The Week Treatment Administered: Monday
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
Body Location Override (Optional): Right Inferior Nasal Cheek
Fractions / Week: 4
Total Dose (Optional-Please Include Units): 5231.60 cGy
Treatment Device Design After Initial Simulation Justification (Will Render If Bill For Treatment Devices = Yes): The patient is status post radiation simulation and is evaluated as to the use of additional devices for shielding and placement for radiation therapy.
Field Number: 1
Detail Level: Zone
Pathology Override (Pathology Will Render As Diagnosis Name If Left Blank): Primary Nodular Basal Cell Carcinoma distributed on the Left Medial Malar Cheek.
Energy (Include Units): 70kv
Functional Status: 0 (fully active)
Energy (Optional-Please Include Units): 70Kv
Intro Statement (Will Not Render If Left Blank): The patient is undergoing superficial radiation therapy for skin cancer and presents for weekly evaluation and management.  Per protocol and as documented on the flow sheet, the patient was questioned as to subjective redness, pruritus, pain, drainage, fatigue, or any other symptoms.  Objectively, the radiation area was evaluated with regards to erythema, atrophy, scale, crusting, erosion, ulceration, edema, purpura, tenderness, warmth, drainage, and any other findings.  The plan was extensively reviewed including the dose, and dosing schedule.  The simulation and clinical setup was also reviewed as was the external and any internal shields and based on this review the appropriateness and sufficiency of treatment was determined.
Comments: RTOG 0
Shielding Size (Optional- Include Units): 1.5cm X 1.5cm
Total Number Of Fractions: 20
Energy (Optional-Please Include Units): 70kv
Total Dose (Optional-Please Include Units): 5321.60
Energy (Include Units): 70kv
Simple Simulation Preamble Text Will Be Included With Simple Simulations (.......... Indications): Simple simulation was performed today for the following reasons:
Functional Status: 1 (ambulatory, light activity)
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
Energy (Include Units): 70kv
Energy (Optional-Please Include Units): 70 Kv
Body Location Override (Optional): Left Medial Cheek
Field Number: 3
Energy (Optional-Please Include Units): 70kv
Treatment Time / Fractionation (Optional- Include Units): 0.41 Min
Body Location Override (Optional): Right Glabella

## 2021-09-27 NOTE — PROCEDURE: TREATMENT REGIMEN
Plan: This patient has been treated today with image guided superficial radiation therapy for non-melanoma skin cancer. Written informed consent has been previously obtained from this patient for this treatment. This consent is documented in the patient’s chart. The patient gave verbal consent to continue treatment today. The patient was treated with a specific radiation dose and setup as prescribed by the provider listed on this visit note. A Radiation Therapist performed administration of radiation under supervision of provider. The treatment parameters and cumulative dose are indicated above. Prior to administering the radiation, the patient underwent a verification therapeutic radiology simulation-aided field setting defining relevant normal and abnormal target anatomy and acquiring images with high frequency ultrasound in addition to data necessary developing optimal radiation treatment process for the patient. This process includes verification of the treatment port(s) and proper treatment positioning. All treatment ports were photographed within electronic medical record. The patient’s customized lead blocking along with gross tumor volume and margin was confirmed. Considering superficial radiotherapy is clinical in setup, this requires physician and radiation therapist to clarify location interest being treated against initial images, pathology and patient anatomy. Care was taken ensuring fields treated were geometrically accurate and properly positioned using therapeutic radiology simulation-aided field setting verification per fraction. This process is also utilized to determine if any prescription or setup changes are necessary. These steps are therefore medically necessary ensuring safe and effective administration of radiation. Ongoing therapeutic radiology simulation-aided field setting verification is ordered throughout course of therapy.\\n\\nA high frequency ultrasound image was acquired today for two-dimensional evaluation of the tumor volume and response to treatment, in addition to geometric accuracy of field placement. US depth Is 1.26 mm, which is 0.01mm in difference from previous imaging. The field placement and ultrasound imaging, per fraction, is separate and distinct from the initial simulation, and is an important task in providing safe administration of superficial radiation therapy. Physician evaluation of the ultrasound tumor depth will be ongoing throughout the course of treatment, and is deemed medically necessary in order to ensure the efficacy of treatment and any necessary changes. Today’s image was evaluated for determination of continuation of treatment with the current plan or with necessary changes as appropriate. According to provider review of verification therapeutic radiology simulation-aided field setting and imaging, no change is required (if change required, please document specifics and changes. If further services rendered for changes, document and submit appropriate billable CPT® codes). \\n\\nAdditionally, the use of ultrasound visualization and targeted assessment allows the patient to be able to see their cancer(s) progress, encouraging patient to complete and maintain compliance through full course of radiotherapy. Per Dr. London, continued ultrasound guidance and therapeutic radiology simulation-aided field setting verification per fraction is required for field placement, measurement of tumor depth, progress and acute effect monitoring. \\n
Detail Level: Zone
Plan: This patient has been treated today with image guided superficial radiation therapy for non-melanoma skin cancer. Written informed consent has been previously obtained from this patient for this treatment. This consent is documented in the patient’s chart. The patient gave verbal consent to continue treatment today. The patient was treated with a specific radiation dose and setup as prescribed by the provider listed on this visit note. A Radiation Therapist performed administration of radiation under supervision of provider. The treatment parameters and cumulative dose are indicated above. Prior to administering the radiation, the patient underwent a verification therapeutic radiology simulation-aided field setting defining relevant normal and abnormal target anatomy and acquiring images with high frequency ultrasound in addition to data necessary developing optimal radiation treatment process for the patient. This process includes verification of the treatment port(s) and proper treatment positioning. All treatment ports were photographed within electronic medical record. The patient’s customized lead blocking along with gross tumor volume and margin was confirmed. Considering superficial radiotherapy is clinical in setup, this requires physician and radiation therapist to clarify location interest being treated against initial images, pathology and patient anatomy. Care was taken ensuring fields treated were geometrically accurate and properly positioned using therapeutic radiology simulation-aided field setting verification per fraction. This process is also utilized to determine if any prescription or setup changes are necessary. These steps are therefore medically necessary ensuring safe and effective administration of radiation. Ongoing therapeutic radiology simulation-aided field setting verification is ordered throughout course of therapy.\\n\\nA high frequency ultrasound image was acquired today for two-dimensional evaluation of the tumor volume and response to treatment, in addition to geometric accuracy of field placement. US depth Is 0.85 mm, which is 0.01 mm in difference from previous imaging. The field placement and ultrasound imaging, per fraction, is separate and distinct from the initial simulation, and is an important task in providing safe administration of superficial radiation therapy. Physician evaluation of the ultrasound tumor depth will be ongoing throughout the course of treatment, and is deemed medically necessary in order to ensure the efficacy of treatment and any necessary changes. Today’s image was evaluated for determination of continuation of treatment with the current plan or with necessary changes as appropriate. According to provider review of verification therapeutic radiology simulation-aided field setting and imaging, no change is required (if change required, please document specifics and changes. If further services rendered for changes, document and submit appropriate billable CPT® codes). \\n\\nAdditionally, the use of ultrasound visualization and targeted assessment allows the patient to be able to see their cancer(s) progress, encouraging patient to complete and maintain compliance through full course of radiotherapy. Per Dr. London, continued ultrasound guidance and therapeutic radiology simulation-aided field setting verification per fraction is required for field placement, measurement of tumor depth, progress and acute effect monitoring. \\n
Plan: This patient has been treated today with image guided superficial radiation therapy for non-melanoma skin cancer. Written informed consent has been previously obtained from this patient for this treatment. This consent is documented in the patient’s chart. The patient gave verbal consent to continue treatment today. The patient was treated with a specific radiation dose and setup as prescribed by the provider listed on this visit note. A Radiation Therapist performed administration of radiation under supervision of provider. The treatment parameters and cumulative dose are indicated above. Prior to administering the radiation, the patient underwent a verification therapeutic radiology simulation-aided field setting defining relevant normal and abnormal target anatomy and acquiring images with high frequency ultrasound in addition to data necessary developing optimal radiation treatment process for the patient. This process includes verification of the treatment port(s) and proper treatment positioning. All treatment ports were photographed within electronic medical record. The patient’s customized lead blocking along with gross tumor volume and margin was confirmed. Considering superficial radiotherapy is clinical in setup, this requires physician and radiation therapist to clarify location interest being treated against initial images, pathology and patient anatomy. Care was taken ensuring fields treated were geometrically accurate and properly positioned using therapeutic radiology simulation-aided field setting verification per fraction. This process is also utilized to determine if any prescription or setup changes are necessary. These steps are therefore medically necessary ensuring safe and effective administration of radiation. Ongoing therapeutic radiology simulation-aided field setting verification is ordered throughout course of therapy.\\n\\nA high frequency ultrasound image was acquired today for two-dimensional evaluation of the tumor volume and response to treatment, in addition to geometric accuracy of field placement. US depth Is 0.77 mm, which is  0.01 mm in difference from previous imaging. The field placement and ultrasound imaging, per fraction, is separate and distinct from the initial simulation, and is an important task in providing safe administration of superficial radiation therapy. Physician evaluation of the ultrasound tumor depth will be ongoing throughout the course of treatment, and is deemed medically necessary in order to ensure the efficacy of treatment and any necessary changes. Today’s image was evaluated for determination of continuation of treatment with the current plan or with necessary changes as appropriate. According to provider review of verification therapeutic radiology simulation-aided field setting and imaging, no change is required (if change required, please document specifics and changes. If further services rendered for changes, document and submit appropriate billable CPT® codes). \\n\\nAdditionally, the use of ultrasound visualization and targeted assessment allows the patient to be able to see their cancer(s) progress, encouraging patient to complete and maintain compliance through full course of radiotherapy. Per Dr. London, continued ultrasound guidance and therapeutic radiology simulation-aided field setting verification per fraction is required for field placement, measurement of tumor depth, progress and acute effect monitoring.

## 2021-09-28 ENCOUNTER — ANTICOAGULATION THERAPY VISIT (OUTPATIENT)
Dept: ANTICOAGULATION | Facility: CLINIC | Age: 81
End: 2021-09-28

## 2021-09-28 ENCOUNTER — LAB (OUTPATIENT)
Dept: LAB | Facility: CLINIC | Age: 81
End: 2021-09-28

## 2021-09-28 DIAGNOSIS — I48.0 PAROXYSMAL ATRIAL FIBRILLATION (H): ICD-10-CM

## 2021-09-28 DIAGNOSIS — I48.20 CHRONIC ATRIAL FIBRILLATION (H): ICD-10-CM

## 2021-09-28 DIAGNOSIS — Z79.01 LONG TERM CURRENT USE OF ANTICOAGULANTS WITH INR GOAL OF 2.0-3.0: ICD-10-CM

## 2021-09-28 DIAGNOSIS — Z79.01 LONG TERM CURRENT USE OF ANTICOAGULANTS WITH INR GOAL OF 2.0-3.0: Primary | ICD-10-CM

## 2021-09-28 LAB — INR BLD: 2.5 (ref 0.9–1.1)

## 2021-09-28 PROCEDURE — 85610 PROTHROMBIN TIME: CPT

## 2021-09-28 PROCEDURE — 36416 COLLJ CAPILLARY BLOOD SPEC: CPT

## 2021-09-28 NOTE — PROGRESS NOTES
Anticoagulation Management    Unable to reach [place] today.    Today's INR result of 2.5 is therapeutic (goal INR of 2.0-3.0).  Result received from: Clinic Lab    Follow up required to assess for changes     Left message to continue current dose of warfarin 5 mg tonight. Call [facility].       Anticoagulation clinic to follow up    Radha Seymour RN

## 2021-09-29 ENCOUNTER — TELEPHONE (OUTPATIENT)
Dept: FAMILY MEDICINE | Facility: CLINIC | Age: 81
End: 2021-09-29

## 2021-09-29 ENCOUNTER — APPOINTMENT (OUTPATIENT)
Dept: URBAN - METROPOLITAN AREA CLINIC 255 | Age: 81
Setting detail: DERMATOLOGY
End: 2021-10-09

## 2021-09-29 DIAGNOSIS — I48.20 CHRONIC ATRIAL FIBRILLATION (H): ICD-10-CM

## 2021-09-29 DIAGNOSIS — Z79.01 LONG TERM CURRENT USE OF ANTICOAGULANTS WITH INR GOAL OF 2.0-3.0: Primary | ICD-10-CM

## 2021-09-29 PROBLEM — C44.319 BASAL CELL CARCINOMA OF SKIN OF OTHER PARTS OF FACE: Status: ACTIVE | Noted: 2021-09-29

## 2021-09-29 PROCEDURE — 77401 RADIATION TX DELIVERY SUPFC: CPT

## 2021-09-29 PROCEDURE — OTHER SUPERFICIAL RADIATION TREATMENT: OTHER

## 2021-09-29 PROCEDURE — G6001 ECHO GUIDANCE RADIOTHERAPY: HCPCS

## 2021-09-29 PROCEDURE — 77280 THER RAD SIMULAJ FIELD SMPL: CPT

## 2021-09-29 PROCEDURE — OTHER FOLLOW UP FOR NEXT VISIT: OTHER

## 2021-09-29 PROCEDURE — OTHER TREATMENT REGIMEN: OTHER

## 2021-09-29 NOTE — PROCEDURE: TREATMENT REGIMEN
Plan: This patient has been treated today with image guided superficial radiation therapy for non-melanoma skin cancer. Written informed consent has been previously obtained from this patient for this treatment. This consent is documented in the patient’s chart. The patient gave verbal consent to continue treatment today. The patient was treated with a specific radiation dose and setup as prescribed by the provider listed on this visit note. A Radiation Therapist performed administration of radiation under supervision of provider. The treatment parameters and cumulative dose are indicated above. Prior to administering the radiation, the patient underwent a verification therapeutic radiology simulation-aided field setting defining relevant normal and abnormal target anatomy and acquiring images with high frequency ultrasound in addition to data necessary developing optimal radiation treatment process for the patient. This process includes verification of the treatment port(s) and proper treatment positioning. All treatment ports were photographed within electronic medical record. The patient’s customized lead blocking along with gross tumor volume and margin was confirmed. Considering superficial radiotherapy is clinical in setup, this requires physician and radiation therapist to clarify location interest being treated against initial images, pathology and patient anatomy. Care was taken ensuring fields treated were geometrically accurate and properly positioned using therapeutic radiology simulation-aided field setting verification per fraction. This process is also utilized to determine if any prescription or setup changes are necessary. These steps are therefore medically necessary ensuring safe and effective administration of radiation. Ongoing therapeutic radiology simulation-aided field setting verification is ordered throughout course of therapy.\\n\\nA high frequency ultrasound image was acquired today for two-dimensional evaluation of the tumor volume and response to treatment, in addition to geometric accuracy of field placement. US depth Is 0.80 mm, which is 0.05 mm in difference from previous imaging. The field placement and ultrasound imaging, per fraction, is separate and distinct from the initial simulation, and is an important task in providing safe administration of superficial radiation therapy. Physician evaluation of the ultrasound tumor depth will be ongoing throughout the course of treatment, and is deemed medically necessary in order to ensure the efficacy of treatment and any necessary changes. Today’s image was evaluated for determination of continuation of treatment with the current plan or with necessary changes as appropriate. According to provider review of verification therapeutic radiology simulation-aided field setting and imaging, no change is required (if change required, please document specifics and changes. If further services rendered for changes, document and submit appropriate billable CPT® codes). \\n\\nAdditionally, the use of ultrasound visualization and targeted assessment allows the patient to be able to see their cancer(s) progress, encouraging patient to complete and maintain compliance through full course of radiotherapy. Per Dr. London, continued ultrasound guidance and therapeutic radiology simulation-aided field setting verification per fraction is required for field placement, measurement of tumor depth, progress and acute effect monitoring. \\n
Detail Level: Zone
Plan: This patient has been treated today with image guided superficial radiation therapy for non-melanoma skin cancer. Written informed consent has been previously obtained from this patient for this treatment. This consent is documented in the patient’s chart. The patient gave verbal consent to continue treatment today. The patient was treated with a specific radiation dose and setup as prescribed by the provider listed on this visit note. A Radiation Therapist performed administration of radiation under supervision of provider. The treatment parameters and cumulative dose are indicated above. Prior to administering the radiation, the patient underwent a verification therapeutic radiology simulation-aided field setting defining relevant normal and abnormal target anatomy and acquiring images with high frequency ultrasound in addition to data necessary developing optimal radiation treatment process for the patient. This process includes verification of the treatment port(s) and proper treatment positioning. All treatment ports were photographed within electronic medical record. The patient’s customized lead blocking along with gross tumor volume and margin was confirmed. Considering superficial radiotherapy is clinical in setup, this requires physician and radiation therapist to clarify location interest being treated against initial images, pathology and patient anatomy. Care was taken ensuring fields treated were geometrically accurate and properly positioned using therapeutic radiology simulation-aided field setting verification per fraction. This process is also utilized to determine if any prescription or setup changes are necessary. These steps are therefore medically necessary ensuring safe and effective administration of radiation. Ongoing therapeutic radiology simulation-aided field setting verification is ordered throughout course of therapy.\\n\\nA high frequency ultrasound image was acquired today for two-dimensional evaluation of the tumor volume and response to treatment, in addition to geometric accuracy of field placement. US depth Is 0.67 mm, which is  0.10 mm in difference from previous imaging. The field placement and ultrasound imaging, per fraction, is separate and distinct from the initial simulation, and is an important task in providing safe administration of superficial radiation therapy. Physician evaluation of the ultrasound tumor depth will be ongoing throughout the course of treatment, and is deemed medically necessary in order to ensure the efficacy of treatment and any necessary changes. Today’s image was evaluated for determination of continuation of treatment with the current plan or with necessary changes as appropriate. According to provider review of verification therapeutic radiology simulation-aided field setting and imaging, no change is required (if change required, please document specifics and changes. If further services rendered for changes, document and submit appropriate billable CPT® codes). \\n\\nAdditionally, the use of ultrasound visualization and targeted assessment allows the patient to be able to see their cancer(s) progress, encouraging patient to complete and maintain compliance through full course of radiotherapy. Per Dr. London, continued ultrasound guidance and therapeutic radiology simulation-aided field setting verification per fraction is required for field placement, measurement of tumor depth, progress and acute effect monitoring.
Plan: This patient has been treated today with image guided superficial radiation therapy for non-melanoma skin cancer. Written informed consent has been previously obtained from this patient for this treatment. This consent is documented in the patient’s chart. The patient gave verbal consent to continue treatment today. The patient was treated with a specific radiation dose and setup as prescribed by the provider listed on this visit note. A Radiation Therapist performed administration of radiation under supervision of provider. The treatment parameters and cumulative dose are indicated above. Prior to administering the radiation, the patient underwent a verification therapeutic radiology simulation-aided field setting defining relevant normal and abnormal target anatomy and acquiring images with high frequency ultrasound in addition to data necessary developing optimal radiation treatment process for the patient. This process includes verification of the treatment port(s) and proper treatment positioning. All treatment ports were photographed within electronic medical record. The patient’s customized lead blocking along with gross tumor volume and margin was confirmed. Considering superficial radiotherapy is clinical in setup, this requires physician and radiation therapist to clarify location interest being treated against initial images, pathology and patient anatomy. Care was taken ensuring fields treated were geometrically accurate and properly positioned using therapeutic radiology simulation-aided field setting verification per fraction. This process is also utilized to determine if any prescription or setup changes are necessary. These steps are therefore medically necessary ensuring safe and effective administration of radiation. Ongoing therapeutic radiology simulation-aided field setting verification is ordered throughout course of therapy.\\n\\nA high frequency ultrasound image was acquired today for two-dimensional evaluation of the tumor volume and response to treatment, in addition to geometric accuracy of field placement. US depth Is 1.22 mm, which is 0.04 mm in difference from previous imaging. The field placement and ultrasound imaging, per fraction, is separate and distinct from the initial simulation, and is an important task in providing safe administration of superficial radiation therapy. Physician evaluation of the ultrasound tumor depth will be ongoing throughout the course of treatment, and is deemed medically necessary in order to ensure the efficacy of treatment and any necessary changes. Today’s image was evaluated for determination of continuation of treatment with the current plan or with necessary changes as appropriate. According to provider review of verification therapeutic radiology simulation-aided field setting and imaging, no change is required (if change required, please document specifics and changes. If further services rendered for changes, document and submit appropriate billable CPT® codes). \\n\\nAdditionally, the use of ultrasound visualization and targeted assessment allows the patient to be able to see their cancer(s) progress, encouraging patient to complete and maintain compliance through full course of radiotherapy. Per Dr. London, continued ultrasound guidance and therapeutic radiology simulation-aided field setting verification per fraction is required for field placement, measurement of tumor depth, progress and acute effect monitoring. \\n

## 2021-09-29 NOTE — PROGRESS NOTES
ANTICOAGULATION MANAGEMENT     Aniket Macias 81 year old male is on warfarin with therapeutic INR result. (Goal INR 2.0-3.0)    Recent labs: (last 7 days)     09/28/21  0814   INR 2.5*       ASSESSMENT     Source(s): Chart Review and Patient/Caregiver Call       Warfarin doses taken: Warfarin taken as instructed    Diet: No new diet changes identified    New illness, injury, or hospitalization: No    Medication/supplement changes: None noted    Signs or symptoms of bleeding or clotting: No    Previous INR: Therapeutic last 2(+) visits    Additional findings: None     PLAN     Recommended plan for no diet, medication or health factor changes affecting INR     Dosing Instructions: Continue your current warfarin dose with next INR in 6 weeks       Summary  As of 9/28/2021    Full warfarin instructions:  2.5 mg every Mon, Wed, Fri; 5 mg all other days   Next INR check:  11/9/2021             Telephone call with Aniket who verbalizes understanding and agrees to plan    Lab visit scheduled    Education provided: Please call back if any changes to your diet, medications or how you've been taking warfarin    Plan made per [facility] anticoagulation protocol    Radha Seymour RN  Anticoagulation Clinic  9/29/2021    _______________________________________________________________________     Anticoagulation Episode Summary     Current INR goal:  2.0-3.0   TTR:  89.3 % (1 y)   Target end date:  Indefinite   Send INR reminders to:  [facility]    Indications    Paroxysmal atrial fibrillation (Resolved) [I48.0]  Long term current use of anticoagulants with INR goal of 2.0-3.0 [Z79.01]  Chronic atrial fibrillation (H) [I48.20]           Comments:           Anticoagulation Care Providers     Provider Role Specialty Phone number    Maribeth Haney MD Referring Internal Medicine - Pediatrics [phone]    Sebastián Bermudez MD Responsible Clinical Cardiac Electrophysiology [phone]

## 2021-09-29 NOTE — PROCEDURE: SUPERFICIAL RADIATION TREATMENT
Total Number Of Fractions Rx 3: 15
Include Rx 3 When Rendering Additional Prescriptions: Yes
Energy (Optional-Please Include Units): 70kv
Total Dose (Optional-Please Include Units): 5231.60 cGy
Simple Simulation Afterword Text Will Be Included With Simple Simulations (Indications............): The patient had a complete consultation regarding all applicable modalities for the treatment of their skin cancer and based on a variety of factors including the type of tumor, size, and location, the relevant medical history as well as local tissue factors, the functional status of the individual, the ability to perform necessary postoperative wound instructions and the need for simultaneous treatments as well as overall wound healing status, it was determined that the patient would begin radiation therapy treatment for skin cancer.  A full simulation and treatment device design was performed including the determination and formulation of appropriate simple and complex devices including lead shield of 0.762 mm thickness to form molded customized shielding to specifically correlate with the lesion size including treatment margin.  The custom lead shield is adequate to accommodate the appropriate applicator and provide adequate shielding around the treatment site.  The specific field applicator, shields, and devices both simple and complex as well as the specific patient setup is outlined below.  The patient was given a full consent for superficial radiation to both verbally and in writing and the full determination of patient's eligibility for treatment and selection is outlined on the patient eligibility and treatment selection form.  The specific superficial radiotherapy prescription was determined and was documented on the superficial radiotherapy prescription form.  A treatment calculation was also performed and documented on the treatment calculation form.  Based on the prescription, the patient was scheduled for a series of fractional treatments.
Cumulative Dose In Cgy (Optional): 2354.22
Treatment Margins In Cm: 0.5
Detail Level: Zone
Field Number: 3
Simple Simulation Preamble Text Will Be Included With Simple Simulations (.......... Indications): Simple simulation was performed today for the following reasons:
Assessment: Appropriate reaction
Comments: RTOG 0
Body Location Override (Optional): Left Medial Cheek
Number Of Treatment Days: 1
Day Of The Week Treatment Administered: Wednesday
Port Dimensions-Y Axis In Cm: 1.5
Fractionation Number: 9
Custom Shielding Preamble Text Will Not Be Included With Simple Simulations (.......... X X Y Cm): A lead shield of 0.762 mm thickness is utilized to form a molded, custom shield with a
Dose Per Fractionation In Cgy (Optional): 261.58
Treatment Time / Fractionation (Optional- Include Units): 0.41 Min
Energy (Optional-Please Include Units): 70kv
Treatment Device Design After Initial Simulation Justification (Will Render If Bill For Treatment Devices = Yes): The patient is status post radiation simulation and is evaluated as to the use of additional devices for shielding and placement for radiation therapy.
Fractionation Number (Evaluation): 5
Intro Statement (Will Not Render If Left Blank): The patient is undergoing superficial radiation therapy for skin cancer and presents for weekly evaluation and management.  Per protocol and as documented on the flow sheet, the patient was questioned as to subjective redness, pruritus, pain, drainage, fatigue, or any other symptoms.  Objectively, the radiation area was evaluated with regards to erythema, atrophy, scale, crusting, erosion, ulceration, edema, purpura, tenderness, warmth, drainage, and any other findings.  The plan was extensively reviewed including the dose, and dosing schedule.  The simulation and clinical setup was also reviewed as was the external and any internal shields and based on this review the appropriateness and sufficiency of treatment was determined.
Field Size (Applicator): 2.0 cm
Custom Shielding Afterword Text Will Not Be Included With Simple Simulations (X X Y Cm............): port to correlate with the lesion size, including treatment margin. The custom lead shield is adequate to accommodate the appropriate applicator and provide adequate shielding around the treatment site. Additional shielding (as noted below) is used to protect sensitive, normal tissues.
Shielding Size (Optional- Include Units): 1.5cm X 1.5cm
Total Number Of Fractions: 20
Treatment Time In Min (Optional): 0.41
Bill For Dosimetry/Render Decay And Dose Adjustment Calculation In Note: No
Please Choose The Type Of Visit (Required): Treatment Visit: Show Treatment Variables
Energy (Optional-Please Include Units): 70Kv
Energy (Include Units): 70kv
Body Location Override (Optional): Right Glabella
Patient Positioning: Sitting
Functional Status: 1 (ambulatory, light activity)
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
Energy (Optional-Please Include Units): 70 Kv
Fractions / Week: 4
Daily Fractionated Dose (Optional- Include Units): 261.58 cGy
Time Dose Fractionation (Optional- Include Units If Applicable): 95
Pathology Override (Pathology Will Render As Diagnosis Name If Left Blank): Primary Nodular Basal Cell Carcinoma distributed on the Left Medial Malar Cheek.
Energy (Include Units): 70kv
Information: Selecting Yes will display possible errors in your note based on the variables you have selected. This validation is only offered as a suggestion for you. PLEASE NOTE THAT THE VALIDATION TEXT WILL BE REMOVED WHEN YOU FINALIZE YOUR NOTE. IF YOU WANT TO FAX A PRELIMINARY NOTE YOU WILL NEED TO TOGGLE THIS TO 'NO' IF YOU DO NOT WANT IT IN YOUR FAXED NOTE.
Additional Prescription Justification Text: If there is any interruption in treatment exceeding 5 days please see Decay and Dose Adjustment Calculation and complete treatment under Prescription 2, 3 or 4 as appropriate.
Field Number: 2
Total Dose (Optional-Please Include Units): 5321.60
Energy (Include Units): 70kv
Functional Status: 0 (fully active)
Body Location Override (Optional): Right Inferior Nasal Cheek
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

## 2021-09-29 NOTE — TELEPHONE ENCOUNTER
ANTICOAGULATION MANAGEMENT      Aniket Macias due for annual renewal of referral to anticoagulation monitoring. Order pended for your review and signature.      ANTICOAGULATION SUMMARY      Warfarin indication(s)     Atrial fibrillation    Heart valve present?  NO       Current goal range   INR: 2.0-3.0     Goal appropriate for indication? Yes, INR 2-3 appropriate for hx of DVT, PE, hypercoagulable state, Afib, LVAD, or bileaflet AVR without risk factors     Current duration of therapy Indefinite/long term therapy   Time in Therapeutic Range (TTR)  (Goal > 60%) 89.3%       Office visit with referring provider's group within last year yes on 8/17/21       Radha Seymour RN

## 2021-09-30 ENCOUNTER — APPOINTMENT (OUTPATIENT)
Dept: URBAN - METROPOLITAN AREA CLINIC 255 | Age: 81
Setting detail: DERMATOLOGY
End: 2021-10-09

## 2021-09-30 PROBLEM — C44.319 BASAL CELL CARCINOMA OF SKIN OF OTHER PARTS OF FACE: Status: ACTIVE | Noted: 2021-09-30

## 2021-09-30 PROCEDURE — G6001 ECHO GUIDANCE RADIOTHERAPY: HCPCS

## 2021-09-30 PROCEDURE — 77427 RADIATION TX MANAGEMENT X5: CPT

## 2021-09-30 PROCEDURE — 77280 THER RAD SIMULAJ FIELD SMPL: CPT

## 2021-09-30 PROCEDURE — OTHER SUPERFICIAL RADIATION TREATMENT: OTHER

## 2021-09-30 PROCEDURE — OTHER FOLLOW UP FOR NEXT VISIT: OTHER

## 2021-09-30 PROCEDURE — OTHER TREATMENT REGIMEN: OTHER

## 2021-09-30 PROCEDURE — 77401 RADIATION TX DELIVERY SUPFC: CPT

## 2021-09-30 NOTE — PROCEDURE: TREATMENT REGIMEN

## 2021-09-30 NOTE — PROCEDURE: TREATMENT REGIMEN

## 2021-09-30 NOTE — PROCEDURE: TREATMENT REGIMEN

## 2021-09-30 NOTE — PROCEDURE: SUPERFICIAL RADIATION TREATMENT
Total Number Of Fractions Rx 2: 15
Field Number: 3
Simple Simulation Afterword Text Will Be Included With Simple Simulations (Indications............): The patient had a complete consultation regarding all applicable modalities for the treatment of their skin cancer and based on a variety of factors including the type of tumor, size, and location, the relevant medical history as well as local tissue factors, the functional status of the individual, the ability to perform necessary postoperative wound instructions and the need for simultaneous treatments as well as overall wound healing status, it was determined that the patient would begin radiation therapy treatment for skin cancer.  A full simulation and treatment device design was performed including the determination and formulation of appropriate simple and complex devices including lead shield of 0.762 mm thickness to form molded customized shielding to specifically correlate with the lesion size including treatment margin.  The custom lead shield is adequate to accommodate the appropriate applicator and provide adequate shielding around the treatment site.  The specific field applicator, shields, and devices both simple and complex as well as the specific patient setup is outlined below.  The patient was given a full consent for superficial radiation to both verbally and in writing and the full determination of patient's eligibility for treatment and selection is outlined on the patient eligibility and treatment selection form.  The specific superficial radiotherapy prescription was determined and was documented on the superficial radiotherapy prescription form.  A treatment calculation was also performed and documented on the treatment calculation form.  Based on the prescription, the patient was scheduled for a series of fractional treatments.
Fractions / Week: 4
Detail Level: Zone
Dose Per Fractionation In Cgy (Optional): 261.58
Validate Note Data (See Information Below): Yes
Treatment Time In Min (Optional): 0.41
Bill For Dosimetry/Render Decay And Dose Adjustment Calculation In Note: No
Fractions / Week Rx 2: 5
Assessment: Appropriate reaction
Number Of Treatment Days: 1
Comments: RTOG 1
Treatment Device Design After Initial Simulation Justification (Will Render If Bill For Treatment Devices = Yes): The patient is status post radiation simulation and is evaluated as to the use of additional devices for shielding and placement for radiation therapy.
Custom Shielding Preamble Text Will Not Be Included With Simple Simulations (.......... X X Y Cm): A lead shield of 0.762 mm thickness is utilized to form a molded, custom shield with a
Fractionation Number (Evaluation): 10
Treatment Time / Fractionation (Optional- Include Units): 0.41 Min
Field Size (Applicator): 2.0 cm
Dimensions-X Axis In Cm: 0.5
Time Dose Fractionation (Optional- Include Units If Applicable): 95
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
Intro Statement (Will Not Render If Left Blank): The patient is undergoing superficial radiation therapy for skin cancer and presents for weekly evaluation and management.  Per protocol and as documented on the flow sheet, the patient was questioned as to subjective redness, pruritus, pain, drainage, fatigue, or any other symptoms.  Objectively, the radiation area was evaluated with regards to erythema, atrophy, scale, crusting, erosion, ulceration, edema, purpura, tenderness, warmth, drainage, and any other findings.  The plan was extensively reviewed including the dose, and dosing schedule.  The simulation and clinical setup was also reviewed as was the external and any internal shields and based on this review the appropriateness and sufficiency of treatment was determined.
Additional Prescription Justification Text: If there is any interruption in treatment exceeding 5 days please see Decay and Dose Adjustment Calculation and complete treatment under Prescription 2, 3 or 4 as appropriate.
Custom Shielding Afterword Text Will Not Be Included With Simple Simulations (X X Y Cm............): port to correlate with the lesion size, including treatment margin. The custom lead shield is adequate to accommodate the appropriate applicator and provide adequate shielding around the treatment site. Additional shielding (as noted below) is used to protect sensitive, normal tissues.
Patient Positioning: Sitting
Shielding Size (Optional- Include Units): 1.5cm X 1.5cm
Total Number Of Fractions: 20
Please Choose The Type Of Visit (Required): Treatment and Weekly Evaluation Visit: Show Treatment/Evaluation Variables
Cumulative Dose In Cgy (Optional): 2615.80
Port Dimensions-Y Axis In Cm: 1.5
Information: Selecting Yes will display possible errors in your note based on the variables you have selected. This validation is only offered as a suggestion for you. PLEASE NOTE THAT THE VALIDATION TEXT WILL BE REMOVED WHEN YOU FINALIZE YOUR NOTE. IF YOU WANT TO FAX A PRELIMINARY NOTE YOU WILL NEED TO TOGGLE THIS TO 'NO' IF YOU DO NOT WANT IT IN YOUR FAXED NOTE.
Energy (Optional-Please Include Units): 70kv
Total Dose (Optional-Please Include Units): 5231.60 cGy
Energy (Include Units): 70kv
Energy (Optional-Please Include Units): 70kv
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
Body Location Override (Optional): Left Medial Cheek
Day Of The Week Treatment Administered: Thursday
Simple Simulation Preamble Text Will Be Included With Simple Simulations (.......... Indications): Simple simulation was performed today for the following reasons:
Energy (Optional-Please Include Units): 70Kv
Functional Status: 1 (ambulatory, light activity)
Energy (Optional-Please Include Units): 70 Kv
Daily Fractionated Dose (Optional- Include Units): 261.58 cGy
Field Number: 2
Body Location Override (Optional): Right Glabella
Energy (Include Units): 70kv
Total Dose (Optional-Please Include Units): 5321.60
Energy (Include Units): 70kv
Body Location Override (Optional): Right Inferior Nasal Cheek
Pathology Override (Pathology Will Render As Diagnosis Name If Left Blank): Primary Nodular Basal Cell Carcinoma distributed on the Left Medial Malar Cheek.
Functional Status: 0 (fully active)

## 2021-10-01 ENCOUNTER — APPOINTMENT (OUTPATIENT)
Dept: URBAN - METROPOLITAN AREA CLINIC 255 | Age: 81
Setting detail: DERMATOLOGY
End: 2021-10-09

## 2021-10-01 PROBLEM — C44.319 BASAL CELL CARCINOMA OF SKIN OF OTHER PARTS OF FACE: Status: ACTIVE | Noted: 2021-10-01

## 2021-10-01 PROCEDURE — 77280 THER RAD SIMULAJ FIELD SMPL: CPT

## 2021-10-01 PROCEDURE — 77401 RADIATION TX DELIVERY SUPFC: CPT

## 2021-10-01 PROCEDURE — OTHER SUPERFICIAL RADIATION TREATMENT: OTHER

## 2021-10-01 PROCEDURE — OTHER TREATMENT REGIMEN: OTHER

## 2021-10-01 PROCEDURE — G6001 ECHO GUIDANCE RADIOTHERAPY: HCPCS

## 2021-10-01 PROCEDURE — OTHER FOLLOW UP FOR NEXT VISIT: OTHER

## 2021-10-01 NOTE — PROCEDURE: TREATMENT REGIMEN
Plan: This patient has been treated today with image guided superficial radiation therapy for non-melanoma skin cancer. Written informed consent has been previously obtained from this patient for this treatment. This consent is documented in the patient’s chart. The patient gave verbal consent to continue treatment today. The patient was treated with a specific radiation dose and setup as prescribed by the provider listed on this visit note. A Radiation Therapist performed administration of radiation under supervision of provider. The treatment parameters and cumulative dose are indicated above. Prior to administering the radiation, the patient underwent a verification therapeutic radiology simulation-aided field setting defining relevant normal and abnormal target anatomy and acquiring images with high frequency ultrasound in addition to data necessary developing optimal radiation treatment process for the patient. This process includes verification of the treatment port(s) and proper treatment positioning. All treatment ports were photographed within electronic medical record. The patient’s customized lead blocking along with gross tumor volume and margin was confirmed. Considering superficial radiotherapy is clinical in setup, this requires physician and radiation therapist to clarify location interest being treated against initial images, pathology and patient anatomy. Care was taken ensuring fields treated were geometrically accurate and properly positioned using therapeutic radiology simulation-aided field setting verification per fraction. This process is also utilized to determine if any prescription or setup changes are necessary. These steps are therefore medically necessary ensuring safe and effective administration of radiation. Ongoing therapeutic radiology simulation-aided field setting verification is ordered throughout course of therapy.\\n\\nA high frequency ultrasound image was acquired today for two-dimensional evaluation of the tumor volume and response to treatment, in addition to geometric accuracy of field placement. US depth Is 0.57 mm, which is  0.14 mm in difference from previous imaging. The field placement and ultrasound imaging, per fraction, is separate and distinct from the initial simulation, and is an important task in providing safe administration of superficial radiation therapy. Physician evaluation of the ultrasound tumor depth will be ongoing throughout the course of treatment, and is deemed medically necessary in order to ensure the efficacy of treatment and any necessary changes. Today’s image was evaluated for determination of continuation of treatment with the current plan or with necessary changes as appropriate. According to provider review of verification therapeutic radiology simulation-aided field setting and imaging, no change is required (if change required, please document specifics and changes. If further services rendered for changes, document and submit appropriate billable CPT® codes). \\n\\nAdditionally, the use of ultrasound visualization and targeted assessment allows the patient to be able to see their cancer(s) progress, encouraging patient to complete and maintain compliance through full course of radiotherapy. Per Dr. London, continued ultrasound guidance and therapeutic radiology simulation-aided field setting verification per fraction is required for field placement, measurement of tumor depth, progress and acute effect monitoring.\\n\\n
Plan: This patient has been treated today with image guided superficial radiation therapy for non-melanoma skin cancer. Written informed consent has been previously obtained from this patient for this treatment. This consent is documented in the patient’s chart. The patient gave verbal consent to continue treatment today. The patient was treated with a specific radiation dose and setup as prescribed by the provider listed on this visit note. A Radiation Therapist performed administration of radiation under supervision of provider. The treatment parameters and cumulative dose are indicated above. Prior to administering the radiation, the patient underwent a verification therapeutic radiology simulation-aided field setting defining relevant normal and abnormal target anatomy and acquiring images with high frequency ultrasound in addition to data necessary developing optimal radiation treatment process for the patient. This process includes verification of the treatment port(s) and proper treatment positioning. All treatment ports were photographed within electronic medical record. The patient’s customized lead blocking along with gross tumor volume and margin was confirmed. Considering superficial radiotherapy is clinical in setup, this requires physician and radiation therapist to clarify location interest being treated against initial images, pathology and patient anatomy. Care was taken ensuring fields treated were geometrically accurate and properly positioned using therapeutic radiology simulation-aided field setting verification per fraction. This process is also utilized to determine if any prescription or setup changes are necessary. These steps are therefore medically necessary ensuring safe and effective administration of radiation. Ongoing therapeutic radiology simulation-aided field setting verification is ordered throughout course of therapy.\\n\\nA high frequency ultrasound image was acquired today for two-dimensional evaluation of the tumor volume and response to treatment, in addition to geometric accuracy of field placement. US depth Is 1.21 mm, which is 0.07 mm in difference from previous imaging. The field placement and ultrasound imaging, per fraction, is separate and distinct from the initial simulation, and is an important task in providing safe administration of superficial radiation therapy. Physician evaluation of the ultrasound tumor depth will be ongoing throughout the course of treatment, and is deemed medically necessary in order to ensure the efficacy of treatment and any necessary changes. Today’s image was evaluated for determination of continuation of treatment with the current plan or with necessary changes as appropriate. According to provider review of verification therapeutic radiology simulation-aided field setting and imaging, no change is required (if change required, please document specifics and changes. If further services rendered for changes, document and submit appropriate billable CPT® codes). \\n\\nAdditionally, the use of ultrasound visualization and targeted assessment allows the patient to be able to see their cancer(s) progress, encouraging patient to complete and maintain compliance through full course of radiotherapy. Per Dr. London, continued ultrasound guidance and therapeutic radiology simulation-aided field setting verification per fraction is required for field placement, measurement of tumor depth, progress and acute effect monitoring. \\n\\n
Detail Level: Zone
Plan: This patient has been treated today with image guided superficial radiation therapy for non-melanoma skin cancer. Written informed consent has been previously obtained from this patient for this treatment. This consent is documented in the patient’s chart. The patient gave verbal consent to continue treatment today. The patient was treated with a specific radiation dose and setup as prescribed by the provider listed on this visit note. A Radiation Therapist performed administration of radiation under supervision of provider. The treatment parameters and cumulative dose are indicated above. Prior to administering the radiation, the patient underwent a verification therapeutic radiology simulation-aided field setting defining relevant normal and abnormal target anatomy and acquiring images with high frequency ultrasound in addition to data necessary developing optimal radiation treatment process for the patient. This process includes verification of the treatment port(s) and proper treatment positioning. All treatment ports were photographed within electronic medical record. The patient’s customized lead blocking along with gross tumor volume and margin was confirmed. Considering superficial radiotherapy is clinical in setup, this requires physician and radiation therapist to clarify location interest being treated against initial images, pathology and patient anatomy. Care was taken ensuring fields treated were geometrically accurate and properly positioned using therapeutic radiology simulation-aided field setting verification per fraction. This process is also utilized to determine if any prescription or setup changes are necessary. These steps are therefore medically necessary ensuring safe and effective administration of radiation. Ongoing therapeutic radiology simulation-aided field setting verification is ordered throughout course of therapy.\\n\\nA high frequency ultrasound image was acquired today for two-dimensional evaluation of the tumor volume and response to treatment, in addition to geometric accuracy of field placement. US depth Is 0.70 mm, which is 0.01 mm in difference from previous imaging. The field placement and ultrasound imaging, per fraction, is separate and distinct from the initial simulation, and is an important task in providing safe administration of superficial radiation therapy. Physician evaluation of the ultrasound tumor depth will be ongoing throughout the course of treatment, and is deemed medically necessary in order to ensure the efficacy of treatment and any necessary changes. Today’s image was evaluated for determination of continuation of treatment with the current plan or with necessary changes as appropriate. According to provider review of verification therapeutic radiology simulation-aided field setting and imaging, no change is required (if change required, please document specifics and changes. If further services rendered for changes, document and submit appropriate billable CPT® codes). \\n\\nAdditionally, the use of ultrasound visualization and targeted assessment allows the patient to be able to see their cancer(s) progress, encouraging patient to complete and maintain compliance through full course of radiotherapy. Per Dr. London, continued ultrasound guidance and therapeutic radiology simulation-aided field setting verification per fraction is required for field placement, measurement of tumor depth, progress and acute effect monitoring. \\n\\n

## 2021-10-01 NOTE — PROCEDURE: SUPERFICIAL RADIATION TREATMENT
Energy (Include Units): 70kv
Fractions / Week Rx 3: 5
Simple Simulation Afterword Text Will Be Included With Simple Simulations (Indications............): The patient had a complete consultation regarding all applicable modalities for the treatment of their skin cancer and based on a variety of factors including the type of tumor, size, and location, the relevant medical history as well as local tissue factors, the functional status of the individual, the ability to perform necessary postoperative wound instructions and the need for simultaneous treatments as well as overall wound healing status, it was determined that the patient would begin radiation therapy treatment for skin cancer.  A full simulation and treatment device design was performed including the determination and formulation of appropriate simple and complex devices including lead shield of 0.762 mm thickness to form molded customized shielding to specifically correlate with the lesion size including treatment margin.  The custom lead shield is adequate to accommodate the appropriate applicator and provide adequate shielding around the treatment site.  The specific field applicator, shields, and devices both simple and complex as well as the specific patient setup is outlined below.  The patient was given a full consent for superficial radiation to both verbally and in writing and the full determination of patient's eligibility for treatment and selection is outlined on the patient eligibility and treatment selection form.  The specific superficial radiotherapy prescription was determined and was documented on the superficial radiotherapy prescription form.  A treatment calculation was also performed and documented on the treatment calculation form.  Based on the prescription, the patient was scheduled for a series of fractional treatments.
Prescription Used: 1
Field Size (Applicator): 2.0 cm
Dimensions-X Axis In Cm: 0.5
Total Number Of Fractions Rx 2: 15
Treatment Time In Min (Optional): 0.41
Render Patient Eligibility And Selection In Note?: No
Please Choose The Type Of Visit (Required): Treatment Visit: Show Treatment Variables
Sebastian For Simulation Without Treatment Device Design (Simple Simulation): Yes
Custom Shielding Afterword Text Will Not Be Included With Simple Simulations (X X Y Cm............): port to correlate with the lesion size, including treatment margin. The custom lead shield is adequate to accommodate the appropriate applicator and provide adequate shielding around the treatment site. Additional shielding (as noted below) is used to protect sensitive, normal tissues.
Energy (Optional-Please Include Units): 70Kv
Comments: RTOG 1
Treatment Device Design After Initial Simulation Justification (Will Render If Bill For Treatment Devices = Yes): The patient is status post radiation simulation and is evaluated as to the use of additional devices for shielding and placement for radiation therapy.
Shielding Size (Optional- Include Units): 1.5cm x 1.5cm
Total Number Of Fractions: 20
Patient Positioning: Sitting
Dose / Tx In Cgy (Optional): 261.58
Fractions / Week: 4
Fractionation Number (Evaluation): 10
Body Location Override (Optional): Right Inferior Nasal Cheek
Assessment: Appropriate reaction
Body Location Override (Optional): Left Medial Cheek
Simple Simulation Preamble Text Will Be Included With Simple Simulations (.......... Indications): Simple simulation was performed today for the following reasons:
Functional Status: 0 (fully active)
Information: Selecting Yes will display possible errors in your note based on the variables you have selected. This validation is only offered as a suggestion for you. PLEASE NOTE THAT THE VALIDATION TEXT WILL BE REMOVED WHEN YOU FINALIZE YOUR NOTE. IF YOU WANT TO FAX A PRELIMINARY NOTE YOU WILL NEED TO TOGGLE THIS TO 'NO' IF YOU DO NOT WANT IT IN YOUR FAXED NOTE.
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
Port Dimensions-X Axis In Cm: 1.5
Time Dose Fractionation (Optional- Include Units If Applicable): 95
Field Number: 3
Energy (Include Units): 70kv
Total Dose (Optional-Please Include Units): 5321.60
Cumulative Dose In Cgy (Optional): 2877.38
Detail Level: Zone
Day Of The Week Treatment Administered: Friday
Daily Fractionated Dose (Optional- Include Units): 261.58 cGy
Fractionation Number: 11
Energy (Optional-Please Include Units): 70kv
Total Dose (Optional-Please Include Units): 5231.60 cGy
Custom Shielding Preamble Text Will Not Be Included With Simple Simulations (.......... X X Y Cm): A lead shield of 0.762 mm thickness is utilized to form a molded, custom shield with a
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
Energy (Optional-Please Include Units): 70 Kv
Functional Status: 1 (ambulatory, light activity)
Intro Statement (Will Not Render If Left Blank): The patient is undergoing superficial radiation therapy for skin cancer and presents for weekly evaluation and management.  Per protocol and as documented on the flow sheet, the patient was questioned as to subjective redness, pruritus, pain, drainage, fatigue, or any other symptoms.  Objectively, the radiation area was evaluated with regards to erythema, atrophy, scale, crusting, erosion, ulceration, edema, purpura, tenderness, warmth, drainage, and any other findings.  The plan was extensively reviewed including the dose, and dosing schedule.  The simulation and clinical setup was also reviewed as was the external and any internal shields and based on this review the appropriateness and sufficiency of treatment was determined.
Additional Prescription Justification Text: If there is any interruption in treatment exceeding 5 days please see Decay and Dose Adjustment Calculation and complete treatment under Prescription 2, 3 or 4 as appropriate.
Treatment Time / Fractionation (Optional- Include Units): 0.41 Min
Energy (Optional-Please Include Units): 70kv
Pathology Override (Pathology Will Render As Diagnosis Name If Left Blank): Primary Nodular Basal Cell Carcinoma distributed on the Left Medial Malar Cheek.
Body Location Override (Optional): Right Glabella
Field Number: 2
Energy (Include Units): 70kv

## 2021-10-05 ENCOUNTER — APPOINTMENT (OUTPATIENT)
Dept: URBAN - METROPOLITAN AREA CLINIC 255 | Age: 81
Setting detail: DERMATOLOGY
End: 2021-10-09

## 2021-10-05 PROBLEM — C44.319 BASAL CELL CARCINOMA OF SKIN OF OTHER PARTS OF FACE: Status: ACTIVE | Noted: 2021-10-05

## 2021-10-05 PROCEDURE — G6001 ECHO GUIDANCE RADIOTHERAPY: HCPCS

## 2021-10-05 PROCEDURE — OTHER TREATMENT REGIMEN: OTHER

## 2021-10-05 PROCEDURE — OTHER FOLLOW UP FOR NEXT VISIT: OTHER

## 2021-10-05 PROCEDURE — 77280 THER RAD SIMULAJ FIELD SMPL: CPT

## 2021-10-05 PROCEDURE — OTHER SUPERFICIAL RADIATION TREATMENT: OTHER

## 2021-10-05 PROCEDURE — 77401 RADIATION TX DELIVERY SUPFC: CPT

## 2021-10-05 NOTE — PROCEDURE: TREATMENT REGIMEN
Plan: This patient has been treated today with image guided superficial radiation therapy for non-melanoma skin cancer. Written informed consent has been previously obtained from this patient for this treatment. This consent is documented in the patient’s chart. The patient gave verbal consent to continue treatment today. The patient was treated with a specific radiation dose and setup as prescribed by the provider listed on this visit note. A Radiation Therapist performed administration of radiation under supervision of provider. The treatment parameters and cumulative dose are indicated above. Prior to administering the radiation, the patient underwent a verification therapeutic radiology simulation-aided field setting defining relevant normal and abnormal target anatomy and acquiring images with high frequency ultrasound in addition to data necessary developing optimal radiation treatment process for the patient. This process includes verification of the treatment port(s) and proper treatment positioning. All treatment ports were photographed within electronic medical record. The patient’s customized lead blocking along with gross tumor volume and margin was confirmed. Considering superficial radiotherapy is clinical in setup, this requires physician and radiation therapist to clarify location interest being treated against initial images, pathology and patient anatomy. Care was taken ensuring fields treated were geometrically accurate and properly positioned using therapeutic radiology simulation-aided field setting verification per fraction. This process is also utilized to determine if any prescription or setup changes are necessary. These steps are therefore medically necessary ensuring safe and effective administration of radiation. Ongoing therapeutic radiology simulation-aided field setting verification is ordered throughout course of therapy.\\n\\nA high frequency ultrasound image was acquired today for two-dimensional evaluation of the tumor volume and response to treatment, in addition to geometric accuracy of field placement. US depth Is 1.17 mm, which is 0.04 mm in difference from previous imaging. The field placement and ultrasound imaging, per fraction, is separate and distinct from the initial simulation, and is an important task in providing safe administration of superficial radiation therapy. Physician evaluation of the ultrasound tumor depth will be ongoing throughout the course of treatment, and is deemed medically necessary in order to ensure the efficacy of treatment and any necessary changes. Today’s image was evaluated for determination of continuation of treatment with the current plan or with necessary changes as appropriate. According to provider review of verification therapeutic radiology simulation-aided field setting and imaging, no change is required (if change required, please document specifics and changes. If further services rendered for changes, document and submit appropriate billable CPT® codes). \\n\\nAdditionally, the use of ultrasound visualization and targeted assessment allows the patient to be able to see their cancer(s) progress, encouraging patient to complete and maintain compliance through full course of radiotherapy. Per Dr. London, continued ultrasound guidance and therapeutic radiology simulation-aided field setting verification per fraction is required for field placement, measurement of tumor depth, progress and acute effect monitoring. \\n\\n
Plan: This patient has been treated today with image guided superficial radiation therapy for non-melanoma skin cancer. Written informed consent has been previously obtained from this patient for this treatment. This consent is documented in the patient’s chart. The patient gave verbal consent to continue treatment today. The patient was treated with a specific radiation dose and setup as prescribed by the provider listed on this visit note. A Radiation Therapist performed administration of radiation under supervision of provider. The treatment parameters and cumulative dose are indicated above. Prior to administering the radiation, the patient underwent a verification therapeutic radiology simulation-aided field setting defining relevant normal and abnormal target anatomy and acquiring images with high frequency ultrasound in addition to data necessary developing optimal radiation treatment process for the patient. This process includes verification of the treatment port(s) and proper treatment positioning. All treatment ports were photographed within electronic medical record. The patient’s customized lead blocking along with gross tumor volume and margin was confirmed. Considering superficial radiotherapy is clinical in setup, this requires physician and radiation therapist to clarify location interest being treated against initial images, pathology and patient anatomy. Care was taken ensuring fields treated were geometrically accurate and properly positioned using therapeutic radiology simulation-aided field setting verification per fraction. This process is also utilized to determine if any prescription or setup changes are necessary. These steps are therefore medically necessary ensuring safe and effective administration of radiation. Ongoing therapeutic radiology simulation-aided field setting verification is ordered throughout course of therapy.\\n\\nA high frequency ultrasound image was acquired today for two-dimensional evaluation of the tumor volume and response to treatment, in addition to geometric accuracy of field placement. US depth Is 0.89 mm, which is 0.19 mm in difference from previous imaging. The field placement and ultrasound imaging, per fraction, is separate and distinct from the initial simulation, and is an important task in providing safe administration of superficial radiation therapy. Physician evaluation of the ultrasound tumor depth will be ongoing throughout the course of treatment, and is deemed medically necessary in order to ensure the efficacy of treatment and any necessary changes. Today’s image was evaluated for determination of continuation of treatment with the current plan or with necessary changes as appropriate. According to provider review of verification therapeutic radiology simulation-aided field setting and imaging, no change is required (if change required, please document specifics and changes. If further services rendered for changes, document and submit appropriate billable CPT® codes). \\n\\nAdditionally, the use of ultrasound visualization and targeted assessment allows the patient to be able to see their cancer(s) progress, encouraging patient to complete and maintain compliance through full course of radiotherapy. Per Dr. London, continued ultrasound guidance and therapeutic radiology simulation-aided field setting verification per fraction is required for field placement, measurement of tumor depth, progress and acute effect monitoring. \\n\\n
Detail Level: Zone
Plan: This patient has been treated today with image guided superficial radiation therapy for non-melanoma skin cancer. Written informed consent has been previously obtained from this patient for this treatment. This consent is documented in the patient’s chart. The patient gave verbal consent to continue treatment today. The patient was treated with a specific radiation dose and setup as prescribed by the provider listed on this visit note. A Radiation Therapist performed administration of radiation under supervision of provider. The treatment parameters and cumulative dose are indicated above. Prior to administering the radiation, the patient underwent a verification therapeutic radiology simulation-aided field setting defining relevant normal and abnormal target anatomy and acquiring images with high frequency ultrasound in addition to data necessary developing optimal radiation treatment process for the patient. This process includes verification of the treatment port(s) and proper treatment positioning. All treatment ports were photographed within electronic medical record. The patient’s customized lead blocking along with gross tumor volume and margin was confirmed. Considering superficial radiotherapy is clinical in setup, this requires physician and radiation therapist to clarify location interest being treated against initial images, pathology and patient anatomy. Care was taken ensuring fields treated were geometrically accurate and properly positioned using therapeutic radiology simulation-aided field setting verification per fraction. This process is also utilized to determine if any prescription or setup changes are necessary. These steps are therefore medically necessary ensuring safe and effective administration of radiation. Ongoing therapeutic radiology simulation-aided field setting verification is ordered throughout course of therapy.\\n\\nA high frequency ultrasound image was acquired today for two-dimensional evaluation of the tumor volume and response to treatment, in addition to geometric accuracy of field placement. US depth Is 0.83 mm, which is  0.26 mm in difference from previous imaging. The field placement and ultrasound imaging, per fraction, is separate and distinct from the initial simulation, and is an important task in providing safe administration of superficial radiation therapy. Physician evaluation of the ultrasound tumor depth will be ongoing throughout the course of treatment, and is deemed medically necessary in order to ensure the efficacy of treatment and any necessary changes. Today’s image was evaluated for determination of continuation of treatment with the current plan or with necessary changes as appropriate. According to provider review of verification therapeutic radiology simulation-aided field setting and imaging, no change is required (if change required, please document specifics and changes. If further services rendered for changes, document and submit appropriate billable CPT® codes). \\n\\nAdditionally, the use of ultrasound visualization and targeted assessment allows the patient to be able to see their cancer(s) progress, encouraging patient to complete and maintain compliance through full course of radiotherapy. Per Dr. London, continued ultrasound guidance and therapeutic radiology simulation-aided field setting verification per fraction is required for field placement, measurement of tumor depth, progress and acute effect monitoring.\\n\\n

## 2021-10-05 NOTE — PROCEDURE: SUPERFICIAL RADIATION TREATMENT
Total Number Of Fractions Rx 4: 15
Bill For Simulation And Treatment Device Design: Yes
Bill For Dosimetry/Render Decay And Dose Adjustment Calculation In Note: No
Day Of The Week Treatment Administered: Tuesday
Simple Simulation Preamble Text Will Be Included With Simple Simulations (.......... Indications): Simple simulation was performed today for the following reasons:
Port Dimensions-Y Axis In Cm: 1.5
Daily Fractionated Dose (Optional- Include Units): 261.58 cGy
Fractionation Number: 12
Field Number: 1
Energy (Optional-Please Include Units): 70kv
Total Dose (Optional-Please Include Units): 5231.60 cGy
Dimensions-X Axis In Cm: 0.5
Field Size (Applicator): 2.0 cm
Dose Per Fractionation In Cgy (Optional): 261.58
Custom Shielding Preamble Text Will Not Be Included With Simple Simulations (.......... X X Y Cm): A lead shield of 0.762 mm thickness is utilized to form a molded, custom shield with a
Shielding Size (Optional- Include Units): 1.5cm X 1.5cm
Total Number Of Fractions: 20
Fractions / Week Rx 3: 5
Simple Simulation Afterword Text Will Be Included With Simple Simulations (Indications............): The patient had a complete consultation regarding all applicable modalities for the treatment of their skin cancer and based on a variety of factors including the type of tumor, size, and location, the relevant medical history as well as local tissue factors, the functional status of the individual, the ability to perform necessary postoperative wound instructions and the need for simultaneous treatments as well as overall wound healing status, it was determined that the patient would begin radiation therapy treatment for skin cancer.  A full simulation and treatment device design was performed including the determination and formulation of appropriate simple and complex devices including lead shield of 0.762 mm thickness to form molded customized shielding to specifically correlate with the lesion size including treatment margin.  The custom lead shield is adequate to accommodate the appropriate applicator and provide adequate shielding around the treatment site.  The specific field applicator, shields, and devices both simple and complex as well as the specific patient setup is outlined below.  The patient was given a full consent for superficial radiation to both verbally and in writing and the full determination of patient's eligibility for treatment and selection is outlined on the patient eligibility and treatment selection form.  The specific superficial radiotherapy prescription was determined and was documented on the superficial radiotherapy prescription form.  A treatment calculation was also performed and documented on the treatment calculation form.  Based on the prescription, the patient was scheduled for a series of fractional treatments.
Treatment Time In Min (Optional): 0.41
Please Choose The Type Of Visit (Required): Treatment Visit: Show Treatment Variables
Custom Shielding Afterword Text Will Not Be Included With Simple Simulations (X X Y Cm............): port to correlate with the lesion size, including treatment margin. The custom lead shield is adequate to accommodate the appropriate applicator and provide adequate shielding around the treatment site. Additional shielding (as noted below) is used to protect sensitive, normal tissues.
Energy (Optional-Please Include Units): 70Kv
Total Dose (Optional-Please Include Units): 5321.60
Cumulative Dose In Cgy (Optional): 3138.96
Detail Level: Zone
Treatment Time / Fractionation (Optional- Include Units): 0.41 Min
Cumulative Dose In Cgy (Optional): 3138.96
Fractions / Week: 4
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
Comments: RTOG 1
Assessment: Appropriate reaction
Body Location Override (Optional): Left Medial Cheek
Patient Positioning: Sitting
Functional Status: 0 (fully active)
Fractionation Number (Evaluation): 10
Information: Selecting Yes will display possible errors in your note based on the variables you have selected. This validation is only offered as a suggestion for you. PLEASE NOTE THAT THE VALIDATION TEXT WILL BE REMOVED WHEN YOU FINALIZE YOUR NOTE. IF YOU WANT TO FAX A PRELIMINARY NOTE YOU WILL NEED TO TOGGLE THIS TO 'NO' IF YOU DO NOT WANT IT IN YOUR FAXED NOTE.
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
Time Dose Fractionation (Optional- Include Units If Applicable): 95
Field Number: 3
Energy (Include Units): 70kv
Intro Statement (Will Not Render If Left Blank): The patient is undergoing superficial radiation therapy for skin cancer and presents for weekly evaluation and management.  Per protocol and as documented on the flow sheet, the patient was questioned as to subjective redness, pruritus, pain, drainage, fatigue, or any other symptoms.  Objectively, the radiation area was evaluated with regards to erythema, atrophy, scale, crusting, erosion, ulceration, edema, purpura, tenderness, warmth, drainage, and any other findings.  The plan was extensively reviewed including the dose, and dosing schedule.  The simulation and clinical setup was also reviewed as was the external and any internal shields and based on this review the appropriateness and sufficiency of treatment was determined.
Additional Prescription Justification Text: If there is any interruption in treatment exceeding 5 days please see Decay and Dose Adjustment Calculation and complete treatment under Prescription 2, 3 or 4 as appropriate.
Field Number: 2
Energy (Include Units): 70kv
Energy (Optional-Please Include Units): 70kv
Treatment Device Design After Initial Simulation Justification (Will Render If Bill For Treatment Devices = Yes): The patient is status post radiation simulation and is evaluated as to the use of additional devices for shielding and placement for radiation therapy.
Energy (Optional-Please Include Units): 70 Kv
Functional Status: 1 (ambulatory, light activity)
Body Location Override (Optional): Right Inferior Nasal Cheek
Energy (Include Units): 70kv
Cumulative Dose In Cgy (Optional): 3138.96
Pathology Override (Pathology Will Render As Diagnosis Name If Left Blank): Primary Nodular Basal Cell Carcinoma distributed on the Left Medial Malar Cheek.
Body Location Override (Optional): Right Glabella

## 2021-10-06 ENCOUNTER — APPOINTMENT (OUTPATIENT)
Dept: URBAN - METROPOLITAN AREA CLINIC 255 | Age: 81
Setting detail: DERMATOLOGY
End: 2021-10-09

## 2021-10-06 PROBLEM — C44.319 BASAL CELL CARCINOMA OF SKIN OF OTHER PARTS OF FACE: Status: ACTIVE | Noted: 2021-10-06

## 2021-10-06 PROCEDURE — OTHER TREATMENT REGIMEN: OTHER

## 2021-10-06 PROCEDURE — 77280 THER RAD SIMULAJ FIELD SMPL: CPT

## 2021-10-06 PROCEDURE — 77401 RADIATION TX DELIVERY SUPFC: CPT

## 2021-10-06 PROCEDURE — OTHER SUPERFICIAL RADIATION TREATMENT: OTHER

## 2021-10-06 PROCEDURE — G6001 ECHO GUIDANCE RADIOTHERAPY: HCPCS

## 2021-10-06 PROCEDURE — OTHER FOLLOW UP FOR NEXT VISIT: OTHER

## 2021-10-06 NOTE — PROCEDURE: SUPERFICIAL RADIATION TREATMENT
Dose Per Fractionation In Cgy (Optional): 261.58
Total Number Of Fractions Rx 2: 15
Treatment Time / Fractionation (Optional- Include Units): 0.41 Min
Render Additional Prescriptions In Note?: No
Treatment Time In Min (Optional): 0.41
Initial Radiation Treatment Planning (Will Render If Bill Simulation = Yes): The patient had a complete consultation regarding all applicable modalities for the treatment of their skin cancer and based on a variety of factors including the type of tumor, size, and location, the relevant medical history as well as local tissue factors, the functional status of the individual, the ability to perform necessary postoperative wound instructions and the need for simultaneous treatments as well as overall wound healing status, it was determined that the patient would begin radiation therapy treatment for skin cancer.  A full simulation and treatment device design was performed including the determination and formulation of appropriate simple and complex devices including lead shield of 0.762 mm thickness to form molded customized shielding to specifically correlate with the lesion size including treatment margin.  The custom lead shield is adequate to accommodate the appropriate applicator and provide adequate shielding around the treatment site.  The specific field applicator, shields, and devices both simple and complex as well as the specific patient setup is outlined below.  The patient was given a full consent for superficial radiation to both verbally and in writing and the full determination of patient's eligibility for treatment and selection is outlined on the patient eligibility and treatment selection form.  The specific superficial radiotherapy prescription was determined and was documented on the superficial radiotherapy prescription form.  A treatment calculation was also performed and documented on the treatment calculation form.  Based on the prescription, the patient was scheduled for a series of fractional treatments.
Bill For Simulation And Treatment Device Design: Yes
Cumulative Dose In Cgy (Optional): 3400.54
Field Size (Applicator): 2.0 cm
Custom Shielding Afterword Text Will Not Be Included With Simple Simulations (X X Y Cm............): port to correlate with the lesion size, including treatment margin. The custom lead shield is adequate to accommodate the appropriate applicator and provide adequate shielding around the treatment site. Additional shielding (as noted below) is used to protect sensitive, normal tissues.
Fractionation Number: 13
Fractions / Week: 4
Port Dimensions-Y Axis In Cm: 1.5
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
Shielding Size (Optional- Include Units): 1.5cm X 1.5cm
Total Number Of Fractions: 20
Body Location Override (Optional): Left Medial Cheek
Patient Positioning: Sitting
Simple Simulation Preamble Text Will Be Included With Simple Simulations (.......... Indications): Simple simulation was performed today for the following reasons:
Functional Status: 0 (fully active)
Fractionation Number (Evaluation): 10
Fractions / Week Rx 4: 5
Assessment: Appropriate reaction
Information: Selecting Yes will display possible errors in your note based on the variables you have selected. This validation is only offered as a suggestion for you. PLEASE NOTE THAT THE VALIDATION TEXT WILL BE REMOVED WHEN YOU FINALIZE YOUR NOTE. IF YOU WANT TO FAX A PRELIMINARY NOTE YOU WILL NEED TO TOGGLE THIS TO 'NO' IF YOU DO NOT WANT IT IN YOUR FAXED NOTE.
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
Prescription Used: 1
Time Dose Fractionation (Optional- Include Units If Applicable): 95
Energy (Include Units): 70kv
Intro Statement (Will Not Render If Left Blank): The patient is undergoing superficial radiation therapy for skin cancer and presents for weekly evaluation and management.  Per protocol and as documented on the flow sheet, the patient was questioned as to subjective redness, pruritus, pain, drainage, fatigue, or any other symptoms.  Objectively, the radiation area was evaluated with regards to erythema, atrophy, scale, crusting, erosion, ulceration, edema, purpura, tenderness, warmth, drainage, and any other findings.  The plan was extensively reviewed including the dose, and dosing schedule.  The simulation and clinical setup was also reviewed as was the external and any internal shields and based on this review the appropriateness and sufficiency of treatment was determined.
Dimensions-Y Axis In Cm: 0.5
Field Number: 3
Energy (Optional-Please Include Units): 70Kv
Additional Prescription Justification Text: If there is any interruption in treatment exceeding 5 days please see Decay and Dose Adjustment Calculation and complete treatment under Prescription 2, 3 or 4 as appropriate.
Daily Fractionated Dose (Optional- Include Units): 261.58 cGy
Field Number: 2
Detail Level: Zone
Custom Shielding Preamble Text Will Not Be Included With Simple Simulations (.......... X X Y Cm): A lead shield of 0.762 mm thickness is utilized to form a molded, custom shield with a
Energy (Include Units): 70kv
Energy (Optional-Please Include Units): 70kv
Total Dose (Optional-Please Include Units): 5231.60 cGy
Treatment Device Design After Initial Simulation Justification (Will Render If Bill For Treatment Devices = Yes): The patient is status post radiation simulation and is evaluated as to the use of additional devices for shielding and placement for radiation therapy.
Day Of The Week Treatment Administered: Wednesday
Comments: RTOG 1
Energy (Optional-Please Include Units): 70 Kv
Functional Status: 1 (ambulatory, light activity)
Please Choose The Type Of Visit (Required): Treatment Visit: Show Treatment Variables
Body Location Override (Optional): Right Inferior Nasal Cheek
Energy (Include Units): 70kv
Pathology Override (Pathology Will Render As Diagnosis Name If Left Blank): Primary Nodular Basal Cell Carcinoma distributed on the Left Medial Malar Cheek.
Body Location Override (Optional): Right Glabella
Energy (Optional-Please Include Units): 70kv
Total Dose (Optional-Please Include Units): 5321.60

## 2021-10-06 NOTE — PROCEDURE: TREATMENT REGIMEN
Plan: This patient has been treated today with image guided superficial radiation therapy for non-melanoma skin cancer. Written informed consent has been previously obtained from this patient for this treatment. This consent is documented in the patient’s chart. The patient gave verbal consent to continue treatment today. The patient was treated with a specific radiation dose and setup as prescribed by the provider listed on this visit note. A Radiation Therapist performed administration of radiation under supervision of provider. The treatment parameters and cumulative dose are indicated above. Prior to administering the radiation, the patient underwent a verification therapeutic radiology simulation-aided field setting defining relevant normal and abnormal target anatomy and acquiring images with high frequency ultrasound in addition to data necessary developing optimal radiation treatment process for the patient. This process includes verification of the treatment port(s) and proper treatment positioning. All treatment ports were photographed within electronic medical record. The patient’s customized lead blocking along with gross tumor volume and margin was confirmed. Considering superficial radiotherapy is clinical in setup, this requires physician and radiation therapist to clarify location interest being treated against initial images, pathology and patient anatomy. Care was taken ensuring fields treated were geometrically accurate and properly positioned using therapeutic radiology simulation-aided field setting verification per fraction. This process is also utilized to determine if any prescription or setup changes are necessary. These steps are therefore medically necessary ensuring safe and effective administration of radiation. Ongoing therapeutic radiology simulation-aided field setting verification is ordered throughout course of therapy.\\n\\nA high frequency ultrasound image was acquired today for two-dimensional evaluation of the tumor volume and response to treatment, in addition to geometric accuracy of field placement. US depth Is 0.77 mm, which is 0.12 mm in difference from previous imaging. The field placement and ultrasound imaging, per fraction, is separate and distinct from the initial simulation, and is an important task in providing safe administration of superficial radiation therapy. Physician evaluation of the ultrasound tumor depth will be ongoing throughout the course of treatment, and is deemed medically necessary in order to ensure the efficacy of treatment and any necessary changes. Today’s image was evaluated for determination of continuation of treatment with the current plan or with necessary changes as appropriate. According to provider review of verification therapeutic radiology simulation-aided field setting and imaging, no change is required (if change required, please document specifics and changes. If further services rendered for changes, document and submit appropriate billable CPT® codes). \\n\\nAdditionally, the use of ultrasound visualization and targeted assessment allows the patient to be able to see their cancer(s) progress, encouraging patient to complete and maintain compliance through full course of radiotherapy. Per Dr. London, continued ultrasound guidance and therapeutic radiology simulation-aided field setting verification per fraction is required for field placement, measurement of tumor depth, progress and acute effect monitoring. \\n\\n
Detail Level: Zone
Plan: This patient has been treated today with image guided superficial radiation therapy for non-melanoma skin cancer. Written informed consent has been previously obtained from this patient for this treatment. This consent is documented in the patient’s chart. The patient gave verbal consent to continue treatment today. The patient was treated with a specific radiation dose and setup as prescribed by the provider listed on this visit note. A Radiation Therapist performed administration of radiation under supervision of provider. The treatment parameters and cumulative dose are indicated above. Prior to administering the radiation, the patient underwent a verification therapeutic radiology simulation-aided field setting defining relevant normal and abnormal target anatomy and acquiring images with high frequency ultrasound in addition to data necessary developing optimal radiation treatment process for the patient. This process includes verification of the treatment port(s) and proper treatment positioning. All treatment ports were photographed within electronic medical record. The patient’s customized lead blocking along with gross tumor volume and margin was confirmed. Considering superficial radiotherapy is clinical in setup, this requires physician and radiation therapist to clarify location interest being treated against initial images, pathology and patient anatomy. Care was taken ensuring fields treated were geometrically accurate and properly positioned using therapeutic radiology simulation-aided field setting verification per fraction. This process is also utilized to determine if any prescription or setup changes are necessary. These steps are therefore medically necessary ensuring safe and effective administration of radiation. Ongoing therapeutic radiology simulation-aided field setting verification is ordered throughout course of therapy.\\n\\nA high frequency ultrasound image was acquired today for two-dimensional evaluation of the tumor volume and response to treatment, in addition to geometric accuracy of field placement. US depth Is 1.39 mm, which is 0.22 mm in difference from previous imaging. The field placement and ultrasound imaging, per fraction, is separate and distinct from the initial simulation, and is an important task in providing safe administration of superficial radiation therapy. Physician evaluation of the ultrasound tumor depth will be ongoing throughout the course of treatment, and is deemed medically necessary in order to ensure the efficacy of treatment and any necessary changes. Today’s image was evaluated for determination of continuation of treatment with the current plan or with necessary changes as appropriate. According to provider review of verification therapeutic radiology simulation-aided field setting and imaging, no change is required (if change required, please document specifics and changes. If further services rendered for changes, document and submit appropriate billable CPT® codes). \\n\\nAdditionally, the use of ultrasound visualization and targeted assessment allows the patient to be able to see their cancer(s) progress, encouraging patient to complete and maintain compliance through full course of radiotherapy. Per Dr. London, continued ultrasound guidance and therapeutic radiology simulation-aided field setting verification per fraction is required for field placement, measurement of tumor depth, progress and acute effect monitoring. \\n\\n
Plan: This patient has been treated today with image guided superficial radiation therapy for non-melanoma skin cancer. Written informed consent has been previously obtained from this patient for this treatment. This consent is documented in the patient’s chart. The patient gave verbal consent to continue treatment today. The patient was treated with a specific radiation dose and setup as prescribed by the provider listed on this visit note. A Radiation Therapist performed administration of radiation under supervision of provider. The treatment parameters and cumulative dose are indicated above. Prior to administering the radiation, the patient underwent a verification therapeutic radiology simulation-aided field setting defining relevant normal and abnormal target anatomy and acquiring images with high frequency ultrasound in addition to data necessary developing optimal radiation treatment process for the patient. This process includes verification of the treatment port(s) and proper treatment positioning. All treatment ports were photographed within electronic medical record. The patient’s customized lead blocking along with gross tumor volume and margin was confirmed. Considering superficial radiotherapy is clinical in setup, this requires physician and radiation therapist to clarify location interest being treated against initial images, pathology and patient anatomy. Care was taken ensuring fields treated were geometrically accurate and properly positioned using therapeutic radiology simulation-aided field setting verification per fraction. This process is also utilized to determine if any prescription or setup changes are necessary. These steps are therefore medically necessary ensuring safe and effective administration of radiation. Ongoing therapeutic radiology simulation-aided field setting verification is ordered throughout course of therapy.\\n\\nA high frequency ultrasound image was acquired today for two-dimensional evaluation of the tumor volume and response to treatment, in addition to geometric accuracy of field placement. US depth Is 0.57 mm, which is  0.26 mm in difference from previous imaging. The field placement and ultrasound imaging, per fraction, is separate and distinct from the initial simulation, and is an important task in providing safe administration of superficial radiation therapy. Physician evaluation of the ultrasound tumor depth will be ongoing throughout the course of treatment, and is deemed medically necessary in order to ensure the efficacy of treatment and any necessary changes. Today’s image was evaluated for determination of continuation of treatment with the current plan or with necessary changes as appropriate. According to provider review of verification therapeutic radiology simulation-aided field setting and imaging, no change is required (if change required, please document specifics and changes. If further services rendered for changes, document and submit appropriate billable CPT® codes). \\n\\nAdditionally, the use of ultrasound visualization and targeted assessment allows the patient to be able to see their cancer(s) progress, encouraging patient to complete and maintain compliance through full course of radiotherapy. Per Dr. London, continued ultrasound guidance and therapeutic radiology simulation-aided field setting verification per fraction is required for field placement, measurement of tumor depth, progress and acute effect monitoring.\\n\\n

## 2021-10-07 ENCOUNTER — APPOINTMENT (OUTPATIENT)
Dept: URBAN - METROPOLITAN AREA CLINIC 255 | Age: 81
Setting detail: DERMATOLOGY
End: 2021-10-09

## 2021-10-07 PROBLEM — C44.319 BASAL CELL CARCINOMA OF SKIN OF OTHER PARTS OF FACE: Status: ACTIVE | Noted: 2021-10-07

## 2021-10-07 PROCEDURE — G6001 ECHO GUIDANCE RADIOTHERAPY: HCPCS

## 2021-10-07 PROCEDURE — OTHER FOLLOW UP FOR NEXT VISIT: OTHER

## 2021-10-07 PROCEDURE — 77280 THER RAD SIMULAJ FIELD SMPL: CPT

## 2021-10-07 PROCEDURE — OTHER TREATMENT REGIMEN: OTHER

## 2021-10-07 PROCEDURE — 77401 RADIATION TX DELIVERY SUPFC: CPT

## 2021-10-07 PROCEDURE — OTHER SUPERFICIAL RADIATION TREATMENT: OTHER

## 2021-10-07 NOTE — PROCEDURE: SUPERFICIAL RADIATION TREATMENT
Energy (Optional-Please Include Units): 70 Kv
Bill And Render Text From Evaluation And Management Tab (Will Bill [id number]): Yes
Functional Status: 1 (ambulatory, light activity)
Please Choose The Type Of Visit (Required): Treatment Visit: Show Treatment Variables
Daily Fractionated Dose (Optional- Include Units): 261.58
Custom Shielding Afterword Text Will Not Be Included With Simple Simulations (X X Y Cm............): port to correlate with the lesion size, including treatment margin. The custom lead shield is adequate to accommodate the appropriate applicator and provide adequate shielding around the treatment site. Additional shielding (as noted below) is used to protect sensitive, normal tissues.
Patient Positioning: Sitting
Number Of Days Off Treatment: 1
Custom Shielding Preamble Text Will Not Be Included With Simple Simulations (.......... X X Y Cm): A lead shield of 0.762 mm thickness is utilized to form a molded, custom shield with a
Field Size (Applicator): 2.0 cm
Render Patient Eligibility And Selection In Note?: No
Port Dimensions-X Axis In Cm: 1.5
Fractions / Week Rx 2: 5
Fractionation Number (Evaluation): 10
Information: Selecting Yes will display possible errors in your note based on the variables you have selected. This validation is only offered as a suggestion for you. PLEASE NOTE THAT THE VALIDATION TEXT WILL BE REMOVED WHEN YOU FINALIZE YOUR NOTE. IF YOU WANT TO FAX A PRELIMINARY NOTE YOU WILL NEED TO TOGGLE THIS TO 'NO' IF YOU DO NOT WANT IT IN YOUR FAXED NOTE.
Assessment: Appropriate reaction
Computed Treatment Time In Min (Will Render The Same As Calculated Treatment Time If Left Blank): 0.41
Daily Fractionated Dose (Optional- Include Units): 261.58 cGy
Total Number Of Fractions Rx 2: 15
Initial Radiation Treatment Planning (Will Render If Bill Simulation = Yes): The patient had a complete consultation regarding all applicable modalities for the treatment of their skin cancer and based on a variety of factors including the type of tumor, size, and location, the relevant medical history as well as local tissue factors, the functional status of the individual, the ability to perform necessary postoperative wound instructions and the need for simultaneous treatments as well as overall wound healing status, it was determined that the patient would begin radiation therapy treatment for skin cancer.  A full simulation and treatment device design was performed including the determination and formulation of appropriate simple and complex devices including lead shield of 0.762 mm thickness to form molded customized shielding to specifically correlate with the lesion size including treatment margin.  The custom lead shield is adequate to accommodate the appropriate applicator and provide adequate shielding around the treatment site.  The specific field applicator, shields, and devices both simple and complex as well as the specific patient setup is outlined below.  The patient was given a full consent for superficial radiation to both verbally and in writing and the full determination of patient's eligibility for treatment and selection is outlined on the patient eligibility and treatment selection form.  The specific superficial radiotherapy prescription was determined and was documented on the superficial radiotherapy prescription form.  A treatment calculation was also performed and documented on the treatment calculation form.  Based on the prescription, the patient was scheduled for a series of fractional treatments.
Cumulative Dose In Cgy (Optional): 3662.12
Fractionation Number: 14
Fractions / Week: 4
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
Comments: RTOG 1
Dimensions-X Axis In Cm: 0.5
Day Of The Week Treatment Administered: Thursday
Treatment Device Design After Initial Simulation Justification (Will Render If Bill For Treatment Devices = Yes): The patient is status post radiation simulation and is evaluated as to the use of additional devices for shielding and placement for radiation therapy.
Cumulative Dose In Cgy (Optional): 3662.12
Time Dose Fractionation (Optional- Include Units If Applicable): 95
Pathology Override (Pathology Will Render As Diagnosis Name If Left Blank): Primary Nodular Basal Cell Carcinoma distributed on the Left Medial Malar Cheek.
Body Location Override (Optional): Right Glabella
Simple Simulation Preamble Text Will Be Included With Simple Simulations (.......... Indications): Simple simulation was performed today for the following reasons:
Detail Level: Zone
Intro Statement (Will Not Render If Left Blank): The patient is undergoing superficial radiation therapy for skin cancer and presents for weekly evaluation and management.  Per protocol and as documented on the flow sheet, the patient was questioned as to subjective redness, pruritus, pain, drainage, fatigue, or any other symptoms.  Objectively, the radiation area was evaluated with regards to erythema, atrophy, scale, crusting, erosion, ulceration, edema, purpura, tenderness, warmth, drainage, and any other findings.  The plan was extensively reviewed including the dose, and dosing schedule.  The simulation and clinical setup was also reviewed as was the external and any internal shields and based on this review the appropriateness and sufficiency of treatment was determined.
Additional Prescription Justification Text: If there is any interruption in treatment exceeding 5 days please see Decay and Dose Adjustment Calculation and complete treatment under Prescription 2, 3 or 4 as appropriate.
Total Number Of Fractions: 20
Shielding Size (Optional- Include Units): 1.5cm x 1.5cm
Field Number: 2
Energy (Include Units): 70kv
Energy (Optional-Please Include Units): 70kv
Energy (Optional-Please Include Units): 70Kv
Total Dose (Optional-Please Include Units): 5321.60
Cumulative Dose In Cgy (Optional): 3662.12
Total Dose (Optional-Please Include Units): 5231.60 cGy
Body Location Override (Optional): Right Inferior Nasal Cheek
Energy (Include Units): 70kv
Functional Status: 0 (fully active)
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
Field Number: 3
Energy (Include Units): 70kv
Treatment Time / Fractionation (Optional- Include Units): 0.41 Min
Energy (Optional-Please Include Units): 70kv
Body Location Override (Optional): Left Medial Cheek

## 2021-10-07 NOTE — PROCEDURE: TREATMENT REGIMEN
Plan: This patient has been treated today with image guided superficial radiation therapy for non-melanoma skin cancer. Written informed consent has been previously obtained from this patient for this treatment. This consent is documented in the patient’s chart. The patient gave verbal consent to continue treatment today. The patient was treated with a specific radiation dose and setup as prescribed by the provider listed on this visit note. A Radiation Therapist performed administration of radiation under supervision of provider. The treatment parameters and cumulative dose are indicated above. Prior to administering the radiation, the patient underwent a verification therapeutic radiology simulation-aided field setting defining relevant normal and abnormal target anatomy and acquiring images with high frequency ultrasound in addition to data necessary developing optimal radiation treatment process for the patient. This process includes verification of the treatment port(s) and proper treatment positioning. All treatment ports were photographed within electronic medical record. The patient’s customized lead blocking along with gross tumor volume and margin was confirmed. Considering superficial radiotherapy is clinical in setup, this requires physician and radiation therapist to clarify location interest being treated against initial images, pathology and patient anatomy. Care was taken ensuring fields treated were geometrically accurate and properly positioned using therapeutic radiology simulation-aided field setting verification per fraction. This process is also utilized to determine if any prescription or setup changes are necessary. These steps are therefore medically necessary ensuring safe and effective administration of radiation. Ongoing therapeutic radiology simulation-aided field setting verification is ordered throughout course of therapy.\\n\\nA high frequency ultrasound image was acquired today for two-dimensional evaluation of the tumor volume and response to treatment, in addition to geometric accuracy of field placement. US depth Is 0.82 mm, which is 0.05 mm in difference from previous imaging. The field placement and ultrasound imaging, per fraction, is separate and distinct from the initial simulation, and is an important task in providing safe administration of superficial radiation therapy. Physician evaluation of the ultrasound tumor depth will be ongoing throughout the course of treatment, and is deemed medically necessary in order to ensure the efficacy of treatment and any necessary changes. Today’s image was evaluated for determination of continuation of treatment with the current plan or with necessary changes as appropriate. According to provider review of verification therapeutic radiology simulation-aided field setting and imaging, no change is required (if change required, please document specifics and changes. If further services rendered for changes, document and submit appropriate billable CPT® codes). \\n\\nAdditionally, the use of ultrasound visualization and targeted assessment allows the patient to be able to see their cancer(s) progress, encouraging patient to complete and maintain compliance through full course of radiotherapy. Per Dr. London, continued ultrasound guidance and therapeutic radiology simulation-aided field setting verification per fraction is required for field placement, measurement of tumor depth, progress and acute effect monitoring. \\n\\n
Detail Level: Zone
Plan: This patient has been treated today with image guided superficial radiation therapy for non-melanoma skin cancer. Written informed consent has been previously obtained from this patient for this treatment. This consent is documented in the patient’s chart. The patient gave verbal consent to continue treatment today. The patient was treated with a specific radiation dose and setup as prescribed by the provider listed on this visit note. A Radiation Therapist performed administration of radiation under supervision of provider. The treatment parameters and cumulative dose are indicated above. Prior to administering the radiation, the patient underwent a verification therapeutic radiology simulation-aided field setting defining relevant normal and abnormal target anatomy and acquiring images with high frequency ultrasound in addition to data necessary developing optimal radiation treatment process for the patient. This process includes verification of the treatment port(s) and proper treatment positioning. All treatment ports were photographed within electronic medical record. The patient’s customized lead blocking along with gross tumor volume and margin was confirmed. Considering superficial radiotherapy is clinical in setup, this requires physician and radiation therapist to clarify location interest being treated against initial images, pathology and patient anatomy. Care was taken ensuring fields treated were geometrically accurate and properly positioned using therapeutic radiology simulation-aided field setting verification per fraction. This process is also utilized to determine if any prescription or setup changes are necessary. These steps are therefore medically necessary ensuring safe and effective administration of radiation. Ongoing therapeutic radiology simulation-aided field setting verification is ordered throughout course of therapy.\\n\\nA high frequency ultrasound image was acquired today for two-dimensional evaluation of the tumor volume and response to treatment, in addition to geometric accuracy of field placement. US depth Is 1.29 mm, which is 0.10 mm in difference from previous imaging. The field placement and ultrasound imaging, per fraction, is separate and distinct from the initial simulation, and is an important task in providing safe administration of superficial radiation therapy. Physician evaluation of the ultrasound tumor depth will be ongoing throughout the course of treatment, and is deemed medically necessary in order to ensure the efficacy of treatment and any necessary changes. Today’s image was evaluated for determination of continuation of treatment with the current plan or with necessary changes as appropriate. According to provider review of verification therapeutic radiology simulation-aided field setting and imaging, no change is required (if change required, please document specifics and changes. If further services rendered for changes, document and submit appropriate billable CPT® codes). \\n\\nAdditionally, the use of ultrasound visualization and targeted assessment allows the patient to be able to see their cancer(s) progress, encouraging patient to complete and maintain compliance through full course of radiotherapy. Per Dr. London, continued ultrasound guidance and therapeutic radiology simulation-aided field setting verification per fraction is required for field placement, measurement of tumor depth, progress and acute effect monitoring. \\n\\n
Plan: This patient has been treated today with image guided superficial radiation therapy for non-melanoma skin cancer. Written informed consent has been previously obtained from this patient for this treatment. This consent is documented in the patient’s chart. The patient gave verbal consent to continue treatment today. The patient was treated with a specific radiation dose and setup as prescribed by the provider listed on this visit note. A Radiation Therapist performed administration of radiation under supervision of provider. The treatment parameters and cumulative dose are indicated above. Prior to administering the radiation, the patient underwent a verification therapeutic radiology simulation-aided field setting defining relevant normal and abnormal target anatomy and acquiring images with high frequency ultrasound in addition to data necessary developing optimal radiation treatment process for the patient. This process includes verification of the treatment port(s) and proper treatment positioning. All treatment ports were photographed within electronic medical record. The patient’s customized lead blocking along with gross tumor volume and margin was confirmed. Considering superficial radiotherapy is clinical in setup, this requires physician and radiation therapist to clarify location interest being treated against initial images, pathology and patient anatomy. Care was taken ensuring fields treated were geometrically accurate and properly positioned using therapeutic radiology simulation-aided field setting verification per fraction. This process is also utilized to determine if any prescription or setup changes are necessary. These steps are therefore medically necessary ensuring safe and effective administration of radiation. Ongoing therapeutic radiology simulation-aided field setting verification is ordered throughout course of therapy.\\n\\nA high frequency ultrasound image was acquired today for two-dimensional evaluation of the tumor volume and response to treatment, in addition to geometric accuracy of field placement. US depth Is 0.62 mm, which is  0.05 mm in difference from previous imaging. The field placement and ultrasound imaging, per fraction, is separate and distinct from the initial simulation, and is an important task in providing safe administration of superficial radiation therapy. Physician evaluation of the ultrasound tumor depth will be ongoing throughout the course of treatment, and is deemed medically necessary in order to ensure the efficacy of treatment and any necessary changes. Today’s image was evaluated for determination of continuation of treatment with the current plan or with necessary changes as appropriate. According to provider review of verification therapeutic radiology simulation-aided field setting and imaging, no change is required (if change required, please document specifics and changes. If further services rendered for changes, document and submit appropriate billable CPT® codes). \\n\\nAdditionally, the use of ultrasound visualization and targeted assessment allows the patient to be able to see their cancer(s) progress, encouraging patient to complete and maintain compliance through full course of radiotherapy. Per Dr. London, continued ultrasound guidance and therapeutic radiology simulation-aided field setting verification per fraction is required for field placement, measurement of tumor depth, progress and acute effect monitoring.\\n\\n

## 2021-10-08 ENCOUNTER — APPOINTMENT (OUTPATIENT)
Dept: URBAN - METROPOLITAN AREA CLINIC 255 | Age: 81
Setting detail: DERMATOLOGY
End: 2021-10-09

## 2021-10-08 PROBLEM — C44.319 BASAL CELL CARCINOMA OF SKIN OF OTHER PARTS OF FACE: Status: ACTIVE | Noted: 2021-10-08

## 2021-10-08 PROCEDURE — 77427 RADIATION TX MANAGEMENT X5: CPT

## 2021-10-08 PROCEDURE — 77401 RADIATION TX DELIVERY SUPFC: CPT

## 2021-10-08 PROCEDURE — OTHER TREATMENT REGIMEN: OTHER

## 2021-10-08 PROCEDURE — OTHER FOLLOW UP FOR NEXT VISIT: OTHER

## 2021-10-08 PROCEDURE — 77280 THER RAD SIMULAJ FIELD SMPL: CPT

## 2021-10-08 PROCEDURE — OTHER SUPERFICIAL RADIATION TREATMENT: OTHER

## 2021-10-08 PROCEDURE — G6001 ECHO GUIDANCE RADIOTHERAPY: HCPCS

## 2021-10-08 NOTE — PROCEDURE: TREATMENT REGIMEN
Plan: This patient has been treated today with image guided superficial radiation therapy for non-melanoma skin cancer. Written informed consent has been previously obtained from this patient for this treatment. This consent is documented in the patient’s chart. The patient gave verbal consent to continue treatment today. The patient was treated with a specific radiation dose and setup as prescribed by the provider listed on this visit note. A Radiation Therapist performed administration of radiation under supervision of provider. The treatment parameters and cumulative dose are indicated above. Prior to administering the radiation, the patient underwent a verification therapeutic radiology simulation-aided field setting defining relevant normal and abnormal target anatomy and acquiring images with high frequency ultrasound in addition to data necessary developing optimal radiation treatment process for the patient. This process includes verification of the treatment port(s) and proper treatment positioning. All treatment ports were photographed within electronic medical record. The patient’s customized lead blocking along with gross tumor volume and margin was confirmed. Considering superficial radiotherapy is clinical in setup, this requires physician and radiation therapist to clarify location interest being treated against initial images, pathology and patient anatomy. Care was taken ensuring fields treated were geometrically accurate and properly positioned using therapeutic radiology simulation-aided field setting verification per fraction. This process is also utilized to determine if any prescription or setup changes are necessary. These steps are therefore medically necessary ensuring safe and effective administration of radiation. Ongoing therapeutic radiology simulation-aided field setting verification is ordered throughout course of therapy.\\n\\nA high frequency ultrasound image was acquired today for two-dimensional evaluation of the tumor volume and response to treatment, in addition to geometric accuracy of field placement. US depth Is 0.62 mm, which is  0.05 mm in difference from previous imaging. The field placement and ultrasound imaging, per fraction, is separate and distinct from the initial simulation, and is an important task in providing safe administration of superficial radiation therapy. Physician evaluation of the ultrasound tumor depth will be ongoing throughout the course of treatment, and is deemed medically necessary in order to ensure the efficacy of treatment and any necessary changes. Today’s image was evaluated for determination of continuation of treatment with the current plan or with necessary changes as appropriate. According to provider review of verification therapeutic radiology simulation-aided field setting and imaging, no change is required (if change required, please document specifics and changes. If further services rendered for changes, document and submit appropriate billable CPT® codes). \\n\\nAdditionally, the use of ultrasound visualization and targeted assessment allows the patient to be able to see their cancer(s) progress, encouraging patient to complete and maintain compliance through full course of radiotherapy. Per Dr. London, continued ultrasound guidance and therapeutic radiology simulation-aided field setting verification per fraction is required for field placement, measurement of tumor depth, progress and acute effect monitoring.\\n\\n

## 2021-10-08 NOTE — PROCEDURE: SUPERFICIAL RADIATION TREATMENT
Bill For Simulation And Treatment Device Design: Yes
Bill For Dosimetry/Render Decay And Dose Adjustment Calculation In Note: No
Cumulative Dose In Cgy (Optional): 3923.70
Dimensions-Y Axis In Cm: 0.5
Shielding Size (Optional- Include Units): 1.5cm x 1.5cm
Fractions / Week: 4
Fractionation Number: 15
Dose Per Fractionation In Cgy (Optional): 261.58
Computed Treatment Time In Min (Will Render The Same As Calculated Treatment Time If Left Blank): 0.41
Initial Radiation Treatment Planning (Will Render If Bill Simulation = Yes): The patient had a complete consultation regarding all applicable modalities for the treatment of their skin cancer and based on a variety of factors including the type of tumor, size, and location, the relevant medical history as well as local tissue factors, the functional status of the individual, the ability to perform necessary postoperative wound instructions and the need for simultaneous treatments as well as overall wound healing status, it was determined that the patient would begin radiation therapy treatment for skin cancer.  A full simulation and treatment device design was performed including the determination and formulation of appropriate simple and complex devices including lead shield of 0.762 mm thickness to form molded customized shielding to specifically correlate with the lesion size including treatment margin.  The custom lead shield is adequate to accommodate the appropriate applicator and provide adequate shielding around the treatment site.  The specific field applicator, shields, and devices both simple and complex as well as the specific patient setup is outlined below.  The patient was given a full consent for superficial radiation to both verbally and in writing and the full determination of patient's eligibility for treatment and selection is outlined on the patient eligibility and treatment selection form.  The specific superficial radiotherapy prescription was determined and was documented on the superficial radiotherapy prescription form.  A treatment calculation was also performed and documented on the treatment calculation form.  Based on the prescription, the patient was scheduled for a series of fractional treatments.
Field Size (Applicator): 2.0 cm
Total Dose (Optional-Please Include Units): 5231.60 cGy
Comments: RTOG 1
Number Of Treatment Days: 1
Fractions / Week Rx 2: 5
Body Location Override (Optional): Right Inferior Nasal Cheek
Treatment Device Design After Initial Simulation Justification (Will Render If Bill For Treatment Devices = Yes): The patient is status post radiation simulation and is evaluated as to the use of additional devices for shielding and placement for radiation therapy.
Energy (Include Units): 70kv
Energy (Optional-Please Include Units): 70kv
Port Dimensions-X Axis In Cm: 1.5
Time Dose Fractionation (Optional- Include Units If Applicable): 95
Pathology Override (Pathology Will Render As Diagnosis Name If Left Blank): Primary Nodular Basal Cell Carcinoma distributed on the Left Medial Malar Cheek.
Body Location Override (Optional): Right Glabella
Day Of The Week Treatment Administered: Friday
Simple Simulation Preamble Text Will Be Included With Simple Simulations (.......... Indications): Simple simulation was performed today for the following reasons:
Functional Status: 1 (ambulatory, light activity)
Please Choose The Type Of Visit (Required): Treatment and Weekly Evaluation Visit: Show Treatment/Evaluation Variables
Intro Statement (Will Not Render If Left Blank): The patient is undergoing superficial radiation therapy for skin cancer and presents for weekly evaluation and management.  Per protocol and as documented on the flow sheet, the patient was questioned as to subjective redness, pruritus, pain, drainage, fatigue, or any other symptoms.  Objectively, the radiation area was evaluated with regards to erythema, atrophy, scale, crusting, erosion, ulceration, edema, purpura, tenderness, warmth, drainage, and any other findings.  The plan was extensively reviewed including the dose, and dosing schedule.  The simulation and clinical setup was also reviewed as was the external and any internal shields and based on this review the appropriateness and sufficiency of treatment was determined.
Additional Prescription Justification Text: If there is any interruption in treatment exceeding 5 days please see Decay and Dose Adjustment Calculation and complete treatment under Prescription 2, 3 or 4 as appropriate.
Detail Level: Zone
Daily Fractionated Dose (Optional- Include Units): 261.58 cGy
Energy (Optional-Please Include Units): 70KV
Total Dose (Optional-Please Include Units): 5321.60
Energy (Optional-Please Include Units): 70kv
Custom Shielding Preamble Text Will Not Be Included With Simple Simulations (.......... X X Y Cm): A lead shield of 0.762 mm thickness is utilized to form a molded, custom shield with a
Patient Positioning: Sitting
Custom Shielding Afterword Text Will Not Be Included With Simple Simulations (X X Y Cm............): port to correlate with the lesion size, including treatment margin. The custom lead shield is adequate to accommodate the appropriate applicator and provide adequate shielding around the treatment site. Additional shielding (as noted below) is used to protect sensitive, normal tissues.
Total Number Of Fractions: 20
Information: Selecting Yes will display possible errors in your note based on the variables you have selected. This validation is only offered as a suggestion for you. PLEASE NOTE THAT THE VALIDATION TEXT WILL BE REMOVED WHEN YOU FINALIZE YOUR NOTE. IF YOU WANT TO FAX A PRELIMINARY NOTE YOU WILL NEED TO TOGGLE THIS TO 'NO' IF YOU DO NOT WANT IT IN YOUR FAXED NOTE.
Energy (Include Units): 70kv
Assessment: Appropriate reaction
Body Location Override (Optional): Left Medial Cheek
Functional Status: 0 (fully active)
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
Field Number: 3
Energy (Optional-Please Include Units): 70 Kv
Field Number: 2
Energy (Include Units): 70kv
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
Treatment Time / Fractionation (Optional- Include Units): 0.41 Min

## 2021-10-08 NOTE — PROCEDURE: TREATMENT REGIMEN

## 2021-10-08 NOTE — PROCEDURE: TREATMENT REGIMEN

## 2021-10-10 ENCOUNTER — APPOINTMENT (OUTPATIENT)
Dept: URBAN - METROPOLITAN AREA CLINIC 255 | Age: 81
Setting detail: DERMATOLOGY
End: 2021-10-17

## 2021-10-10 PROBLEM — C44.91 BASAL CELL CARCINOMA OF SKIN, UNSPECIFIED: Status: ACTIVE | Noted: 2021-10-10

## 2021-10-10 PROCEDURE — 77336 RADIATION PHYSICS CONSULT: CPT

## 2021-10-11 ENCOUNTER — APPOINTMENT (OUTPATIENT)
Dept: URBAN - METROPOLITAN AREA CLINIC 255 | Age: 81
Setting detail: DERMATOLOGY
End: 2021-10-17

## 2021-10-11 PROBLEM — C44.319 BASAL CELL CARCINOMA OF SKIN OF OTHER PARTS OF FACE: Status: ACTIVE | Noted: 2021-10-11

## 2021-10-11 PROCEDURE — 77280 THER RAD SIMULAJ FIELD SMPL: CPT

## 2021-10-11 PROCEDURE — OTHER SUPERFICIAL RADIATION TREATMENT: OTHER

## 2021-10-11 PROCEDURE — OTHER TREATMENT REGIMEN: OTHER

## 2021-10-11 PROCEDURE — OTHER FOLLOW UP FOR NEXT VISIT: OTHER

## 2021-10-11 PROCEDURE — G6001 ECHO GUIDANCE RADIOTHERAPY: HCPCS

## 2021-10-11 PROCEDURE — 77401 RADIATION TX DELIVERY SUPFC: CPT

## 2021-10-11 NOTE — PROCEDURE: TREATMENT REGIMEN

## 2021-10-11 NOTE — PROCEDURE: SUPERFICIAL RADIATION TREATMENT
Port Dimensions-X Axis In Cm: 1.5
Bill For Dosimetry/Render Decay And Dose Adjustment Calculation In Note: No
Validate Note Data (See Information Below): Yes
Fractionation Number (Evaluation): 15
Assessment: Appropriate reaction
Fractions / Week Rx 2: 5
Number Of Treatment Days: 1
Information: Selecting Yes will display possible errors in your note based on the variables you have selected. This validation is only offered as a suggestion for you. PLEASE NOTE THAT THE VALIDATION TEXT WILL BE REMOVED WHEN YOU FINALIZE YOUR NOTE. IF YOU WANT TO FAX A PRELIMINARY NOTE YOU WILL NEED TO TOGGLE THIS TO 'NO' IF YOU DO NOT WANT IT IN YOUR FAXED NOTE.
Field Number: 2
Computed Treatment Time In Min (Will Render The Same As Calculated Treatment Time If Left Blank): 0.41
Daily Fractionated Dose (Optional- Include Units): 261.58 cGy
Simple Simulation Afterword Text Will Be Included With Simple Simulations (Indications............): The patient had a complete consultation regarding all applicable modalities for the treatment of their skin cancer and based on a variety of factors including the type of tumor, size, and location, the relevant medical history as well as local tissue factors, the functional status of the individual, the ability to perform necessary postoperative wound instructions and the need for simultaneous treatments as well as overall wound healing status, it was determined that the patient would begin radiation therapy treatment for skin cancer.  A full simulation and treatment device design was performed including the determination and formulation of appropriate simple and complex devices including lead shield of 0.762 mm thickness to form molded customized shielding to specifically correlate with the lesion size including treatment margin.  The custom lead shield is adequate to accommodate the appropriate applicator and provide adequate shielding around the treatment site.  The specific field applicator, shields, and devices both simple and complex as well as the specific patient setup is outlined below.  The patient was given a full consent for superficial radiation to both verbally and in writing and the full determination of patient's eligibility for treatment and selection is outlined on the patient eligibility and treatment selection form.  The specific superficial radiotherapy prescription was determined and was documented on the superficial radiotherapy prescription form.  A treatment calculation was also performed and documented on the treatment calculation form.  Based on the prescription, the patient was scheduled for a series of fractional treatments.
Detail Level: Zone
Fractions / Week: 4
Time Dose Fractionation (Optional- Include Units If Applicable): 95
Custom Shielding Preamble Text Will Not Be Included With Simple Simulations (.......... X X Y Cm): A lead shield of 0.762 mm thickness is utilized to form a molded, custom shield with a
Field Size (Applicator): 2.0 cm
Energy (Include Units): 70kv
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
Comments: RTOG 1
Dimensions-X Axis In Cm: 0.5
Day Of The Week Treatment Administered: Monday
Treatment Device Design After Initial Simulation Justification (Will Render If Bill For Treatment Devices = Yes): The patient is status post radiation simulation and is evaluated as to the use of additional devices for shielding and placement for radiation therapy.
Please Choose The Type Of Visit (Required): Treatment Visit: Show Treatment Variables
Daily Fractionated Dose (Optional- Include Units): 261.58
Functional Status: 1 (ambulatory, light activity)
Custom Shielding Afterword Text Will Not Be Included With Simple Simulations (X X Y Cm............): port to correlate with the lesion size, including treatment margin. The custom lead shield is adequate to accommodate the appropriate applicator and provide adequate shielding around the treatment site. Additional shielding (as noted below) is used to protect sensitive, normal tissues.
Patient Positioning: Sitting
Total Dose (Optional-Please Include Units): 5321.60
Shielding Size (Optional- Include Units): 1.5cm x 1.5cm
Total Dose (Optional-Please Include Units): 5231.60 cGy
Intro Statement (Will Not Render If Left Blank): The patient is undergoing superficial radiation therapy for skin cancer and presents for weekly evaluation and management.  Per protocol and as documented on the flow sheet, the patient was questioned as to subjective redness, pruritus, pain, drainage, fatigue, or any other symptoms.  Objectively, the radiation area was evaluated with regards to erythema, atrophy, scale, crusting, erosion, ulceration, edema, purpura, tenderness, warmth, drainage, and any other findings.  The plan was extensively reviewed including the dose, and dosing schedule.  The simulation and clinical setup was also reviewed as was the external and any internal shields and based on this review the appropriateness and sufficiency of treatment was determined.
Energy (Optional-Please Include Units): 70 Kv
Additional Prescription Justification Text: If there is any interruption in treatment exceeding 5 days please see Decay and Dose Adjustment Calculation and complete treatment under Prescription 2, 3 or 4 as appropriate.
Energy (Optional-Please Include Units): 70kv
Cumulative Dose In Cgy (Optional): 4185.28
Pathology Override (Pathology Will Render As Diagnosis Name If Left Blank): Primary Nodular Basal Cell Carcinoma distributed on the Left Medial Malar Cheek.
Body Location Override (Optional): Right Glabella
Simple Simulation Preamble Text Will Be Included With Simple Simulations (.......... Indications): Simple simulation was performed today for the following reasons:
Fractionation Number: 16
Total Number Of Fractions: 20
Energy (Optional-Please Include Units): 70KV
Cumulative Dose In Cgy (Optional): 4185.28
Treatment Time / Fractionation (Optional- Include Units): 0.41 Min
Energy (Optional-Please Include Units): 70kv
Body Location Override (Optional): Right Inferior Nasal Cheek
Energy (Include Units): 70kv
Field Number: 3
Energy (Include Units): 70kv
Cumulative Dose In Cgy (Optional): 4185.28
Body Location Override (Optional): Left Medial Cheek
Functional Status: 0 (fully active)
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

## 2021-10-12 ENCOUNTER — APPOINTMENT (OUTPATIENT)
Dept: URBAN - METROPOLITAN AREA CLINIC 255 | Age: 81
Setting detail: DERMATOLOGY
End: 2021-10-17

## 2021-10-12 PROBLEM — C44.319 BASAL CELL CARCINOMA OF SKIN OF OTHER PARTS OF FACE: Status: ACTIVE | Noted: 2021-10-12

## 2021-10-12 PROCEDURE — G6001 ECHO GUIDANCE RADIOTHERAPY: HCPCS

## 2021-10-12 PROCEDURE — OTHER SUPERFICIAL RADIATION TREATMENT: OTHER

## 2021-10-12 PROCEDURE — 77401 RADIATION TX DELIVERY SUPFC: CPT

## 2021-10-12 PROCEDURE — OTHER FOLLOW UP FOR NEXT VISIT: OTHER

## 2021-10-12 PROCEDURE — OTHER TREATMENT REGIMEN: OTHER

## 2021-10-12 PROCEDURE — 77280 THER RAD SIMULAJ FIELD SMPL: CPT

## 2021-10-12 NOTE — PROCEDURE: SUPERFICIAL RADIATION TREATMENT
Treatment Margins In Cm: 0.5
Total Number Of Fractions Rx 3: 15
Fractions / Week Rx 3: 5
Time Dose Fractionation (Optional- Include Units If Applicable): 95
Field Size (Applicator): 2.0 cm
Prescription Used: 1
Custom Shielding Preamble Text Will Not Be Included With Simple Simulations (.......... X X Y Cm): A lead shield of 0.762 mm thickness is utilized to form a molded, custom shield with a
Energy (Include Units): 70kv
Body Location Override (Optional): Right Glabella
Day Of The Week Treatment Administered: Tuesday
Simple Simulation Preamble Text Will Be Included With Simple Simulations (.......... Indications): Simple simulation was performed today for the following reasons:
Bill And Render Text From Evaluation And Management Tab (Will Bill [id number]): No
Functional Status: 1 (ambulatory, light activity)
Please Choose The Type Of Visit (Required): Treatment Visit: Show Treatment Variables
Sebastian For Simulation Without Treatment Device Design (Simple Simulation): Yes
Intro Statement (Will Not Render If Left Blank): The patient is undergoing superficial radiation therapy for skin cancer and presents for weekly evaluation and management.  Per protocol and as documented on the flow sheet, the patient was questioned as to subjective redness, pruritus, pain, drainage, fatigue, or any other symptoms.  Objectively, the radiation area was evaluated with regards to erythema, atrophy, scale, crusting, erosion, ulceration, edema, purpura, tenderness, warmth, drainage, and any other findings.  The plan was extensively reviewed including the dose, and dosing schedule.  The simulation and clinical setup was also reviewed as was the external and any internal shields and based on this review the appropriateness and sufficiency of treatment was determined.
Energy (Optional-Please Include Units): 70 Kv
Additional Prescription Justification Text: If there is any interruption in treatment exceeding 5 days please see Decay and Dose Adjustment Calculation and complete treatment under Prescription 2, 3 or 4 as appropriate.
Assessment: Appropriate reaction
Daily Fractionated Dose (Optional- Include Units): 261.58
yes
Simple Simulation Afterword Text Will Be Included With Simple Simulations (Indications............): The patient had a complete consultation regarding all applicable modalities for the treatment of their skin cancer and based on a variety of factors including the type of tumor, size, and location, the relevant medical history as well as local tissue factors, the functional status of the individual, the ability to perform necessary postoperative wound instructions and the need for simultaneous treatments as well as overall wound healing status, it was determined that the patient would begin radiation therapy treatment for skin cancer.  A full simulation and treatment device design was performed including the determination and formulation of appropriate simple and complex devices including lead shield of 0.762 mm thickness to form molded customized shielding to specifically correlate with the lesion size including treatment margin.  The custom lead shield is adequate to accommodate the appropriate applicator and provide adequate shielding around the treatment site.  The specific field applicator, shields, and devices both simple and complex as well as the specific patient setup is outlined below.  The patient was given a full consent for superficial radiation to both verbally and in writing and the full determination of patient's eligibility for treatment and selection is outlined on the patient eligibility and treatment selection form.  The specific superficial radiotherapy prescription was determined and was documented on the superficial radiotherapy prescription form.  A treatment calculation was also performed and documented on the treatment calculation form.  Based on the prescription, the patient was scheduled for a series of fractional treatments.
Custom Shielding Afterword Text Will Not Be Included With Simple Simulations (X X Y Cm............): port to correlate with the lesion size, including treatment margin. The custom lead shield is adequate to accommodate the appropriate applicator and provide adequate shielding around the treatment site. Additional shielding (as noted below) is used to protect sensitive, normal tissues.
Detail Level: Zone
Fractions / Week: 4
Treatment Time In Min (Optional): 0.41
Information: Selecting Yes will display possible errors in your note based on the variables you have selected. This validation is only offered as a suggestion for you. PLEASE NOTE THAT THE VALIDATION TEXT WILL BE REMOVED WHEN YOU FINALIZE YOUR NOTE. IF YOU WANT TO FAX A PRELIMINARY NOTE YOU WILL NEED TO TOGGLE THIS TO 'NO' IF YOU DO NOT WANT IT IN YOUR FAXED NOTE.
Energy (Optional-Please Include Units): 70kv
Field Number: 2
Daily Fractionated Dose (Optional- Include Units): 261.58 cGy
Patient Positioning: Sitting
Treatment Device Design After Initial Simulation Justification (Will Render If Bill For Treatment Devices = Yes): The patient is status post radiation simulation and is evaluated as to the use of additional devices for shielding and placement for radiation therapy.
Port Dimensions-X Axis In Cm: 1.5
Comments: RTOG 1
Body Location Override (Optional): Right Inferior Nasal Cheek
Total Number Of Fractions: 20
Cumulative Dose In Cgy (Optional): 4446.86
Shielding Size (Optional- Include Units): 1.5cm x 1.5cm
Pathology Override (Pathology Will Render As Diagnosis Name If Left Blank): Primary Nodular Basal Cell Carcinoma distributed on the Left Medial Malar Cheek.
Fractionation Number: 17
Total Dose (Optional-Please Include Units): 5231.60 cGy
Total Dose (Optional-Please Include Units): 5321.60
Energy (Include Units): 70kv
Energy (Optional-Please Include Units): 70kv
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
Energy (Optional-Please Include Units): 70Kv
Body Location Override (Optional): Left Medial Cheek
Cumulative Dose In Cgy (Optional): 4446.86
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
Functional Status: 0 (fully active)
Cumulative Dose In Cgy (Optional): 4446.86
Field Number: 3
Energy (Include Units): 70kv
Treatment Time / Fractionation (Optional- Include Units): 0.41 Min

## 2021-10-12 NOTE — PROCEDURE: TREATMENT REGIMEN

## 2021-10-13 ENCOUNTER — APPOINTMENT (OUTPATIENT)
Dept: URBAN - METROPOLITAN AREA CLINIC 255 | Age: 81
Setting detail: DERMATOLOGY
End: 2021-10-17

## 2021-10-13 PROBLEM — C44.319 BASAL CELL CARCINOMA OF SKIN OF OTHER PARTS OF FACE: Status: ACTIVE | Noted: 2021-10-13

## 2021-10-13 PROCEDURE — OTHER SUPERFICIAL RADIATION TREATMENT: OTHER

## 2021-10-13 PROCEDURE — OTHER FOLLOW UP FOR NEXT VISIT: OTHER

## 2021-10-13 PROCEDURE — G6001 ECHO GUIDANCE RADIOTHERAPY: HCPCS

## 2021-10-13 PROCEDURE — 77280 THER RAD SIMULAJ FIELD SMPL: CPT

## 2021-10-13 PROCEDURE — 77401 RADIATION TX DELIVERY SUPFC: CPT

## 2021-10-13 PROCEDURE — OTHER TREATMENT REGIMEN: OTHER

## 2021-10-13 NOTE — PROCEDURE: SUPERFICIAL RADIATION TREATMENT
Field Size (Applicator): 2.0 cm
Computed Treatment Time In Min (Will Render The Same As Calculated Treatment Time If Left Blank): 0.41
Number Of Treatment Days: 1
Validate Note Data (See Information Below): Yes
Fractionation Number (Evaluation): 15
Treatment Device Design After Initial Simulation Justification (Will Render If Bill For Treatment Devices = Yes): The patient is status post radiation simulation and is evaluated as to the use of additional devices for shielding and placement for radiation therapy.
Assessment: Appropriate reaction
Body Location Override (Optional): Right Inferior Nasal Cheek
Comments: RTOG 1
Fractions / Week Rx 2: 5
Energy (Include Units): 70kv
Simple Simulation Afterword Text Will Be Included With Simple Simulations (Indications............): The patient had a complete consultation regarding all applicable modalities for the treatment of their skin cancer and based on a variety of factors including the type of tumor, size, and location, the relevant medical history as well as local tissue factors, the functional status of the individual, the ability to perform necessary postoperative wound instructions and the need for simultaneous treatments as well as overall wound healing status, it was determined that the patient would begin radiation therapy treatment for skin cancer.  A full simulation and treatment device design was performed including the determination and formulation of appropriate simple and complex devices including lead shield of 0.762 mm thickness to form molded customized shielding to specifically correlate with the lesion size including treatment margin.  The custom lead shield is adequate to accommodate the appropriate applicator and provide adequate shielding around the treatment site.  The specific field applicator, shields, and devices both simple and complex as well as the specific patient setup is outlined below.  The patient was given a full consent for superficial radiation to both verbally and in writing and the full determination of patient's eligibility for treatment and selection is outlined on the patient eligibility and treatment selection form.  The specific superficial radiotherapy prescription was determined and was documented on the superficial radiotherapy prescription form.  A treatment calculation was also performed and documented on the treatment calculation form.  Based on the prescription, the patient was scheduled for a series of fractional treatments.
Dimensions-X Axis In Cm: 0.5
Fractionation Number: 18
Intro Statement (Will Not Render If Left Blank): The patient is undergoing superficial radiation therapy for skin cancer and presents for weekly evaluation and management.  Per protocol and as documented on the flow sheet, the patient was questioned as to subjective redness, pruritus, pain, drainage, fatigue, or any other symptoms.  Objectively, the radiation area was evaluated with regards to erythema, atrophy, scale, crusting, erosion, ulceration, edema, purpura, tenderness, warmth, drainage, and any other findings.  The plan was extensively reviewed including the dose, and dosing schedule.  The simulation and clinical setup was also reviewed as was the external and any internal shields and based on this review the appropriateness and sufficiency of treatment was determined.
Fractions / Week: 4
Additional Prescription Justification Text: If there is any interruption in treatment exceeding 5 days please see Decay and Dose Adjustment Calculation and complete treatment under Prescription 2, 3 or 4 as appropriate.
Detail Level: Zone
Bill For Dosimetry/Render Decay And Dose Adjustment Calculation In Note: No
Shielding Size (Optional- Include Units): 1.5cm x 1.5cm
Total Number Of Fractions: 20
Dose / Tx In Cgy (Optional): 261.58
Patient Positioning: Sitting
Total Dose (Optional-Please Include Units): 5231.60 cGy
Energy (Optional-Please Include Units): 70kv
Custom Shielding Preamble Text Will Not Be Included With Simple Simulations (.......... X X Y Cm): A lead shield of 0.762 mm thickness is utilized to form a molded, custom shield with a
Body Location Override (Optional): Left Medial Cheek
Simple Simulation Preamble Text Will Be Included With Simple Simulations (.......... Indications): Simple simulation was performed today for the following reasons:
Functional Status: 0 (fully active)
Information: Selecting Yes will display possible errors in your note based on the variables you have selected. This validation is only offered as a suggestion for you. PLEASE NOTE THAT THE VALIDATION TEXT WILL BE REMOVED WHEN YOU FINALIZE YOUR NOTE. IF YOU WANT TO FAX A PRELIMINARY NOTE YOU WILL NEED TO TOGGLE THIS TO 'NO' IF YOU DO NOT WANT IT IN YOUR FAXED NOTE.
Please Choose The Type Of Visit (Required): Treatment Visit: Show Treatment Variables
Energy (Optional-Please Include Units): 70Kv
Custom Shielding Afterword Text Will Not Be Included With Simple Simulations (X X Y Cm............): port to correlate with the lesion size, including treatment margin. The custom lead shield is adequate to accommodate the appropriate applicator and provide adequate shielding around the treatment site. Additional shielding (as noted below) is used to protect sensitive, normal tissues.
Total Dose (Optional-Please Include Units): 5321.60
Cumulative Dose In Cgy (Optional): 4708.44
Day Of The Week Treatment Administered: Wednesday
Daily Fractionated Dose (Optional- Include Units): 261.58 cGy
Port Dimensions-Y Axis In Cm: 1.5
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
Energy (Optional-Please Include Units): 70 Kv
Functional Status: 1 (ambulatory, light activity)
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
Time Dose Fractionation (Optional- Include Units If Applicable): 95
Energy (Include Units): 70kv
Field Number: 3
Treatment Time / Fractionation (Optional- Include Units): 0.41 Min
Energy (Include Units): 70kv
Energy (Optional-Please Include Units): 70kv
Pathology Override (Pathology Will Render As Diagnosis Name If Left Blank): Primary Nodular Basal Cell Carcinoma distributed on the Left Medial Malar Cheek.
Body Location Override (Optional): Right Glabella
Field Number: 2

## 2021-10-13 NOTE — PROCEDURE: TREATMENT REGIMEN
Plan: This patient has been treated today with image guided superficial radiation therapy for non-melanoma skin cancer. Written informed consent has been previously obtained from this patient for this treatment. This consent is documented in the patient’s chart. The patient gave verbal consent to continue treatment today. The patient was treated with a specific radiation dose and setup as prescribed by the provider listed on this visit note. A Radiation Therapist performed administration of radiation under supervision of provider. The treatment parameters and cumulative dose are indicated above. Prior to administering the radiation, the patient underwent a verification therapeutic radiology simulation-aided field setting defining relevant normal and abnormal target anatomy and acquiring images with high frequency ultrasound in addition to data necessary developing optimal radiation treatment process for the patient. This process includes verification of the treatment port(s) and proper treatment positioning. All treatment ports were photographed within electronic medical record. The patient’s customized lead blocking along with gross tumor volume and margin was confirmed. Considering superficial radiotherapy is clinical in setup, this requires physician and radiation therapist to clarify location interest being treated against initial images, pathology and patient anatomy. Care was taken ensuring fields treated were geometrically accurate and properly positioned using therapeutic radiology simulation-aided field setting verification per fraction. This process is also utilized to determine if any prescription or setup changes are necessary. These steps are therefore medically necessary ensuring safe and effective administration of radiation. Ongoing therapeutic radiology simulation-aided field setting verification is ordered throughout course of therapy.\\n\\nA high frequency ultrasound image was acquired today for two-dimensional evaluation of the tumor volume and response to treatment, in addition to geometric accuracy of field placement. US depth Is 0.59 mm, which is  0.19 mm in difference from previous imaging. The field placement and ultrasound imaging, per fraction, is separate and distinct from the initial simulation, and is an important task in providing safe administration of superficial radiation therapy. Physician evaluation of the ultrasound tumor depth will be ongoing throughout the course of treatment, and is deemed medically necessary in order to ensure the efficacy of treatment and any necessary changes. Today’s image was evaluated for determination of continuation of treatment with the current plan or with necessary changes as appropriate. According to provider review of verification therapeutic radiology simulation-aided field setting and imaging, no change is required (if change required, please document specifics and changes. If further services rendered for changes, document and submit appropriate billable CPT® codes). \\n\\nAdditionally, the use of ultrasound visualization and targeted assessment allows the patient to be able to see their cancer(s) progress, encouraging patient to complete and maintain compliance through full course of radiotherapy. Per Dr. London, continued ultrasound guidance and therapeutic radiology simulation-aided field setting verification per fraction is required for field placement, measurement of tumor depth, progress and acute effect monitoring.\\n\\n
Plan: This patient has been treated today with image guided superficial radiation therapy for non-melanoma skin cancer. Written informed consent has been previously obtained from this patient for this treatment. This consent is documented in the patient’s chart. The patient gave verbal consent to continue treatment today. The patient was treated with a specific radiation dose and setup as prescribed by the provider listed on this visit note. A Radiation Therapist performed administration of radiation under supervision of provider. The treatment parameters and cumulative dose are indicated above. Prior to administering the radiation, the patient underwent a verification therapeutic radiology simulation-aided field setting defining relevant normal and abnormal target anatomy and acquiring images with high frequency ultrasound in addition to data necessary developing optimal radiation treatment process for the patient. This process includes verification of the treatment port(s) and proper treatment positioning. All treatment ports were photographed within electronic medical record. The patient’s customized lead blocking along with gross tumor volume and margin was confirmed. Considering superficial radiotherapy is clinical in setup, this requires physician and radiation therapist to clarify location interest being treated against initial images, pathology and patient anatomy. Care was taken ensuring fields treated were geometrically accurate and properly positioned using therapeutic radiology simulation-aided field setting verification per fraction. This process is also utilized to determine if any prescription or setup changes are necessary. These steps are therefore medically necessary ensuring safe and effective administration of radiation. Ongoing therapeutic radiology simulation-aided field setting verification is ordered throughout course of therapy.\\n\\nA high frequency ultrasound image was acquired today for two-dimensional evaluation of the tumor volume and response to treatment, in addition to geometric accuracy of field placement. US depth Is 1.33 mm, which is 0.04 mm in difference from previous imaging. The field placement and ultrasound imaging, per fraction, is separate and distinct from the initial simulation, and is an important task in providing safe administration of superficial radiation therapy. Physician evaluation of the ultrasound tumor depth will be ongoing throughout the course of treatment, and is deemed medically necessary in order to ensure the efficacy of treatment and any necessary changes. Today’s image was evaluated for determination of continuation of treatment with the current plan or with necessary changes as appropriate. According to provider review of verification therapeutic radiology simulation-aided field setting and imaging, no change is required (if change required, please document specifics and changes. If further services rendered for changes, document and submit appropriate billable CPT® codes). \\n\\nAdditionally, the use of ultrasound visualization and targeted assessment allows the patient to be able to see their cancer(s) progress, encouraging patient to complete and maintain compliance through full course of radiotherapy. Per Dr. London, continued ultrasound guidance and therapeutic radiology simulation-aided field setting verification per fraction is required for field placement, measurement of tumor depth, progress and acute effect monitoring. \\n\\n
Detail Level: Zone
Plan: This patient has been treated today with image guided superficial radiation therapy for non-melanoma skin cancer. Written informed consent has been previously obtained from this patient for this treatment. This consent is documented in the patient’s chart. The patient gave verbal consent to continue treatment today. The patient was treated with a specific radiation dose and setup as prescribed by the provider listed on this visit note. A Radiation Therapist performed administration of radiation under supervision of provider. The treatment parameters and cumulative dose are indicated above. Prior to administering the radiation, the patient underwent a verification therapeutic radiology simulation-aided field setting defining relevant normal and abnormal target anatomy and acquiring images with high frequency ultrasound in addition to data necessary developing optimal radiation treatment process for the patient. This process includes verification of the treatment port(s) and proper treatment positioning. All treatment ports were photographed within electronic medical record. The patient’s customized lead blocking along with gross tumor volume and margin was confirmed. Considering superficial radiotherapy is clinical in setup, this requires physician and radiation therapist to clarify location interest being treated against initial images, pathology and patient anatomy. Care was taken ensuring fields treated were geometrically accurate and properly positioned using therapeutic radiology simulation-aided field setting verification per fraction. This process is also utilized to determine if any prescription or setup changes are necessary. These steps are therefore medically necessary ensuring safe and effective administration of radiation. Ongoing therapeutic radiology simulation-aided field setting verification is ordered throughout course of therapy.\\n\\nA high frequency ultrasound image was acquired today for two-dimensional evaluation of the tumor volume and response to treatment, in addition to geometric accuracy of field placement. US depth Is 0.75 mm, which is 0.03 mm in difference from previous imaging. The field placement and ultrasound imaging, per fraction, is separate and distinct from the initial simulation, and is an important task in providing safe administration of superficial radiation therapy. Physician evaluation of the ultrasound tumor depth will be ongoing throughout the course of treatment, and is deemed medically necessary in order to ensure the efficacy of treatment and any necessary changes. Today’s image was evaluated for determination of continuation of treatment with the current plan or with necessary changes as appropriate. According to provider review of verification therapeutic radiology simulation-aided field setting and imaging, no change is required (if change required, please document specifics and changes. If further services rendered for changes, document and submit appropriate billable CPT® codes). \\n\\nAdditionally, the use of ultrasound visualization and targeted assessment allows the patient to be able to see their cancer(s) progress, encouraging patient to complete and maintain compliance through full course of radiotherapy. Per Dr. London, continued ultrasound guidance and therapeutic radiology simulation-aided field setting verification per fraction is required for field placement, measurement of tumor depth, progress and acute effect monitoring.

## 2021-10-14 ENCOUNTER — APPOINTMENT (OUTPATIENT)
Dept: URBAN - METROPOLITAN AREA CLINIC 255 | Age: 81
Setting detail: DERMATOLOGY
End: 2021-10-17

## 2021-10-14 PROBLEM — C44.319 BASAL CELL CARCINOMA OF SKIN OF OTHER PARTS OF FACE: Status: ACTIVE | Noted: 2021-10-14

## 2021-10-14 PROCEDURE — OTHER SUPERFICIAL RADIATION TREATMENT: OTHER

## 2021-10-14 PROCEDURE — 77401 RADIATION TX DELIVERY SUPFC: CPT

## 2021-10-14 PROCEDURE — 77280 THER RAD SIMULAJ FIELD SMPL: CPT

## 2021-10-14 PROCEDURE — OTHER TREATMENT REGIMEN: OTHER

## 2021-10-14 PROCEDURE — OTHER FOLLOW UP FOR NEXT VISIT: OTHER

## 2021-10-14 PROCEDURE — G6001 ECHO GUIDANCE RADIOTHERAPY: HCPCS

## 2021-10-14 NOTE — PROCEDURE: TREATMENT REGIMEN

## 2021-10-14 NOTE — PROCEDURE: SUPERFICIAL RADIATION TREATMENT
Detail Level: Zone
Render Additional Prescriptions In Note?: No
Bill For Simulation And Treatment Device Design: Yes
Energy (Optional-Please Include Units): 70KV
Total Dose (Optional-Please Include Units): 5321.60
Cumulative Dose In Cgy (Optional): 4970.02
Simple Simulation Preamble Text Will Be Included With Simple Simulations (.......... Indications): Simple simulation was performed today for the following reasons:
Dose / Tx In Cgy (Optional): 261.58
Fractionation Number: 19
Fractions / Week: 4
Fractions / Week Rx 4: 5
Patient Positioning: Sitting
Total Number Of Fractions Rx 3: 15
Computed Treatment Time In Min (Will Render The Same As Calculated Treatment Time If Left Blank): 0.41
Energy (Optional-Please Include Units): 70kv
Total Dose (Optional-Please Include Units): 5231.60 cGy
Treatment Margins In Cm: 0.5
Number Of Treatment Days: 1
Port Dimensions-X Axis In Cm: 1.5
Treatment Device Design After Initial Simulation Justification (Will Render If Bill For Treatment Devices = Yes): The patient is status post radiation simulation and is evaluated as to the use of additional devices for shielding and placement for radiation therapy.
Comments: RTOG 1
Assessment: Appropriate reaction
Simple Simulation Afterword Text Will Be Included With Simple Simulations (Indications............): The patient had a complete consultation regarding all applicable modalities for the treatment of their skin cancer and based on a variety of factors including the type of tumor, size, and location, the relevant medical history as well as local tissue factors, the functional status of the individual, the ability to perform necessary postoperative wound instructions and the need for simultaneous treatments as well as overall wound healing status, it was determined that the patient would begin radiation therapy treatment for skin cancer.  A full simulation and treatment device design was performed including the determination and formulation of appropriate simple and complex devices including lead shield of 0.762 mm thickness to form molded customized shielding to specifically correlate with the lesion size including treatment margin.  The custom lead shield is adequate to accommodate the appropriate applicator and provide adequate shielding around the treatment site.  The specific field applicator, shields, and devices both simple and complex as well as the specific patient setup is outlined below.  The patient was given a full consent for superficial radiation to both verbally and in writing and the full determination of patient's eligibility for treatment and selection is outlined on the patient eligibility and treatment selection form.  The specific superficial radiotherapy prescription was determined and was documented on the superficial radiotherapy prescription form.  A treatment calculation was also performed and documented on the treatment calculation form.  Based on the prescription, the patient was scheduled for a series of fractional treatments.
Pathology Override (Pathology Will Render As Diagnosis Name If Left Blank): Primary Nodular Basal Cell Carcinoma distributed on the Left Medial Malar Cheek.
Time Dose Fractionation (Optional- Include Units If Applicable): 95
Day Of The Week Treatment Administered: Thursday
Please Choose The Type Of Visit (Required): Treatment Visit: Show Treatment Variables
Functional Status: 1 (ambulatory, light activity)
Intro Statement (Will Not Render If Left Blank): The patient is undergoing superficial radiation therapy for skin cancer and presents for weekly evaluation and management.  Per protocol and as documented on the flow sheet, the patient was questioned as to subjective redness, pruritus, pain, drainage, fatigue, or any other symptoms.  Objectively, the radiation area was evaluated with regards to erythema, atrophy, scale, crusting, erosion, ulceration, edema, purpura, tenderness, warmth, drainage, and any other findings.  The plan was extensively reviewed including the dose, and dosing schedule.  The simulation and clinical setup was also reviewed as was the external and any internal shields and based on this review the appropriateness and sufficiency of treatment was determined.
Additional Prescription Justification Text: If there is any interruption in treatment exceeding 5 days please see Decay and Dose Adjustment Calculation and complete treatment under Prescription 2, 3 or 4 as appropriate.
Information: Selecting Yes will display possible errors in your note based on the variables you have selected. This validation is only offered as a suggestion for you. PLEASE NOTE THAT THE VALIDATION TEXT WILL BE REMOVED WHEN YOU FINALIZE YOUR NOTE. IF YOU WANT TO FAX A PRELIMINARY NOTE YOU WILL NEED TO TOGGLE THIS TO 'NO' IF YOU DO NOT WANT IT IN YOUR FAXED NOTE.
Field Number: 3
Treatment Time / Fractionation (Optional- Include Units): 0.41 Min
Energy (Optional-Please Include Units): 70kv
Energy (Include Units): 70kv
Body Location Override (Optional): Right Glabella
Shielding Size (Optional- Include Units): 1.5cm X 1.5cm
Total Number Of Fractions: 20
Field Size (Applicator): 2.0 cm
Daily Fractionated Dose (Optional- Include Units): 261.58 cGy
Custom Shielding Preamble Text Will Not Be Included With Simple Simulations (.......... X X Y Cm): A lead shield of 0.762 mm thickness is utilized to form a molded, custom shield with a
Custom Shielding Afterword Text Will Not Be Included With Simple Simulations (X X Y Cm............): port to correlate with the lesion size, including treatment margin. The custom lead shield is adequate to accommodate the appropriate applicator and provide adequate shielding around the treatment site. Additional shielding (as noted below) is used to protect sensitive, normal tissues.
Energy (Include Units): 70kv
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
Body Location Override (Optional): Left Medial Cheek
Functional Status: 0 (fully active)
Energy (Include Units): 70kv
Field Number: 2
Body Location Override (Optional): Right Inferior Nasal Cheek
Energy (Optional-Please Include Units): 70 Kv
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

## 2021-10-19 ENCOUNTER — APPOINTMENT (OUTPATIENT)
Dept: URBAN - METROPOLITAN AREA CLINIC 255 | Age: 81
Setting detail: DERMATOLOGY
End: 2021-11-02

## 2021-10-19 PROBLEM — C44.319 BASAL CELL CARCINOMA OF SKIN OF OTHER PARTS OF FACE: Status: ACTIVE | Noted: 2021-10-19

## 2021-10-19 PROCEDURE — 77280 THER RAD SIMULAJ FIELD SMPL: CPT

## 2021-10-19 PROCEDURE — 77401 RADIATION TX DELIVERY SUPFC: CPT

## 2021-10-19 PROCEDURE — OTHER TREATMENT REGIMEN: OTHER

## 2021-10-19 PROCEDURE — 77427 RADIATION TX MANAGEMENT X5: CPT

## 2021-10-19 PROCEDURE — G6001 ECHO GUIDANCE RADIOTHERAPY: HCPCS

## 2021-10-19 PROCEDURE — OTHER FOLLOW UP FOR NEXT VISIT: OTHER

## 2021-10-19 PROCEDURE — OTHER SUPERFICIAL RADIATION TREATMENT: OTHER

## 2021-10-19 NOTE — PROCEDURE: TREATMENT REGIMEN

## 2021-10-19 NOTE — PROCEDURE: TREATMENT REGIMEN

## 2021-10-19 NOTE — PROCEDURE: SUPERFICIAL RADIATION TREATMENT
Validate Note Data (See Information Below): Yes
Bill For Dosimetry/Render Decay And Dose Adjustment Calculation In Note: No
Total Number Of Fractions Rx 2: 15
Port Dimensions-Y Axis In Cm: 1.5
Fractionation Number: 20
Field Size (Applicator): 2.0 cm
Energy (Include Units): 70kv
Daily Fractionated Dose (Optional- Include Units): 261.58 cGy
Field Number: 2
Computed Treatment Time In Min (Will Render The Same As Calculated Treatment Time If Left Blank): 0.41
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
Time Dose Fractionation (Optional- Include Units If Applicable): 95
Custom Shielding Preamble Text Will Not Be Included With Simple Simulations (.......... X X Y Cm): A lead shield of 0.762 mm thickness is utilized to form a molded, custom shield with a
Fractions / Week Rx 4: 5
Patient Positioning: Sitting
Dose / Tx In Cgy (Optional): 261.58
Prescription Used: 1
Custom Shielding Afterword Text Will Not Be Included With Simple Simulations (X X Y Cm............): port to correlate with the lesion size, including treatment margin. The custom lead shield is adequate to accommodate the appropriate applicator and provide adequate shielding around the treatment site. Additional shielding (as noted below) is used to protect sensitive, normal tissues.
Day Of The Week Treatment Administered: Tuesday
Dimensions-X Axis In Cm: 0.5
Information: Selecting Yes will display possible errors in your note based on the variables you have selected. This validation is only offered as a suggestion for you. PLEASE NOTE THAT THE VALIDATION TEXT WILL BE REMOVED WHEN YOU FINALIZE YOUR NOTE. IF YOU WANT TO FAX A PRELIMINARY NOTE YOU WILL NEED TO TOGGLE THIS TO 'NO' IF YOU DO NOT WANT IT IN YOUR FAXED NOTE.
Energy (Optional-Please Include Units): 70Kv
Comments: RTOG 2
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
Cumulative Dose In Cgy (Optional): 5231.60
Comments: RTOG 1
Treatment Device Design After Initial Simulation Justification (Will Render If Bill For Treatment Devices = Yes): The patient is status post radiation simulation and is evaluated as to the use of additional devices for shielding and placement for radiation therapy.
Fractions / Week: 4
Detail Level: Zone
Simple Simulation Preamble Text Will Be Included With Simple Simulations (.......... Indications): Simple simulation was performed today for the following reasons:
Please Choose The Type Of Visit (Required): Treatment and Weekly Evaluation Visit: Show Treatment/Evaluation Variables
Functional Status: 1 (ambulatory, light activity)
Intro Statement (Will Not Render If Left Blank): The patient is undergoing superficial radiation therapy for skin cancer and presents for weekly evaluation and management.  Per protocol and as documented on the flow sheet, the patient was questioned as to subjective redness, pruritus, pain, drainage, fatigue, or any other symptoms.  Objectively, the radiation area was evaluated with regards to erythema, atrophy, scale, crusting, erosion, ulceration, edema, purpura, tenderness, warmth, drainage, and any other findings.  The plan was extensively reviewed including the dose, and dosing schedule.  The simulation and clinical setup was also reviewed as was the external and any internal shields and based on this review the appropriateness and sufficiency of treatment was determined.
Additional Prescription Justification Text: If there is any interruption in treatment exceeding 5 days please see Decay and Dose Adjustment Calculation and complete treatment under Prescription 2, 3 or 4 as appropriate.
Pathology Override (Pathology Will Render As Diagnosis Name If Left Blank): Primary Nodular Basal Cell Carcinoma distributed on the Left Medial Malar Cheek.
Initial Radiation Treatment Planning (Will Render If Bill Simulation = Yes): The patient had a complete consultation regarding all applicable modalities for the treatment of their skin cancer and based on a variety of factors including the type of tumor, size, and location, the relevant medical history as well as local tissue factors, the functional status of the individual, the ability to perform necessary postoperative wound instructions and the need for simultaneous treatments as well as overall wound healing status, it was determined that the patient would begin radiation therapy treatment for skin cancer.  A full simulation and treatment device design was performed including the determination and formulation of appropriate simple and complex devices including lead shield of 0.762 mm thickness to form molded customized shielding to specifically correlate with the lesion size including treatment margin.  The custom lead shield is adequate to accommodate the appropriate applicator and provide adequate shielding around the treatment site.  The specific field applicator, shields, and devices both simple and complex as well as the specific patient setup is outlined below.  The patient was given a full consent for superficial radiation to both verbally and in writing and the full determination of patient's eligibility for treatment and selection is outlined on the patient eligibility and treatment selection form.  The specific superficial radiotherapy prescription was determined and was documented on the superficial radiotherapy prescription form.  A treatment calculation was also performed and documented on the treatment calculation form.  Based on the prescription, the patient was scheduled for a series of fractional treatments.
Total Dose (Optional-Please Include Units): 5231.60 cGy
Energy (Optional-Please Include Units): 70kv
Energy (Include Units): 70kv
Total Dose (Optional-Please Include Units): 5321.60
Body Location Override (Optional): Right Glabella
Assessment: Appropriate reaction
Energy (Optional-Please Include Units): 70kv
Body Location Override (Optional): Right Inferior Nasal Cheek
Energy (Optional-Please Include Units): 70 Kv
Shielding Size (Optional- Include Units): 1.5cm x 1.5cm
Field Number: 3
Treatment Time / Fractionation (Optional- Include Units): 0.41 Min
Energy (Include Units): 70kv
Body Location Override (Optional): Left Medial Cheek
Functional Status: 0 (fully active)

## 2021-10-19 NOTE — PROCEDURE: TREATMENT REGIMEN
Plan: This patient has been treated today with image guided superficial radiation therapy for non-melanoma skin cancer. Written informed consent has been previously obtained from this patient for this treatment. This consent is documented in the patient’s chart. The patient gave verbal consent to continue treatment today. The patient was treated with a specific radiation dose and setup as prescribed by the provider listed on this visit note. A Radiation Therapist performed administration of radiation under supervision of provider. The treatment parameters and cumulative dose are indicated above. Prior to administering the radiation, the patient underwent a verification therapeutic radiology simulation-aided field setting defining relevant normal and abnormal target anatomy and acquiring images with high frequency ultrasound in addition to data necessary developing optimal radiation treatment process for the patient. This process includes verification of the treatment port(s) and proper treatment positioning. All treatment ports were photographed within electronic medical record. The patient’s customized lead blocking along with gross tumor volume and margin was confirmed. Considering superficial radiotherapy is clinical in setup, this requires physician and radiation therapist to clarify location interest being treated against initial images, pathology and patient anatomy. Care was taken ensuring fields treated were geometrically accurate and properly positioned using therapeutic radiology simulation-aided field setting verification per fraction. This process is also utilized to determine if any prescription or setup changes are necessary. These steps are therefore medically necessary ensuring safe and effective administration of radiation. Ongoing therapeutic radiology simulation-aided field setting verification is ordered throughout course of therapy.\\n\\nA high frequency ultrasound image was acquired today for two-dimensional evaluation of the tumor volume and response to treatment, in addition to geometric accuracy of field placement. US depth Is 0.54 mm, which is  0.02 mm in difference from previous imaging. The field placement and ultrasound imaging, per fraction, is separate and distinct from the initial simulation, and is an important task in providing safe administration of superficial radiation therapy. Physician evaluation of the ultrasound tumor depth will be ongoing throughout the course of treatment, and is deemed medically necessary in order to ensure the efficacy of treatment and any necessary changes. Today’s image was evaluated for determination of continuation of treatment with the current plan or with necessary changes as appropriate. According to provider review of verification therapeutic radiology simulation-aided field setting and imaging, no change is required (if change required, please document specifics and changes. If further services rendered for changes, document and submit appropriate billable CPT® codes). \\n\\nAdditionally, the use of ultrasound visualization and targeted assessment allows the patient to be able to see their cancer(s) progress, encouraging patient to complete and maintain compliance through full course of radiotherapy. Per Dr. London, continued ultrasound guidance and therapeutic radiology simulation-aided field setting verification per fraction is required for field placement, measurement of tumor depth, progress and acute effect monitoring.\\n\\nPer the request of Dr. London, this patient was seen today for Superficial Radiation Therapy management (CPT® 57104). A high frequency ultrasound image was acquired today for three-dimensional evaluation of tumor volume and response to treatment, in addition to geometric accuracy of field placement (CPT® ). Physician evaluation of the ultrasound tumor depth will be ongoing through the course of treatment and is deemed medically necessary ensuring efficacy of treatment. Today’s image and setup was evaluated determining continuation of treatment with the current plan, or necessary changes as appropriate. All appropriate custom blocking and treatment parameters were verified by the Radiation therapist according to initial simulation. \\n\\nPer Dr. London, continued daily US guidance is necessary for measurement of tumor depth, progress and edema monitoring.\\nEvaluation for response and reaction to SRT based on current fraction and cumulative dose with a visual inspection and ultrasound demonstrates a normal expected response. Superficial Radiation Therapy will continue as planned.\\n\\nThe patient is undergoing superficial radiation therapy for skin cancer and presents for weekly evaluation and management. Per protocol and as documented on the flow sheet, the patient was questioned as to subjective redness, pruritus, pain, drainage, fatigue, or any other symptoms. Objectively, the radiation area was evaluated with regards to erythema, atrophy, scale, crusting, erosion, ulceration, edema, purpura, tenderness, warmth, drainage, and any other findings. The plan was extensively reviewed including dose, and dosing schedule. The simulation and clinical setup was also reviewed as was the external and any internal shields and based on this review the appropriateness and sufficiency of treatment was determined.\\n\\nUS image was performed. US depth is 0.54 mm.\\n\\nPer Dr. London, continued daily US guidance and simulation is required for field placement, measurement of tumor depth, progress and edema monitoring. \\n Plan: This patient has been treated today with image guided superficial radiation therapy for non-melanoma skin cancer. Written informed consent has been previously obtained from this patient for this treatment. This consent is documented in the patient’s chart. The patient gave verbal consent to continue treatment today. The patient was treated with a specific radiation dose and setup as prescribed by the provider listed on this visit note. A Radiation Therapist performed administration of radiation under supervision of provider. The treatment parameters and cumulative dose are indicated above. Prior to administering the radiation, the patient underwent a verification therapeutic radiology simulation-aided field setting defining relevant normal and abnormal target anatomy and acquiring images with high frequency ultrasound in addition to data necessary developing optimal radiation treatment process for the patient. This process includes verification of the treatment port(s) and proper treatment positioning. All treatment ports were photographed within electronic medical record. The patient’s customized lead blocking along with gross tumor volume and margin was confirmed. Considering superficial radiotherapy is clinical in setup, this requires physician and radiation therapist to clarify location interest being treated against initial images, pathology and patient anatomy. Care was taken ensuring fields treated were geometrically accurate and properly positioned using therapeutic radiology simulation-aided field setting verification per fraction. This process is also utilized to determine if any prescription or setup changes are necessary. These steps are therefore medically necessary ensuring safe and effective administration of radiation. Ongoing therapeutic radiology simulation-aided field setting verification is ordered throughout course of therapy.\\n\\nA high frequency ultrasound image was acquired today for two-dimensional evaluation of the tumor volume and response to treatment, in addition to geometric accuracy of field placement. US depth Is 0.54 mm, which is  0.02 mm in difference from previous imaging. The field placement and ultrasound imaging, per fraction, is separate and distinct from the initial simulation, and is an important task in providing safe administration of superficial radiation therapy. Physician evaluation of the ultrasound tumor depth will be ongoing throughout the course of treatment, and is deemed medically necessary in order to ensure the efficacy of treatment and any necessary changes. Today’s image was evaluated for determination of continuation of treatment with the current plan or with necessary changes as appropriate. According to provider review of verification therapeutic radiology simulation-aided field setting and imaging, no change is required (if change required, please document specifics and changes. If further services rendered for changes, document and submit appropriate billable CPT® codes). \\n\\nAdditionally, the use of ultrasound visualization and targeted assessment allows the patient to be able to see their cancer(s) progress, encouraging patient to complete and maintain compliance through full course of radiotherapy. Per Dr. London, continued ultrasound guidance and therapeutic radiology simulation-aided field setting verification per fraction is required for field placement, measurement of tumor depth, progress and acute effect monitoring.\\n\\nPer the request of Dr. London, this patient was seen today for Superficial Radiation Therapy management (CPT® 52943). A high frequency ultrasound image was acquired today for three-dimensional evaluation of tumor volume and response to treatment, in addition to geometric accuracy of field placement (CPT® ). Physician evaluation of the ultrasound tumor depth will be ongoing through the course of treatment and is deemed medically necessary ensuring efficacy of treatment. Today’s image and setup was evaluated determining continuation of treatment with the current plan, or necessary changes as appropriate. All appropriate custom blocking and treatment parameters were verified by the Radiation therapist according to initial simulation. \\n\\nPer Dr. London, continued daily US guidance is necessary for measurement of tumor depth, progress and edema monitoring.\\nEvaluation for response and reaction to SRT based on current fraction and cumulative dose with a visual inspection and ultrasound demonstrates a normal expected response. Superficial Radiation Therapy will continue as planned.\\n\\nThe patient is undergoing superficial radiation therapy for skin cancer and presents for weekly evaluation and management. Per protocol and as documented on the flow sheet, the patient was questioned as to subjective redness, pruritus, pain, drainage, fatigue, or any other symptoms. Objectively, the radiation area was evaluated with regards to erythema, atrophy, scale, crusting, erosion, ulceration, edema, purpura, tenderness, warmth, drainage, and any other findings. The plan was extensively reviewed including dose, and dosing schedule. The simulation and clinical setup was also reviewed as was the external and any internal shields and based on this review the appropriateness and sufficiency of treatment was determined.\\n\\nUS image was performed. US depth is 0.54 mm.\\n\\nPer Dr. London, continued daily US guidance and simulation is required for field placement, measurement of tumor depth, progress and edema monitoring. \\n

## 2021-10-23 ENCOUNTER — APPOINTMENT (OUTPATIENT)
Dept: URBAN - METROPOLITAN AREA CLINIC 255 | Age: 81
Setting detail: DERMATOLOGY
End: 2021-11-02

## 2021-10-23 PROBLEM — C44.91 BASAL CELL CARCINOMA OF SKIN, UNSPECIFIED: Status: ACTIVE | Noted: 2021-10-23

## 2021-10-23 PROCEDURE — 77336 RADIATION PHYSICS CONSULT: CPT

## 2021-10-27 DIAGNOSIS — Z11.59 ENCOUNTER FOR SCREENING FOR OTHER VIRAL DISEASES: ICD-10-CM

## 2021-11-09 ENCOUNTER — APPOINTMENT (OUTPATIENT)
Dept: URBAN - METROPOLITAN AREA CLINIC 255 | Age: 81
Setting detail: DERMATOLOGY
End: 2021-11-09

## 2021-11-09 ENCOUNTER — ANTICOAGULATION THERAPY VISIT (OUTPATIENT)
Dept: ANTICOAGULATION | Facility: CLINIC | Age: 81
End: 2021-11-09

## 2021-11-09 ENCOUNTER — LAB (OUTPATIENT)
Dept: LAB | Facility: CLINIC | Age: 81
End: 2021-11-09
Payer: MEDICARE

## 2021-11-09 DIAGNOSIS — Z79.01 LONG TERM CURRENT USE OF ANTICOAGULANTS WITH INR GOAL OF 2.0-3.0: Primary | ICD-10-CM

## 2021-11-09 DIAGNOSIS — I48.20 CHRONIC ATRIAL FIBRILLATION (H): ICD-10-CM

## 2021-11-09 DIAGNOSIS — Z79.01 LONG TERM CURRENT USE OF ANTICOAGULANTS WITH INR GOAL OF 2.0-3.0: ICD-10-CM

## 2021-11-09 DIAGNOSIS — I48.0 PAROXYSMAL ATRIAL FIBRILLATION (H): ICD-10-CM

## 2021-11-09 LAB — INR BLD: 2 (ref 0.9–1.1)

## 2021-11-09 PROCEDURE — 85610 PROTHROMBIN TIME: CPT

## 2021-11-09 PROCEDURE — 36416 COLLJ CAPILLARY BLOOD SPEC: CPT

## 2021-11-09 NOTE — PROGRESS NOTES
ANTICOAGULATION MANAGEMENT     Aniket Macias 81 year old male is on warfarin with therapeutic INR result. (Goal INR 2.0-3.0)    Recent labs: (last 7 days)     11/09/21  0912   INR 2.0*       ASSESSMENT     Source(s): Chart Review and Patient/Caregiver Call       Warfarin doses taken: Warfarin taken as instructed    Diet: No new diet changes identified    New illness, injury, or hospitalization: No    Medication/supplement changes: None noted    Signs or symptoms of bleeding or clotting: No    Previous INR: Therapeutic last 2(+) visits    Additional findings: None     PLAN     Recommended plan for no diet, medication or health factor changes affecting INR     Dosing Instructions: Continue your current warfarin dose with next INR in 6 weeks       Summary  As of 11/9/2021    Full warfarin instructions:  2.5 mg every Mon, Wed, Fri; 5 mg all other days   Next INR check:  12/21/2021             Telephone call with Aniket who verbalizes understanding and agrees to plan    Lab visit scheduled    Education provided: Please call back if any changes to your diet, medications or how you've been taking warfarin    Plan made per [facility] anticoagulation protocol    Radha Seymour RN  Anticoagulation Clinic  11/9/2021    _______________________________________________________________________     Anticoagulation Episode Summary     Current INR goal:  2.0-3.0   TTR:  95.3 % (1 y)   Target end date:  Indefinite   Send INR reminders to:  [facility]    Indications    Paroxysmal atrial fibrillation (Resolved) [I48.0]  Long term current use of anticoagulants with INR goal of 2.0-3.0 [Z79.01]  Chronic atrial fibrillation (H) [I48.20]           Comments:           Anticoagulation Care Providers     Provider Role Specialty Phone number    Maribeth Haney MD Referring Internal Medicine - Pediatrics [phone]    Sebastián Bermudez MD Responsible Clinical Cardiac Electrophysiology [phone]

## 2021-11-16 ENCOUNTER — APPOINTMENT (OUTPATIENT)
Dept: URBAN - METROPOLITAN AREA CLINIC 255 | Age: 81
Setting detail: DERMATOLOGY
End: 2021-11-18

## 2021-11-16 PROBLEM — C44.91 BASAL CELL CARCINOMA OF SKIN, UNSPECIFIED: Status: ACTIVE | Noted: 2021-11-16

## 2021-11-16 PROCEDURE — G6001 ECHO GUIDANCE RADIOTHERAPY: HCPCS

## 2021-11-16 PROCEDURE — 99212 OFFICE O/P EST SF 10 MIN: CPT

## 2021-11-19 ENCOUNTER — TRANSFERRED RECORDS (OUTPATIENT)
Dept: HEALTH INFORMATION MANAGEMENT | Facility: CLINIC | Age: 81
End: 2021-11-19
Payer: MEDICARE

## 2021-12-04 ENCOUNTER — LAB (OUTPATIENT)
Dept: URGENT CARE | Facility: URGENT CARE | Age: 81
End: 2021-12-04
Attending: SPECIALIST
Payer: MEDICARE

## 2021-12-04 DIAGNOSIS — Z11.59 ENCOUNTER FOR SCREENING FOR OTHER VIRAL DISEASES: ICD-10-CM

## 2021-12-04 PROCEDURE — U0003 INFECTIOUS AGENT DETECTION BY NUCLEIC ACID (DNA OR RNA); SEVERE ACUTE RESPIRATORY SYNDROME CORONAVIRUS 2 (SARS-COV-2) (CORONAVIRUS DISEASE [COVID-19]), AMPLIFIED PROBE TECHNIQUE, MAKING USE OF HIGH THROUGHPUT TECHNOLOGIES AS DESCRIBED BY CMS-2020-01-R: HCPCS

## 2021-12-04 PROCEDURE — U0005 INFEC AGEN DETEC AMPLI PROBE: HCPCS

## 2021-12-05 LAB — SARS-COV-2 RNA RESP QL NAA+PROBE: NEGATIVE

## 2021-12-06 RX ORDER — LIDOCAINE 40 MG/G
CREAM TOPICAL
Status: CANCELLED | OUTPATIENT
Start: 2021-12-06

## 2021-12-06 RX ORDER — ONDANSETRON 2 MG/ML
4 INJECTION INTRAMUSCULAR; INTRAVENOUS
Status: CANCELLED | OUTPATIENT
Start: 2021-12-06

## 2021-12-07 ENCOUNTER — HOSPITAL ENCOUNTER (OUTPATIENT)
Facility: CLINIC | Age: 81
Discharge: HOME | End: 2021-12-07
Attending: SPECIALIST | Admitting: SPECIALIST
Payer: MEDICARE

## 2021-12-07 ENCOUNTER — TELEPHONE (OUTPATIENT)
Dept: FAMILY MEDICINE | Facility: CLINIC | Age: 81
End: 2021-12-07
Payer: MEDICARE

## 2021-12-07 VITALS
HEART RATE: 72 BPM | OXYGEN SATURATION: 96 % | DIASTOLIC BLOOD PRESSURE: 90 MMHG | BODY MASS INDEX: 25.05 KG/M2 | HEIGHT: 70 IN | RESPIRATION RATE: 37 BRPM | SYSTOLIC BLOOD PRESSURE: 157 MMHG | WEIGHT: 175 LBS

## 2021-12-07 DIAGNOSIS — I48.20 CHRONIC ATRIAL FIBRILLATION (H): ICD-10-CM

## 2021-12-07 DIAGNOSIS — Z79.01 LONG TERM CURRENT USE OF ANTICOAGULANTS WITH INR GOAL OF 2.0-3.0: Primary | ICD-10-CM

## 2021-12-07 LAB — COLONOSCOPY: NORMAL

## 2021-12-07 PROCEDURE — 45381 COLONOSCOPY SUBMUCOUS NJX: CPT

## 2021-12-07 PROCEDURE — 45385 COLONOSCOPY W/LESION REMOVAL: CPT | Mod: PT | Performed by: SPECIALIST

## 2021-12-07 PROCEDURE — 99153 MOD SED SAME PHYS/QHP EA: CPT | Performed by: SPECIALIST

## 2021-12-07 PROCEDURE — 88305 TISSUE EXAM BY PATHOLOGIST: CPT | Mod: TC | Performed by: SPECIALIST

## 2021-12-07 PROCEDURE — G0500 MOD SEDAT ENDO SERVICE >5YRS: HCPCS | Performed by: SPECIALIST

## 2021-12-07 PROCEDURE — 250N000011 HC RX IP 250 OP 636: Performed by: SPECIALIST

## 2021-12-07 RX ORDER — FENTANYL CITRATE 50 UG/ML
INJECTION, SOLUTION INTRAMUSCULAR; INTRAVENOUS PRN
Status: COMPLETED | OUTPATIENT
Start: 2021-12-07 | End: 2021-12-07

## 2021-12-07 RX ADMIN — FENTANYL CITRATE 25 MCG: 50 INJECTION, SOLUTION INTRAMUSCULAR; INTRAVENOUS at 12:10

## 2021-12-07 RX ADMIN — MIDAZOLAM 0.5 MG: 1 INJECTION INTRAMUSCULAR; INTRAVENOUS at 12:10

## 2021-12-07 RX ADMIN — MIDAZOLAM 0.5 MG: 1 INJECTION INTRAMUSCULAR; INTRAVENOUS at 12:15

## 2021-12-07 RX ADMIN — FENTANYL CITRATE 25 MCG: 50 INJECTION, SOLUTION INTRAMUSCULAR; INTRAVENOUS at 12:15

## 2021-12-07 ASSESSMENT — MIFFLIN-ST. JEOR: SCORE: 1500.04

## 2021-12-07 NOTE — H&P
Pre-Endoscopy History and Physical     Aniket Macias MRN# [id number]   YOB: 1940 Age: 81 year old     Date of Procedure: 12/7/2021  Primary care provider: Maribeth Haney  Type of Endoscopy: colonoscopy  Reason for Procedure: history of adenomatous polyps  Type of Anesthesia Anticipated: Moderate sedation    HPI:    Aniket is a 81 year old male who will be undergoing the above procedure.      A history and physical has been performed. The patient's medications and allergies have been reviewed. The risks and benefits of the procedure and the sedation options and risks were discussed with the patient.  All questions were answered and informed consent was obtained.      He denies a personal or family history of anesthesia complications or bleeding disorders.     No Known Allergies     Current Facility-Administered Medications   Medication     fentaNYL (PF) (SUBLIMAZE) injection     midazolam (VERSED) injection       Patient Active Problem List   Diagnosis     Benign essential hypertension; goal < 140/90     Hyperlipidemia     Glaucoma     Benign neoplasm of colon     Benign non-nodular prostatic hyperplasia with lower urinary tract symptoms     History of basal cell carcinoma     Rosacea     Spinal stenosis of lumbar region with neurogenic claudication     Long term current use of anticoagulants with INR goal of 2.0-3.0     Bilateral leg edema     Non-sustained ventricular tachycardia (H)     Chronic atrial fibrillation (H)     Chronic diastolic heart failure (H)     Pulmonary emphysema, unspecified emphysema type (H)     UIP (usual interstitial pneumonitis) (H)        Past Medical History:   Diagnosis Date     Basal cell carcinoma      BPH (benign prostatic hypertrophy)      Diverticulosis      Glaucoma      Hemorrhoids      HTN (hypertension)      Hyperlipidemia      Obesity      Persistent atrial fibrillation (H)      PVC (premature ventricular contraction)      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      Syncope 2016        Past Surgical History:   Procedure Laterality Date     BACK SURGERY       COLONOSCOPY  11/19/15    hx polyps     SURGICAL HISTORY OF -       Laminectomy     SURGICAL HISTORY OF -       biopsy of prostate     TONSILLECTOMY & ADENOIDECTOMY         Social History     Tobacco Use     Smoking status: Former Smoker     Packs/day: 2.00     Years: 35.00     Pack years: 70.00     Types: Cigarettes     Quit date: 1995     Years since quittin.6     Smokeless tobacco: Never Used     Tobacco comment: quit age 54   Substance Use Topics     Alcohol use: Yes     Alcohol/week: 0.0 standard drinks     Comment: 1-2 drinks per week       Family History   Problem Relation Age of Onset     Cerebrovascular Disease Mother      Hypertension Mother      Cerebrovascular Disease Father      Myocardial Infarction Father      Hypertension Father      Hypertension Sister      Myocardial Infarction Sister      LUNG DISEASE No family hx of        REVIEW OF SYSTEMS:   5 point ROS negative except as noted above in HPI, including Gen., Resp., CV, GI &  system review.    PHYSICAL EXAM:   BP (!) 157/83   Pulse 68   Resp 16   Ht 1.778 m (5' 10\")   Wt 79.4 kg (175 lb)   SpO2 97%   BMI 25.11 kg/m   Estimated body mass index is 25.11 kg/m  as calculated from the following:    Height as of this encounter: 1.778 m (5' 10\").    Weight as of this encounter: 79.4 kg (175 lb).   GENERAL APPEARANCE: healthy  MENTAL STATUS: alert or interactive  AIRWAY EXAM: Mallampatti Class I (visualization of the soft palate, fauces, uvula, anterior and posterior pillars)  RESP: lungs clear to auscultation - no rales, rhonchi or wheezes  CV: irregularly irregular rhythm    DIAGNOSTICS:    Not indicated    IMPRESSION   ASA Class 2 - Mild systemic disease    PLAN:   colonoscopy    The above has been forwarded to the consulting provider.      Signed Electronically by: Eugene Burden MD  2021        "

## 2021-12-07 NOTE — TELEPHONE ENCOUNTER
ANTICOAGULATION  MANAGEMENT: Discharge Review    Aniket Macias chart reviewed for anticoagulation continuity of care    Outpatient surgery/procedure on 12/7/21 for colonoscopy.    Discharge disposition: Home    Results:    No results for input(s): INR, [id number], F2, ALMWH, AAUFH in the last 168 hours.  Anticoagulation inpatient management:     not applicable     Anticoagulation discharge instructions:     Warfarin dosing: home regimen continued   Bridging: No   INR goal change: No      Medication changes affecting anticoagulation: No    Additional factors affecting anticoagulation: No    Plan     Recommend to check INR on 12/14/21    [facility] was not informed of colonoscopy. No hold/ bridge instructions given. I LM for patient to call me back. Need to confirm if he held for 4 or 5 days pre procedure. Advised to take a booster (double dose) tonight and call [facility].     Anticoagulation Calendar updated    Radha Seymour RN

## 2021-12-08 ENCOUNTER — TELEPHONE (OUTPATIENT)
Dept: FAMILY MEDICINE | Facility: CLINIC | Age: 81
End: 2021-12-08

## 2021-12-08 LAB
PATH REPORT.COMMENTS IMP SPEC: NORMAL
PATH REPORT.COMMENTS IMP SPEC: NORMAL
PATH REPORT.FINAL DX SPEC: NORMAL
PATH REPORT.GROSS SPEC: NORMAL
PATH REPORT.MICROSCOPIC SPEC OTHER STN: NORMAL
PATH REPORT.RELEVANT HX SPEC: NORMAL
PHOTO IMAGE: NORMAL

## 2021-12-08 PROCEDURE — 88305 TISSUE EXAM BY PATHOLOGIST: CPT | Mod: 26 | Performed by: PATHOLOGY

## 2021-12-08 NOTE — RESULT ENCOUNTER NOTE
Assessment: Adenomatous polyps are benign, but have malignant potential. This increases the risk of colon cancer and impacts the frequency of colonoscopy. Based on the updated polypectomy surveillance recommendations, individuals with 1-2 sessile serrated polyps (SSPs) <10 mm should undergo surveillance colonoscopy in 5-10 years. The [place] preventive services task force recommends against surveillance colonoscopy beyond age 85.

## 2021-12-08 NOTE — TELEPHONE ENCOUNTER
Reason for Call:  Patient returning call    Detailed comments: Patient stated he followed directions on increased dose of warfarin/but is calling back to speak with someone in anticoagulation. Please contact patient for next route of care    Phone Number Patient can be reached at: Home number on file [phone] (home)    Best Time: ANY    Can we leave a detailed message on this number? YES    Call taken on 12/8/2021 at 11:37 AM by Sherri Bryant

## 2021-12-08 NOTE — TELEPHONE ENCOUNTER
Spoke with Lowell, he got message yesterday for boost. Advised to resume maintenance dose and recheck 7 days after procedure. He states he cannot come in next week as there is not an early enough time. Declined to schedule INR check and states he will recheck as scheduled on 12/21/21.    Ellyn Seymour RN   [facility]  [facility]

## 2021-12-08 NOTE — TELEPHONE ENCOUNTER
See 12/7/21 RUPA Seymour RN   [facility]  Jessica Boone, [place], [place], [facility]

## 2021-12-21 ENCOUNTER — ANTICOAGULATION THERAPY VISIT (OUTPATIENT)
Dept: ANTICOAGULATION | Facility: CLINIC | Age: 81
End: 2021-12-21

## 2021-12-21 ENCOUNTER — LAB (OUTPATIENT)
Dept: LAB | Facility: CLINIC | Age: 81
End: 2021-12-21
Payer: MEDICARE

## 2021-12-21 DIAGNOSIS — I48.0 PAROXYSMAL ATRIAL FIBRILLATION (H): ICD-10-CM

## 2021-12-21 DIAGNOSIS — I48.20 CHRONIC ATRIAL FIBRILLATION (H): ICD-10-CM

## 2021-12-21 DIAGNOSIS — Z79.01 LONG TERM CURRENT USE OF ANTICOAGULANTS WITH INR GOAL OF 2.0-3.0: Primary | ICD-10-CM

## 2021-12-21 DIAGNOSIS — Z79.01 LONG TERM CURRENT USE OF ANTICOAGULANTS WITH INR GOAL OF 2.0-3.0: ICD-10-CM

## 2021-12-21 LAB — INR BLD: 1.8 (ref 0.9–1.1)

## 2021-12-21 PROCEDURE — 36416 COLLJ CAPILLARY BLOOD SPEC: CPT

## 2021-12-21 PROCEDURE — 85610 PROTHROMBIN TIME: CPT

## 2021-12-21 NOTE — PROGRESS NOTES
ANTICOAGULATION MANAGEMENT     Aniket Macias 81 year old male is on warfarin with subtherapeutic INR result. (Goal INR 2.0-3.0)    Recent labs: (last 7 days)     12/21/21  0859   INR 1.8*       ASSESSMENT     Source(s): Chart Review and Patient/Caregiver Call       Warfarin doses taken: Warfarin taken as instructed    Diet: Increased greens/vitamin K in diet; plans to resume previous intake    New illness, injury, or hospitalization: No    Medication/supplement changes: None noted    Signs or symptoms of bleeding or clotting: No    Previous INR: Therapeutic last 2(+) visits    Additional findings: warfarin recently held for colonoscopy     PLAN     Recommended plan for temporary change(s) affecting INR     Dosing Instructions: Booster dose then continue your current warfarin dose with next INR in 2 weeks       Summary  As of 12/21/2021    Full warfarin instructions:  12/21: 7.5 mg; Otherwise 2.5 mg every Mon, Wed, Fri; 5 mg all other days   Next INR check:  1/4/2022             Telephone call with Aniket who verbalizes understanding and agrees to plan, except the follow up interval recommended. Patient states that he used to go 6 weeks between INR checks. Education provided on why a 2 week recheck is strongly recommended. He verbalized understanding but declined to schedule any sooner than 4 weeks.     Lab visit scheduled on the date that patient requested.    Education provided: Importance of consistent vitamin K intake, Impact of vitamin K foods on INR, Goal range and significance of current result, Importance of therapeutic range, Importance of following up at instructed interval, Monitoring for clotting signs and symptoms and Contact [phone]  with any changes, questions or concerns.     Plan made per [facility] anticoagulation protocol    Natacha Barnes RN  Anticoagulation Clinic  12/21/2021    _______________________________________________________________________     Anticoagulation Episode Summary     Current  INR goal:  2.0-3.0   TTR:  83.8 % (1 y)   Target end date:  Indefinite   Send INR reminders to:  [facility]    Indications    Paroxysmal atrial fibrillation (Resolved) [I48.0]  Long term current use of anticoagulants with INR goal of 2.0-3.0 [Z79.01]  Chronic atrial fibrillation (H) [I48.20]           Comments:           Anticoagulation Care Providers     Provider Role Specialty Phone number    Maribeth Haney MD Referring Internal Medicine - Pediatrics [phone]    Sebastián Bermudez MD Responsible Clinical Cardiac Electrophysiology [phone]

## 2021-12-29 ENCOUNTER — E-VISIT (OUTPATIENT)
Dept: FAMILY MEDICINE | Facility: CLINIC | Age: 81
End: 2021-12-29
Payer: MEDICARE

## 2021-12-29 ENCOUNTER — NURSE TRIAGE (OUTPATIENT)
Dept: NURSING | Facility: CLINIC | Age: 81
End: 2021-12-29
Payer: MEDICARE

## 2021-12-29 DIAGNOSIS — Z20.822 SUSPECTED COVID-19 VIRUS INFECTION: Primary | ICD-10-CM

## 2021-12-29 PROCEDURE — 99207 PR NON-BILLABLE SERV PER CHARTING: CPT | Performed by: PHYSICIAN ASSISTANT

## 2021-12-29 RX ORDER — BENZONATATE 100 MG/1
CAPSULE ORAL
Qty: 45 CAPSULE | Refills: 0 | Status: SHIPPED | OUTPATIENT
Start: 2021-12-29 | End: 2023-08-22

## 2021-12-29 NOTE — PATIENT INSTRUCTIONS
Lowell,      Based on your responses, you may have coronavirus (COVID-19). This illness can cause fever, cough and trouble breathing. Many people get a mild case and get better on their own. Some people can get very sick.    Will I be tested for COVID-19?  We would like to test you for COVID-19 virus. I have placed orders for this test.     To schedule: go to your [employer] home page and scroll down to the section that says  You have an appointment that needs to be scheduled  and click the large green button that says  Schedule Now  and follow the steps to find the next available openings.    If you are unable to complete these [employer] scheduling steps, please call [phone] to schedule your testing.     Return to work/school/ guidance:  Please let your workplace manager and staffing office know when your isolation ends.       If you receive a positive COVID-19 test result, follow the guidance of the those who are giving you the results. Usually the return to work is 10 days from symptom onset or positive test date, (or in some cases 20 days if you are immunocompromised). If your symptoms started after your positive test, the 10 days should start when your symptoms started.   o If you work at [employer], you must also be cleared by Employee Occupational Health and Safety to return to work.      If you receive a negative COVID-19 test result and did not have a high risk exposure to someone with a known positive COVID-19 test, you can return to work once you're free of fever for 24 hours without fever-reducing medication and your symptoms are improving or resolved.    If you receive a negative COVID-19 test and had a high-risk exposure to someone who has tested positive for COVID-19 then you can return to work 14 days after your last contact with the positive individual. Follow quarantine guidance given by your doctor or public health officials.     Sign up for Get[employer] Loop:  We know it's scary to hear  that you might have COVID-19. We want to track your symptoms to make sure you're okay over the next 2 weeks. Please look for an email from [employer]--this is a free, online program that we'll use to keep in touch. To sign up, follow the link in the email you will receive. Learn more at [email or web address]    How can I take care of myself?  Over the counter medications may help with your symptoms like congestion, cough, chills, or fever. I have sent in a prescription for Tessalon Perles which will help with your cough  There are not many effective prescription treatments for early COVID-19. Hydroxychloroquine, ivermectin, and azithromycin are not effective or recommended for COVID-19.    If your symptoms started in the last 10 days, you may be able to receive a treatment with monoclonal antibodies. This treatment can lower your risk of severe illness and going to the hospital. It is given through an IV or under your skin (subcutaneous) and must be given at an infusion center. You must be 12 or older, weight at least 88 pounds, and have a positive COVID-19 test.     If you would like to sign up to be considered to receive the monoclonal antibody medicine, please complete a participation form through the [facility] here: MNRAP ([email or web address]). You may also call the [facility] COVID-19 Public Hotline at 1-934.931.4418 (open Mon-Fri: 9am-7pm and Sat: 10am-6pm).     Not all people who are eligible will receive the medicine, since supply is limited. You will be contacted in the next 1 to 2 business days only if you are selected. If you do not receive a call, you have not been selected to receive the medicine. If you have any questions about this medication, please contact your primary care provider. For more information, see [email or web address]/[email or web address]/[email or web address]/[email or web address].pdf      Get lots of rest. Drink extra fluids (unless a doctor  has told you not to)    Take Tylenol (acetaminophen) or ibuprofen for fever or pain. If you have liver or kidney problems, ask your family doctor if it's okay to take Tylenol o ibuprofen    Take over the counter medications for your symptoms, as directed by your doctor. You may also talk to your pharmacist.      If you have other health problems (like cancer, heart failure, an organ transplant or severe kidney disease): Call your specialty clinic if you don't feel better in the next 2 days.    Know when to call 911. Emergency warning signs include:  o Trouble breathing or shortness of breath  o Pain or pressure in the chest that doesn't go away  o Feeling confused like you haven't felt before, or not being able to wake up  o Bluish-colored lips or face    Where can I get more information?     [facility] [place] - About COVID-19: [email or web address]     CDC - What to Do If You're Sick:     www.cdc.gov/coronavirus/2019-ncov/about/steps-when-sick.html    CDC - Ending Home Isolation:  https://www.cdc.gov/coronavirus/2019-ncov/your-health/quarantine-isolation.html    CDC - Caring for Someone:  www.cdc.gov/coronavirus/2019-ncov/if-you-are-sick/care-for-someone.html    [facility] clinical trials (COVID-19 research studies): clinicalaffairs.[facility]/n-clinical-trials    Below are the COVID-19 hotlines at the [facility] ([facility]). Interpreters are available.  o For health questions: Call 213-614-4500 or 1-120.923.4654 (7 a.m. to 7 p.m.)  o For questions about schools and childcare: Call 747-566-2817 or 1-848.594.4785 (7 a.m. to 7 p.m.)  December 29, 2021  RE:  Aniket Macias                                                                                                                  [address]   [place] [address]      To whom it may concern:    I evaluated Aniket Macias on December 29, 2021. Aniket Macias should be excused from work/school.     They should  let their workplace manager and staffing office know when their quarantine ends.    We can not give an exact date as it depends on the information below. They can calculate this on their own or work with their manager/staffing office to calculate this. (For example if they were exposed on 10/04, they would have to quarantine for 14 full days. That would be through 10/18. They could return on 10/19.)    Quarantine Guidelines:    If patient receives a positive COVID-19 test result, they should follow the guidance of those who are giving the results. Usually the return to work is 10 (or in some cases 20 days from symptom onset.) If they work at [employer], they must be cleared by Employee Occupational Health and Safety to return to work.      If patient receives a negative COVID-19 test result and did not have a high risk exposure to someone with a known positive COVID-19 test, they can return to work once they're free of fever for 24 hours without fever-reducing medication and their symptoms are improving or resolved.    If patient receives a negative COVID-19 test and if they had a high risk exposure to someone who has tested positive for COVID-19 then they can return to work 14 days after their last contact with the positive individual    Note: If there is ongoing exposure to the covid positive person, this quarantine period may be longer than 14 days. (For example, if they are continually exposed to their child, who tested positive and cannot isolate from them, then the quarantine of 7-14 days can't start until their child is no longer contagious. This is typically 10 days from onset to the child's symptoms. So the total duration may be 17-24 days in this case.)     Sincerely,  STEPHEN Bah

## 2021-12-29 NOTE — TELEPHONE ENCOUNTER
Lowell is calling and states that he is trying to get a covid test.  Patient had one on Dec 4th for a colonoscopy.  Patient has a history of emphysema   Patient denies fever.  Patient is coughing and having drainage out of his nose.  Patient is requesting a virtual visit.    COVID 19 Nurse Triage Plan/Patient Instructions    Please be aware that novel coronavirus (COVID-19) may be circulating in the community. If you develop symptoms such as fever, cough, or SOB or if you have concerns about the presence of another infection including coronavirus (COVID-19), please contact your health care provider or visit [email or web address].     Disposition/Instructions    Virtual Visit with provider recommended. Reference Visit Selection Guide.    Thank you for taking steps to prevent the spread of this virus.  o Limit your contact with others.  o Wear a simple mask to cover your cough.  o Wash your hands well and often.    Resources    [facility]: About COVID-19: [email or web address]    CDC: What to Do If You're Sick: www.cdc.gov/coronavirus/2019-ncov/about/steps-when-sick.html    CDC: Ending Home Isolation: www.cdc.gov/coronavirus/2019-ncov/hcp/disposition-in-home-patients.html     CDC: Caring for Someone: www.cdc.gov/coronavirus/2019-ncov/if-you-are-sick/care-for-someone.html     [facility]: Interim Guidance for Hospital Discharge to Home: www.health.[facility].mn.us/diseases/coronavirus/hcp/hospdischarge.pdf    [facility] clinical trials (COVID-19 research studies): clinicalaffairs.[facility]-clinical-trials     Below are the COVID-19 hotlines at the [place] [facility] ([facility]). Interpreters are available.   o For health questions: Call 105-613-9578 or 1-250.166.8073 (7 a.m. to 7 p.m.)  o For questions about schools and childcare: Call 113-460-4361 or 1-634.796.7281 (7 a.m. to 7 p.m.)                       Reason for Disposition    [1] Continuous (nonstop) coughing interferes with work or  school AND [2] no improvement using cough treatment per protocol    Additional Information    Negative: SEVERE difficulty breathing (e.g., struggling for each breath, speaks in single words)    Negative: Difficult to awaken or acting confused (e.g., disoriented, slurred speech)    Negative: Bluish (or gray) lips or face now    Negative: Shock suspected (e.g., cold/pale/clammy skin, too weak to stand, low BP, rapid pulse)    Negative: Sounds like a life-threatening emergency to the triager    Negative: SEVERE or constant chest pain or pressure (Exception: mild central chest pain, present only when coughing)    Negative: MODERATE difficulty breathing (e.g., speaks in phrases, SOB even at rest, pulse 100-120)    Negative: [1] Headache AND [2] stiff neck (can't touch chin to chest)    Negative: MILD difficulty breathing (e.g., minimal/no SOB at rest, SOB with walking, pulse <100)    Negative: Chest pain or pressure    Negative: Patient sounds very sick or weak to the triager    Negative: Fever > 103 F (39.4 C)    Negative: [1] Fever > 101 F (38.3 C) AND [2] age > 60 years    Negative: [1] Fever > 100.0 F (37.8 C) AND [2] bedridden (e.g., nursing home patient, CVA, chronic illness, recovering from surgery)    Negative: HIGH RISK for severe COVID complications (e.g., age > 64 years, obesity with BMI > 25, pregnant, chronic lung disease or other chronic medical condition)  (Exception: Already seen by PCP and no new or worsening symptoms.)    Negative: [1] HIGH RISK patient AND [2] influenza is widespread in the community AND [3] ONE OR MORE respiratory symptoms: cough, sore throat, runny or stuffy nose    Negative: [1] HIGH RISK patient AND [2] influenza exposure within the last 7 days AND [3] ONE OR MORE respiratory symptoms: cough, sore throat, runny or stuffy nose    Negative: [1] COVID-19 infection suspected by caller or triager AND [2] mild symptoms (cough, fever, or others) AND [3] negative COVID-19 rapid test     Negative: Fever present > 3 days (72 hours)    Negative: [1] Fever returns after gone for over 24 hours AND [2] symptoms worse or not improved    Protocols used: CORONAVIRUS (COVID-19) DIAGNOSED OR [id number] 8.25.2021

## 2022-01-04 ENCOUNTER — LAB (OUTPATIENT)
Dept: URGENT CARE | Facility: URGENT CARE | Age: 82
End: 2022-01-04
Attending: PHYSICIAN ASSISTANT
Payer: MEDICARE

## 2022-01-04 DIAGNOSIS — Z20.822 SUSPECTED COVID-19 VIRUS INFECTION: ICD-10-CM

## 2022-01-04 PROCEDURE — U0003 INFECTIOUS AGENT DETECTION BY NUCLEIC ACID (DNA OR RNA); SEVERE ACUTE RESPIRATORY SYNDROME CORONAVIRUS 2 (SARS-COV-2) (CORONAVIRUS DISEASE [COVID-19]), AMPLIFIED PROBE TECHNIQUE, MAKING USE OF HIGH THROUGHPUT TECHNOLOGIES AS DESCRIBED BY CMS-2020-01-R: HCPCS

## 2022-01-04 PROCEDURE — U0005 INFEC AGEN DETEC AMPLI PROBE: HCPCS

## 2022-01-06 LAB — SARS-COV-2 RNA RESP QL NAA+PROBE: NEGATIVE

## 2022-01-18 ENCOUNTER — ANTICOAGULATION THERAPY VISIT (OUTPATIENT)
Dept: ANTICOAGULATION | Facility: CLINIC | Age: 82
End: 2022-01-18

## 2022-01-18 ENCOUNTER — LAB (OUTPATIENT)
Dept: LAB | Facility: CLINIC | Age: 82
End: 2022-01-18
Payer: MEDICARE

## 2022-01-18 DIAGNOSIS — I48.0 PAROXYSMAL ATRIAL FIBRILLATION (H): ICD-10-CM

## 2022-01-18 DIAGNOSIS — I48.20 CHRONIC ATRIAL FIBRILLATION (H): ICD-10-CM

## 2022-01-18 DIAGNOSIS — Z79.01 LONG TERM CURRENT USE OF ANTICOAGULANTS WITH INR GOAL OF 2.0-3.0: ICD-10-CM

## 2022-01-18 DIAGNOSIS — Z79.01 LONG TERM CURRENT USE OF ANTICOAGULANTS WITH INR GOAL OF 2.0-3.0: Primary | ICD-10-CM

## 2022-01-18 LAB — INR BLD: 2 (ref 0.9–1.1)

## 2022-01-18 PROCEDURE — 36416 COLLJ CAPILLARY BLOOD SPEC: CPT

## 2022-01-18 PROCEDURE — 85610 PROTHROMBIN TIME: CPT

## 2022-01-18 NOTE — PROGRESS NOTES
ANTICOAGULATION MANAGEMENT     Aniket Macias 81 year old male is on warfarin with therapeutic INR result. (Goal INR 2.0-3.0)    Recent labs: (last 7 days)     01/18/22  0901   INR 2.0*       ASSESSMENT     Source(s): Chart Review and Patient/Caregiver Call     Warfarin doses taken: Warfarin taken as instructed  Diet: No new diet changes identified  New illness, injury, or hospitalization: No  Medication/supplement changes: None noted  Signs or symptoms of bleeding or clotting: Yes: intermittent bruising  Previous INR: Subtherapeutic  Additional findings: None     PLAN     Recommended plan for no diet, medication or health factor changes affecting INR     Dosing Instructions: Continue your current warfarin dose with next INR in 4 weeks       Summary  As of 1/18/2022    Full warfarin instructions:  2.5 mg every Mon, Wed, Fri; 5 mg all other days   Next INR check:  3/1/2022             Telephone call with Lowell who verbalizes understanding and agrees to plan    Patient elected to schedule next visit 3/1/22    Education provided: Please call back if any changes to your diet, medications or how you've been taking warfarin, Importance of consistent vitamin K intake, Impact of vitamin K foods on INR, Monitoring for bleeding signs and symptoms, Monitoring for clotting signs and symptoms and Importance of notifying clinic for changes in medications; a sooner lab recheck maybe needed.    Plan made per [facility] anticoagulation protocol    Yvette Guaman RN  Anticoagulation Clinic  1/18/2022    _______________________________________________________________________     Anticoagulation Episode Summary     Current INR goal:  2.0-3.0   TTR:  76.2 % (1 y)   Target end date:  Indefinite   Send INR reminders to:  [facility]    Indications    Paroxysmal atrial fibrillation (Resolved) [I48.0]  Long term current use of anticoagulants with INR goal of 2.0-3.0 [Z79.01]  Chronic atrial fibrillation (H) [I48.20]           Comments:            Anticoagulation Care Providers     Provider Role Specialty Phone number    Maribeth Haney MD Referring Internal Medicine - Pediatrics [phone]    Sebastián Bermudez MD Responsible Clinical Cardiac Electrophysiology [phone]

## 2022-01-18 NOTE — PROGRESS NOTES
Anticoagulation Management     Attempted to reach Lowell to discuss today's INR result, no answer at this time.      Left  requesting callback at patient's earliest convenience.      Anticoagulation clinic to follow up     Yvette Guaman RN

## 2022-01-20 ENCOUNTER — APPOINTMENT (OUTPATIENT)
Dept: URBAN - METROPOLITAN AREA CLINIC 255 | Age: 82
Setting detail: DERMATOLOGY
End: 2022-01-23

## 2022-01-20 VITALS — HEART RATE: 54 BPM | SYSTOLIC BLOOD PRESSURE: 126 MMHG | DIASTOLIC BLOOD PRESSURE: 77 MMHG

## 2022-01-20 PROBLEM — C44.319 BASAL CELL CARCINOMA OF SKIN OF OTHER PARTS OF FACE: Status: ACTIVE | Noted: 2022-01-20

## 2022-01-20 PROBLEM — C44.91 BASAL CELL CARCINOMA OF SKIN, UNSPECIFIED: Status: ACTIVE | Noted: 2022-01-20

## 2022-01-20 PROCEDURE — 99213 OFFICE O/P EST LOW 20 MIN: CPT | Mod: 25

## 2022-01-20 PROCEDURE — 13132 CMPLX RPR F/C/C/M/N/AX/G/H/F: CPT

## 2022-01-20 PROCEDURE — G6001 ECHO GUIDANCE RADIOTHERAPY: HCPCS

## 2022-01-20 PROCEDURE — 17311 MOHS 1 STAGE H/N/HF/G: CPT

## 2022-01-20 PROCEDURE — OTHER MIPS QUALITY: OTHER

## 2022-01-20 PROCEDURE — OTHER MOHS SURGERY: OTHER

## 2022-01-20 NOTE — PROCEDURE: MIPS QUALITY
Quality 130: Documentation Of Current Medications In The Medical Record: Current Medications Documented
Quality 226: Preventive Care And Screening: Tobacco Use: Screening And Cessation Intervention: Patient screened for tobacco use and is an ex/non-smoker
Quality 143: Oncology: Medical And Radiation- Pain Intensity Quantified: Pain severity quantified, no pain present
Quality 110: Preventive Care And Screening: Influenza Immunization: Influenza Immunization Administered during Influenza season
Detail Level: Detailed
Quality 431: Preventive Care And Screening: Unhealthy Alcohol Use - Screening: Patient not identified as an unhealthy alcohol user when screened for unhealthy alcohol use using a systematic screening method

## 2022-01-20 NOTE — PROCEDURE: MOHS SURGERY
Body Location Override (Optional - Billing Will Still Be Based On Selected Body Map Location If Applicable): Right Lateral Eyebrow

## 2022-01-28 ENCOUNTER — APPOINTMENT (OUTPATIENT)
Dept: URBAN - METROPOLITAN AREA CLINIC 255 | Age: 82
Setting detail: DERMATOLOGY
End: 2022-02-06

## 2022-01-28 DIAGNOSIS — Z48.02 ENCOUNTER FOR REMOVAL OF SUTURES: ICD-10-CM

## 2022-01-28 PROCEDURE — OTHER MIPS QUALITY: OTHER

## 2022-01-28 PROCEDURE — OTHER DIAGNOSIS COMMENT: OTHER

## 2022-01-28 PROCEDURE — OTHER COUNSELING: OTHER

## 2022-01-28 PROCEDURE — OTHER SUTURE REMOVAL (GLOBAL PERIOD): OTHER

## 2022-01-28 PROCEDURE — 99024 POSTOP FOLLOW-UP VISIT: CPT

## 2022-01-28 ASSESSMENT — LOCATION DETAILED DESCRIPTION DERM: LOCATION DETAILED: RIGHT INFERIOR MEDIAL FOREHEAD

## 2022-01-28 ASSESSMENT — LOCATION ZONE DERM: LOCATION ZONE: FACE

## 2022-01-28 ASSESSMENT — LOCATION SIMPLE DESCRIPTION DERM: LOCATION SIMPLE: RIGHT FOREHEAD

## 2022-01-28 NOTE — PROCEDURE: SUTURE REMOVAL (GLOBAL PERIOD)
Add 45550 Cpt? (Important Note: In 2017 The Use Of 19887 Is Being Tracked By Cms To Determine Future Global Period Reimbursement For Global Periods): no
Body Location Override (Optional - Billing Will Still Be Based On Selected Body Map Location If Applicable): Right Lateral Eyebrow
Detail Level: Detailed

## 2022-01-28 NOTE — PROCEDURE: DIAGNOSIS COMMENT
Comment: Primary Nod. 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
Render Risk Assessment In Note?: yes
Detail Level: Simple

## 2022-03-01 ENCOUNTER — ANTICOAGULATION THERAPY VISIT (OUTPATIENT)
Dept: ANTICOAGULATION | Facility: CLINIC | Age: 82
End: 2022-03-01

## 2022-03-01 ENCOUNTER — LAB (OUTPATIENT)
Dept: LAB | Facility: CLINIC | Age: 82
End: 2022-03-01
Payer: MEDICARE

## 2022-03-01 DIAGNOSIS — I48.0 PAROXYSMAL ATRIAL FIBRILLATION (H): ICD-10-CM

## 2022-03-01 DIAGNOSIS — I48.20 CHRONIC ATRIAL FIBRILLATION (H): ICD-10-CM

## 2022-03-01 DIAGNOSIS — Z79.01 LONG TERM CURRENT USE OF ANTICOAGULANTS WITH INR GOAL OF 2.0-3.0: ICD-10-CM

## 2022-03-01 DIAGNOSIS — Z79.01 LONG TERM CURRENT USE OF ANTICOAGULANTS WITH INR GOAL OF 2.0-3.0: Primary | ICD-10-CM

## 2022-03-01 LAB — INR BLD: 2.3 (ref 0.9–1.1)

## 2022-03-01 PROCEDURE — 85610 PROTHROMBIN TIME: CPT

## 2022-03-01 PROCEDURE — 36416 COLLJ CAPILLARY BLOOD SPEC: CPT

## 2022-03-01 NOTE — PROGRESS NOTES
ANTICOAGULATION MANAGEMENT     Aniket Macias 81 year old male is on warfarin with therapeutic INR result. (Goal INR 2.0-3.0)    Recent labs: (last 7 days)     03/01/22  0850   INR 2.3*       ASSESSMENT     Source(s): Chart Review and Patient/Caregiver Call     Warfarin doses taken: Warfarin taken as instructed  Diet: No new diet changes identified  New illness, injury, or hospitalization: No  Medication/supplement changes: None noted  Signs or symptoms of bleeding or clotting: No  Previous INR: Therapeutic last visit; previously outside of goal range  Additional findings: None       PLAN     Recommended plan for no diet, medication or health factor changes affecting INR     Dosing Instructions: Continue your current warfarin dose with next INR in 6 weeks - wanted to recheck in 5 weeks but patient wanted 6 weeks.      Summary  As of 3/1/2022    Full warfarin instructions:  2.5 mg every Mon, Wed, Fri; 5 mg all other days   Next INR check:  4/12/2022             Telephone call with Aniket who verbalizes understanding and agrees to plan    Lab visit scheduled    Education provided: Please call back if any changes to your diet, medications or how you've been taking warfarin    Plan made per [facility] anticoagulation protocol    Radha Seymour RN  Anticoagulation Clinic  3/1/2022    _______________________________________________________________________     Anticoagulation Episode Summary     Current INR goal:  2.0-3.0   TTR:  76.2 % (1 y)   Target end date:  Indefinite   Send INR reminders to:  [facility]    Indications    Long term current use of anticoagulants with INR goal of 2.0-3.0 [Z79.01]  Chronic atrial fibrillation (H) [I48.20]           Comments:           Anticoagulation Care Providers     Provider Role Specialty Phone number    Maribeth Haney MD Referring Internal Medicine - Pediatrics [phone]    Sebastián Bermudez MD Responsible Cardiovascular Disease [phone]

## 2022-03-08 DIAGNOSIS — I48.0 PAROXYSMAL ATRIAL FIBRILLATION (H): ICD-10-CM

## 2022-03-08 DIAGNOSIS — Z79.01 LONG TERM CURRENT USE OF ANTICOAGULANTS WITH INR GOAL OF 2.0-3.0: ICD-10-CM

## 2022-03-08 RX ORDER — WARFARIN SODIUM 5 MG/1
TABLET ORAL
Qty: 90 TABLET | Refills: 0 | Status: SHIPPED | OUTPATIENT
Start: 2022-03-08 | End: 2022-07-29

## 2022-03-08 NOTE — TELEPHONE ENCOUNTER
Warfarin - Prescription approved per [facility].    Ellyn Seymour RN   [facility]tico[facility]

## 2022-04-12 ENCOUNTER — ANTICOAGULATION THERAPY VISIT (OUTPATIENT)
Dept: ANTICOAGULATION | Facility: CLINIC | Age: 82
End: 2022-04-12

## 2022-04-12 ENCOUNTER — LAB (OUTPATIENT)
Dept: LAB | Facility: CLINIC | Age: 82
End: 2022-04-12
Payer: MEDICARE

## 2022-04-12 DIAGNOSIS — I48.0 PAROXYSMAL ATRIAL FIBRILLATION (H): ICD-10-CM

## 2022-04-12 DIAGNOSIS — Z79.01 LONG TERM CURRENT USE OF ANTICOAGULANTS WITH INR GOAL OF 2.0-3.0: ICD-10-CM

## 2022-04-12 DIAGNOSIS — I48.20 CHRONIC ATRIAL FIBRILLATION (H): ICD-10-CM

## 2022-04-12 DIAGNOSIS — Z79.01 LONG TERM CURRENT USE OF ANTICOAGULANTS WITH INR GOAL OF 2.0-3.0: Primary | ICD-10-CM

## 2022-04-12 LAB — INR BLD: 2 (ref 0.9–1.1)

## 2022-04-12 PROCEDURE — 85610 PROTHROMBIN TIME: CPT

## 2022-04-12 PROCEDURE — 36416 COLLJ CAPILLARY BLOOD SPEC: CPT

## 2022-04-12 NOTE — PROGRESS NOTES
ANTICOAGULATION MANAGEMENT     Aniket Macias 81 year old male is on warfarin with therapeutic INR result. (Goal INR 2.0-3.0)    Recent labs: (last 7 days)     04/12/22  0850   INR 2.0*       ASSESSMENT     Source(s): Chart Review and Patient/Caregiver Call     Warfarin doses taken: Warfarin taken as instructed  Diet: No new diet changes identified  New illness, injury, or hospitalization: No  Medication/supplement changes: None noted  Signs or symptoms of bleeding or clotting: No  Previous INR: Therapeutic last 2(+) visits  Additional findings: None       PLAN     Recommended plan for no diet, medication or health factor changes affecting INR     Dosing Instructions: continue your current warfarin dose with next INR in 6 weeks       Summary  As of 4/12/2022    Full warfarin instructions:  2.5 mg every Mon, Wed, Fri; 5 mg all other days   Next INR check:  5/24/2022             Telephone call with Lowell who verbalizes understanding and agrees to plan    Lab visit scheduled    Education provided: Please call back if any changes to your diet, medications or how you've been taking warfarin    Plan made per [facility] anticoagulation protocol    Radha Seymour RN  Anticoagulation Clinic  4/12/2022    _______________________________________________________________________     Anticoagulation Episode Summary     Current INR goal:  2.0-3.0   TTR:  76.2 % (1 y)   Target end date:  Indefinite   Send INR reminders to:  [facility]    Indications    Long term current use of anticoagulants with INR goal of 2.0-3.0 [Z79.01]  Chronic atrial fibrillation (H) [I48.20]           Comments:           Anticoagulation Care Providers     Provider Role Specialty Phone number    Maribeth Haney MD Referring Internal Medicine - Pediatrics [phone]    Sebastián Bermudez MD Responsible Cardiovascular Disease [phone]

## 2022-05-24 ENCOUNTER — LAB (OUTPATIENT)
Dept: LAB | Facility: CLINIC | Age: 82
End: 2022-05-24
Payer: MEDICARE

## 2022-05-24 ENCOUNTER — ANTICOAGULATION THERAPY VISIT (OUTPATIENT)
Dept: ANTICOAGULATION | Facility: CLINIC | Age: 82
End: 2022-05-24

## 2022-05-24 DIAGNOSIS — I48.20 CHRONIC ATRIAL FIBRILLATION (H): ICD-10-CM

## 2022-05-24 DIAGNOSIS — Z79.01 LONG TERM CURRENT USE OF ANTICOAGULANTS WITH INR GOAL OF 2.0-3.0: Primary | ICD-10-CM

## 2022-05-24 DIAGNOSIS — Z79.01 LONG TERM CURRENT USE OF ANTICOAGULANTS WITH INR GOAL OF 2.0-3.0: ICD-10-CM

## 2022-05-24 DIAGNOSIS — I48.0 PAROXYSMAL ATRIAL FIBRILLATION (H): ICD-10-CM

## 2022-05-24 LAB — INR BLD: 2.8 (ref 0.9–1.1)

## 2022-05-24 PROCEDURE — 36416 COLLJ CAPILLARY BLOOD SPEC: CPT

## 2022-05-24 PROCEDURE — 85610 PROTHROMBIN TIME: CPT

## 2022-05-24 NOTE — PROGRESS NOTES
ANTICOAGULATION MANAGEMENT     Aniket Macias 81 year old male is on warfarin with therapeutic INR result. (Goal INR 2.0-3.0)    Recent labs: (last 7 days)     05/24/22  0851   INR 2.8*       ASSESSMENT     Source(s): Chart Review  Previous INR was Therapeutic last 2(+) visits  Medication, diet, health changes since last INR chart reviewed; none identified           PLAN     Recommended plan for no diet, medication or health factor changes affecting INR     Dosing Instructions: continue your current warfarin dose with next INR in 6 weeks       Summary  As of 5/24/2022    Full warfarin instructions:  2.5 mg every Mon, Wed, Fri; 5 mg all other days   Next INR check:  7/5/2022             Detailed voice message left for Lowell with dosing instructions and follow up date.     Lab visit scheduled    Education provided: Please call back if any changes to your diet, medications or how you've been taking warfarin    Plan made per [facility] anticoagulation protocol    Radha Seymour RN  Anticoagulation Clinic  5/24/2022    _______________________________________________________________________     Anticoagulation Episode Summary     Current INR goal:  2.0-3.0   TTR:  76.2 % (1 y)   Target end date:  Indefinite   Send INR reminders to:  [facility]    Indications    Long term current use of anticoagulants with INR goal of 2.0-3.0 [Z79.01]  Chronic atrial fibrillation (H) [I48.20]           Comments:           Anticoagulation Care Providers     Provider Role Specialty Phone number    Maribeth Haney MD Referring Internal Medicine - Pediatrics [phone]    Sebastián Bermudez MD Responsible Cardiovascular Disease [phone]

## 2022-06-07 DIAGNOSIS — J84.9 ILD (INTERSTITIAL LUNG DISEASE) (H): Primary | ICD-10-CM

## 2022-06-28 ENCOUNTER — HOSPITAL ENCOUNTER (OUTPATIENT)
Dept: CARDIAC REHAB | Facility: CLINIC | Age: 82
Discharge: HOME | End: 2022-06-28
Attending: INTERNAL MEDICINE
Payer: MEDICARE

## 2022-06-28 DIAGNOSIS — J43.9 PULMONARY EMPHYSEMA, UNSPECIFIED EMPHYSEMA TYPE (H): ICD-10-CM

## 2022-06-28 DIAGNOSIS — J84.9 ILD (INTERSTITIAL LUNG DISEASE) (H): ICD-10-CM

## 2022-06-28 PROCEDURE — 94726 PLETHYSMOGRAPHY LUNG VOLUMES: CPT

## 2022-06-28 PROCEDURE — 94375 RESPIRATORY FLOW VOLUME LOOP: CPT

## 2022-06-28 PROCEDURE — 94729 DIFFUSING CAPACITY: CPT

## 2022-06-29 LAB
DLCOUNC-%PRED-PRE: 44 %
DLCOUNC-PRE: 10.75 ML/MIN/MMHG
DLCOUNC-PRED: 23.98 ML/MIN/MMHG
ERV-%PRED-PRE: 130 %
ERV-PRE: 1.25 L
ERV-PRED: 0.96 L
EXPTIME-PRE: 8.61 SEC
FEF2575-%PRED-PRE: 97 %
FEF2575-PRE: 1.88 L/SEC
FEF2575-PRED: 1.93 L/SEC
FEFMAX-%PRED-PRE: 105 %
FEFMAX-PRE: 7.25 L/SEC
FEFMAX-PRED: 6.85 L/SEC
FEV1-%PRED-PRE: 92 %
FEV1-PRE: 2.59 L
FEV1FEV6-PRE: 74 %
FEV1FEV6-PRED: 76 %
FEV1FVC-PRE: 74 %
FEV1FVC-PRED: 74 %
FEV1SVC-PRE: 74 %
FEV1SVC-PRED: 63 %
FIFMAX-PRE: 4.4 L/SEC
FRCPLETH-%PRED-PRE: 75 %
FRCPLETH-PRE: 2.87 L
FRCPLETH-PRED: 3.82 L
FVC-%PRED-PRE: 92 %
FVC-PRE: 3.51 L
FVC-PRED: 3.8 L
IC-%PRED-PRE: 63 %
IC-PRE: 2.22 L
IC-PRED: 3.5 L
RVPLETH-%PRED-PRE: 54 %
RVPLETH-PRE: 1.61 L
RVPLETH-PRED: 2.93 L
TLCPLETH-%PRED-PRE: 71 %
TLCPLETH-PRE: 5.09 L
TLCPLETH-PRED: 7.13 L
VA-%PRED-PRE: 69 %
VA-PRE: 4.34 L
VC-%PRED-PRE: 77 %
VC-PRE: 3.48 L
VC-PRED: 4.46 L

## 2022-07-05 ENCOUNTER — ANTICOAGULATION THERAPY VISIT (OUTPATIENT)
Dept: ANTICOAGULATION | Facility: CLINIC | Age: 82
End: 2022-07-05

## 2022-07-05 ENCOUNTER — LAB (OUTPATIENT)
Dept: LAB | Facility: CLINIC | Age: 82
End: 2022-07-05
Payer: MEDICARE

## 2022-07-05 DIAGNOSIS — Z79.01 LONG TERM CURRENT USE OF ANTICOAGULANTS WITH INR GOAL OF 2.0-3.0: Primary | ICD-10-CM

## 2022-07-05 DIAGNOSIS — I48.20 CHRONIC ATRIAL FIBRILLATION (H): ICD-10-CM

## 2022-07-05 DIAGNOSIS — I48.0 PAROXYSMAL ATRIAL FIBRILLATION (H): ICD-10-CM

## 2022-07-05 DIAGNOSIS — Z79.01 LONG TERM CURRENT USE OF ANTICOAGULANTS WITH INR GOAL OF 2.0-3.0: ICD-10-CM

## 2022-07-05 LAB — INR BLD: 2.4 (ref 0.9–1.1)

## 2022-07-05 PROCEDURE — 36415 COLL VENOUS BLD VENIPUNCTURE: CPT

## 2022-07-05 PROCEDURE — 85610 PROTHROMBIN TIME: CPT

## 2022-07-05 NOTE — PROGRESS NOTES
ANTICOAGULATION MANAGEMENT     Aniket Macias 82 year old male is on warfarin with therapeutic INR result. (Goal INR 2.0-3.0)    Recent labs: (last 7 days)     07/05/22  0910   INR 2.4*       ASSESSMENT     Source(s): Chart Review and Patient/Caregiver Call     Warfarin doses taken: Warfarin taken as instructed  Diet: No new diet changes identified  New illness, injury, or hospitalization: No  Medication/supplement changes: None noted  Signs or symptoms of bleeding or clotting: No  Previous INR: Therapeutic last 2(+) visits  Additional findings: None       PLAN     Recommended plan for no diet, medication or health factor changes affecting INR     Dosing Instructions: continue your current warfarin dose with next INR in 6 weeks       Summary  As of 7/5/2022    Full warfarin instructions:  2.5 mg every Mon, Wed, Fri; 5 mg all other days   Next INR check:  8/16/2022             Telephone call with Lowell who verbalizes understanding and agrees to plan    Lab visit scheduled    Education provided: Please call back if any changes to your diet, medications or how you've been taking warfarin    Plan made per [facility] anticoagulation protocol    Radha Seymour RN  Anticoagulation Clinic  7/5/2022    _______________________________________________________________________     Anticoagulation Episode Summary     Current INR goal:  2.0-3.0   TTR:  76.2 % (1 y)   Target end date:  Indefinite   Send INR reminders to:  [facility]    Indications    Long term current use of anticoagulants with INR goal of 2.0-3.0 [Z79.01]  Chronic atrial fibrillation (H) [I48.20]           Comments:           Anticoagulation Care Providers     Provider Role Specialty Phone number    Maribeth Haney MD Referring Internal Medicine - Pediatrics [phone]    Sebastián Bermudez MD Responsible Cardiovascular Disease [phone]

## 2022-07-07 ENCOUNTER — VIRTUAL VISIT (OUTPATIENT)
Dept: PULMONOLOGY | Facility: CLINIC | Age: 82
End: 2022-07-07
Attending: INTERNAL MEDICINE
Payer: MEDICARE

## 2022-07-07 DIAGNOSIS — J43.9 PULMONARY EMPHYSEMA, UNSPECIFIED EMPHYSEMA TYPE (H): Primary | ICD-10-CM

## 2022-07-07 DIAGNOSIS — J84.9 ILD (INTERSTITIAL LUNG DISEASE) (H): ICD-10-CM

## 2022-07-07 PROCEDURE — 99214 OFFICE O/P EST MOD 30 MIN: CPT | Mod: 95 | Performed by: INTERNAL MEDICINE

## 2022-07-07 PROCEDURE — G0463 HOSPITAL OUTPT CLINIC VISIT: HCPCS | Mod: PN,RTG | Performed by: INTERNAL MEDICINE

## 2022-07-07 NOTE — LETTER
7/7/2022         RE: Aniket Macias  [address]  [place] [address]        Dear Colleague,    Thank you for referring your patient, Aniket Macias, to the [facility] LUNG SCIENCE AND [facility]. Please see a copy of my visit note below.    Dolores is a 82 year old who is being evaluated via a billable video visit.     Patient confirms medications and allergies are accurate via patients echeck in completion, and or denies any changes since last reviewed/verified.     Liza Cardoza, Virtual Facilitator       How would you like to obtain your AVS? MyChart  If the video visit is dropped, the invitation should be resent by: Send to e-mail at: [email or web address]  Will anyone else be joining your video visit? No        Video-Visit Details    Video Start Time: 8:08 AM    Type of service:  Video Visit    Video End Time:8:29 AM    Originating Location (pt. 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 Remains asymptomatic and not limited by breathing. Given asymptomatic nature, and good exercise tolerance, he is not currently on any medications (inhalers or antifibrotics). If becomes symptomatic, would start Anoro, with albuterol PRN (did not feel he got benefit from PRN albuterol). If worsening, will consider antifibrotics, no need currently. ILD lab work up with borderline ALEKSEY (1:40), and one positive  HP panel, without an HP pattern on CT.     -Pneumonia vaccinations (13 and 23 valent) up to date (2015 and 2017, respectively)  -Should have annual influenza immunization (up to date   -up to date on COVID vaccination series.   -Encouraged ongoing activity/exercise  -again discussed anti-fibrotic medications today, I don't think he should start these at this point, given stability of disease and asymptomatic.   -Pulmonary rehab ordered.  -His PFts are slowly worsening. Clsoe follow up. May consider medication if continued worsening.     RTC: 4 months with PFts     I spent 31 minutes on the date of the encounter doing chart review, history and exam, documentation and further activities as noted above.      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      Brief summary (from prior visits):   I met him in May 2020. He initially developed a cough in Jan 2020. At his PCP's office with a cough, and was found to have SpO2 of 94%. A CXR was done, concerning for left lung pneumonia. He was treated with Augmentin and azithromycin. This improved his cough, but he continued to feel short of breath, especially with exertion. He also noted some lightheadedness when he stood up quickly. He was complaining of dyspnea requiring him to stop after 1 flight of stairs (a change from baseline of 3 flights of stairs). He saw his PCP, Dr. Haney, for these complaints in February 2020. A CT was done at that time, showing emphysema and pulmonary fibrosis in a possible UIP pattern. He was referred to ILD clinic. 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 As he has been  "relatively asymptomatic, he was given a PRN albuterol. Anoro was discussed, but not started. If there is progression, antifibrotics will be considered.      Interval history:   He was last seen virtually in January. Overall, he is feeling well. He denies any shortness of breath. He is not getting out of breath. He filled albuterol in May of 2020, and he used it 21 times since then. He didn't feel any benefit from it. He denies wheeze. The only time he notes dyspnea, is if he walks up from his garage up to the 4th floor. If he walks slow, he is fine, if he walks faster, he may have to stop on the third floor for 1-2 min. Otherwise, he does not feel at all limited by his breathing. He could \"walk all day\" on level ground. He does have to go slow with walking with his wife, because she is slower, but he feels he wants to walk faster. No chest pain, LE edema. He does have some atrial fibrillation, which he sometimes feels. Rare coughing and sneezing. Does feel he may have some seasonal allergies in the Spring/Summer. Daughter thinking of going to [place] next summer, and patient may join. No new joint pain, swelling, or new myalgias. 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 No new rashes.     #Interval History: 7/7/22   -He has been doing pulmonary rehab. He has difficulty climbing stairs up to 4 storeys.  No increased cough. He has not been using any inhalers. Overall he has stayed stable. He does get short of breath while doing vacuum in the house. No snoring or no dysonea is noted.   -As per his wife he is stable and no significant worsening is noted.  He had tried albuterol without any improvement.     Exposure History: reviewed, no change.   Tobacco: Former use, quit about 25 years ago, used 1/2-2 ppd for 35 years or so, up to 70 pack years.   Other inhaled substances: No pipes, vaping, marijuana.   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   Pets: No pets, no " birds.   Allergies: seasonal allergies  Hobbies: enjoys being on the computer. [place] around [place]    Travel: No recent travel. (Previously lived in [place] from 1995 to 2015).   Home: [place]. No feather pillows or down comforters. No mold in apartment, no hot tub use. No humidifier. No musical instrument use.     ROS: Complete 10 point ROS negative unless mentioned in HPI    Allergies and current medications: Reviewed in EHR  Past medical, surgical, social, and family histories: Personally reviewed and updated (if needed) during this visit.     Physical Exam:  There were no vitals taken for this visit.    General: Sitting in the chair in NAD  HEENT: anicteric, mask in place  Neck: Trachea midline. Normal ROM.   Lymphatic: no cervical or supraclavicular lymphadenopathy   Chest: Very mild basilar crackles, worse on left. Normal Work of breathing. No cough or wheeze.   Cardiac: irregularly irregular. Possible fixed split S2.   Abdomen: Soft, flat, non tender, active BS  Extremities: No LE Edema, moving all extremities. No digital clubbing. No obvious synovitis.   Neuro: A&Ox3, no focal defects. Speech/language wnl.   Skin: no rash noted on limited exam  Psych: normal affect.     Labs and Radiology: All new data personally reviewed and summarized below. I have reviewed the results with the patient today.   All laboratory data reviewed   8/17/21 Hgb 13.2 K 4.3 Cr 1.17, LFTs normal.   All cardiac studies reviewed by me.   No new studies  All imaging studies reviewed by me.   No new imaging.     PFT's:  Pulmonary Function Testing: personally reviewed.          5/6/20 Six minute walk: Walk completed on room air. Walk distance normal, 442 m vs  m. Significant desaturation without hypoxia, SpO2 dropped from 95% to 91% on room air walk.              Again, thank you for allowing me to participate in the care of your patient.        Sincerely,        Hem  MD Pop

## 2022-07-07 NOTE — PROGRESS NOTES
Lowell is a 82 year old who is being evaluated via a billable video visit.     Patient confirms medications and allergies are accurate via patients echeck in completion, and or denies any changes since last reviewed/verified.     Liza Cardoza, Virtual Facilitator       How would you like to obtain your AVS? MyChart  If the video visit is dropped, the invitation should be resent by: Send to e-mail at: [email or web address]  Will anyone else be joining your video visit? No        Video-Visit Details    Video Start Time: 8:08 AM    Type of service:  Video Visit    Video End Time:8:29 AM    Originating Location (pt. 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 Remains asymptomatic and not limited by breathing. Given asymptomatic nature, and good exercise tolerance, he is not currently on any medications (inhalers or antifibrotics). If becomes symptomatic, would start Anoro, with albuterol PRN (did not feel he got benefit from PRN albuterol). If worsening, will consider antifibrotics, no need currently. ILD lab work up with borderline AELKSEY (1:40), and one positive HP panel, without an HP pattern on CT.     -Pneumonia vaccinations (13 and 23 valent) up to date (2015 and 2017, respectively)  -Should have annual influenza immunization (up to date   -up to date on COVID vaccination series.   -Encouraged ongoing activity/exercise  -again discussed anti-fibrotic  medications today, I don't think he should start these at this point, given stability of disease and asymptomatic.   -Pulmonary rehab ordered.  -His PFts are slowly worsening. Clsoe follow up. May consider medication if continued worsening.     RTC: 4 months with PFts     I spent 31 minutes on the date of the encounter doing chart review, history and exam, documentation and further activities as noted above.      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      Brief summary (from prior visits):   I met him in May 2020. He initially developed a cough in Jan 2020. At his PCP's office with a cough, and was found to have SpO2 of 94%. A CXR was done, concerning for left lung pneumonia. He was treated with Augmentin and azithromycin. This improved his cough, but he continued to feel short of breath, especially with exertion. He also noted some lightheadedness when he stood up quickly. He was complaining of dyspnea requiring him to stop after 1 flight of stairs (a change from baseline of 3 flights of stairs). He saw his PCP, Dr. Haney, for these complaints in February 2020. A CT was done at that time, showing emphysema and pulmonary fibrosis in a possible UIP pattern. He was referred to ILD clinic. 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 As he has been relatively asymptomatic, he was given a PRN albuterol. Anoro was discussed, but not started. If there is progression, antifibrotics will be considered.      Interval history:   He was last seen virtually in January. Overall, he is feeling well. He denies any shortness of breath. He is not getting  "out of breath. He filled albuterol in May of 2020, and he used it 21 times since then. He didn't feel any benefit from it. He denies wheeze. The only time he notes dyspnea, is if he walks up from his garage up to the 4th floor. If he walks slow, he is fine, if he walks faster, he may have to stop on the third floor for 1-2 min. Otherwise, he does not feel at all limited by his breathing. He could \"walk all day\" on level ground. He does have to go slow with walking with his wife, because she is slower, but he feels he wants to walk faster. No chest pain, LE edema. He does have some atrial fibrillation, which he sometimes feels. Rare coughing and sneezing. Does feel he may have some seasonal allergies in the Spring/Summer. Daughter thinking of going to [place] next summer, and patient may join. No new joint pain, swelling, or new myalgias. 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 No new rashes.     #Interval History: 7/7/22   -He has been doing pulmonary rehab. He has difficulty climbing stairs up to 4 storeys.  No increased cough. He has not been using any inhalers. Overall he has stayed stable. He does get short of breath while doing vacuum in the house. No snoring or no dysonea is noted.   -As per his wife he is stable and no significant worsening is noted.  He had tried albuterol without any improvement.     Exposure History: reviewed, no change.   Tobacco: Former use, quit about 25 years ago, used 1/2-2 ppd for 35 years or so, up to 70 pack years.   Other inhaled substances: No pipes, vaping, marijuana.   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   Pets: No pets, no birds.   Allergies: seasonal allergies  Hobbies: enjoys being on the computer. [place]    Travel: No recent travel. (Previously lived in [place] from 1995 to 2015).   Home: [place]. No feather pillows or down comforters. No mold in apartment, " no hot tub use. No humidifier. No musical instrument use.     ROS: Complete 10 point ROS negative unless mentioned in HPI    Allergies and current medications: Reviewed in EHR  Past medical, surgical, social, and family histories: Personally reviewed and updated (if needed) during this visit.     Physical Exam:  There were no vitals taken for this visit.    General: Sitting in the chair in NAD  HEENT: anicteric, mask in place  Neck: Trachea midline. Normal ROM.   Lymphatic: no cervical or supraclavicular lymphadenopathy   Chest: Very mild basilar crackles, worse on left. Normal Work of breathing. No cough or wheeze.   Cardiac: irregularly irregular. Possible fixed split S2.   Abdomen: Soft, flat, non tender, active BS  Extremities: No LE Edema, moving all extremities. No digital clubbing. No obvious synovitis.   Neuro: A&Ox3, no focal defects. Speech/language wnl.   Skin: no rash noted on limited exam  Psych: normal affect.     Labs and Radiology: All new data personally reviewed and summarized below. I have reviewed the results with the patient today.   All laboratory data reviewed   8/17/21 Hgb 13.2 K 4.3 Cr 1.17, LFTs normal.   All cardiac studies reviewed by me.   No new studies  All imaging studies reviewed by me.   No new imaging.     PFT's:  Pulmonary Function Testing: personally reviewed.          5/6/20 Six minute walk: Walk completed on room air. Walk distance normal, 442 m vs  m. Significant desaturation without hypoxia, SpO2 dropped from 95% to 91% on room air walk.

## 2022-07-29 DIAGNOSIS — Z79.01 LONG TERM CURRENT USE OF ANTICOAGULANTS WITH INR GOAL OF 2.0-3.0: ICD-10-CM

## 2022-07-29 DIAGNOSIS — I48.0 PAROXYSMAL ATRIAL FIBRILLATION (H): ICD-10-CM

## 2022-07-29 RX ORDER — WARFARIN SODIUM 5 MG/1
TABLET ORAL
Qty: 90 TABLET | Refills: 1 | Status: SHIPPED | OUTPATIENT
Start: 2022-07-29 | End: 2023-03-01

## 2022-07-29 NOTE — TELEPHONE ENCOUNTER
ANTICOAGULATION MANAGEMENT:  Medication Refill    Anticoagulation Summary  As of 7/5/2022    Warfarin maintenance plan:  2.5 mg (5 mg x 0.5) every Mon, Wed, Fri; 5 mg (5 mg x 1) all other days   Next INR check:  8/16/2022   Target end date:  Indefinite    Indications    Long term current use of anticoagulants with INR goal of 2.0-3.0 [Z79.01]  Chronic atrial fibrillation (H) [I48.20]             Anticoagulation Care Providers     Provider Role Specialty Phone number    Maribeth Haney MD Referring Internal Medicine - Pediatrics [phone]    Sebastián Bermudez MD Responsible Cardiovascular Disease [phone]          Visit with referring provider/group within last year: Yes    [facility] referral signed within last year: Yes    Aniket meets all criteria for refill (current [facility] referral, office visit with referring provider/group in last year, lab monitoring up to date or not exceeding 2 weeks overdue). Rx instructions and quantity supplied updated to match patient's current dosing plan. Warfarin 90 day supply with 1 refill granted per [facility] protocol     Radha Seymour RN  Anticoagulation Clinic

## 2022-08-03 DIAGNOSIS — I48.20 CHRONIC ATRIAL FIBRILLATION (H): Primary | ICD-10-CM

## 2022-08-11 ENCOUNTER — HOSPITAL ENCOUNTER (OUTPATIENT)
Dept: CARDIAC REHAB | Facility: CLINIC | Age: 82
Discharge: HOME | End: 2022-08-11
Attending: INTERNAL MEDICINE
Payer: MEDICARE

## 2022-08-11 DIAGNOSIS — J84.9 ILD (INTERSTITIAL LUNG DISEASE) (H): ICD-10-CM

## 2022-08-11 DIAGNOSIS — J43.9 PULMONARY EMPHYSEMA, UNSPECIFIED EMPHYSEMA TYPE (H): ICD-10-CM

## 2022-08-11 PROCEDURE — G0238 OTH RESP PROC, INDIV: HCPCS | Performed by: CLINICAL EXERCISE PHYSIOLOGIST

## 2022-08-15 DIAGNOSIS — I10 BENIGN ESSENTIAL HYPERTENSION: Chronic | ICD-10-CM

## 2022-08-15 DIAGNOSIS — E78.5 HYPERLIPIDEMIA LDL GOAL <100: ICD-10-CM

## 2022-08-16 ENCOUNTER — DOCUMENTATION ONLY (OUTPATIENT)
Dept: FAMILY MEDICINE | Facility: CLINIC | Age: 82
End: 2022-08-16

## 2022-08-16 ENCOUNTER — LAB (OUTPATIENT)
Dept: LAB | Facility: CLINIC | Age: 82
End: 2022-08-16
Payer: MEDICARE

## 2022-08-16 ENCOUNTER — ANTICOAGULATION THERAPY VISIT (OUTPATIENT)
Dept: ANTICOAGULATION | Facility: CLINIC | Age: 82
End: 2022-08-16

## 2022-08-16 DIAGNOSIS — I48.20 CHRONIC ATRIAL FIBRILLATION (H): ICD-10-CM

## 2022-08-16 DIAGNOSIS — Z79.01 LONG TERM CURRENT USE OF ANTICOAGULANTS WITH INR GOAL OF 2.0-3.0: Primary | ICD-10-CM

## 2022-08-16 LAB — INR BLD: 2.9 (ref 0.9–1.1)

## 2022-08-16 PROCEDURE — 36416 COLLJ CAPILLARY BLOOD SPEC: CPT

## 2022-08-16 PROCEDURE — 85610 PROTHROMBIN TIME: CPT

## 2022-08-16 NOTE — PROGRESS NOTES
ANTICOAGULATION MANAGEMENT     Aniket Macias 82 year old male is on warfarin with therapeutic INR result. (Goal INR 2.0-3.0)    Recent labs: (last 7 days)     08/16/22  0853   INR 2.9*       ASSESSMENT     Source(s): Chart Review and Patient/Caregiver Call     Warfarin doses taken: Warfarin taken as instructed  Diet: No new diet changes identified  New illness, injury, or hospitalization: No  Medication/supplement changes: None noted  Signs or symptoms of bleeding or clotting: No  Previous INR: Therapeutic last 2(+) visits  Additional findings: None       PLAN     Recommended plan for no diet, medication or health factor changes affecting INR     Dosing Instructions: Continue your current warfarin dose with next INR in 6 weeks       Summary  As of 8/16/2022    Full warfarin instructions:  2.5 mg every Mon, Wed, Fri; 5 mg all other days   Next INR check:  9/27/2022             Telephone call with Lowell who verbalizes understanding and agrees to plan    Lab visit scheduled    Education provided: Please call back if any changes to your diet, medications or how you've been taking warfarin and Contact [phone]  with any changes, questions or concerns.     Plan made per [facility] anticoagulation protocol    Jeanette Bender RN  Anticoagulation Clinic  8/16/2022    _______________________________________________________________________     Anticoagulation Episode Summary     Current INR goal:  2.0-3.0   TTR:  80.8 % (1 y)   Target end date:  Indefinite   Send INR reminders to:  [facility]    Indications    Long term current use of anticoagulants with INR goal of 2.0-3.0 [Z79.01]  Chronic atrial fibrillation (H) [I48.20]           Comments:           Anticoagulation Care Providers     Provider Role Specialty Phone number    Maribeth Haney MD Referring Internal Medicine - Pediatrics [phone]    Sebastián Bermudez MD Responsible Cardiovascular Disease [phone]

## 2022-08-16 NOTE — PROGRESS NOTES
ANTICOAGULATION CLINIC REFERRAL RENEWAL REQUEST       An annual renewal order is required for all patients referred to [facility] Anticoagulation Clinic.?  Please review and sign the pended referral order for Aniket Macias.       ANTICOAGULATION SUMMARY      Warfarin indication(s)   Atrial Fibrillation    Mechanical heart valve present?  NO       Current goal range   INR: 2.0-3.0     Goal appropriate for indication? Goal INR 2-3, standard for indication(s) above     Time in Therapeutic Range (TTR)  (Goal > 60%) 76.2%       Office visit with referring provider's group within last year yes on 8/17/21. Due for annual visit with PCP, scheduled for 8/17/22.       Jeanette Bender RN  [facility] Anticoagulation Clinic

## 2022-08-17 ENCOUNTER — OFFICE VISIT (OUTPATIENT)
Dept: FAMILY MEDICINE | Facility: CLINIC | Age: 82
End: 2022-08-17
Payer: MEDICARE

## 2022-08-17 VITALS
HEIGHT: 65 IN | HEART RATE: 50 BPM | TEMPERATURE: 97.6 F | RESPIRATION RATE: 16 BRPM | DIASTOLIC BLOOD PRESSURE: 68 MMHG | SYSTOLIC BLOOD PRESSURE: 124 MMHG | OXYGEN SATURATION: 97 % | BODY MASS INDEX: 30.32 KG/M2 | WEIGHT: 182 LBS

## 2022-08-17 DIAGNOSIS — I48.0 PAROXYSMAL ATRIAL FIBRILLATION (H): ICD-10-CM

## 2022-08-17 DIAGNOSIS — I50.32 CHRONIC DIASTOLIC HEART FAILURE (H): ICD-10-CM

## 2022-08-17 DIAGNOSIS — E78.5 HYPERLIPIDEMIA LDL GOAL <100: ICD-10-CM

## 2022-08-17 DIAGNOSIS — I48.0 PAROXYSMAL ATRIAL FIBRILLATION (H): Chronic | ICD-10-CM

## 2022-08-17 DIAGNOSIS — N40.1 BENIGN NON-NODULAR PROSTATIC HYPERPLASIA WITH LOWER URINARY TRACT SYMPTOMS: ICD-10-CM

## 2022-08-17 DIAGNOSIS — I10 BENIGN ESSENTIAL HYPERTENSION: Primary | ICD-10-CM

## 2022-08-17 DIAGNOSIS — Z00.00 ROUTINE GENERAL MEDICAL EXAMINATION AT A HEALTH CARE FACILITY: ICD-10-CM

## 2022-08-17 LAB
ALBUMIN SERPL-MCNC: 3.3 G/DL (ref 3.4–5)
ALP SERPL-CCNC: 73 U/L (ref 40–150)
ALT SERPL W P-5'-P-CCNC: 30 U/L (ref 0–70)
ANION GAP SERPL CALCULATED.3IONS-SCNC: 6 MMOL/L (ref 3–14)
AST SERPL W P-5'-P-CCNC: 23 U/L (ref 0–45)
BILIRUB SERPL-MCNC: 0.8 MG/DL (ref 0.2–1.3)
BUN SERPL-MCNC: 17 MG/DL (ref 7–30)
CALCIUM SERPL-MCNC: 8.7 MG/DL (ref 8.5–10.1)
CHLORIDE BLD-SCNC: 108 MMOL/L (ref 94–109)
CHOLEST SERPL-MCNC: 98 MG/DL
CO2 SERPL-SCNC: 25 MMOL/L (ref 20–32)
CREAT SERPL-MCNC: 1.03 MG/DL (ref 0.66–1.25)
FASTING STATUS PATIENT QL REPORTED: YES
GFR SERPL CREATININE-BSD FRML MDRD: 72 ML/MIN/1.73M2
GLUCOSE BLD-MCNC: 104 MG/DL (ref 70–99)
HDLC SERPL-MCNC: 53 MG/DL
HGB BLD-MCNC: 12.7 G/DL (ref 13.3–17.7)
LDLC SERPL CALC-MCNC: 33 MG/DL
NONHDLC SERPL-MCNC: 45 MG/DL
POTASSIUM BLD-SCNC: 4.6 MMOL/L (ref 3.4–5.3)
PROT SERPL-MCNC: 6.2 G/DL (ref 6.8–8.8)
SODIUM SERPL-SCNC: 139 MMOL/L (ref 133–144)
TRIGL SERPL-MCNC: 58 MG/DL

## 2022-08-17 PROCEDURE — 80061 LIPID PANEL: CPT | Performed by: FAMILY MEDICINE

## 2022-08-17 PROCEDURE — 99214 OFFICE O/P EST MOD 30 MIN: CPT | Mod: 25 | Performed by: FAMILY MEDICINE

## 2022-08-17 PROCEDURE — G0439 PPPS, SUBSEQ VISIT: HCPCS | Performed by: FAMILY MEDICINE

## 2022-08-17 PROCEDURE — 85018 HEMOGLOBIN: CPT | Performed by: FAMILY MEDICINE

## 2022-08-17 PROCEDURE — 36415 COLL VENOUS BLD VENIPUNCTURE: CPT | Performed by: FAMILY MEDICINE

## 2022-08-17 PROCEDURE — 80053 COMPREHEN METABOLIC PANEL: CPT | Performed by: FAMILY MEDICINE

## 2022-08-17 RX ORDER — LISINOPRIL 40 MG/1
TABLET ORAL
Qty: 90 TABLET | Refills: 0 | OUTPATIENT
Start: 2022-08-17

## 2022-08-17 RX ORDER — ATORVASTATIN CALCIUM 40 MG/1
40 TABLET, FILM COATED ORAL DAILY
Qty: 90 TABLET | Refills: 3 | Status: SHIPPED | OUTPATIENT
Start: 2022-08-17 | End: 2023-08-23

## 2022-08-17 RX ORDER — ATORVASTATIN CALCIUM 40 MG/1
TABLET, FILM COATED ORAL
Qty: 90 TABLET | Refills: 0 | OUTPATIENT
Start: 2022-08-17

## 2022-08-17 RX ORDER — LISINOPRIL 40 MG/1
40 TABLET ORAL DAILY
Qty: 90 TABLET | Refills: 3 | Status: SHIPPED | OUTPATIENT
Start: 2022-08-17 | End: 2023-08-10

## 2022-08-17 RX ORDER — FUROSEMIDE 40 MG
40 TABLET ORAL DAILY
Qty: 90 TABLET | Refills: 3 | Status: SHIPPED | OUTPATIENT
Start: 2022-08-17 | End: 2023-10-27

## 2022-08-17 RX ORDER — METOPROLOL TARTRATE 50 MG
50 TABLET ORAL 2 TIMES DAILY
Qty: 180 TABLET | Refills: 3 | Status: SHIPPED | OUTPATIENT
Start: 2022-08-17 | End: 2023-08-22

## 2022-08-17 RX ORDER — TERAZOSIN 5 MG/1
CAPSULE ORAL
Qty: 90 CAPSULE | Refills: 1 | Status: SHIPPED | OUTPATIENT
Start: 2022-08-17 | End: 2023-04-04

## 2022-08-17 ASSESSMENT — PAIN SCALES - GENERAL: PAINLEVEL: NO PAIN (0)

## 2022-08-17 NOTE — TELEPHONE ENCOUNTER
This refill request is a duplicate request, previously received or sent.  Sent denial notification to pharmacy.  Merle Daniels RN  [facility] Triage Team

## 2022-08-17 NOTE — PATIENT INSTRUCTIONS
Preventive Health Recommendations:     See your health care provider every year to    Review health changes.     Discuss preventive care.      Review your medicines if your doctor has prescribed any.      Talk with your health care provider about whether you should have a test to screen for prostate cancer (PSA).    Every 3 years, have a diabetes test (fasting glucose). If you are at risk for diabetes, you should have this test more often.    Every 5 years, have a cholesterol test. Have this test more often if you are at risk for high cholesterol or heart disease.     Every 10 years, have a colonoscopy. Or, have a yearly FIT test (stool test). These exams will check for colon cancer.    Talk to with your health care provider about screening for Abdominal Aortic Aneurysm if you have a family history of AAA or have a history of smoking.    Shots:     Get a flu shot each year.     Get a tetanus shot every 10 years.     Talk to your doctor about your pneumonia vaccines. There are now two you should receive - Pneumovax (PPSV 23) and Prevnar (PCV 13).     Talk to your pharmacist about a shingles vaccine.     Talk to your doctor about the hepatitis B vaccine.  Nutrition:     Eat at least 5 servings of fruits and vegetables each day.     Eat whole-grain bread, whole-wheat pasta and brown rice instead of white grains and rice.     Get adequate Calcium and Vitamin D.   Lifestyle    Exercise for at least 150 minutes a week (30 minutes a day, 5 days a week). This will help you control your weight and prevent disease.     Limit alcohol to one drink per day.     No smoking.     Wear sunscreen to prevent skin cancer.    See your dentist every six months for an exam and cleaning.    See your eye doctor every 1 to 2 years to screen for conditions such as glaucoma, macular degeneration, cataracts, etc.    Personalized Prevention Plan  You are due for the preventive services outlined below.  Your care team is available to assist you  in scheduling these services.  If you have already completed any of these items, please share that information with your care team to update in your medical record.  Health Maintenance Due   Topic Date Due     COPD Action Plan  Never done     Zoster (Shingles) Vaccine (1 of 2) Never done     Annual Wellness Visit  08/17/2022     Cholesterol Lab  08/17/2022     ANNUAL REVIEW OF HM ORDERS  08/17/2022

## 2022-08-17 NOTE — PROGRESS NOTES
SUBJECTIVE:   Aniket Macias is a 82 year old male who presents for Preventive Visit.        Patient has been advised of split billing requirements and indicates understanding: Yes  Are you in the first 12 months of your Medicare coverage?  No    History of Present Illness       Reason for visit:  Wellness visit    He eats 0-1 servings of fruits and vegetables daily.He consumes 0 sweetened beverage(s) daily.He exercises with enough effort to increase his heart rate 10 to 19 minutes per day.  He exercises with enough effort to increase his heart rate 3 or less days per week.   He is taking medications regularly.    Do you feel safe in your environment? Yes    Have you ever done Advance Care Planning? (For example, a Health Directive, POLST, or a discussion with a medical provider or your loved ones about your wishes): Yes, advance care planning is on file.    Fall risk  Fallen 2 or more times in the past year?: No  Any fall with injury in the past year?: No  click delete button to remove this line now  Cognitive Screening   1) Repeat 3 items (Leader, Season, Table)    2) Clock draw: NORMAL  3) 3 item recall: }Recalls 3 objects  Results: 3 items recalled: COGNITIVE IMPAIRMENT LESS LIKELY    Mini-CogTM Copyright S Benjamin. Licensed by the author for use in [facility] [place]; reprinted with permission ([email or web address]). All rights reserved.          Reviewed and updated as needed this visit by clinical staff                    Reviewed and updated as needed this visit by Provider                   Social History     Tobacco Use     Smoking status: Former Smoker     Packs/day: 2.00     Years: 35.00     Pack years: 70.00     Types: Cigarettes     Quit date: 1995     Years since quittin.3     Smokeless tobacco: Never Used     Tobacco comment: quit age 54   Substance Use Topics     Alcohol use: Yes     Alcohol/week: 0.0 standard drinks     Comment: 1-2 drinks per week       Alcohol Use 2021   Prescreen: >3  "drinks/day or >7 drinks/week? No   Prescreen: >3 drinks/day or >7 drinks/week? -             Current providers sharing in care for this patient include:   Patient Care Team:  Basilio Gregorio MD as PCP - General (Family Medicine)  Basilio Gregorio MD as Assigned PCP  Derrek Lord MD as Assigned Pulmonology Provider  Demetrius Christian EP as Cardiac Rehabilitation Therapist    The following health maintenance items are reviewed in Epic and correct as of today:  Health Maintenance Due   Topic Date Due     COPD ACTION PLAN  Never done     ZOSTER IMMUNIZATION (1 of 2) Never done     MEDICARE ANNUAL WELLNESS VISIT  08/17/2022     LIPID  08/17/2022     ANNUAL REVIEW OF HM ORDERS  08/17/2022             Review of Systems  CONSTITUTIONAL: NEGATIVE for fever, chills, change in weight  INTEGUMENTARY/SKIN: NEGATIVE for worrisome rashes, moles or lesions  EYES: NEGATIVE for vision changes or irritation  ENT/MOUTH: NEGATIVE for ear, mouth and throat problems  RESP: NEGATIVE for significant cough or SOB  BREAST: NEGATIVE for masses, tenderness or discharge  CV: NEGATIVE for chest pain, palpitations or peripheral edema  GI: NEGATIVE for nausea, abdominal pain, heartburn, or change in bowel habits  : NEGATIVE for frequency, dysuria, or hematuria  MUSCULOSKELETAL: NEGATIVE for significant arthralgias or myalgia  NEURO: NEGATIVE for weakness, dizziness or paresthesias  ENDOCRINE: NEGATIVE for temperature intolerance, skin/hair changes  HEME: NEGATIVE for bleeding problems  PSYCHIATRIC: NEGATIVE for changes in mood or affect    OBJECTIVE:   There were no vitals taken for this visit. Estimated body mass index is 25.11 kg/m  as calculated from the following:    Height as of 12/7/21: 1.778 m (5' 10\").    Weight as of 12/7/21: 79.4 kg (175 lb).  Physical Exam  GENERAL: healthy, alert and no distress  EYES: Eyes grossly normal to inspection, PERRL and conjunctivae and sclerae normal  HENT: ear canals and TM's normal, nose and mouth without ulcers or " lesions  NECK: no adenopathy, no asymmetry, masses, or scars and thyroid normal to palpation  RESP: lungs clear to auscultation - no rales, rhonchi or wheezes  CV: regular rate and rhythm, normal S1 S2, no S3 or S4, no murmur, click or rub, no peripheral edema and peripheral pulses strong  ABDOMEN: soft, nontender, no hepatosplenomegaly, no masses and bowel sounds normal  MS: no gross musculoskeletal defects noted, no edema  SKIN: no suspicious lesions or rashes  NEURO: Normal strength and tone, mentation intact and speech normal  PSYCH: mentation appears normal, affect normal/bright    Diagnostic Test Results:  Labs reviewed in Epic    ASSESSMENT / PLAN:   Aniket was seen today for physical.    Diagnoses and all orders for this visit:    Benign essential hypertension; goal < 140/90  -     Comprehensive metabolic panel; Future  -     lisinopril (ZESTRIL) 40 MG tablet; Take 1 tablet (40 mg) by mouth daily  -     Comprehensive metabolic panel    Routine general medical examination at a health care facility  -     ZOSTER VACCINE RECOMBINANT ADJUVANTED (SHINGRIX)  -     Lipid panel reflex to direct LDL Non-fasting; Future  -     REVIEW OF HEALTH MAINTENANCE PROTOCOL ORDERS  -     Comprehensive metabolic panel; Future  -     Hemoglobin; Future  -     Lipid panel reflex to direct LDL Non-fasting  -     Comprehensive metabolic panel  -     Hemoglobin    Chronic diastolic heart failure (H)  -     Comprehensive metabolic panel; Future  -     furosemide (LASIX) 40 MG tablet; Take 1 tablet (40 mg) by mouth daily  -     Comprehensive metabolic panel    Paroxysmal atrial fibrillation (H)  -     metoprolol tartrate (LOPRESSOR) 50 MG tablet; Take 1 tablet (50 mg) by mouth 2 times daily    Benign non-nodular prostatic hyperplasia with lower urinary tract symptoms  -     terazosin (HYTRIN) 5 MG capsule; TAKE ONE CAPSULE BY MOUTH ONCE DAILY AT BEDTIME    Hyperlipidemia LDL goal <100  -     Lipid panel reflex to direct LDL Non-fasting;  "Future  -     Comprehensive metabolic panel; Future  -     atorvastatin (LIPITOR) 40 MG tablet; Take 1 tablet (40 mg) by mouth daily  -     Lipid panel reflex to direct LDL Non-fasting  -     Comprehensive metabolic panel    Paroxysmal atrial fibrillation  -     metoprolol tartrate (LOPRESSOR) 50 MG tablet; Take 1 tablet (50 mg) by mouth 2 times daily        Patient has been advised of split billing requirements and indicates understanding: Yes    COUNSELING:  Reviewed preventive health counseling, as reflected in patient instructions       Regular exercise       Healthy diet/nutrition    Estimated body mass index is 25.11 kg/m  as calculated from the following:    Height as of 12/7/21: 1.778 m (5' 10\").    Weight as of 12/7/21: 79.4 kg (175 lb).        He reports that he quit smoking about 27 years ago. His smoking use included cigarettes. He has a 70.00 pack-year smoking history. He has never used smokeless tobacco.      Appropriate preventive services were discussed with this patient, including applicable screening as appropriate for cardiovascular disease, diabetes, osteopenia/osteoporosis, and glaucoma.  As appropriate for age/gender, discussed screening for colorectal cancer, prostate cancer, breast cancer, and cervical cancer. Checklist reviewing preventive services available has been given to the patient.    Reviewed patients plan of care and provided an AVS. The Basic Care Plan (routine screening as documented in Health Maintenance) for Aniket meets the Care Plan requirement. This Care Plan has been established and reviewed with the Patient.    Counseling Resources:  ATP IV Guidelines  Pooled Cohorts Equation Calculator  Breast Cancer Risk Calculator  Breast Cancer: Medication to Reduce Risk  FRAX Risk Assessment  ICSI Preventive Guidelines  Dietary Guidelines for Americans, 2010  USDA's MyPlate  ASA Prophylaxis  Lung CA Screening    Basilio Gregorio MD  [facility] " Risks:

## 2022-08-31 ENCOUNTER — APPOINTMENT (OUTPATIENT)
Dept: URBAN - METROPOLITAN AREA CLINIC 255 | Age: 82
Setting detail: DERMATOLOGY
End: 2022-09-05

## 2022-08-31 DIAGNOSIS — D18.0 HEMANGIOMA: ICD-10-CM

## 2022-08-31 DIAGNOSIS — Z85.828 PERSONAL HISTORY OF OTHER MALIGNANT NEOPLASM OF SKIN: ICD-10-CM

## 2022-08-31 DIAGNOSIS — L57.0 ACTINIC KERATOSIS: ICD-10-CM

## 2022-08-31 DIAGNOSIS — D22 MELANOCYTIC NEVI: ICD-10-CM

## 2022-08-31 DIAGNOSIS — L81.4 OTHER MELANIN HYPERPIGMENTATION: ICD-10-CM

## 2022-08-31 DIAGNOSIS — L82.1 OTHER SEBORRHEIC KERATOSIS: ICD-10-CM

## 2022-08-31 DIAGNOSIS — L21.8 OTHER SEBORRHEIC DERMATITIS: ICD-10-CM

## 2022-08-31 PROBLEM — D22.5 MELANOCYTIC NEVI OF TRUNK: Status: ACTIVE | Noted: 2022-08-31

## 2022-08-31 PROBLEM — D18.01 HEMANGIOMA OF SKIN AND SUBCUTANEOUS TISSUE: Status: ACTIVE | Noted: 2022-08-31

## 2022-08-31 PROCEDURE — OTHER PRESCRIPTION: OTHER

## 2022-08-31 PROCEDURE — OTHER MIPS QUALITY: OTHER

## 2022-08-31 PROCEDURE — OTHER LIQUID NITROGEN: OTHER

## 2022-08-31 PROCEDURE — OTHER COUNSELING: OTHER

## 2022-08-31 PROCEDURE — 99214 OFFICE O/P EST MOD 30 MIN: CPT | Mod: 25

## 2022-08-31 PROCEDURE — 17003 DESTRUCT PREMALG LES 2-14: CPT

## 2022-08-31 PROCEDURE — 17000 DESTRUCT PREMALG LESION: CPT

## 2022-08-31 RX ORDER — FLUOCINOLONE ACETONIDE 0.1 MG/ML
SOLUTION TOPICAL QD
Qty: 60 | Refills: 1 | Status: ERX | COMMUNITY
Start: 2022-08-31

## 2022-08-31 ASSESSMENT — LOCATION DETAILED DESCRIPTION DERM
LOCATION DETAILED: RIGHT MEDIAL UPPER BACK
LOCATION DETAILED: RIGHT FOREHEAD
LOCATION DETAILED: LEFT NASAL ALA
LOCATION DETAILED: LEFT SUPERIOR MEDIAL UPPER BACK
LOCATION DETAILED: INFERIOR MID FOREHEAD
LOCATION DETAILED: RIGHT CENTRAL FRONTAL SCALP
LOCATION DETAILED: RIGHT UPPER CUTANEOUS LIP
LOCATION DETAILED: RIGHT INFERIOR MEDIAL UPPER BACK
LOCATION DETAILED: RIGHT CENTRAL TEMPLE
LOCATION DETAILED: RIGHT INFERIOR LATERAL FOREHEAD
LOCATION DETAILED: LEFT CENTRAL FRONTAL SCALP

## 2022-08-31 ASSESSMENT — LOCATION SIMPLE DESCRIPTION DERM
LOCATION SIMPLE: RIGHT TEMPLE
LOCATION SIMPLE: RIGHT UPPER BACK
LOCATION SIMPLE: LEFT UPPER BACK
LOCATION SIMPLE: INFERIOR FOREHEAD
LOCATION SIMPLE: RIGHT LIP
LOCATION SIMPLE: RIGHT FOREHEAD
LOCATION SIMPLE: SCALP
LOCATION SIMPLE: LEFT NOSE

## 2022-08-31 ASSESSMENT — LOCATION ZONE DERM
LOCATION ZONE: FACE
LOCATION ZONE: FACE
LOCATION ZONE: NOSE
LOCATION ZONE: TRUNK
LOCATION ZONE: LIP
LOCATION ZONE: SCALP

## 2022-08-31 NOTE — PROCEDURE: LIQUID NITROGEN
Render Post-Care Instructions In Note?: no
Detail Level: Detailed
Duration Of Freeze Thaw-Cycle (Seconds): 10
Show Applicator Variable?: Yes
Consent: The patient's consent was obtained including but not limited to risks of crusting, scabbing, blistering, scarring, darker or lighter pigmentary change, recurrence, incomplete removal and infection.
Post-Care Instructions: I reviewed with the patient in detail post-care instructions. Patient is to wear sunprotection, and avoid picking at any of the treated lesions. Pt may apply Vaseline to crusted or scabbing areas.
Number Of Freeze-Thaw Cycles: 1 freeze-thaw cycle

## 2022-09-01 ENCOUNTER — HOSPITAL ENCOUNTER (OUTPATIENT)
Dept: CARDIAC REHAB | Facility: CLINIC | Age: 82
Discharge: HOME | End: 2022-09-01
Attending: INTERNAL MEDICINE
Payer: MEDICARE

## 2022-09-01 PROCEDURE — G0238 OTH RESP PROC, INDIV: HCPCS

## 2022-09-06 ENCOUNTER — HOSPITAL ENCOUNTER (OUTPATIENT)
Dept: CARDIAC REHAB | Facility: CLINIC | Age: 82
Discharge: HOME | End: 2022-09-06
Attending: INTERNAL MEDICINE
Payer: MEDICARE

## 2022-09-06 PROCEDURE — G0239 OTH RESP PROC, GROUP: HCPCS

## 2022-09-08 ENCOUNTER — HOSPITAL ENCOUNTER (OUTPATIENT)
Dept: CARDIAC REHAB | Facility: CLINIC | Age: 82
Discharge: HOME | End: 2022-09-08
Attending: INTERNAL MEDICINE
Payer: MEDICARE

## 2022-09-08 PROCEDURE — G0239 OTH RESP PROC, GROUP: HCPCS

## 2022-09-13 ENCOUNTER — HOSPITAL ENCOUNTER (OUTPATIENT)
Dept: CARDIAC REHAB | Facility: CLINIC | Age: 82
Discharge: HOME | End: 2022-09-13
Attending: INTERNAL MEDICINE
Payer: MEDICARE

## 2022-09-13 PROCEDURE — G0239 OTH RESP PROC, GROUP: HCPCS

## 2022-09-15 ENCOUNTER — HOSPITAL ENCOUNTER (OUTPATIENT)
Dept: CARDIAC REHAB | Facility: CLINIC | Age: 82
Discharge: HOME | End: 2022-09-15
Attending: INTERNAL MEDICINE
Payer: MEDICARE

## 2022-09-15 PROCEDURE — G0239 OTH RESP PROC, GROUP: HCPCS

## 2022-09-20 ENCOUNTER — HOSPITAL ENCOUNTER (OUTPATIENT)
Dept: CARDIAC REHAB | Facility: CLINIC | Age: 82
Discharge: HOME | End: 2022-09-20
Attending: INTERNAL MEDICINE
Payer: MEDICARE

## 2022-09-20 PROCEDURE — G0239 OTH RESP PROC, GROUP: HCPCS

## 2022-09-22 ENCOUNTER — HOSPITAL ENCOUNTER (OUTPATIENT)
Dept: CARDIAC REHAB | Facility: CLINIC | Age: 82
Discharge: HOME | End: 2022-09-22
Attending: INTERNAL MEDICINE
Payer: MEDICARE

## 2022-09-22 PROCEDURE — G0239 OTH RESP PROC, GROUP: HCPCS

## 2022-09-27 ENCOUNTER — HOSPITAL ENCOUNTER (OUTPATIENT)
Dept: CARDIAC REHAB | Facility: CLINIC | Age: 82
Discharge: HOME | End: 2022-09-27
Attending: INTERNAL MEDICINE
Payer: MEDICARE

## 2022-09-27 PROCEDURE — G0239 OTH RESP PROC, GROUP: HCPCS

## 2022-09-28 ENCOUNTER — ANTICOAGULATION THERAPY VISIT (OUTPATIENT)
Dept: ANTICOAGULATION | Facility: CLINIC | Age: 82
End: 2022-09-28

## 2022-09-28 ENCOUNTER — LAB (OUTPATIENT)
Dept: LAB | Facility: CLINIC | Age: 82
End: 2022-09-28
Payer: MEDICARE

## 2022-09-28 DIAGNOSIS — I48.20 CHRONIC ATRIAL FIBRILLATION (H): ICD-10-CM

## 2022-09-28 DIAGNOSIS — Z79.01 LONG TERM CURRENT USE OF ANTICOAGULANTS WITH INR GOAL OF 2.0-3.0: Primary | ICD-10-CM

## 2022-09-28 DIAGNOSIS — Z79.01 LONG TERM CURRENT USE OF ANTICOAGULANTS WITH INR GOAL OF 2.0-3.0: ICD-10-CM

## 2022-09-28 LAB — INR BLD: 3.2 (ref 0.9–1.1)

## 2022-09-28 PROCEDURE — 36416 COLLJ CAPILLARY BLOOD SPEC: CPT

## 2022-09-28 PROCEDURE — 85610 PROTHROMBIN TIME: CPT

## 2022-09-28 NOTE — PROGRESS NOTES
ANTICOAGULATION MANAGEMENT     Aniket Macias 82 year old male is on warfarin with supratherapeutic INR result. (Goal INR 2.0-3.0)    Recent labs: (last 7 days)     09/28/22  0836   INR 3.2*       ASSESSMENT     Source(s): Chart Review and Patient/Caregiver Call     Warfarin doses taken: Warfarin taken as instructed  Diet: Decreased greens/vitamin K in diet; plans to resume previous intake  New illness, injury, or hospitalization: No  Medication/supplement changes: None noted  Signs or symptoms of bleeding or clotting: No  Previous INR: Therapeutic last 2(+) visits  Additional findings: None       PLAN     Recommended plan for temporary change(s) affecting INR     Dosing Instructions: Continue your current warfarin dose with next INR in 2 weeks       Summary  As of 9/28/2022    Full warfarin instructions:  2.5 mg every Mon, Wed, Fri; 5 mg all other days   Next INR check:  10/12/2022             Telephone call with Lowell who verbalizes understanding and agrees to plan    Lab visit scheduled    Education provided: Importance of consistent vitamin K intake and Impact of vitamin K foods on INR    Plan made per [facility] anticoagulation protocol    Franc Metzger RN  Anticoagulation Clinic  9/28/2022    _______________________________________________________________________     Anticoagulation Episode Summary     Current INR goal:  2.0-3.0   TTR:  73.0 % (1 y)   Target end date:  Indefinite   Send INR reminders to:  [facility]    Indications    Long term current use of anticoagulants with INR goal of 2.0-3.0 [Z79.01]  Chronic atrial fibrillation (H) [I48.20]           Comments:           Anticoagulation Care Providers     Provider Role Specialty Phone number    Maribeth Haney MD Referring Internal Medicine - Pediatrics [phone]    Basilio Gregorio MD Referring Family Medicine [phone]    Sebastián Bermudez MD Responsible Cardiovascular Disease [phone]

## 2022-09-28 NOTE — PROGRESS NOTES
ANTICOAGULATION MANAGEMENT     Aniket Macias 82 year old male is on warfarin with supratherapeutic INR result. (Goal INR 2.0-3.0)    Recent labs: (last 7 days)     09/28/22  0836   INR 3.2*       ASSESSMENT     Source(s): Chart Review  Previous INR was Therapeutic last 2(+) visits  Medication, diet, health changes since last INR chart reviewed; none identified           PLAN     Unable to reach Lowell today.    Left message to call back to [facility]    Follow up required to confirm warfarin dose taken and assess for changes    Franc Metzger RN  Anticoagulation Clinic  9/28/2022

## 2022-09-29 ENCOUNTER — HOSPITAL ENCOUNTER (OUTPATIENT)
Dept: CARDIAC REHAB | Facility: CLINIC | Age: 82
Discharge: HOME | End: 2022-09-29
Attending: INTERNAL MEDICINE
Payer: MEDICARE

## 2022-09-29 PROCEDURE — G0239 OTH RESP PROC, GROUP: HCPCS

## 2022-10-04 ENCOUNTER — HOSPITAL ENCOUNTER (OUTPATIENT)
Dept: CARDIAC REHAB | Facility: CLINIC | Age: 82
Discharge: HOME | End: 2022-10-04
Attending: INTERNAL MEDICINE
Payer: MEDICARE

## 2022-10-04 PROCEDURE — G0239 OTH RESP PROC, GROUP: HCPCS

## 2022-10-06 ENCOUNTER — HOSPITAL ENCOUNTER (OUTPATIENT)
Dept: CARDIAC REHAB | Facility: CLINIC | Age: 82
Discharge: HOME | End: 2022-10-06
Attending: INTERNAL MEDICINE
Payer: MEDICARE

## 2022-10-06 PROCEDURE — G0237 THERAPEUTIC PROCD STRG ENDUR: HCPCS

## 2022-10-06 PROCEDURE — G0239 OTH RESP PROC, GROUP: HCPCS | Performed by: REHABILITATION PRACTITIONER

## 2022-10-11 ENCOUNTER — HOSPITAL ENCOUNTER (OUTPATIENT)
Dept: CARDIAC REHAB | Facility: CLINIC | Age: 82
Discharge: HOME | End: 2022-10-11
Attending: INTERNAL MEDICINE
Payer: MEDICARE

## 2022-10-11 PROCEDURE — G0239 OTH RESP PROC, GROUP: HCPCS

## 2022-10-12 ENCOUNTER — ANTICOAGULATION THERAPY VISIT (OUTPATIENT)
Dept: ANTICOAGULATION | Facility: CLINIC | Age: 82
End: 2022-10-12

## 2022-10-12 ENCOUNTER — LAB (OUTPATIENT)
Dept: LAB | Facility: CLINIC | Age: 82
End: 2022-10-12
Payer: MEDICARE

## 2022-10-12 DIAGNOSIS — I48.20 CHRONIC ATRIAL FIBRILLATION (H): ICD-10-CM

## 2022-10-12 DIAGNOSIS — Z79.01 LONG TERM CURRENT USE OF ANTICOAGULANTS WITH INR GOAL OF 2.0-3.0: Primary | ICD-10-CM

## 2022-10-12 DIAGNOSIS — Z79.01 LONG TERM CURRENT USE OF ANTICOAGULANTS WITH INR GOAL OF 2.0-3.0: ICD-10-CM

## 2022-10-12 LAB — INR BLD: 2.5 (ref 0.9–1.1)

## 2022-10-12 PROCEDURE — 85610 PROTHROMBIN TIME: CPT

## 2022-10-12 PROCEDURE — 36416 COLLJ CAPILLARY BLOOD SPEC: CPT

## 2022-10-12 NOTE — PROGRESS NOTES
ANTICOAGULATION MANAGEMENT     Aniket Macias 82 year old male is on warfarin with therapeutic INR result. (Goal INR 2.0-3.0)    Recent labs: (last 7 days)     10/12/22  0838   INR 2.5*       ASSESSMENT     Source(s): Chart Review and Patient/Caregiver Call     Warfarin doses taken: Warfarin taken as instructed  Diet: No new diet changes identified  New illness, injury, or hospitalization: No  Medication/supplement changes: None noted  Signs or symptoms of bleeding or clotting: No  Previous INR: Supratherapeutic  Additional findings: None       PLAN     Recommended plan for no diet, medication or health factor changes affecting INR     Dosing Instructions: Continue your current warfarin dose with next INR in 6 weeks       Summary  As of 10/12/2022    Full warfarin instructions:  2.5 mg every Mon, Wed, Fri; 5 mg all other days   Next INR check:  11/23/2022             Telephone call with Lowell who verbalizes understanding and agrees to plan    Lab visit scheduled    Education provided: Please call back if any changes to your diet, medications or how you've been taking warfarin    Plan made per [facility] anticoagulation protocol    Radha Seymour RN  Anticoagulation Clinic  10/12/2022    _______________________________________________________________________     Anticoagulation Episode Summary     Current INR goal:  2.0-3.0   TTR:  71.9 % (1 y)   Target end date:  Indefinite   Send INR reminders to:  [facility]    Indications    Long term current use of anticoagulants with INR goal of 2.0-3.0 [Z79.01]  Chronic atrial fibrillation (H) [I48.20]           Comments:           Anticoagulation Care Providers     Provider Role Specialty Phone number    Maribeth Haney MD Referring Internal Medicine - Pediatrics [phone]    Basilio Gregorio MD Referring Family Medicine [phone]    Sebastián Bermudez MD Responsible Cardiovascular Disease [phone]

## 2022-10-13 ENCOUNTER — HOSPITAL ENCOUNTER (OUTPATIENT)
Dept: CARDIAC REHAB | Facility: CLINIC | Age: 82
Discharge: HOME | End: 2022-10-13
Attending: INTERNAL MEDICINE
Payer: MEDICARE

## 2022-10-13 PROCEDURE — G0239 OTH RESP PROC, GROUP: HCPCS

## 2022-10-18 ENCOUNTER — HOSPITAL ENCOUNTER (OUTPATIENT)
Dept: CARDIAC REHAB | Facility: CLINIC | Age: 82
Discharge: HOME | End: 2022-10-18
Attending: INTERNAL MEDICINE
Payer: MEDICARE

## 2022-10-18 PROCEDURE — G0239 OTH RESP PROC, GROUP: HCPCS | Performed by: REHABILITATION PRACTITIONER

## 2022-10-20 ENCOUNTER — HOSPITAL ENCOUNTER (OUTPATIENT)
Dept: CARDIAC REHAB | Facility: CLINIC | Age: 82
Discharge: HOME | End: 2022-10-20
Attending: INTERNAL MEDICINE
Payer: MEDICARE

## 2022-10-20 PROCEDURE — G0239 OTH RESP PROC, GROUP: HCPCS | Performed by: REHABILITATION PRACTITIONER

## 2022-10-25 ENCOUNTER — HOSPITAL ENCOUNTER (OUTPATIENT)
Dept: CARDIAC REHAB | Facility: CLINIC | Age: 82
Discharge: HOME | End: 2022-10-25
Attending: INTERNAL MEDICINE
Payer: MEDICARE

## 2022-10-25 PROCEDURE — G0239 OTH RESP PROC, GROUP: HCPCS

## 2022-10-27 ENCOUNTER — HOSPITAL ENCOUNTER (OUTPATIENT)
Dept: CARDIAC REHAB | Facility: CLINIC | Age: 82
Discharge: HOME | End: 2022-10-27
Attending: INTERNAL MEDICINE
Payer: MEDICARE

## 2022-10-27 PROCEDURE — G0239 OTH RESP PROC, GROUP: HCPCS | Performed by: REHABILITATION PRACTITIONER

## 2022-11-01 ENCOUNTER — HOSPITAL ENCOUNTER (OUTPATIENT)
Dept: CARDIAC REHAB | Facility: CLINIC | Age: 82
Discharge: HOME | End: 2022-11-01
Attending: INTERNAL MEDICINE
Payer: MEDICARE

## 2022-11-01 PROCEDURE — G0239 OTH RESP PROC, GROUP: HCPCS

## 2022-11-03 ENCOUNTER — HOSPITAL ENCOUNTER (OUTPATIENT)
Dept: CARDIAC REHAB | Facility: CLINIC | Age: 82
Discharge: HOME | End: 2022-11-03
Attending: INTERNAL MEDICINE
Payer: MEDICARE

## 2022-11-03 DIAGNOSIS — J43.9 PULMONARY EMPHYSEMA, UNSPECIFIED EMPHYSEMA TYPE (H): ICD-10-CM

## 2022-11-03 PROCEDURE — 94729 DIFFUSING CAPACITY: CPT

## 2022-11-03 PROCEDURE — 94726 PLETHYSMOGRAPHY LUNG VOLUMES: CPT

## 2022-11-03 PROCEDURE — 94375 RESPIRATORY FLOW VOLUME LOOP: CPT

## 2022-11-03 PROCEDURE — 94618 PULMONARY STRESS TESTING: CPT

## 2022-11-03 NOTE — PROGRESS NOTES
PFT completed per  ; results sent to [facility]. 6 minute walk completed; results scanned to Epic and added to PFT.

## 2022-11-04 LAB
DLCOUNC-%PRED-PRE: 49 %
DLCOUNC-PRE: 11.25 ML/MIN/MMHG
DLCOUNC-PRED: 22.89 ML/MIN/MMHG
ERV-%PRED-PRE: 170 %
ERV-PRE: 1.4 L
ERV-PRED: 0.82 L
EXPTIME-PRE: 8.07 SEC
FEF2575-%PRED-PRE: 99 %
FEF2575-PRE: 1.85 L/SEC
FEF2575-PRED: 1.86 L/SEC
FEFMAX-%PRED-PRE: 129 %
FEFMAX-PRE: 8.42 L/SEC
FEFMAX-PRED: 6.51 L/SEC
FEV1-%PRED-PRE: 101 %
FEV1-PRE: 2.69 L
FEV1FEV6-PRE: 73 %
FEV1FEV6-PRED: 76 %
FEV1FVC-PRE: 72 %
FEV1FVC-PRED: 75 %
FEV1SVC-PRE: 71 %
FEV1SVC-PRED: 63 %
FIFMAX-PRE: 4.12 L/SEC
FRCPLETH-%PRED-PRE: 80 %
FRCPLETH-PRE: 3.01 L
FRCPLETH-PRED: 3.73 L
FVC-%PRED-PRE: 103 %
FVC-PRE: 3.74 L
FVC-PRED: 3.6 L
IC-%PRED-PRE: 71 %
IC-PRE: 2.42 L
IC-PRED: 3.38 L
RVPLETH-%PRED-PRE: 55 %
RVPLETH-PRE: 1.61 L
RVPLETH-PRED: 2.88 L
TLCPLETH-%PRED-PRE: 79 %
TLCPLETH-PRE: 5.43 L
TLCPLETH-PRED: 6.82 L
VA-%PRED-PRE: 75 %
VA-PRE: 4.46 L
VC-%PRED-PRE: 90 %
VC-PRE: 3.82 L
VC-PRED: 4.2 L

## 2022-11-10 ENCOUNTER — VIRTUAL VISIT (OUTPATIENT)
Dept: PULMONOLOGY | Facility: CLINIC | Age: 82
End: 2022-11-10
Attending: INTERNAL MEDICINE
Payer: MEDICARE

## 2022-11-10 DIAGNOSIS — R06.09 DYSPNEA ON EXERTION: ICD-10-CM

## 2022-11-10 DIAGNOSIS — J84.112 UIP (USUAL INTERSTITIAL PNEUMONITIS) (H): Primary | ICD-10-CM

## 2022-11-10 PROCEDURE — G0463 HOSPITAL OUTPT CLINIC VISIT: HCPCS | Mod: PN,RTG | Performed by: INTERNAL MEDICINE

## 2022-11-10 PROCEDURE — 99213 OFFICE O/P EST LOW 20 MIN: CPT | Mod: 95 | Performed by: INTERNAL MEDICINE

## 2022-11-10 NOTE — PROGRESS NOTES
Dolores is a 82 year old who is being evaluated via a billable video visit.      How would you like to obtain your AVS? MyChart  If the video visit is dropped, the invitation should be resent by: Text to cell phone: [phone]  Will anyone else be joining your video visit? No         Angela ROLLINS        Video-Visit Details    Video Start Time: 9:15 AM    Type of service:  Video Visit    Video End Time:9:31 AM    Originating Location (pt. Location): Home        Distant Location (provider location):  On-site    Platform used for Video Visit: Martha Etienne is a 82 year old who is being evaluated via a billable video visit.     Patient confirms medications and allergies are accurate via patients echeck in completion, and or denies any changes since last reviewed/verified.     Liza Cardoza Virtual Facilitator       How would you like to obtain your AVS? MyChart  If the video visit is dropped, the invitation should be resent by: Send to e-mail at: [email or web address]  Will anyone else be joining your video visit? No        Video-Visit Details    Video Start Time: 8:08 AM    Type of service:  Video Visit    Video End Time:8:29 AM    Originating Location (pt. 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 Remains asymptomatic and not limited by breathing. Given asymptomatic nature, and good exercise tolerance, he is not currently on any  medications (inhalers or antifibrotics). If becomes symptomatic, would start Anoro, with albuterol PRN (did not feel he got benefit from PRN albuterol). If worsening, will consider antifibrotics, no need currently. ILD lab work up with borderline ALEKSEY (1:40), and one positive HP panel, without an HP pattern on CT.     -Pneumonia vaccinations (13 and 23 valent) up to date (2015 and 2017, respectively) Will need Prevnar 20.   -Should have annual influenza immunization (up to date   -up to date on COVID vaccination series.   -Encouraged ongoing activity/exercise  -again discussed anti-fibrotic medications today, I don't think he should start these at this point, given stability of disease and asymptomatic.   -Has been going through pulmonary rehab.    -His PFts have mildly worsened but slowly stabilized,. Will keep a close monitoring NO ofev yet.  -Will get a n ECHO to asses for ph due to drop in DLCO.     - COntineu with exercise.  -repeat PFts and 6 mwt in 6 months.     RTC: 6 months with PFts and 6wmt    I spent 24 minutes on the date of the encounter doing chart review, history and exam, documentation and further activities as noted above.      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      Brief summary (from prior visits):   I met him in May 2020. He initially developed a cough in Jan 2020. At his PCP's office with a cough, and was found to have SpO2 of 94%. A CXR was done, concerning for left lung pneumonia. He was treated with Augmentin and azithromycin. This improved his cough, but he continued to feel short of breath, especially with exertion. He also noted some lightheadedness when  "he stood up quickly. He was complaining of dyspnea requiring him to stop after 1 flight of stairs (a change from baseline of 3 flights of stairs). He saw his PCP, Dr. Haney, for these complaints in February 2020. A CT was done at that time, showing emphysema and pulmonary fibrosis in a possible UIP pattern. He was referred to ILD clinic. 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 As he has been relatively asymptomatic, he was given a PRN albuterol. Anoro was discussed, but not started. If there is progression, antifibrotics will be considered.      Interval history:   He was last seen virtually in January. Overall, he is feeling well. He denies any shortness of breath. He is not getting out of breath. He filled albuterol in May of 2020, and he used it 21 times since then. He didn't feel any benefit from it. He denies wheeze. The only time he notes dyspnea, is if he walks up from his garage up to the 4th floor. If he walks slow, he is fine, if he walks faster, he may have to stop on the third floor for 1-2 min. Otherwise, he does not feel at all limited by his breathing. He could \"walk all day\" on level ground. He does have to go slow with walking with his wife, because she is slower, but he feels he wants to walk faster. No chest pain, LE edema. He does have some atrial fibrillation, which he sometimes feels. Rare coughing and sneezing. Does feel he may have some seasonal allergies in the Spring/Summer. Daughter thinking of going to [place] next summer, and patient may join. No new joint pain, swelling, or new myalgias. 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 No new rashes.     #Interval History: 7/7/22   -He has been doing pulmonary rehab. He has difficulty climbing stairs up to 4 storeys.  No increased cough. He has not been using any inhalers. Overall he has stayed stable. He does get short of breath while doing vacuum in the house. No snoring or no dysonea is noted.   -As per " his wife he is stable and no significant worsening is noted.  He had tried albuterol without any improvement.     #Interval History: 11/10/22  -  Patient has stayed stable. No significant worsening of breath is noted.     Exposure History: reviewed, no change.   Tobacco: Former use, quit about 25 years ago, used 1/2-2 ppd for 35 years or so, up to 70 pack years.   Other inhaled substances: No pipes, vaping, marijuana.   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   Pets: No pets, no birds.   Allergies: seasonal allergies  Hobbies: enjoys being on the computer. [place] around [place]    Travel: No recent travel. (Previously lived in [place] from 1995 to 2015).   Home: [place]. No feather pillows or down comforters. No mold in apartment, no hot tub use. No humidifier. No musical instrument use.     ROS: Complete 10 point ROS negative unless mentioned in HPI    Allergies and current medications: Reviewed in EHR  Past medical, surgical, social, and family histories: Personally reviewed and updated (if needed) during this visit.     Physical Exam:  There were no vitals taken for this visit.    General: Sitting in the chair in NAD  HEENT: anicteric, mask in place  Neck: Trachea midline. Normal ROM.   Lymphatic: no cervical or supraclavicular lymphadenopathy   Chest: Very mild basilar crackles, worse on left. Normal Work of breathing. No cough or wheeze.   Cardiac: irregularly irregular. Possible fixed split S2.   Abdomen: Soft, flat, non tender, active BS  Extremities: No LE Edema, moving all extremities. No digital clubbing. No obvious synovitis.   Neuro: A&Ox3, no focal defects. Speech/language wnl.   Skin: no rash noted on limited exam  Psych: normal affect.     Labs and Radiology: All new data personally reviewed and summarized below. I have reviewed the results with the patient today.   All laboratory data  reviewed   8/17/21 Hgb 13.2 K 4.3 Cr 1.17, LFTs normal.   All cardiac studies reviewed by me.   No new studies  All imaging studies reviewed by me.   No new imaging.     PFT's:  Pulmonary Function Testing: personally reviewed.

## 2022-11-10 NOTE — LETTER
11/10/2022         RE: Aniket Macias  [address]  [place] [address]        Dear Colleague,    Thank you for referring your patient, Aniket Macias, to the [facility]. Please see a copy of my visit note below.    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 Remains asymptomatic and not limited by breathing. Given asymptomatic nature, and good exercise tolerance, he is not currently on any medications (inhalers or antifibrotics). If becomes symptomatic, would start Anoro, with albuterol PRN (did not feel he got benefit from PRN albuterol). If worsening, will consider antifibrotics, no need currently. ILD lab work up with borderline ALEKSEY (1:40), and one positive HP panel, without an HP pattern on CT.     -Pneumonia vaccinations (13 and 23 valent) up to date (2015 and 2017, respectively) Will need Prevnar 20.   -Should have annual influenza immunization (up to date   -up to date on COVID vaccination series.   -Encouraged ongoing activity/exercise  -again discussed anti-fibrotic medications today, I don't think he should start these at this point, given stability of disease and asymptomatic.   -Has been going through pulmonary rehab.    -His PFts have mildly worsened but slowly stabilized,. Will keep a close monitoring NO ofev yet.  -Will get a n ECHO to asses for ph due to drop in DLCO.     - COntineu with exercise.  -repeat PFts and 6 mwt in 6 months.     RTC: 6 months with PFts and 6wmt    I spent 24 minutes on the date of the encounter  doing chart review, history and exam, documentation and further activities as noted above.      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      Brief summary (from prior visits):   I met him in May 2020. He initially developed a cough in Jan 2020. At his PCP's office with a cough, and was found to have SpO2 of 94%. A CXR was done, concerning for left lung pneumonia. He was treated with Augmentin and azithromycin. This improved his cough, but he continued to feel short of breath, especially with exertion. He also noted some lightheadedness when he stood up quickly. He was complaining of dyspnea requiring him to stop after 1 flight of stairs (a change from baseline of 3 flights of stairs). He saw his PCP, Dr. Haney, for these complaints in February 2020. A CT was done at that time, showing emphysema and pulmonary fibrosis in a possible UIP pattern. He was referred to ILD clinic. 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 As he has been relatively asymptomatic, he was given a PRN albuterol. Anoro was discussed, but not started. If there is progression, antifibrotics will be considered.      Interval history:   He was last seen virtually in January. Overall, he is feeling well. He denies any shortness of breath. He is not getting out of breath. He filled albuterol in May of 2020, and he used it 21 times since then. He didn't feel any benefit from it. He denies wheeze. The only time he notes dyspnea, is if he walks up from his garage up to the 4th floor. If he walks slow, he is fine, if he walks faster, he may have to stop on the third floor for  "1-2 min. Otherwise, he does not feel at all limited by his breathing. He could \"walk all day\" on level ground. He does have to go slow with walking with his wife, because she is slower, but he feels he wants to walk faster. No chest pain, LE edema. He does have some atrial fibrillation, which he sometimes feels. Rare coughing and sneezing. Does feel he may have some seasonal allergies in the Spring/Summer. Daughter thinking of going to [place] next summer, and patient may join. No new joint pain, swelling, or new myalgias. 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 No new rashes.     #Interval History: 7/7/22   -He has been doing pulmonary rehab. He has difficulty climbing stairs up to 4 storeys.  No increased cough. He has not been using any inhalers. Overall he has stayed stable. He does get short of breath while doing vacuum in the house. No snoring or no dysonea is noted.   -As per his wife he is stable and no significant worsening is noted.  He had tried albuterol without any improvement.     #Interval History: 11/10/22  -  Patient has stayed stable. No significant worsening of breath is noted.     Exposure History: reviewed, no change.   Tobacco: Former use, quit about 25 years ago, used 1/2-2 ppd for 35 years or so, up to 70 pack years.   Other inhaled substances: No pipes, vaping, marijuana.   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   Pets: No pets, no birds.   Allergies: seasonal allergies  Hobbies: enjoys being on the computer. [place] around [place].    Travel: No recent travel. (Previously lived in [place] from 1995 to 2015).   Home: [place]. No feather pillows or down comforters. No mold in apartment, no hot tub use. No humidifier. No musical instrument use.     ROS: Complete 10 point ROS negative unless mentioned in HPI    Allergies and current medications: Reviewed in EHR  Past medical, surgical, social, and " family histories: Personally reviewed and updated (if needed) during this visit.     Physical Exam:  There were no vitals taken for this visit.    General: Sitting in the chair in NAD  HEENT: anicteric, mask in place  Neck: Trachea midline. Normal ROM.   Lymphatic: no cervical or supraclavicular lymphadenopathy   Chest: Very mild basilar crackles, worse on left. Normal Work of breathing. No cough or wheeze.   Cardiac: irregularly irregular. Possible fixed split S2.   Abdomen: Soft, flat, non tender, active BS  Extremities: No LE Edema, moving all extremities. No digital clubbing. No obvious synovitis.   Neuro: A&Ox3, no focal defects. Speech/language wnl.   Skin: no rash noted on limited exam  Psych: normal affect.     Labs and Radiology: All new data personally reviewed and summarized below. I have reviewed the results with the patient today.   All laboratory data reviewed   8/17/21 Hgb 13.2 K 4.3 Cr 1.17, LFTs normal.   All cardiac studies reviewed by me.   No new studies  All imaging studies reviewed by me.   No new imaging.     PFT's:  Pulmonary Function Testing: personally reviewed.                   Again, thank you for allowing me to participate in the care of your patient.        Sincerely,        Derrek Lord MD

## 2022-11-23 ENCOUNTER — LAB (OUTPATIENT)
Dept: LAB | Facility: CLINIC | Age: 82
End: 2022-11-23
Payer: MEDICARE

## 2022-11-23 ENCOUNTER — ANTICOAGULATION THERAPY VISIT (OUTPATIENT)
Dept: ANTICOAGULATION | Facility: CLINIC | Age: 82
End: 2022-11-23

## 2022-11-23 DIAGNOSIS — I48.20 CHRONIC ATRIAL FIBRILLATION (H): ICD-10-CM

## 2022-11-23 DIAGNOSIS — Z79.01 LONG TERM CURRENT USE OF ANTICOAGULANTS WITH INR GOAL OF 2.0-3.0: ICD-10-CM

## 2022-11-23 DIAGNOSIS — Z79.01 LONG TERM CURRENT USE OF ANTICOAGULANTS WITH INR GOAL OF 2.0-3.0: Primary | ICD-10-CM

## 2022-11-23 LAB — INR BLD: 2.8 (ref 0.9–1.1)

## 2022-11-23 PROCEDURE — 85610 PROTHROMBIN TIME: CPT

## 2022-11-23 PROCEDURE — 36416 COLLJ CAPILLARY BLOOD SPEC: CPT

## 2022-11-23 NOTE — PROGRESS NOTES
ANTICOAGULATION MANAGEMENT     Aniket Macias 82 year old male is on warfarin with therapeutic INR result. (Goal INR 2.0-3.0)    Recent labs: (last 7 days)     11/23/22  0857   INR 2.8*       ASSESSMENT     Source(s): Chart Review  Previous INR was Therapeutic last 2(+) visits  Medication, diet, health changes since last INR chart reviewed; none identified           PLAN     Recommended plan for no diet, medication or health factor changes affecting INR     Dosing Instructions: Continue your current warfarin dose with next INR in 6 weeks       Summary  As of 11/23/2022    Full warfarin instructions:  2.5 mg every Mon, Wed, Fri; 5 mg all other days; Starting 11/23/2022   Next INR check:  1/4/2023             Detailed voice message left for Lowell with dosing instructions and follow up date.     Contact [phone]  to schedule and with any changes, questions or concerns.     Education provided:   Please call back if any changes to your diet, medications or how you've been taking warfarin    Plan made per [facility] anticoagulation protocol    Radha Seymour RN  Anticoagulation Clinic  11/23/2022    _______________________________________________________________________     Anticoagulation Episode Summary     Current INR goal:  2.0-3.0   TTR:  75.6 % (1 y)   Target end date:  Indefinite   Send INR reminders to:  [facility]    Indications    Long term current use of anticoagulants with INR goal of 2.0-3.0 [Z79.01]  Chronic atrial fibrillation (H) [I48.20]           Comments:           Anticoagulation Care Providers     Provider Role Specialty Phone number    Maribeth Haney MD Referring Internal Medicine - Pediatrics [phone]    Basilio Gregorio MD Referring Family Medicine [phone]    Sebastián Bermudez MD Responsible Cardiovascular Disease [phone]

## 2023-01-11 ENCOUNTER — TELEPHONE (OUTPATIENT)
Dept: GERIATRICS | Facility: CLINIC | Age: 83
End: 2023-01-11

## 2023-01-11 ENCOUNTER — LAB (OUTPATIENT)
Dept: LAB | Facility: CLINIC | Age: 83
End: 2023-01-11
Payer: COMMERCIAL

## 2023-01-11 ENCOUNTER — ALLIED HEALTH/NURSE VISIT (OUTPATIENT)
Dept: FAMILY MEDICINE | Facility: CLINIC | Age: 83
End: 2023-01-11
Payer: COMMERCIAL

## 2023-01-11 ENCOUNTER — ANTICOAGULATION THERAPY VISIT (OUTPATIENT)
Dept: ANTICOAGULATION | Facility: CLINIC | Age: 83
End: 2023-01-11

## 2023-01-11 DIAGNOSIS — I48.20 CHRONIC ATRIAL FIBRILLATION (H): ICD-10-CM

## 2023-01-11 DIAGNOSIS — Z23 NEED FOR VACCINATION: Primary | ICD-10-CM

## 2023-01-11 DIAGNOSIS — Z79.01 LONG TERM CURRENT USE OF ANTICOAGULANTS WITH INR GOAL OF 2.0-3.0: ICD-10-CM

## 2023-01-11 DIAGNOSIS — Z79.01 LONG TERM CURRENT USE OF ANTICOAGULANTS WITH INR GOAL OF 2.0-3.0: Primary | ICD-10-CM

## 2023-01-11 LAB — INR BLD: 2.6 (ref 0.9–1.1)

## 2023-01-11 PROCEDURE — G0009 ADMIN PNEUMOCOCCAL VACCINE: HCPCS

## 2023-01-11 PROCEDURE — 90732 PPSV23 VACC 2 YRS+ SUBQ/IM: CPT

## 2023-01-11 PROCEDURE — 99207 PR NO CHARGE NURSE ONLY: CPT

## 2023-01-11 PROCEDURE — 85610 PROTHROMBIN TIME: CPT

## 2023-01-11 PROCEDURE — 36415 COLL VENOUS BLD VENIPUNCTURE: CPT

## 2023-01-11 NOTE — PROGRESS NOTES
Spoke with provider (Marnie) per patient HM record: He completed the series. At this time due to age group, Marnie recommended PPSV 23 instead of PCV20.    Margy FORD CMA

## 2023-01-11 NOTE — PROGRESS NOTES
ANTICOAGULATION MANAGEMENT     Aniket Macias 82 year old male is on warfarin with therapeutic INR result. (Goal INR 2.0-3.0)    Recent labs: (last 7 days)     01/11/23  1320   INR 2.6*       ASSESSMENT       Source(s): Chart Review and Patient/Caregiver Call       Warfarin doses taken: Warfarin taken as instructed    Diet: No new diet changes identified    New illness, injury, or hospitalization: No    Medication/supplement changes: None noted    Signs or symptoms of bleeding or clotting: No    Previous INR: Therapeutic last 2(+) visits    Additional findings: None       PLAN     Recommended plan for no diet, medication or health factor changes affecting INR     Dosing Instructions: Continue your current warfarin dose with next INR in 6 weeks       Summary  As of 1/11/2023    Full warfarin instructions:  2.5 mg every Mon, Wed, Fri; 5 mg all other days   Next INR check:  2/22/2023             Telephone call with Lowell who verbalizes understanding and agrees to plan    Lab visit scheduled    Education provided:     Please call back if any changes to your diet, medications or how you've been taking warfarin    Plan made per [facility] anticoagulation protocol    Radha Seymour RN  Anticoagulation Clinic  1/11/2023    _______________________________________________________________________     Anticoagulation Episode Summary     Current INR goal:  2.0-3.0   TTR:  89.0 % (1 y)   Target end date:  Indefinite   Send INR reminders to:  [facility]    Indications    Long term current use of anticoagulants with INR goal of 2.0-3.0 [Z79.01]  Chronic atrial fibrillation (H) [I48.20]           Comments:           Anticoagulation Care Providers     Provider Role Specialty Phone number    Maribeth Haney MD Referring Internal Medicine - Pediatrics [phone]    Basilio Gregorio MD Referring Family Medicine [phone]    Sebastián Bermudez MD Responsible Cardiovascular Disease [phone]

## 2023-01-11 NOTE — TELEPHONE ENCOUNTER
FYI - Status Update    Who is Calling: patient    Update: PT would like to schedule an appt with Jennifer Mishra for memory care plus PCP    Does caller want a call/response back: Yes     Could we send this information to you in [employer] or would you prefer to receive a phone call?:   Patient would prefer a phone call   Okay to leave a detailed message?: Yes at Cell number on file:    Telephone Information:   Mobile [phone]

## 2023-01-12 NOTE — TELEPHONE ENCOUNTER
TCs please call and help him schedule an appointment for memory clinic but note I can't fulfill the PCP role at this time - appreciate the question though! BERENICE I used to be his PCP before I transitioned to [facility] work.

## 2023-01-19 NOTE — TELEPHONE ENCOUNTER
I called and spoke to Pt. He has new insurance and noticed Dr. Mishra is listed.  I explained to him that Dr. Mishra is not a PCP.  We did not schedule an appointment.

## 2023-03-01 ENCOUNTER — LAB (OUTPATIENT)
Dept: LAB | Facility: CLINIC | Age: 83
End: 2023-03-01
Payer: COMMERCIAL

## 2023-03-01 ENCOUNTER — ANTICOAGULATION THERAPY VISIT (OUTPATIENT)
Dept: ANTICOAGULATION | Facility: CLINIC | Age: 83
End: 2023-03-01

## 2023-03-01 DIAGNOSIS — Z79.01 LONG TERM CURRENT USE OF ANTICOAGULANTS WITH INR GOAL OF 2.0-3.0: ICD-10-CM

## 2023-03-01 DIAGNOSIS — I48.20 CHRONIC ATRIAL FIBRILLATION (H): ICD-10-CM

## 2023-03-01 DIAGNOSIS — Z79.01 LONG TERM CURRENT USE OF ANTICOAGULANTS WITH INR GOAL OF 2.0-3.0: Primary | ICD-10-CM

## 2023-03-01 DIAGNOSIS — I48.0 PAROXYSMAL ATRIAL FIBRILLATION (H): ICD-10-CM

## 2023-03-01 LAB — INR BLD: 1.9 (ref 0.9–1.1)

## 2023-03-01 PROCEDURE — 36416 COLLJ CAPILLARY BLOOD SPEC: CPT

## 2023-03-01 PROCEDURE — 85610 PROTHROMBIN TIME: CPT

## 2023-03-01 RX ORDER — WARFARIN SODIUM 5 MG/1
TABLET ORAL
Qty: 90 TABLET | Refills: 1 | Status: SHIPPED | OUTPATIENT
Start: 2023-03-01 | End: 2023-10-02

## 2023-03-01 NOTE — PROGRESS NOTES
ANTICOAGULATION MANAGEMENT     Aniket Macias 82 year old male is on warfarin with subtherapeutic INR result. (Goal INR 2.0-3.0)    Recent labs: (last 7 days)     03/01/23  1334   INR 1.9*       ASSESSMENT       Source(s): Chart Review and Patient/Caregiver Call       Warfarin doses taken: Warfarin taken as instructed    Diet: No new diet changes identified    New illness, injury, or hospitalization: No    Medication/supplement changes: L-serine for memory loss started a couple weeks ago No interaction anticipated    Signs or symptoms of bleeding or clotting: No    Previous INR: Therapeutic last 2(+) visits    Additional findings: None         PLAN     Recommended plan for no diet, medication or health factor changes affecting INR     Dosing Instructions: Continue your current warfarin dose with next INR in 2 weeks       Summary  As of 3/1/2023    Full warfarin instructions:  2.5 mg every Mon, Wed, Fri; 5 mg all other days   Next INR check:  3/15/2023             Telephone call with Lowell who verbalizes understanding and agrees to plan    Lab visit scheduled    Education provided:     Please call back if any changes to your diet, medications or how you've been taking warfarin    Interaction IS NOT anticipated between warfarin and L-serine    Contact [phone]  with any changes, questions or concerns.     Plan made per [facility] anticoagulation protocol    Jeanette Bender RN  Anticoagulation Clinic  3/1/2023    _______________________________________________________________________     Anticoagulation Episode Summary     Current INR goal:  2.0-3.0   TTR:  89.2 % (1 y)   Target end date:  Indefinite   Send INR reminders to:  [facility]    Indications    Long term current use of anticoagulants with INR goal of 2.0-3.0 [Z79.01]  Chronic atrial fibrillation (H) [I48.20]           Comments:           Anticoagulation Care Providers     Provider Role Specialty Phone number    Maribeth Haney MD Referring  Internal Medicine - Pediatrics [phone]    Basilio Gregorio MD Referring Family Medicine [phone]    Sebastián Bermudez MD Responsible Cardiovascular Disease [phone]

## 2023-03-06 ENCOUNTER — OFFICE VISIT (OUTPATIENT)
Dept: CARDIOLOGY | Facility: CLINIC | Age: 83
End: 2023-03-06
Payer: COMMERCIAL

## 2023-03-06 VITALS
RESPIRATION RATE: 17 BRPM | SYSTOLIC BLOOD PRESSURE: 138 MMHG | HEIGHT: 67 IN | BODY MASS INDEX: 27.69 KG/M2 | WEIGHT: 176.4 LBS | DIASTOLIC BLOOD PRESSURE: 80 MMHG | HEART RATE: 96 BPM | OXYGEN SATURATION: 97 %

## 2023-03-06 DIAGNOSIS — I48.21 PERMANENT ATRIAL FIBRILLATION (H): Primary | ICD-10-CM

## 2023-03-06 PROCEDURE — 99214 OFFICE O/P EST MOD 30 MIN: CPT | Performed by: INTERNAL MEDICINE

## 2023-03-06 PROCEDURE — 93000 ELECTROCARDIOGRAM COMPLETE: CPT | Performed by: INTERNAL MEDICINE

## 2023-03-06 RX ORDER — SERINE 100 %
500 CRYSTALS MISCELLANEOUS 4 TIMES DAILY
COMMUNITY
Start: 2023-02-20 | End: 2024-04-22

## 2023-03-06 NOTE — PROGRESS NOTES
"Electrophysiology/ Clinic Note         H&P and Plan:     REASON FOR VISIT: Electrophysiology evaluation.      HISTORY OF PRESENT ILLNESS: Patient is a pleasant 82-year-old gentleman with history of hypertension, skin cancer, lung fibrosis and permanent atrial fibrillation, who is here for routine followup.     Patient used to be followed in clinic with us.  However, he lost follow-up since 2018.    He informs that he was diagnosed with lung fibrosis in 2020.  At that time, he presents with dyspnea on exertion and apparently a CT confirmed lung fibrosis.  He was was evaluated by pulmonologist, and was referred here to reestablish care with us.    At the moment, he is doing well.  He continues to have dyspnea on major exertion.  However, he appears to be euvolemic on physical exam.  He denies any other symptoms such as chest pain,  palpitations, near-syncope or syncope    He is on chronic therapy with Coumadin and metoprolol. Heart rate seems to be well controlled and he is asymptomatic.      EKG today shows atrial fibrillation with good heart rate control underlying RBBB pattern.    PREVIOUS STUDIES (personally reviewed):  -Cardiac MRI (2019): EF of 67%.  Negative for ischemia, fibrosis or infiltrative disease.  -Holter (2019): Persistent A-fib (average HR of 61 bpm)      ASSESSMENT AND PLAN:   1.  Permanent atrial fibrillation.  Heart rate is well-controlled.  We will continue therapy with metoprolol.  We will will obtain a Zio patch monitor for 3 days.    2.  Embolic prevention.  [id number] score of 3.  Continue anticoagulation with Coumadin indefinitely.   3. Lung fibrosis.  We will obtain an echo to rule out pulmonary hypertension           Sebastián Bermudez MD    Physical Exam:  Vitals: /80   Pulse 96   Resp 17   Ht 1.702 m (5' 7\")   Wt 80 kg (176 lb 6.4 oz)   SpO2 97%   BMI 27.63 kg/m      Constitutional:  AAO x3.  Pt is in NAD.  HEAD: normocephalic.  SKIN: Skin normal color, texture and turgor with " no lesions or eruptions.  Eyes: PERRL, EOMI.  ENT:  Supple, normal JVP. No lymphadenopathy or thyroid enlargement.  Chest:  Course in base B/L.  Cardiac:  IIR, variable sound of S1 and Normal S2.  No murmurs rubs or gallop.    Abdomen:  Normal BS.  Soft, non-tender and non-distended.  No rebound or guarding.    Extremities:  Pedious pulses palpable B/L.  No LE edema noticed.   Neurological: Strength and sensation grossly symmetric and intact throughout.       CURRENT MEDICATIONS:  Current Outpatient Medications   Medication Sig Dispense Refill     ACETAMINOPHEN PO Take 1,000 mg by mouth every 6 hours as needed for pain       albuterol (PROAIR HFA/PROVENTIL HFA/VENTOLIN HFA) 108 (90 Base) MCG/ACT inhaler Inhale 1-2 puffs into the lungs every 4 hours as needed for shortness of breath / dyspnea or wheezing 1 Inhaler 11     atorvastatin (LIPITOR) 40 MG tablet Take 1 tablet (40 mg) by mouth daily 90 tablet 3     benzonatate (TESSALON) 100 MG capsule Take 1-2 capsules by mouth up to 3 x a day as needed with food 45 capsule 0     furosemide (LASIX) 40 MG tablet Take 1 tablet (40 mg) by mouth daily 90 tablet 3     latanoprost (XALATAN) 0.005 % ophthalmic solution Place 1 drop into both eyes At Bedtime       lisinopril (ZESTRIL) 40 MG tablet Take 1 tablet (40 mg) by mouth daily 90 tablet 3     metoprolol tartrate (LOPRESSOR) 50 MG tablet Take 1 tablet (50 mg) by mouth 2 times daily 180 tablet 3     Multiple Vitamins-Minerals (MULTIVITAMIN PO) Take 1 tablet by mouth daily        terazosin (HYTRIN) 5 MG capsule TAKE ONE CAPSULE BY MOUTH ONCE DAILY AT BEDTIME 90 capsule 1     UNABLE TO FIND MEDICATION NAME: Pro-racia    OTC rosacea cream.       warfarin ANTICOAGULANT (COUMADIN) 5 MG tablet TAKE 1/2 TABLET BY MOUTH MONDAY, WED AND FRIDAY AND 1 TABLET ALL OTHER DAYS OR AS DIRECTED 90 tablet 1       ALLERGIES   No Known Allergies    PAST MEDICAL HISTORY:  Past Medical History:   Diagnosis Date     Basal cell carcinoma      BPH  (benign prostatic hypertrophy)      Diverticulosis      Glaucoma      Hemorrhoids      HTN (hypertension)      Hyperlipidemia      Obesity      Persistent atrial fibrillation (H)      PVC (premature ventricular contraction)      Squamous cell carcinoma in situ of skin      Syncope 2016       PAST SURGICAL HISTORY:  Past Surgical History:   Procedure Laterality Date     BACK SURGERY       COLONOSCOPY  11/19/15    hx polyps     COLONOSCOPY N/A 2021    Procedure: COLONOSCOPY, FLEXIBLE, WITH LESION REMOVAL USING SNARE;  Surgeon: Eugene Burden MD;  Location:  GI     SURGICAL HISTORY OF -       Laminectomy     SURGICAL HISTORY OF -       biopsy of prostate     TONSILLECTOMY & ADENOIDECTOMY         FAMILY HISTORY:  The patient's family history was reviewed and is non-contributory for heart disease.    SOCIAL HISTORY:  Social History     Socioeconomic History     Marital status:    Tobacco Use     Smoking status: Former     Packs/day: 2.00     Years: 35.00     Pack years: 70.00     Types: Cigarettes     Quit date: 1995     Years since quittin.8     Smokeless tobacco: Never     Tobacco comments:     quit age 54   Substance and Sexual Activity     Alcohol use: Yes     Alcohol/week: 0.0 standard drinks     Comment: 1-2 drinks per week     Drug use: No     Sexual activity: Not Currently     Partners: Female   Other Topics Concern     Caffeine Concern Yes     Comment: 2 cups coffee per day     Sleep Concern No     Stress Concern No     Weight Concern No     Exercise No       Review of Systems:  Skin:        Eyes:       ENT:       Respiratory:       Cardiovascular:       Gastroenterology:      Genitourinary:       Musculoskeletal:       Neurologic:       Psychiatric:       Heme/Lymph/Imm:       Endocrine:           Recent Lab Results:  LIPID RESULTS:  Lab Results   Component Value Date    CHOL 98 2022    CHOL 127 2020    HDL 53 2022    HDL 60 2020    LDL 33 2022    LDL  55 07/30/2020    TRIG 58 08/17/2022    TRIG 62 07/30/2020    CHOLHDLRATIO 4.6 03/05/2015       LIVER ENZYME RESULTS:  Lab Results   Component Value Date    AST 23 08/17/2022    AST 25 05/06/2020    ALT 30 08/17/2022    ALT 40 05/06/2020       CBC RESULTS:  Lab Results   Component Value Date    WBC 8.9 05/06/2020    RBC 4.14 (L) 05/06/2020    HGB 12.7 (L) 08/17/2022    HGB 13.9 05/06/2020    HCT 42.0 05/06/2020     (H) 05/06/2020    MCH 33.6 (H) 05/06/2020    MCHC 33.1 05/06/2020    RDW 12.9 05/06/2020     05/06/2020       BMP RESULTS:  Lab Results   Component Value Date     08/17/2022     05/06/2020    POTASSIUM 4.6 08/17/2022    POTASSIUM 4.3 05/06/2020    CHLORIDE 108 08/17/2022    CHLORIDE 105 05/06/2020    CO2 25 08/17/2022    CO2 27 05/06/2020    ANIONGAP 6 08/17/2022    ANIONGAP 5 05/06/2020     (H) 08/17/2022     (H) 05/06/2020    BUN 17 08/17/2022    BUN 18 05/06/2020    CR 1.03 08/17/2022    CR 1.03 05/06/2020    GFRESTIMATED 72 08/17/2022    GFRESTIMATED 68 05/06/2020    GFRESTBLACK 79 05/06/2020    MCKAYLA 8.7 08/17/2022    MCKAYLA 9.0 05/06/2020        A1C RESULTS:  No results found for: A1C    INR RESULTS:  Lab Results   Component Value Date    INR 1.9 (H) 03/01/2023    INR 2.6 (H) 01/11/2023    INR 2.30 (H) 06/03/2021    INR 3.00 (H) 04/22/2021         ECHOCARDIOGRAM  No results found for this or any previous visit (from the past 8760 hour(s)).      No orders of the defined types were placed in this encounter.    No orders of the defined types were placed in this encounter.    There are no discontinued medications.      No diagnosis found.      CC  No referring provider defined for this encounter.

## 2023-03-06 NOTE — LETTER
3/6/2023    Basilio Gregorio MD  [address]  [place] [address]    RE: Aniket Macias       Dear Colleague,     I had the pleasure of seeing [facility].  Electrophysiology/ Clinic Note         H&P and Plan:     REASON FOR VISIT: Electrophysiology evaluation.      HISTORY OF PRESENT ILLNESS: Patient is a pleasant 82-year-old gentleman with history of hypertension, skin cancer, lung fibrosis and permanent atrial fibrillation, who is here for routine followup.     Patient used to be followed in clinic with us.  However, he lost follow-up since 2018.    He informs that he was diagnosed with lung fibrosis in 2020.  At that time, he presents with dyspnea on exertion and apparently a CT confirmed lung fibrosis.  He was was evaluated by pulmonologist, and was referred here to reestablish care with us.    At the moment, he is doing well.  He continues to have dyspnea on major exertion.  However, he appears to be euvolemic on physical exam.  He denies any other symptoms such as chest pain,  palpitations, near-syncope or syncope    He is on chronic therapy with Coumadin and metoprolol. Heart rate seems to be well controlled and he is asymptomatic.      EKG today shows atrial fibrillation with good heart rate control underlying RBBB pattern.    PREVIOUS STUDIES (personally reviewed):  -Cardiac MRI (2019): EF of 67%.  Negative for ischemia, fibrosis or infiltrative disease.  -Holter (2019): Persistent A-fib (average HR of 61 bpm)      ASSESSMENT AND PLAN:   1.  Permanent atrial fibrillation.  Heart rate is well-controlled.  We will continue therapy with metoprolol.  We will will obtain a Zio patch monitor for 3 days.    2.  Embolic prevention.  [id number] score of 3.  Continue anticoagulation with Coumadin indefinitely.   3. Lung fibrosis.  We will obtain an echo to rule out pulmonary hypertension           Sebastián Bermudez MD    Physical Exam:  Vitals: /80   Pulse 96    "Resp 17   Ht 1.702 m (5' 7\")   Wt 80 kg (176 lb 6.4 oz)   SpO2 97%   BMI 27.63 kg/m      Constitutional:  AAO x3.  Pt is in NAD.  HEAD: normocephalic.  SKIN: Skin normal color, texture and turgor with no lesions or eruptions.  Eyes: PERRL, EOMI.  ENT:  Supple, normal JVP. No lymphadenopathy or thyroid enlargement.  Chest:  Course in base B/L.  Cardiac:  IIR, variable sound of S1 and Normal S2.  No murmurs rubs or gallop.    Abdomen:  Normal BS.  Soft, non-tender and non-distended.  No rebound or guarding.    Extremities:  Pedious pulses palpable B/L.  No LE edema noticed.   Neurological: Strength and sensation grossly symmetric and intact throughout.       CURRENT MEDICATIONS:  Current Outpatient Medications   Medication Sig Dispense Refill     ACETAMINOPHEN PO Take 1,000 mg by mouth every 6 hours as needed for pain       albuterol (PROAIR HFA/PROVENTIL HFA/VENTOLIN HFA) 108 (90 Base) MCG/ACT inhaler Inhale 1-2 puffs into the lungs every 4 hours as needed for shortness of breath / dyspnea or wheezing 1 Inhaler 11     atorvastatin (LIPITOR) 40 MG tablet Take 1 tablet (40 mg) by mouth daily 90 tablet 3     benzonatate (TESSALON) 100 MG capsule Take 1-2 capsules by mouth up to 3 x a day as needed with food 45 capsule 0     furosemide (LASIX) 40 MG tablet Take 1 tablet (40 mg) by mouth daily 90 tablet 3     latanoprost (XALATAN) 0.005 % ophthalmic solution Place 1 drop into both eyes At Bedtime       lisinopril (ZESTRIL) 40 MG tablet Take 1 tablet (40 mg) by mouth daily 90 tablet 3     metoprolol tartrate (LOPRESSOR) 50 MG tablet Take 1 tablet (50 mg) by mouth 2 times daily 180 tablet 3     Multiple Vitamins-Minerals (MULTIVITAMIN PO) Take 1 tablet by mouth daily        terazosin (HYTRIN) 5 MG capsule TAKE ONE CAPSULE BY MOUTH ONCE DAILY AT BEDTIME 90 capsule 1     UNABLE TO FIND MEDICATION NAME: Pro-racia    OTC rosacea cream.       warfarin ANTICOAGULANT (COUMADIN) 5 MG tablet TAKE 1/2 TABLET BY MOUTH MONDAY, WED " AND FRIDAY AND 1 TABLET ALL OTHER DAYS OR AS DIRECTED 90 tablet 1       ALLERGIES   No Known Allergies    PAST MEDICAL HISTORY:  Past Medical History:   Diagnosis Date     Basal cell carcinoma      BPH (benign prostatic hypertrophy)      Diverticulosis      Glaucoma      Hemorrhoids      HTN (hypertension)      Hyperlipidemia      Obesity      Persistent atrial fibrillation (H)      PVC (premature ventricular contraction)      Squamous cell carcinoma in situ of skin      Syncope 2016       PAST SURGICAL HISTORY:  Past Surgical History:   Procedure Laterality Date     BACK SURGERY       COLONOSCOPY  11/19/15    hx polyps     COLONOSCOPY N/A 2021    Procedure: COLONOSCOPY, FLEXIBLE, WITH LESION REMOVAL USING SNARE;  Surgeon: Eugene Burden MD;  Location:  GI     SURGICAL HISTORY OF -       Laminectomy     SURGICAL HISTORY OF -       biopsy of prostate     TONSILLECTOMY & ADENOIDECTOMY         FAMILY HISTORY:  The patient's family history was reviewed and is non-contributory for heart disease.    SOCIAL HISTORY:  Social History     Socioeconomic History     Marital status:    Tobacco Use     Smoking status: Former     Packs/day: 2.00     Years: 35.00     Pack years: 70.00     Types: Cigarettes     Quit date: 1995     Years since quittin.8     Smokeless tobacco: Never     Tobacco comments:     quit age 54   Substance and Sexual Activity     Alcohol use: Yes     Alcohol/week: 0.0 standard drinks     Comment: 1-2 drinks per week     Drug use: No     Sexual activity: Not Currently     Partners: Female   Other Topics Concern     Caffeine Concern Yes     Comment: 2 cups coffee per day     Sleep Concern No     Stress Concern No     Weight Concern No     Exercise No       Review of Systems:  Skin:        Eyes:       ENT:       Respiratory:       Cardiovascular:       Gastroenterology:      Genitourinary:       Musculoskeletal:       Neurologic:       Psychiatric:       Heme/Lymph/Imm:        Endocrine:           Recent Lab Results:  LIPID RESULTS:  Lab Results   Component Value Date    CHOL 98 08/17/2022    CHOL 127 07/30/2020    HDL 53 08/17/2022    HDL 60 07/30/2020    LDL 33 08/17/2022    LDL 55 07/30/2020    TRIG 58 08/17/2022    TRIG 62 07/30/2020    CHOLHDLRATIO 4.6 03/05/2015       LIVER ENZYME RESULTS:  Lab Results   Component Value Date    AST 23 08/17/2022    AST 25 05/06/2020    ALT 30 08/17/2022    ALT 40 05/06/2020       CBC RESULTS:  Lab Results   Component Value Date    WBC 8.9 05/06/2020    RBC 4.14 (L) 05/06/2020    HGB 12.7 (L) 08/17/2022    HGB 13.9 05/06/2020    HCT 42.0 05/06/2020     (H) 05/06/2020    MCH 33.6 (H) 05/06/2020    MCHC 33.1 05/06/2020    RDW 12.9 05/06/2020     05/06/2020       BMP RESULTS:  Lab Results   Component Value Date     08/17/2022     05/06/2020    POTASSIUM 4.6 08/17/2022    POTASSIUM 4.3 05/06/2020    CHLORIDE 108 08/17/2022    CHLORIDE 105 05/06/2020    CO2 25 08/17/2022    CO2 27 05/06/2020    ANIONGAP 6 08/17/2022    ANIONGAP 5 05/06/2020     (H) 08/17/2022     (H) 05/06/2020    BUN 17 08/17/2022    BUN 18 05/06/2020    CR 1.03 08/17/2022    CR 1.03 05/06/2020    GFRESTIMATED 72 08/17/2022    GFRESTIMATED 68 05/06/2020    GFRESTBLACK 79 05/06/2020    MCKAYLA 8.7 08/17/2022    MCKAYLA 9.0 05/06/2020        A1C RESULTS:  No results found for: A1C    INR RESULTS:  Lab Results   Component Value Date    INR 1.9 (H) 03/01/2023    INR 2.6 (H) 01/11/2023    INR 2.30 (H) 06/03/2021    INR 3.00 (H) 04/22/2021         ECHOCARDIOGRAM  No results found for this or any previous visit (from the past 8760 hour(s)).      No orders of the defined types were placed in this encounter.    No orders of the defined types were placed in this encounter.    There are no discontinued medications.      No diagnosis found.      CC  No referring provider defined for this encounter.        Thank you for allowing me to participate in the care of your  patient.      Sincerely,     Sebastián Bermudez MD      [facility]

## 2023-03-15 ENCOUNTER — ANTICOAGULATION THERAPY VISIT (OUTPATIENT)
Dept: ANTICOAGULATION | Facility: CLINIC | Age: 83
End: 2023-03-15

## 2023-03-15 ENCOUNTER — LAB (OUTPATIENT)
Dept: LAB | Facility: CLINIC | Age: 83
End: 2023-03-15
Payer: COMMERCIAL

## 2023-03-15 DIAGNOSIS — I48.20 CHRONIC ATRIAL FIBRILLATION (H): ICD-10-CM

## 2023-03-15 DIAGNOSIS — Z79.01 LONG TERM CURRENT USE OF ANTICOAGULANTS WITH INR GOAL OF 2.0-3.0: Primary | ICD-10-CM

## 2023-03-15 DIAGNOSIS — Z79.01 LONG TERM CURRENT USE OF ANTICOAGULANTS WITH INR GOAL OF 2.0-3.0: ICD-10-CM

## 2023-03-15 LAB — INR BLD: 2.1 (ref 0.9–1.1)

## 2023-03-15 PROCEDURE — 85610 PROTHROMBIN TIME: CPT

## 2023-03-15 PROCEDURE — 36416 COLLJ CAPILLARY BLOOD SPEC: CPT

## 2023-03-15 NOTE — PROGRESS NOTES
ANTICOAGULATION MANAGEMENT     Aniket Macias 82 year old male is on warfarin with therapeutic INR result. (Goal INR 2.0-3.0)    Recent labs: (last 7 days)     03/15/23  1026   INR 2.1*       ASSESSMENT     Warfarin Lab Questionnaire    Dose in Tablet or Mg 3/15/2023   TAB or MG? milligram (mg)     Pt Rptd Dose KATHARINA MONDAY TUESDAY WEDNESDAY THURSDAY FRIDAY SATURDAY   3/15/2023 5 2.5 5 2.5 5 2.5 5     Warfarin Lab Questionnaire 3/15/2023   Missed doses? No   Medication changes? No   Abnormal bleeding? No   Shortness of breath? No   Injuries or illness since last INR? No   Changes in diet or alcohol? No   Upcoming surgery, procedure? No       Additional findings: None  Patient is requesting to come back in 6 weeks.      PLAN     Recommended plan for no diet, medication or health factor changes affecting INR     Dosing Instructions: Continue your current warfarin dose with next INR in 6 weeks       Summary  As of 3/15/2023    Full warfarin instructions:  2.5 mg every Mon, Wed, Fri; 5 mg all other days   Next INR check:  4/5/2023             Telephone call with Lowell who verbalizes understanding and agrees to plan    Lab visit scheduled    Education provided:     Contact [phone]  with any changes, questions or concerns.     Plan made per [facility] anticoagulation protocol    Alexandra Boudreaux RN  Anticoagulation Clinic  3/15/2023    _______________________________________________________________________     Anticoagulation Episode Summary     Current INR goal:  2.0-3.0   TTR:  87.2 % (1 y)   Target end date:  Indefinite   Send INR reminders to:  [facility]    Indications    Long term current use of anticoagulants with INR goal of 2.0-3.0 [Z79.01]  Chronic atrial fibrillation (H) [I48.20]           Comments:           Anticoagulation Care Providers     Provider Role Specialty Phone number    Maribeth Haney MD Referring Internal Medicine - Pediatrics [phone]    Basilio Gregorio MD Referring Family  Medicine [phone]    Sebastián Bermudez MD Responsible Cardiovascular Disease [phone]

## 2023-03-27 ENCOUNTER — HOSPITAL ENCOUNTER (OUTPATIENT)
Dept: CARDIOLOGY | Facility: CLINIC | Age: 83
Discharge: HOME | End: 2023-03-27
Attending: INTERNAL MEDICINE
Payer: COMMERCIAL

## 2023-03-27 DIAGNOSIS — I48.21 PERMANENT ATRIAL FIBRILLATION (H): ICD-10-CM

## 2023-03-27 LAB — LVEF ECHO: NORMAL

## 2023-03-27 PROCEDURE — 93306 TTE W/DOPPLER COMPLETE: CPT

## 2023-03-27 PROCEDURE — 93242 EXT ECG>48HR<7D RECORDING: CPT

## 2023-03-27 PROCEDURE — 93306 TTE W/DOPPLER COMPLETE: CPT | Mod: 26 | Performed by: INTERNAL MEDICINE

## 2023-03-27 PROCEDURE — 93244 EXT ECG>48HR<7D REV&INTERPJ: CPT | Performed by: INTERNAL MEDICINE

## 2023-04-04 DIAGNOSIS — N40.1 BENIGN NON-NODULAR PROSTATIC HYPERPLASIA WITH LOWER URINARY TRACT SYMPTOMS: ICD-10-CM

## 2023-04-04 RX ORDER — TERAZOSIN 5 MG/1
CAPSULE ORAL
Qty: 90 CAPSULE | Refills: 0 | Status: SHIPPED | OUTPATIENT
Start: 2023-04-04 | End: 2023-07-03

## 2023-04-26 ENCOUNTER — ANTICOAGULATION THERAPY VISIT (OUTPATIENT)
Dept: ANTICOAGULATION | Facility: CLINIC | Age: 83
End: 2023-04-26

## 2023-04-26 ENCOUNTER — HOSPITAL ENCOUNTER (OUTPATIENT)
Dept: CARDIAC REHAB | Facility: CLINIC | Age: 83
Discharge: HOME | End: 2023-04-26
Attending: INTERNAL MEDICINE
Payer: COMMERCIAL

## 2023-04-26 ENCOUNTER — LAB (OUTPATIENT)
Dept: LAB | Facility: CLINIC | Age: 83
End: 2023-04-26
Payer: COMMERCIAL

## 2023-04-26 DIAGNOSIS — Z79.01 LONG TERM CURRENT USE OF ANTICOAGULANTS WITH INR GOAL OF 2.0-3.0: ICD-10-CM

## 2023-04-26 DIAGNOSIS — J84.112 UIP (USUAL INTERSTITIAL PNEUMONITIS) (H): ICD-10-CM

## 2023-04-26 DIAGNOSIS — Z79.01 LONG TERM CURRENT USE OF ANTICOAGULANTS WITH INR GOAL OF 2.0-3.0: Primary | ICD-10-CM

## 2023-04-26 DIAGNOSIS — I48.20 CHRONIC ATRIAL FIBRILLATION (H): ICD-10-CM

## 2023-04-26 LAB — INR BLD: 2.6 (ref 0.9–1.1)

## 2023-04-26 PROCEDURE — 94060 EVALUATION OF WHEEZING: CPT

## 2023-04-26 PROCEDURE — 94729 DIFFUSING CAPACITY: CPT

## 2023-04-26 PROCEDURE — 94618 PULMONARY STRESS TESTING: CPT

## 2023-04-26 PROCEDURE — 85610 PROTHROMBIN TIME: CPT

## 2023-04-26 PROCEDURE — 94726 PLETHYSMOGRAPHY LUNG VOLUMES: CPT

## 2023-04-26 PROCEDURE — 36416 COLLJ CAPILLARY BLOOD SPEC: CPT

## 2023-04-26 NOTE — PROGRESS NOTES
ANTICOAGULATION MANAGEMENT     Aniket Macias 82 year old male is on warfarin with therapeutic INR result. (Goal INR 2.0-3.0)    Recent labs: (last 7 days)     04/26/23  0946   INR 2.6*       ASSESSMENT     Warfarin Lab Questionnaire    Warfarin Doses Last 7 Days      4/25/2023     2:10 PM   Dose in Tablet or Mg   TAB or MG? milligram (mg)     Pt Rptd Dose SUNDAY MONDAY TUESDAY WED THURS FRIDAY SATURDAY 4/25/2023   2:10 PM 5 2.5 5 2.5 5 2.5 5         4/25/2023   Warfarin Lab Questionnaire   Missed doses within past 14 days? No   Changes in diet or alcohol within past 14 days? No   Medication changes since last result? No   Injuries or illness since last result? No   New shortness of breath, severe headaches or sudden changes in vision since last result? No   Abnormal bleeding since last result? No   Upcoming surgery, procedure? No        Previous result: Therapeutic last 2(+) visits  Additional findings: None       PLAN     Recommended plan for no diet, medication or health factor changes affecting INR     Dosing Instructions: Continue your current warfarin dose with next INR in 6 weeks       Summary  As of 4/26/2023    Full warfarin instructions:  2.5 mg every Mon, Wed, Fri; 5 mg all other days   Next INR check:  6/7/2023             Telephone call with Lowell who verbalizes understanding and agrees to plan and who agrees to plan and repeated back plan correctly    Lab visit scheduled    Education provided:     None required    Plan made per [facility] anticoagulation protocol    Renu Stiles RN  Anticoagulation Clinic  4/26/2023    _______________________________________________________________________     Anticoagulation Episode Summary     Current INR goal:  2.0-3.0   TTR:  87.2 % (1 y)   Target end date:  Indefinite   Send INR reminders to:  [facility]    Indications    Long term current use of anticoagulants with INR goal of 2.0-3.0 [Z79.01]  Chronic atrial fibrillation (H) [I48.20]           Comments:            Anticoagulation Care Providers     Provider Role Specialty Phone number    Maribeth Haney MD Referring Internal Medicine - Pediatrics [phone]    Basilio Gregorio MD Referring Family Medicine [phone]    Sebastián Bermudez MD Responsible Cardiovascular Disease [phone]

## 2023-05-02 ENCOUNTER — VIRTUAL VISIT (OUTPATIENT)
Dept: PULMONOLOGY | Facility: CLINIC | Age: 83
End: 2023-05-02
Attending: INTERNAL MEDICINE
Payer: COMMERCIAL

## 2023-05-02 DIAGNOSIS — J84.9 ILD (INTERSTITIAL LUNG DISEASE) (H): Primary | ICD-10-CM

## 2023-05-02 PROCEDURE — G0463 HOSPITAL OUTPT CLINIC VISIT: HCPCS | Mod: PN,GT | Performed by: INTERNAL MEDICINE

## 2023-05-02 PROCEDURE — 99214 OFFICE O/P EST MOD 30 MIN: CPT | Mod: VID | Performed by: INTERNAL MEDICINE

## 2023-05-02 NOTE — NURSING NOTE
Is the patient currently in the state of [place]? YES    Visit mode:VIDEO    If the visit is dropped, the patient can be reconnected by: VIDEO VISIT: Text to cell phone: [phone]    Will anyone else be joining the visit? NO      How would you like to obtain your AVS? MyChart    Are changes needed to the allergy or medication list? NO    Reason for visit: Video Visit      Angela Arshad VF

## 2023-05-02 NOTE — PROGRESS NOTES
Virtual Visit Details    Type of service:  Video Visit   Video Start Time: 0900  Video End Time: 0930    Originating Location (pt. Location): Home    Distant Location (provider location):  On-site  Platform used for Video Visit: Martha Etienne is a 82 year old who is being evaluated via a billable video visit.      How would you like to obtain your AVS? MyChart  If the video visit is dropped, the invitation should be resent by: Text to cell phone: [phone]  Will anyone else be joining your video visit? 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 Remains asymptomatic and not limited by breathing. Given asymptomatic nature, and good exercise tolerance, he is not currently on any medications (inhalers or antifibrotics). If becomes symptomatic, would start Anoro, with albuterol PRN (did not feel he got benefit from PRN albuterol). If worsening, will consider antifibrotics, no need currently. ILD lab work up with borderline ALEKSEY (1:40), and one positive HP panel, without an HP pattern on CT.     -Pneumonia vaccinations (13 and 23 valent) up to date (2015 and 2017, respectively) Will need Prevnar 20.   -Should have annual influenza immunization (up to date   -up to date on COVID vaccination series.   -Encouraged ongoing activity/exercise  -again discussed anti-fibrotic medications today, I don't think he should start these at this point, given stability of disease and asymptomatic.   -Has been going through pulmonary rehab.    -His PFts have mildly worsened but slowly  stabilized,. Will keep a close monitoring NO ofev yet.    #Pulmonary Hypertension:   -He has an isolated reduction in diffusion capacity out of proportion of his lung disease.  -Discussed referral to cardiology here at [facility]. After discussion with wife they would like to hold off.  especially considering his age   - COntineu with exercise.  -repeat PFts  in 9  months.     RTC: 9 months with PFts t    I spent 30 minutes on the date of the encounter doing chart review, history and exam, documentation and further activities as noted above.      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      Brief summary (from prior visits):   I met him in May 2020. He initially developed a cough in Jan 2020. At his PCP's office with a cough, and was found to have SpO2 of 94%. A CXR was done, concerning for left lung pneumonia. He was treated with Augmentin and azithromycin. This improved his cough, but he continued to feel short of breath, especially with exertion. He also noted some lightheadedness when he stood up quickly. He was complaining of dyspnea requiring him to stop after 1 flight of stairs (a change from baseline of 3 flights of stairs). He saw his PCP, Dr. Haney, for these complaints in February 2020. A CT was done at that time, showing emphysema and pulmonary fibrosis in a possible UIP pattern. He was referred to ILD clinic. 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 As he has been relatively asymptomatic, he was given a PRN albuterol. Anoro was discussed, but not started. If there is progression, antifibrotics will be considered.      Interval  "history:   He was last seen virtually in January. Overall, he is feeling well. He denies any shortness of breath. He is not getting out of breath. He filled albuterol in May of 2020, and he used it 21 times since then. He didn't feel any benefit from it. He denies wheeze. The only time he notes dyspnea, is if he walks up from his garage up to the 4th floor. If he walks slow, he is fine, if he walks faster, he may have to stop on the third floor for 1-2 min. Otherwise, he does not feel at all limited by his breathing. He could \"walk all day\" on level ground. He does have to go slow with walking with his wife, because she is slower, but he feels he wants to walk faster. No chest pain, LE edema. He does have some atrial fibrillation, which he sometimes feels. Rare coughing and sneezing. Does feel he may have some seasonal allergies in the Spring/Summer. Daughter thinking of going to [place] next summer, and patient may join. No new joint pain, swelling, or new myalgias. 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 No new rashes.     #Interval History: 7/7/22   -He has been doing pulmonary rehab. He has difficulty climbing stairs up to 4 storeys.  No increased cough. He has not been using any inhalers. Overall he has stayed stable. He does get short of breath while doing vacuum in the house. No snoring or no dysonea is noted.   -As per his wife he is stable and no significant worsening is noted.  He had tried albuterol without any improvement.     #Interval History: 11/10/22  -  Patient has stayed stable. No significant worsening of breath is noted.     Exposure History: reviewed, no change.   Tobacco: Former use, quit about 25 years ago, used 1/2-2 ppd for 35 years or so, up to 70 pack years.   Other inhaled substances: No pipes, vaping, marijuana.   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   Pets: No pets, no birds.   Allergies: seasonal allergies  Hobbies: enjoys being on the " computer. [place] around [place].    Travel: No recent travel. (Previously lived in [place] from 1995 to 2015).   Home: [place]. No feather pillows or down comforters. No mold in apartment, no hot tub use. No humidifier. No musical instrument use.     ROS: Complete 10 point ROS negative unless mentioned in HPI    Allergies and current medications: Reviewed in EHR  Past medical, surgical, social, and family histories: Personally reviewed and updated (if needed) during this visit.     Physical Exam:  There were no vitals taken for this visit.    General: Sitting in the chair in NAD  HEENT: anicteric, mask in place  Neck: Trachea midline. Normal ROM.   Lymphatic: no cervical or supraclavicular lymphadenopathy   Chest: Very mild basilar crackles, worse on left. Normal Work of breathing. No cough or wheeze.   Cardiac: irregularly irregular. Possible fixed split S2.   Abdomen: Soft, flat, non tender, active BS  Extremities: No LE Edema, moving all extremities. No digital clubbing. No obvious synovitis.   Neuro: A&Ox3, no focal defects. Speech/language wnl.   Skin: no rash noted on limited exam  Psych: normal affect.     Labs and Radiology: All new data personally reviewed and summarized below. I have reviewed the results with the patient today.   All laboratory data reviewed   8/17/21 Hgb 13.2 K 4.3 Cr 1.17, LFTs normal.   All cardiac studies reviewed by me.   No new studies  All imaging studies reviewed by me.   No new imaging.     PFT's:  Pulmonary Function Testing: personally reviewed.

## 2023-05-02 NOTE — LETTER
5/2/2023         RE: Aniket Macias  [address]  [place] [address]        Dear Colleague,    Thank you for referring your patient, Aniket Macias, to the [facility] [place]. Please see a copy of my visit note below.        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 Remains asymptomatic and not limited by breathing. Given asymptomatic nature, and good exercise tolerance, he is not currently on any medications (inhalers or antifibrotics). If becomes symptomatic, would start Anoro, with albuterol PRN (did not feel he got benefit from PRN albuterol). If worsening, will consider antifibrotics, no need currently. ILD lab work up with borderline ALEKSEY (1:40), and one positive HP panel, without an HP pattern on CT.     -Pneumonia vaccinations (13 and 23 valent) up to date (2015 and 2017, respectively) Will need Prevnar 20.   -Should have annual influenza immunization (up to date   -up to date on COVID vaccination series.   -Encouraged ongoing activity/exercise  -again discussed anti-fibrotic medications today, I don't think he should start these at this point, given stability of disease and asymptomatic.   -Has been going through pulmonary rehab.    -His PFts have mildly worsened but slowly stabilized,. Will keep a close monitoring NO ofev yet.    #Pulmonary Hypertension:   -He has an isolated reduction in diffusion capacity out of proportion of his lung disease.  -Discussed referral to cardiology here at [facility]. After discussion with wife they would  like to hold off.  especially considering his age   - COntineu with exercise.  -repeat PFts  in 9  months.     RTC: 9 months with PFts t    I spent 30 minutes on the date of the encounter doing chart review, history and exam, documentation and further activities as noted above.      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      Brief summary (from prior visits):   I met him in May 2020. He initially developed a cough in Jan 2020. At his PCP's office with a cough, and was found to have SpO2 of 94%. A CXR was done, concerning for left lung pneumonia. He was treated with Augmentin and azithromycin. This improved his cough, but he continued to feel short of breath, especially with exertion. He also noted some lightheadedness when he stood up quickly. He was complaining of dyspnea requiring him to stop after 1 flight of stairs (a change from baseline of 3 flights of stairs). He saw his PCP, Dr. Haney, for these complaints in February 2020. A CT was done at that time, showing emphysema and pulmonary fibrosis in a possible UIP pattern. He was referred to ILD clinic. 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 As he has been relatively asymptomatic, he was given a PRN albuterol. Anoro was discussed, but not started. If there is progression, antifibrotics will be considered.      Interval history:   He was last seen virtually in January. Overall, he is feeling well. He denies any shortness of breath. He is not getting out of breath. He filled albuterol in May of 2020, and he used it 21 times since then. He didn't feel any benefit from it. He denies  "wheeze. The only time he notes dyspnea, is if he walks up from his garage up to the 4th floor. If he walks slow, he is fine, if he walks faster, he may have to stop on the third floor for 1-2 min. Otherwise, he does not feel at all limited by his breathing. He could \"walk all day\" on level ground. He does have to go slow with walking with his wife, because she is slower, but he feels he wants to walk faster. No chest pain, LE edema. He does have some atrial fibrillation, which he sometimes feels. Rare coughing and sneezing. Does feel he may have some seasonal allergies in the Spring/Summer. Daughter thinking of going to [place] next summer, and patient may join. No new joint pain, swelling, or new myalgias. 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 No new rashes.     #Interval History: 7/7/22   -He has been doing pulmonary rehab. He has difficulty climbing stairs up to 4 storeys.  No increased cough. He has not been using any inhalers. Overall he has stayed stable. He does get short of breath while doing vacuum in the house. No snoring or no dysonea is noted.   -As per his wife he is stable and no significant worsening is noted.  He had tried albuterol without any improvement.     #Interval History: 11/10/22  -  Patient has stayed stable. No significant worsening of breath is noted.     Exposure History: reviewed, no change.   Tobacco: Former use, quit about 25 years ago, used 1/2-2 ppd for 35 years or so, up to 70 pack years.   Other inhaled substances: No pipes, vaping, marijuana.   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   Pets: No pets, no birds.   Allergies: seasonal allergies  Hobbies: enjoys being on the computer. [place] around [place]    Travel: No recent travel. (Previously lived in [place] from 1995 to 2015).   Home: [place]. No feather pillows or down comforters. No mold in apartment, no hot tub use. No " humidifier. No musical instrument use.     ROS: Complete 10 point ROS negative unless mentioned in HPI    Allergies and current medications: Reviewed in EHR  Past medical, surgical, social, and family histories: Personally reviewed and updated (if needed) during this visit.     Physical Exam:  There were no vitals taken for this visit.    General: Sitting in the chair in NAD  HEENT: anicteric, mask in place  Neck: Trachea midline. Normal ROM.   Lymphatic: no cervical or supraclavicular lymphadenopathy   Chest: Very mild basilar crackles, worse on left. Normal Work of breathing. No cough or wheeze.   Cardiac: irregularly irregular. Possible fixed split S2.   Abdomen: Soft, flat, non tender, active BS  Extremities: No LE Edema, moving all extremities. No digital clubbing. No obvious synovitis.   Neuro: A&Ox3, no focal defects. Speech/language wnl.   Skin: no rash noted on limited exam  Psych: normal affect.     Labs and Radiology: All new data personally reviewed and summarized below. I have reviewed the results with the patient today.   All laboratory data reviewed   8/17/21 Hgb 13.2 K 4.3 Cr 1.17, LFTs normal.   All cardiac studies reviewed by me.   No new studies  All imaging studies reviewed by me.   No new imaging.     PFT's:  Pulmonary Function Testing: personally reviewed.                           Again, thank you for allowing me to participate in the care of your patient.        Sincerely,        Derrek Lord MD

## 2023-05-09 LAB
DLCOUNC-%PRED-PRE: 40 %
DLCOUNC-PRE: 9.78 ML/MIN/MMHG
DLCOUNC-PRED: 23.99 ML/MIN/MMHG
ERV-%PRED-PRE: 120 %
ERV-PRE: 1.2 L
ERV-PRED: 1 L
EXPTIME-PRE: 8.98 SEC
FEF2575-%PRED-POST: 100 %
FEF2575-%PRED-PRE: 88 %
FEF2575-POST: 1.94 L/SEC
FEF2575-PRE: 1.71 L/SEC
FEF2575-PRED: 1.93 L/SEC
FEFMAX-%PRED-PRE: 105 %
FEFMAX-PRE: 7.24 L/SEC
FEFMAX-PRED: 6.85 L/SEC
FEV1-%PRED-PRE: 89 %
FEV1-PRE: 2.5 L
FEV1FEV6-PRE: 74 %
FEV1FEV6-PRED: 76 %
FEV1FVC-PRE: 71 %
FEV1FVC-PRED: 74 %
FEV1SVC-PRE: 70 %
FEV1SVC-PRED: 63 %
FIFMAX-PRE: 3.1 L/SEC
FRCPLETH-%PRED-PRE: 78 %
FRCPLETH-PRE: 2.98 L
FRCPLETH-PRED: 3.82 L
FVC-%PRED-PRE: 92 %
FVC-PRE: 3.5 L
FVC-PRED: 3.8 L
IC-%PRED-PRE: 66 %
IC-PRE: 2.3 L
IC-PRED: 3.47 L
RVPLETH-%PRED-PRE: 58 %
RVPLETH-PRE: 1.71 L
RVPLETH-PRED: 2.93 L
TLCPLETH-%PRED-PRE: 74 %
TLCPLETH-PRE: 5.28 L
TLCPLETH-PRED: 7.13 L
VA-%PRED-PRE: 70 %
VA-PRE: 4.38 L
VC-%PRED-PRE: 79 %
VC-PRE: 3.56 L
VC-PRED: 4.46 L

## 2023-05-10 ENCOUNTER — TELEPHONE (OUTPATIENT)
Dept: CARDIOLOGY | Facility: CLINIC | Age: 83
End: 2023-05-10
Payer: COMMERCIAL

## 2023-05-10 DIAGNOSIS — I48.21 PERMANENT ATRIAL FIBRILLATION (H): Primary | ICD-10-CM

## 2023-05-10 RX ORDER — METOPROLOL TARTRATE 25 MG/1
37.5 TABLET, FILM COATED ORAL 2 TIMES DAILY
Qty: 270 TABLET | Refills: 1 | Status: SHIPPED | OUTPATIENT
Start: 2023-05-10 | End: 2024-02-05

## 2023-05-10 NOTE — TELEPHONE ENCOUNTER
Reviewed recommendations per Dr Bermudez:  Monitor showed A-fib with good heart rate control. However, patient was having up to 4.8 seconds pause. Lets decrease metoprolol dose to 37.5 mg twice daily.   Medication list updated in epic and sent to pharmacy.  Patient provided verbal understanding. LUANA Baer

## 2023-05-11 NOTE — TELEPHONE ENCOUNTER
Reviewed with patient recommendations per Dr Bermudez regarding wearing a monitor s/p reduction in metoprolol dosing.  Orders placed in epic.  Will have scheduling contact patient to get this set up.  LUANA Baer

## 2023-05-11 NOTE — TELEPHONE ENCOUNTER
Discussed with Dr Bermudez follow up monitor after decreasing medication to see if pauses have stopped. See orders below.  Per Dr Bermudez:  Sure.  Lets place a 48-hour Holter monitor.  Thank you.  Sebastián     Clarification: verbal order per Dr Bermudez:  Do Holter monitor 2 weeks after decreasing metoprolol.  LUANA Baer

## 2023-05-30 ENCOUNTER — HOSPITAL ENCOUNTER (OUTPATIENT)
Dept: CARDIOLOGY | Facility: CLINIC | Age: 83
Discharge: HOME | End: 2023-05-30
Attending: INTERNAL MEDICINE | Admitting: INTERNAL MEDICINE
Payer: COMMERCIAL

## 2023-05-30 DIAGNOSIS — I48.21 PERMANENT ATRIAL FIBRILLATION (H): ICD-10-CM

## 2023-05-30 PROCEDURE — 93225 XTRNL ECG REC<48 HRS REC: CPT

## 2023-05-30 PROCEDURE — 93226 XTRNL ECG REC<48 HR SCAN A/R: CPT

## 2023-05-30 PROCEDURE — 93227 XTRNL ECG REC<48 HR R&I: CPT | Performed by: INTERNAL MEDICINE

## 2023-06-07 ENCOUNTER — ANTICOAGULATION THERAPY VISIT (OUTPATIENT)
Dept: ANTICOAGULATION | Facility: CLINIC | Age: 83
End: 2023-06-07

## 2023-06-07 ENCOUNTER — LAB (OUTPATIENT)
Dept: LAB | Facility: CLINIC | Age: 83
End: 2023-06-07
Payer: COMMERCIAL

## 2023-06-07 DIAGNOSIS — Z79.01 LONG TERM CURRENT USE OF ANTICOAGULANTS WITH INR GOAL OF 2.0-3.0: ICD-10-CM

## 2023-06-07 DIAGNOSIS — I48.20 CHRONIC ATRIAL FIBRILLATION (H): ICD-10-CM

## 2023-06-07 DIAGNOSIS — Z79.01 LONG TERM CURRENT USE OF ANTICOAGULANTS WITH INR GOAL OF 2.0-3.0: Primary | ICD-10-CM

## 2023-06-07 LAB — INR BLD: 1.5 (ref 0.9–1.1)

## 2023-06-07 PROCEDURE — 85610 PROTHROMBIN TIME: CPT

## 2023-06-07 PROCEDURE — 36416 COLLJ CAPILLARY BLOOD SPEC: CPT

## 2023-06-07 NOTE — PROGRESS NOTES
ANTICOAGULATION MANAGEMENT     Aniket Macias 82 year old male is on warfarin with subtherapeutic INR result. (Goal INR 2.0-3.0)    Recent labs: (last 7 days)     06/07/23  0926   INR 1.5*       ASSESSMENT     Warfarin Lab Questionnaire    Warfarin Doses Last 7 Days          6/7/2023   Warfarin Lab Questionnaire   Missed doses within past 14 days? No   Changes in diet or alcohol within past 14 days? No - Per assessment, patient reports having a larger salad than usual yesterday + increased greens over the last month ~ ongoing change   Medication changes since last result? No - Metoprolol reduced from 50 mg to 37.5 mg BID for the last 2 weeks   Injuries or illness since last result? No   New shortness of breath, severe headaches or sudden changes in vision since last result? No   Abnormal bleeding since last result? No   Upcoming surgery, procedure? No        Previous result: Therapeutic last 2(+) visits  Additional findings: None       PLAN     Recommended plan for ongoing change(s) affecting INR     Dosing Instructions: Increase your warfarin dose (9.1% change) with next INR in 2 weeks       **Closest dose increase within protocol ~ any more warfarin and MD would have exceeded protocol by 18.2%    Summary  As of 6/7/2023    Full warfarin instructions:  2.5 mg every Mon, Fri; 5 mg all other days   Next INR check:  6/21/2023             Telephone call with Lowell who verbalizes understanding and agrees to plan    Lab visit scheduled    Education provided:     Please call back if any changes to your diet, medications or how you've been taking warfarin    Interaction IS NOT anticipated between warfarin and Metoprolol    Symptom monitoring: monitoring for bleeding signs and symptoms and monitoring for clotting signs and symptoms    Plan made per [facility] anticoagulation protocol    Yvette Guaman, RN  Anticoagulation Clinic  6/7/2023    _______________________________________________________________________      Anticoagulation Episode Summary     Current INR goal:  2.0-3.0   TTR:  82.0 % (1 y)   Target end date:  Indefinite   Send INR reminders to:  [facility]    Indications    Long term current use of anticoagulants with INR goal of 2.0-3.0 [Z79.01]  Chronic atrial fibrillation (H) [I48.20]           Comments:           Anticoagulation Care Providers     Provider Role Specialty Phone number    Maribeth Haney MD Referring Internal Medicine - Pediatrics [phone]    Basilio Gregorio MD Referring Family Medicine [phone]    Sebastián Bermudez MD Responsible Cardiovascular Disease [phone]

## 2023-06-21 ENCOUNTER — ANTICOAGULATION THERAPY VISIT (OUTPATIENT)
Dept: ANTICOAGULATION | Facility: CLINIC | Age: 83
End: 2023-06-21

## 2023-06-21 ENCOUNTER — LAB (OUTPATIENT)
Dept: LAB | Facility: CLINIC | Age: 83
End: 2023-06-21
Payer: COMMERCIAL

## 2023-06-21 DIAGNOSIS — Z79.01 LONG TERM CURRENT USE OF ANTICOAGULANTS WITH INR GOAL OF 2.0-3.0: ICD-10-CM

## 2023-06-21 DIAGNOSIS — I48.20 CHRONIC ATRIAL FIBRILLATION (H): ICD-10-CM

## 2023-06-21 DIAGNOSIS — Z79.01 LONG TERM CURRENT USE OF ANTICOAGULANTS WITH INR GOAL OF 2.0-3.0: Primary | ICD-10-CM

## 2023-06-21 LAB — INR BLD: 2.3 (ref 0.9–1.1)

## 2023-06-21 PROCEDURE — 36416 COLLJ CAPILLARY BLOOD SPEC: CPT

## 2023-06-21 PROCEDURE — 85610 PROTHROMBIN TIME: CPT

## 2023-06-21 NOTE — PROGRESS NOTES
ANTICOAGULATION MANAGEMENT     Aniket Macias 83 year old male is on warfarin with therapeutic INR result. (Goal INR 2.0-3.0)    Recent labs: (last 7 days)     06/21/23  1314   INR 2.3*       ASSESSMENT       Source(s): Chart Review    Previous INR was Subtherapeutic    Medication, diet, health changes since last INR chart reviewed; none identified       PLAN     Unable to reach Lowell today.    Left message to continue current dose of warfarin 5 mg tonight. Request call back for assessment.    Follow up required to assess for changes     Radha Seymour RN  Anticoagulation Clinic  6/21/2023

## 2023-06-22 NOTE — PROGRESS NOTES
ANTICOAGULATION MANAGEMENT     Aniket Macias 83 year old male is on warfarin with therapeutic INR result. (Goal INR 2.0-3.0)    Recent labs: (last 7 days)     06/21/23  1314   INR 2.3*       ASSESSMENT       Source(s): Chart Review - attempted to reach patient multiple times w/o success    Previous INR was Subtherapeutic    Medication, diet, health changes since last INR chart reviewed; none identified             PLAN     Recommended plan for no diet, medication or health factor changes affecting INR     Dosing Instructions: Continue your current warfarin dose with next INR in 3 weeks       Summary  As of 6/21/2023    Full warfarin instructions:  2.5 mg every Mon, Fri; 5 mg all other days   Next INR check:  7/12/2023             Detailed voice message left for Lowell with dosing instructions and follow up date.   Sent [employer] message with dosing and follow up instructions    Contact [phone]  to schedule and with any changes, questions or concerns.     Education provided:     Please call back if any changes to your diet, medications or how you've been taking warfarin    Contact [phone]  with any changes, questions or concerns.     Plan made per [facility] anticoagulation protocol    Yvette Guaman, RN  Anticoagulation Clinic  6/22/2023    _______________________________________________________________________     Anticoagulation Episode Summary     Current INR goal:  2.0-3.0   TTR:  79.6 % (1 y)   Target end date:  Indefinite   Send INR reminders to:  [facility]    Indications    Long term current use of anticoagulants with INR goal of 2.0-3.0 [Z79.01]  Chronic atrial fibrillation (H) [I48.20]           Comments:           Anticoagulation Care Providers     Provider Role Specialty Phone number    Maribeth Haney MD Referring Internal Medicine - Pediatrics [phone]    Basilio Gregorio MD Referring Family Medicine [phone]    Sebastián Bermudez MD Responsible Cardiovascular Disease  [phone]

## 2023-06-22 NOTE — PROGRESS NOTES
ANTICOAGULATION MANAGEMENT     Aniket Macias 83 year old male is on warfarin with therapeutic INR result. (Goal INR 2.0-3.0)    Recent labs: (last 7 days)     06/21/23  1314   INR 2.3*       ASSESSMENT       Source(s): Chart Review    Previous INR was Subtherapeutic    Medication, diet, health changes since last INR chart reviewed; none identified         PLAN     Unable to reach Lowell today.    Left message to continue current dose of warfarin: 2.5 mg Mon, Fri + 5 mg all other days with a recheck within 3 weeks. Request call back for assessment.    Follow up required to confirm warfarin dose taken and assess for changes    Yvette Guaman RN  Anticoagulation Clinic  6/22/2023

## 2023-07-01 DIAGNOSIS — N40.1 BENIGN NON-NODULAR PROSTATIC HYPERPLASIA WITH LOWER URINARY TRACT SYMPTOMS: ICD-10-CM

## 2023-07-03 RX ORDER — TERAZOSIN 5 MG/1
CAPSULE ORAL
Qty: 90 CAPSULE | Refills: 0 | Status: SHIPPED | OUTPATIENT
Start: 2023-07-03 | End: 2023-08-23

## 2023-08-02 ENCOUNTER — LAB (OUTPATIENT)
Dept: LAB | Facility: CLINIC | Age: 83
End: 2023-08-02
Payer: COMMERCIAL

## 2023-08-02 ENCOUNTER — ANTICOAGULATION THERAPY VISIT (OUTPATIENT)
Dept: ANTICOAGULATION | Facility: CLINIC | Age: 83
End: 2023-08-02

## 2023-08-02 DIAGNOSIS — Z79.01 LONG TERM CURRENT USE OF ANTICOAGULANTS WITH INR GOAL OF 2.0-3.0: ICD-10-CM

## 2023-08-02 DIAGNOSIS — Z79.01 LONG TERM CURRENT USE OF ANTICOAGULANTS WITH INR GOAL OF 2.0-3.0: Primary | ICD-10-CM

## 2023-08-02 DIAGNOSIS — I48.20 CHRONIC ATRIAL FIBRILLATION (H): ICD-10-CM

## 2023-08-02 LAB — INR BLD: 2.5 (ref 0.9–1.1)

## 2023-08-02 PROCEDURE — 36416 COLLJ CAPILLARY BLOOD SPEC: CPT

## 2023-08-02 PROCEDURE — 85610 PROTHROMBIN TIME: CPT

## 2023-08-02 NOTE — PROGRESS NOTES
ANTICOAGULATION MANAGEMENT     Aniket Macias 83 year old male is on warfarin with therapeutic INR result. (Goal INR 2.0-3.0)    Recent labs: (last 7 days)     08/02/23  0931   INR 2.5*       ASSESSMENT     Source(s): Chart Review  Previous INR was Therapeutic last visit; previously outside of goal range  Medication, diet, health changes since last INR chart reviewed; none identified         PLAN     Recommended plan for no diet, medication or health factor changes affecting INR     Dosing Instructions: Continue your current warfarin dose with next INR in 6 weeks       Summary  As of 8/2/2023      Full warfarin instructions:  2.5 mg every Mon, Fri; 5 mg all other days   Next INR check:  9/13/2023               Detailed voice message left for Lowell with dosing instructions and follow up date.     Contact [phone]  to schedule and with any changes, questions or concerns.     Education provided:   Please call back if any changes to your diet, medications or how you've been taking warfarin  Contact [phone]  with any changes, questions or concerns.     Plan made per [facility] anticoagulation protocol    Jeanette Bender RN  Anticoagulation Clinic  8/2/2023    _______________________________________________________________________     Anticoagulation Episode Summary       Current INR goal:  2.0-3.0   TTR:  79.6 % (1 y)   Target end date:  Indefinite   Send INR reminders to:  [facility]    Indications    Long term current use of anticoagulants with INR goal of 2.0-3.0 [Z79.01]  Chronic atrial fibrillation (H) [I48.20]             Comments:               Anticoagulation Care Providers       Provider Role Specialty Phone number    Maribeth Haney MD Referring Internal Medicine - Pediatrics [phone]    Basilio Gregorio MD Referring Family Medicine [phone]    Sebastián Bermudez MD Responsible Cardiovascular Disease [phone]

## 2023-08-03 ENCOUNTER — DOCUMENTATION ONLY (OUTPATIENT)
Dept: CARDIOLOGY | Facility: CLINIC | Age: 83
End: 2023-08-03
Payer: COMMERCIAL

## 2023-08-03 DIAGNOSIS — I48.21 PERMANENT ATRIAL FIBRILLATION (H): ICD-10-CM

## 2023-08-03 DIAGNOSIS — Z79.01 LONG TERM CURRENT USE OF ANTICOAGULANTS WITH INR GOAL OF 2.0-3.0: Primary | ICD-10-CM

## 2023-08-03 NOTE — PROGRESS NOTES
ANTICOAGULATION CLINIC REFERRAL RENEWAL REQUEST       An annual renewal order is required for all patients referred to [facility].?  Please review and sign the pended referral order for Aniket Macias.       ANTICOAGULATION SUMMARY      Warfarin indication(s)   Atrial Fibrillation    Mechanical heart valve present?  NO       Current goal range   INR: 2.0-3.0     Goal appropriate for indication? Goal INR 2-3, standard for indication(s) above     Time in Therapeutic Range (TTR)  (Goal > 60%) 79.6%       Office visit with referring provider's group within last year yes on 8/17/22, another appointment is scheduled for 8/22/23       Jeanette Bender RN  [facility]

## 2023-08-10 DIAGNOSIS — I10 BENIGN ESSENTIAL HYPERTENSION: ICD-10-CM

## 2023-08-10 RX ORDER — LISINOPRIL 40 MG/1
40 TABLET ORAL DAILY
Qty: 90 TABLET | Refills: 0 | Status: SHIPPED | OUTPATIENT
Start: 2023-08-10 | End: 2023-08-23

## 2023-08-22 ENCOUNTER — OFFICE VISIT (OUTPATIENT)
Dept: FAMILY MEDICINE | Facility: CLINIC | Age: 83
End: 2023-08-22
Payer: COMMERCIAL

## 2023-08-22 VITALS
BODY MASS INDEX: 27.31 KG/M2 | OXYGEN SATURATION: 96 % | RESPIRATION RATE: 12 BRPM | HEIGHT: 67 IN | WEIGHT: 174 LBS | HEART RATE: 59 BPM | SYSTOLIC BLOOD PRESSURE: 136 MMHG | DIASTOLIC BLOOD PRESSURE: 78 MMHG | TEMPERATURE: 97.8 F

## 2023-08-22 DIAGNOSIS — I10 BENIGN ESSENTIAL HYPERTENSION: Primary | ICD-10-CM

## 2023-08-22 DIAGNOSIS — E78.5 HYPERLIPIDEMIA LDL GOAL <100: ICD-10-CM

## 2023-08-22 DIAGNOSIS — Z00.00 MEDICARE ANNUAL WELLNESS VISIT, SUBSEQUENT: ICD-10-CM

## 2023-08-22 DIAGNOSIS — H61.20 IMPACTED CERUMEN, UNSPECIFIED LATERALITY: ICD-10-CM

## 2023-08-22 DIAGNOSIS — I10 BENIGN ESSENTIAL HYPERTENSION: ICD-10-CM

## 2023-08-22 DIAGNOSIS — I48.0 PAROXYSMAL ATRIAL FIBRILLATION (H): ICD-10-CM

## 2023-08-22 LAB
ALBUMIN SERPL BCG-MCNC: 3.7 G/DL (ref 3.5–5.2)
ALP SERPL-CCNC: 74 U/L (ref 40–129)
ALT SERPL W P-5'-P-CCNC: 19 U/L (ref 0–70)
ANION GAP SERPL CALCULATED.3IONS-SCNC: 8 MMOL/L (ref 7–15)
AST SERPL W P-5'-P-CCNC: 30 U/L (ref 0–45)
BILIRUB SERPL-MCNC: 0.6 MG/DL
BUN SERPL-MCNC: 27.9 MG/DL (ref 8–23)
CALCIUM SERPL-MCNC: 9 MG/DL (ref 8.8–10.2)
CHLORIDE SERPL-SCNC: 107 MMOL/L (ref 98–107)
CHOLEST SERPL-MCNC: 115 MG/DL
CREAT SERPL-MCNC: 1.19 MG/DL (ref 0.67–1.17)
DEPRECATED HCO3 PLAS-SCNC: 26 MMOL/L (ref 22–29)
GFR SERPL CREATININE-BSD FRML MDRD: 60 ML/MIN/1.73M2
GLUCOSE SERPL-MCNC: 106 MG/DL (ref 70–99)
HDLC SERPL-MCNC: 52 MG/DL
HGB BLD-MCNC: 13.1 G/DL (ref 13.3–17.7)
LDLC SERPL CALC-MCNC: 53 MG/DL
NONHDLC SERPL-MCNC: 63 MG/DL
POTASSIUM SERPL-SCNC: 4.8 MMOL/L (ref 3.4–5.3)
PROT SERPL-MCNC: 6.3 G/DL (ref 6.4–8.3)
SODIUM SERPL-SCNC: 141 MMOL/L (ref 136–145)
TRIGL SERPL-MCNC: 49 MG/DL

## 2023-08-22 PROCEDURE — 85018 HEMOGLOBIN: CPT | Performed by: FAMILY MEDICINE

## 2023-08-22 PROCEDURE — 36415 COLL VENOUS BLD VENIPUNCTURE: CPT | Performed by: FAMILY MEDICINE

## 2023-08-22 PROCEDURE — 80053 COMPREHEN METABOLIC PANEL: CPT | Performed by: FAMILY MEDICINE

## 2023-08-22 PROCEDURE — 99214 OFFICE O/P EST MOD 30 MIN: CPT | Mod: 25 | Performed by: FAMILY MEDICINE

## 2023-08-22 PROCEDURE — 80061 LIPID PANEL: CPT | Performed by: FAMILY MEDICINE

## 2023-08-22 PROCEDURE — G0439 PPPS, SUBSEQ VISIT: HCPCS | Performed by: FAMILY MEDICINE

## 2023-08-22 RX ORDER — ATORVASTATIN CALCIUM 40 MG/1
40 TABLET, FILM COATED ORAL DAILY
Qty: 90 TABLET | Refills: 3 | Status: CANCELLED | OUTPATIENT
Start: 2023-08-22

## 2023-08-22 ASSESSMENT — ENCOUNTER SYMPTOMS
HEADACHES: 0
HEMATOCHEZIA: 0
FREQUENCY: 1
WEAKNESS: 0
ARTHRALGIAS: 0
CHILLS: 0
JOINT SWELLING: 0
DYSURIA: 0
SHORTNESS OF BREATH: 0
ABDOMINAL PAIN: 0
DIARRHEA: 1
PALPITATIONS: 0
COUGH: 0
PARESTHESIAS: 0
MYALGIAS: 0
DIZZINESS: 0
SORE THROAT: 0
FEVER: 0
NERVOUS/ANXIOUS: 0
EYE PAIN: 0
NAUSEA: 0
CONSTIPATION: 0
HEMATURIA: 0
HEARTBURN: 0

## 2023-08-22 ASSESSMENT — ACTIVITIES OF DAILY LIVING (ADL): CURRENT_FUNCTION: NO ASSISTANCE NEEDED

## 2023-08-22 ASSESSMENT — PAIN SCALES - GENERAL: PAINLEVEL: NO PAIN (0)

## 2023-08-22 NOTE — PROGRESS NOTES
"SUBJECTIVE:   Lowell is a 83 year old who presents for Preventive Visit.      8/22/2023    10:40 AM   Additional Questions   Roomed by candy       Are you in the first 12 months of your Medicare coverage?  No    Healthy Habits:     In general, how would you rate your overall health?  Good    Frequency of exercise:  1 day/week    Duration of exercise:  Other    Do you usually eat at least 4 servings of fruit and vegetables a day, include whole grains    & fiber and avoid regularly eating high fat or \"junk\" foods?  No    Taking medications regularly:  Yes    Medication side effects:  None    Ability to successfully perform activities of daily living:  No assistance needed    Home Safety:  No safety concerns identified    Hearing Impairment:  Difficulty following a conversation in a noisy restaurant or crowded room, need to ask people to speak up or repeat themselves and find that men's voices are easier to understand than woman's    In the past 6 months, have you been bothered by leaking of urine? Yes    In general, how would you rate your overall mental or emotional health?  Good    Additional concerns today:  Yes  Patient is a pleasant 83-year-old male overall feeling stable.  Currently on blood pressure and rate control medication along with blood thinners.  He is seeing cardiology for his atrial fibrillation.  Blood pressure initially was elevated recheck was better.  Planes of some hearing issues would like to get his ears checked.  He believes there was some wax present.  Also taking Lasix for bilateral edema.    Have you ever done Advance Care Planning? (For example, a Health Directive, POLST, or a discussion with a medical provider or your loved ones about your wishes): No, advance care planning information given to patient to review.  Patient declined advance care planning discussion at this time.      Fall risk  Fallen 2 or more times in the past year?: Yes  Any fall with injury in the past year?: " No    Cognitive Screening   1) Repeat 3 items (Leader, Season, Table)    2) Clock draw: NORMAL  3) 3 item recall: Recalls 3 objects  Results: 3 items recalled: COGNITIVE IMPAIRMENT LESS LIKELY    Mini-CogTM Copyright S Benjamin. Licensed by the author for use in [facility]; reprinted with permission (gerda@[facility]). All rights reserved.          Reviewed and updated as needed this visit by clinical staff     Meds              Reviewed and updated as needed this visit by Provider                 Social History     Tobacco Use    Smoking status: Former     Packs/day: 2.00     Years: 35.00     Pack years: 70.00     Types: Cigarettes     Quit date: 1995     Years since quittin.3    Smokeless tobacco: Never    Tobacco comments:     quit age 54   Substance Use Topics    Alcohol use: Yes     Alcohol/week: 0.0 standard drinks of alcohol     Comment: 1-2 drinks per week           2023    10:38 AM   Alcohol Use   Prescreen: >3 drinks/day or >7 drinks/week? No     Do you have a current opioid prescription? No  Do you use any other controlled substances or medications that are not prescribed by a provider? None              Current providers sharing in care for this patient include: Patient Care Team:  Basilio Gregorio MD as PCP - General (Family Medicine)  Basilio Gregorio MD as Assigned PCP  Derrek Lord MD as Assigned Pulmonology Provider  Sebastián Bermudez MD as MD (Cardiovascular Disease)  Sebastián Bermudez MD as Assigned Heart and Vascular Provider    The following health maintenance items are reviewed in Epic and correct as of today:  Health Maintenance   Topic Date Due    HF ACTION PLAN  Never done    COPD ACTION PLAN  Never done    ZOSTER IMMUNIZATION (1 of 2) Never done    CBC  2021    BMP  2023    COVID-19 Vaccine (6 - Moderna series) 2023    ALT  2023    LIPID  2023    ANNUAL REVIEW OF HM ORDERS  2023    MEDICARE ANNUAL WELLNESS VISIT  2023     "INFLUENZA VACCINE (1) 09/01/2023    DTAP/TDAP/TD IMMUNIZATION (5 - Td or Tdap) 02/04/2024    FALL RISK ASSESSMENT  08/22/2024    ADVANCE CARE PLANNING  08/17/2027    TSH W/FREE T4 REFLEX  Completed    SPIROMETRY  Completed    PHQ-2 (once per calendar year)  Completed    Pneumococcal Vaccine: 65+ Years  Completed    IPV IMMUNIZATION  Aged Out    MENINGITIS IMMUNIZATION  Aged Out    COLORECTAL CANCER SCREENING  Discontinued             Review of Systems   Constitutional:  Negative for chills and fever.   HENT:  Positive for hearing loss. Negative for congestion, ear pain and sore throat.    Eyes:  Negative for pain and visual disturbance.   Respiratory:  Negative for cough and shortness of breath.    Cardiovascular:  Negative for chest pain, palpitations and peripheral edema.   Gastrointestinal:  Positive for diarrhea. Negative for abdominal pain, constipation, heartburn, hematochezia and nausea.   Genitourinary:  Positive for frequency, impotence and urgency. Negative for dysuria, genital sores, hematuria and penile discharge.   Musculoskeletal:  Negative for arthralgias, joint swelling and myalgias.   Skin:  Negative for rash.   Neurological:  Negative for dizziness, weakness, headaches and paresthesias.   Psychiatric/Behavioral:  Negative for mood changes. The patient is not nervous/anxious.          OBJECTIVE:   There were no vitals taken for this visit. Estimated body mass index is 27.63 kg/m  as calculated from the following:    Height as of 3/6/23: 1.702 m (5' 7\").    Weight as of 3/6/23: 80 kg (176 lb 6.4 oz).  Physical Exam  GENERAL: healthy, alert and no distress  EYES: Eyes grossly normal to inspection, PERRL and conjunctivae and sclerae normal  HENT: ear canals and TM's normal, nose and mouth without ulcers or lesions  NECK: no adenopathy, no asymmetry, masses, or scars and thyroid normal to palpation  RESP: lungs clear to auscultation - no rales, rhonchi or wheezes  CV: regular rate and rhythm, normal S1 " "S2, no S3 or S4, no murmur, click or rub, no peripheral edema and peripheral pulses strong  ABDOMEN: soft, nontender, no hepatosplenomegaly, no masses and bowel sounds normal  MS: no gross musculoskeletal defects noted, no edema  SKIN: no suspicious lesions or rashes  NEURO: Normal strength and tone, mentation intact and speech normal  PSYCH: mentation appears normal, affect normal/bright    Diagnostic Test Results:  Labs reviewed in Epic    ASSESSMENT / PLAN:   Aniket was seen today for physical.    Diagnoses and all orders for this visit:    Benign essential hypertension  -     Comprehensive metabolic panel; Future  Currently on lisinopril we will refill medication once labs reviewed.  Medicare annual wellness visit, subsequent  -     Comprehensive metabolic panel; Future  -     Hemoglobin; Future  -     Lipid panel reflex to direct LDL Fasting; Future    Hyperlipidemia LDL goal <100  -     Comprehensive metabolic panel; Future  -     Lipid panel reflex to direct LDL Fasting; Future  Skin cholesterol medication we will refill once labs reviewed.  Paroxysmal atrial fibrillation (H)  Paroxysmal atrial fibrillation currently on blood thinners as well as rate control.  He is currently taking 37.5 mg metoprolol twice daily.  Which was managed by cardiology.  He is managing by cutting the tablets currently does not need refill.  Benign essential hypertension; goal < 140/90  -     Comprehensive metabolic panel; Future    Impacted cerumen, unspecified laterality        Patient has been advised of split billing requirements and indicates understanding: Yes      COUNSELING:  Reviewed preventive health counseling, as reflected in patient instructions       Regular exercise       Healthy diet/nutrition      BMI:   Estimated body mass index is 27.63 kg/m  as calculated from the following:    Height as of 3/6/23: 1.702 m (5' 7\").    Weight as of 3/6/23: 80 kg (176 lb 6.4 oz).         He reports that he quit smoking about 28 " years ago. His smoking use included cigarettes. He has a 70.00 pack-year smoking history. He has never used smokeless tobacco.      Appropriate preventive services were discussed with this patient, including applicable screening as appropriate for cardiovascular disease, diabetes, osteopenia/osteoporosis, and glaucoma.  As appropriate for age/gender, discussed screening for colorectal cancer, prostate cancer, breast cancer, and cervical cancer. Checklist reviewing preventive services available has been given to the patient.    Reviewed patients plan of care and provided an AVS. The Basic Care Plan (routine screening as documented in Health Maintenance) for Aniket meets the Care Plan requirement. This Care Plan has been established and reviewed with the Patient.          Basilio Gregorio MD  [facility] [place]    Identified Health Risks:

## 2023-08-23 DIAGNOSIS — N40.1 BENIGN NON-NODULAR PROSTATIC HYPERPLASIA WITH LOWER URINARY TRACT SYMPTOMS: ICD-10-CM

## 2023-08-23 DIAGNOSIS — I10 BENIGN ESSENTIAL HYPERTENSION: ICD-10-CM

## 2023-08-23 DIAGNOSIS — E78.5 HYPERLIPIDEMIA LDL GOAL <100: ICD-10-CM

## 2023-08-23 RX ORDER — ATORVASTATIN CALCIUM 40 MG/1
40 TABLET, FILM COATED ORAL DAILY
Qty: 90 TABLET | Refills: 3 | Status: SHIPPED | OUTPATIENT
Start: 2023-08-23 | End: 2024-08-06

## 2023-08-23 RX ORDER — TERAZOSIN 5 MG/1
CAPSULE ORAL
Qty: 90 CAPSULE | Refills: 3 | Status: SHIPPED | OUTPATIENT
Start: 2023-08-23 | End: 2024-09-20

## 2023-08-23 RX ORDER — LISINOPRIL 40 MG/1
40 TABLET ORAL DAILY
Qty: 90 TABLET | Refills: 0 | Status: SHIPPED | OUTPATIENT
Start: 2023-08-23 | End: 2024-02-01

## 2023-08-25 ENCOUNTER — TELEPHONE (OUTPATIENT)
Dept: PULMONOLOGY | Facility: CLINIC | Age: 83
End: 2023-08-25
Payer: COMMERCIAL

## 2023-09-11 ENCOUNTER — TELEPHONE (OUTPATIENT)
Dept: FAMILY MEDICINE | Facility: CLINIC | Age: 83
End: 2023-09-11
Payer: COMMERCIAL

## 2023-09-11 NOTE — TELEPHONE ENCOUNTER
Patient called the clinic to cancel flu/covid vaccine appt that was to be in two day. He would like Dr. Gregorio to comment on when it's best to get the Flu shot for it to be most effective.

## 2023-09-13 ENCOUNTER — APPOINTMENT (OUTPATIENT)
Dept: URBAN - METROPOLITAN AREA CLINIC 255 | Age: 83
Setting detail: DERMATOLOGY
End: 2023-09-19

## 2023-09-13 ENCOUNTER — LAB (OUTPATIENT)
Dept: LAB | Facility: CLINIC | Age: 83
End: 2023-09-13
Payer: COMMERCIAL

## 2023-09-13 ENCOUNTER — ANTICOAGULATION THERAPY VISIT (OUTPATIENT)
Dept: ANTICOAGULATION | Facility: CLINIC | Age: 83
End: 2023-09-13

## 2023-09-13 VITALS — WEIGHT: 170 LBS | HEIGHT: 68 IN

## 2023-09-13 DIAGNOSIS — I48.20 CHRONIC ATRIAL FIBRILLATION (H): ICD-10-CM

## 2023-09-13 DIAGNOSIS — D18.0 HEMANGIOMA: ICD-10-CM

## 2023-09-13 DIAGNOSIS — D17 BENIGN LIPOMATOUS NEOPLASM: ICD-10-CM

## 2023-09-13 DIAGNOSIS — I48.21 PERMANENT ATRIAL FIBRILLATION (H): ICD-10-CM

## 2023-09-13 DIAGNOSIS — L81.4 OTHER MELANIN HYPERPIGMENTATION: ICD-10-CM

## 2023-09-13 DIAGNOSIS — D22 MELANOCYTIC NEVI: ICD-10-CM

## 2023-09-13 DIAGNOSIS — Z79.01 LONG TERM CURRENT USE OF ANTICOAGULANTS WITH INR GOAL OF 2.0-3.0: Primary | ICD-10-CM

## 2023-09-13 DIAGNOSIS — Z79.01 LONG TERM CURRENT USE OF ANTICOAGULANTS WITH INR GOAL OF 2.0-3.0: ICD-10-CM

## 2023-09-13 DIAGNOSIS — L57.8 OTHER SKIN CHANGES DUE TO CHRONIC EXPOSURE TO NONIONIZING RADIATION: ICD-10-CM

## 2023-09-13 DIAGNOSIS — L82.1 OTHER SEBORRHEIC KERATOSIS: ICD-10-CM

## 2023-09-13 DIAGNOSIS — Z71.89 OTHER SPECIFIED COUNSELING: ICD-10-CM

## 2023-09-13 PROBLEM — D22.5 MELANOCYTIC NEVI OF TRUNK: Status: ACTIVE | Noted: 2023-09-13

## 2023-09-13 PROBLEM — D17.1 BENIGN LIPOMATOUS NEOPLASM OF SKIN AND SUBCUTANEOUS TISSUE OF TRUNK: Status: ACTIVE | Noted: 2023-09-13

## 2023-09-13 PROBLEM — D18.01 HEMANGIOMA OF SKIN AND SUBCUTANEOUS TISSUE: Status: ACTIVE | Noted: 2023-09-13

## 2023-09-13 LAB — INR BLD: 3 (ref 0.9–1.1)

## 2023-09-13 PROCEDURE — OTHER MIPS QUALITY: OTHER

## 2023-09-13 PROCEDURE — 99213 OFFICE O/P EST LOW 20 MIN: CPT

## 2023-09-13 PROCEDURE — 36416 COLLJ CAPILLARY BLOOD SPEC: CPT

## 2023-09-13 PROCEDURE — OTHER COUNSELING: OTHER

## 2023-09-13 PROCEDURE — OTHER ADDITIONAL NOTES: OTHER

## 2023-09-13 PROCEDURE — 85610 PROTHROMBIN TIME: CPT

## 2023-09-13 ASSESSMENT — LOCATION DETAILED DESCRIPTION DERM
LOCATION DETAILED: LEFT MEDIAL UPPER BACK
LOCATION DETAILED: LEFT BUTTOCK
LOCATION DETAILED: LEFT SUPERIOR MEDIAL UPPER BACK

## 2023-09-13 ASSESSMENT — LOCATION SIMPLE DESCRIPTION DERM
LOCATION SIMPLE: LEFT UPPER BACK
LOCATION SIMPLE: LEFT BUTTOCK

## 2023-09-13 ASSESSMENT — LOCATION ZONE DERM: LOCATION ZONE: TRUNK

## 2023-09-13 NOTE — PROGRESS NOTES
ANTICOAGULATION MANAGEMENT     Aniket Macias 83 year old male is on warfarin with therapeutic INR result. (Goal INR 2.0-3.0)    Recent labs: (last 7 days)     09/13/23  0925   INR 3.0*       ASSESSMENT     Warfarin Lab Questionnaire    Warfarin Doses Last 7 Days      9/12/2023     9:58 AM   Dose in Tablet or Mg   TAB or MG? milligram (mg)     Pt Rptd Dose SUNDAY MONDAY TUESDAY WED THURS FRIDAY SATURDAY 9/12/2023   9:58 AM 5 2.5 5 5 5 2.5 5         9/12/2023   Warfarin Lab Questionnaire   Missed doses within past 14 days? No   Changes in diet or alcohol within past 14 days? No   Medication changes since last result? No   Injuries or illness since last result? No   New shortness of breath, severe headaches or sudden changes in vision since last result? No   Abnormal bleeding since last result? No   Upcoming surgery, procedure? No     Previous result: Therapeutic last 2(+) visits  Additional findings: None       PLAN     Recommended plan for no diet, medication or health factor changes affecting INR     Dosing Instructions: Continue your current warfarin dose with next INR in 6 weeks       Summary  As of 9/13/2023      Full warfarin instructions:  2.5 mg every Mon, Fri; 5 mg all other days   Next INR check:  10/25/2023               Detailed voice message left for Lowell with dosing instructions and follow up date.     Contact [phone]  to schedule and with any changes, questions or concerns.     Education provided:   Please call back if any changes to your diet, medications or how you've been taking warfarin  Dietary considerations: importance of consistent vitamin K intake and impact of vitamin K foods on INR    Plan made per [facility] anticoagulation protocol    Jeanette Bender, RN  Anticoagulation Clinic  9/13/2023    _______________________________________________________________________     Anticoagulation Episode Summary       Current INR goal:  2.0-3.0   TTR:  83.2 % (1 y)   Target end date:  Indefinite   Send  INR reminders to:  [facility]    Indications    Long term current use of anticoagulants with INR goal of 2.0-3.0 [Z79.01]  Chronic atrial fibrillation (H) [I48.20]  Permanent atrial fibrillation (H) [I48.21]             Comments:               Anticoagulation Care Providers       Provider Role Specialty Phone number    Maribeth Haney MD Referring Internal Medicine - Pediatrics [phone]    Basilio Gregorio MD Referring Family Medicine [phone]    Sebastián Bermudez MD Responsible Cardiovascular Disease [phone]

## 2023-09-13 NOTE — PROCEDURE: ADDITIONAL NOTES
Additional Notes: Has had for 30 years and doesn’t bother him.
Detail Level: Detailed
Render Risk Assessment In Note?: no

## 2023-09-14 NOTE — TELEPHONE ENCOUNTER
Patient Contact    Attempt # 1    Was call answered?  No.  Left message on voicemail with information to call triage back at [phone], option 2.     On call back: message below from Dr. Jg Marquez RN

## 2023-09-15 NOTE — TELEPHONE ENCOUNTER
"Patient Contact    S/w pt and relayed message from Dr. Gregorio, see below. He stated understanding.    Pt then stated he has questions regarding his upcoming PFT and scheduling with pulmonary. At this time he is scheduled on 10/25/23 and did have a follow up appointment scheduled after that. However, he was notified this had to be canceled and he needs to re-schedule. He cannot schedule until January and is quite upset about this. He also states it is far from him in [place]. Nurse explained sometimes they do book out but he states if he cannot be seen sooner he is going to cancel the PFT and not do this. Last VV was 5/2/23 with them, it appears PCP referred. If there is no way to prioritize would PCP refer out of [place]? Unsure how else to get him in sooner?  He also wanted to notify provider the \"6 minute walking test\" he was supposed to have cannot get done because they don't have anyone to do this right now. Unsure if he means a walking desat? Unable to find incomplete procedure/imaging order?    Pt wants provider's advise.    Viviane WELLS RN  [facility] [place]   "

## 2023-09-18 NOTE — TELEPHONE ENCOUNTER
Deangelo patent to keep PFT appointment, will follow up on the results and see what further need to be done.

## 2023-09-19 NOTE — TELEPHONE ENCOUNTER
Patient given message from Dr. Gregorio. Patient states that he is still scheduled for 10/25 for PFT and will keep that appointment.  Shazia Marquez RN

## 2023-10-02 DIAGNOSIS — I48.0 PAROXYSMAL ATRIAL FIBRILLATION (H): ICD-10-CM

## 2023-10-02 DIAGNOSIS — Z79.01 LONG TERM CURRENT USE OF ANTICOAGULANTS WITH INR GOAL OF 2.0-3.0: ICD-10-CM

## 2023-10-02 RX ORDER — WARFARIN SODIUM 5 MG/1
TABLET ORAL
Qty: 90 TABLET | Refills: 1 | Status: SHIPPED | OUTPATIENT
Start: 2023-10-02 | End: 2024-04-19

## 2023-10-02 NOTE — TELEPHONE ENCOUNTER
ANTICOAGULATION MANAGEMENT:  Medication Refill    Anticoagulation Summary  As of 9/13/2023      Warfarin maintenance plan:  2.5 mg (5 mg x 0.5) every Mon, Fri; 5 mg (5 mg x 1) all other days   Next INR check:  10/25/2023   Target end date:  Indefinite    Indications    Long term current use of anticoagulants with INR goal of 2.0-3.0 [Z79.01]  Chronic atrial fibrillation (H) [I48.20]  Permanent atrial fibrillation (H) [I48.21]                 Anticoagulation Care Providers       Provider Role Specialty Phone number    Maribeth Haney MD Referring Internal Medicine - Pediatrics [phone]    Basilio Gregorio MD Referring Family Medicine [phone]    Sebastián Bermudez MD Responsible Cardiovascular Disease [phone]            Refill Criteria    Visit with referring provider/group: Meets criteria: office visit within referring provider group in the last 1 year on 8/22/23    [facility] referral signed last signed: 08/03/2023; within last year: Yes    Lab monitoring not exceeding 2 weeks overdue: Yes    Aniket meets all criteria for refill. Rx instructions and quantity supplied updated to match patient's current dosing plan. Warfarin 90 day supply with 1 refill granted per [facility] protocol     Allison De La Cruz RN  Anticoagulation Clinic

## 2023-10-24 ENCOUNTER — LAB (OUTPATIENT)
Dept: LAB | Facility: CLINIC | Age: 83
End: 2023-10-24
Payer: COMMERCIAL

## 2023-10-24 ENCOUNTER — ANTICOAGULATION THERAPY VISIT (OUTPATIENT)
Dept: ANTICOAGULATION | Facility: CLINIC | Age: 83
End: 2023-10-24

## 2023-10-24 DIAGNOSIS — Z79.01 LONG TERM CURRENT USE OF ANTICOAGULANTS WITH INR GOAL OF 2.0-3.0: Primary | ICD-10-CM

## 2023-10-24 DIAGNOSIS — J84.9 ILD (INTERSTITIAL LUNG DISEASE) (H): Primary | ICD-10-CM

## 2023-10-24 DIAGNOSIS — I48.21 PERMANENT ATRIAL FIBRILLATION (H): ICD-10-CM

## 2023-10-24 DIAGNOSIS — I48.20 CHRONIC ATRIAL FIBRILLATION (H): ICD-10-CM

## 2023-10-24 DIAGNOSIS — Z79.01 LONG TERM CURRENT USE OF ANTICOAGULANTS WITH INR GOAL OF 2.0-3.0: ICD-10-CM

## 2023-10-24 LAB — INR BLD: 2.9 (ref 0.9–1.1)

## 2023-10-24 PROCEDURE — 36416 COLLJ CAPILLARY BLOOD SPEC: CPT

## 2023-10-24 PROCEDURE — 85610 PROTHROMBIN TIME: CPT

## 2023-10-24 NOTE — PROGRESS NOTES
ANTICOAGULATION MANAGEMENT     Aniket Macias 83 year old male is on warfarin with therapeutic INR result. (Goal INR 2.0-3.0)    Recent labs: (last 7 days)     10/24/23  0924   INR 2.9*       ASSESSMENT     Warfarin Lab Questionnaire    Warfarin Doses Last 7 Days          10/24/2023   Warfarin Lab Questionnaire   Missed doses within past 14 days? No   Changes in diet or alcohol within past 14 days? No   Medication changes since last result? No   Injuries or illness since last result? No   New shortness of breath, severe headaches or sudden changes in vision since last result? No   Abnormal bleeding since last result? No   Upcoming surgery, procedure? No     Previous result: Therapeutic last 2(+) visits  Additional findings: None       PLAN     Recommended plan for no diet, medication or health factor changes affecting INR     Dosing Instructions: Continue your current warfarin dose with next INR in 6 weeks       Summary  As of 10/24/2023      Full warfarin instructions:  2.5 mg every Mon, Fri; 5 mg all other days   Next INR check:  12/5/2023               Telephone call with Lowell who verbalizes understanding and agrees to plan    Lab visit scheduled    Education provided:   Please call back if any changes to your diet, medications or how you've been taking warfarin    Plan made per [facility] anticoagulation protocol    Gabriella Jean RN  Anticoagulation Clinic  10/24/2023    _______________________________________________________________________     Anticoagulation Episode Summary       Current INR goal:  2.0-3.0   TTR:  88.5% (1 y)   Target end date:  Indefinite   Send INR reminders to:  [facility]    Indications    Long term current use of anticoagulants with INR goal of 2.0-3.0 [Z79.01]  Chronic atrial fibrillation (H) [I48.20]  Permanent atrial fibrillation (H) [I48.21]             Comments:               Anticoagulation Care Providers       Provider Role Specialty Phone number    Maribeth Haney MD  Referring Internal Medicine - Pediatrics [phone]    Basilio Gregorio MD Referring Family Medicine [phone]    Sebastián Bermudez MD Responsible Cardiovascular Disease [phone]

## 2023-10-25 ENCOUNTER — HOSPITAL ENCOUNTER (OUTPATIENT)
Dept: CARDIAC REHAB | Facility: CLINIC | Age: 83
Discharge: HOME | End: 2023-10-25
Attending: INTERNAL MEDICINE
Payer: COMMERCIAL

## 2023-10-25 DIAGNOSIS — J84.9 ILD (INTERSTITIAL LUNG DISEASE) (H): ICD-10-CM

## 2023-10-25 PROCEDURE — 94729 DIFFUSING CAPACITY: CPT

## 2023-10-25 PROCEDURE — 94618 PULMONARY STRESS TESTING: CPT

## 2023-10-25 PROCEDURE — 94726 PLETHYSMOGRAPHY LUNG VOLUMES: CPT

## 2023-10-25 PROCEDURE — 94375 RESPIRATORY FLOW VOLUME LOOP: CPT

## 2023-10-26 LAB
DLCOUNC-%PRED-PRE: 38 %
DLCOUNC-PRE: 9.16 ML/MIN/MMHG
DLCOUNC-PRED: 23.77 ML/MIN/MMHG
ERV-%PRED-PRE: 113 %
ERV-PRE: 1.43 L
ERV-PRED: 1.27 L
EXPTIME-PRE: 8.21 SEC
FEF2575-%PRED-PRE: 88 %
FEF2575-PRE: 1.57 L/SEC
FEF2575-PRED: 1.78 L/SEC
FEFMAX-%PRED-PRE: 106 %
FEFMAX-PRE: 7.1 L/SEC
FEFMAX-PRED: 6.66 L/SEC
FEV1-%PRED-PRE: 98 %
FEV1-PRE: 2.52 L
FEV1FEV6-PRE: 72 %
FEV1FEV6-PRED: 76 %
FEV1FVC-PRE: 70 %
FEV1FVC-PRED: 75 %
FEV1SVC-PRE: 71 %
FEV1SVC-PRED: 67 %
FIFMAX-PRE: 3.08 L/SEC
FRCPLETH-%PRED-PRE: 81 %
FRCPLETH-PRE: 3.12 L
FRCPLETH-PRED: 3.83 L
FVC-%PRED-PRE: 104 %
FVC-PRE: 3.6 L
FVC-PRED: 3.46 L
IC-%PRED-PRE: 82 %
IC-PRE: 2.1 L
IC-PRED: 2.53 L
RVPLETH-%PRED-PRE: 56 %
RVPLETH-PRE: 1.68 L
RVPLETH-PRED: 2.96 L
TLCPLETH-%PRED-PRE: 73 %
TLCPLETH-PRE: 5.21 L
TLCPLETH-PRED: 7.13 L
VA-%PRED-PRE: 68 %
VA-PRE: 4.3 L
VC-%PRED-PRE: 93 %
VC-PRE: 3.54 L
VC-PRED: 3.8 L

## 2023-10-27 DIAGNOSIS — I50.32 CHRONIC DIASTOLIC HEART FAILURE (H): ICD-10-CM

## 2023-10-27 RX ORDER — FUROSEMIDE 40 MG
40 TABLET ORAL DAILY
Qty: 90 TABLET | Refills: 0 | Status: SHIPPED | OUTPATIENT
Start: 2023-10-27 | End: 2024-02-20

## 2023-11-07 ENCOUNTER — OFFICE VISIT (OUTPATIENT)
Dept: PULMONOLOGY | Facility: CLINIC | Age: 83
End: 2023-11-07
Payer: COMMERCIAL

## 2023-11-07 VITALS
HEART RATE: 64 BPM | OXYGEN SATURATION: 94 % | WEIGHT: 167 LBS | DIASTOLIC BLOOD PRESSURE: 73 MMHG | BODY MASS INDEX: 26.21 KG/M2 | HEIGHT: 67 IN | SYSTOLIC BLOOD PRESSURE: 123 MMHG

## 2023-11-07 DIAGNOSIS — J84.9 ILD (INTERSTITIAL LUNG DISEASE) (H): Primary | ICD-10-CM

## 2023-11-07 DIAGNOSIS — J43.9 PULMONARY EMPHYSEMA, UNSPECIFIED EMPHYSEMA TYPE (H): ICD-10-CM

## 2023-11-07 PROCEDURE — 99214 OFFICE O/P EST MOD 30 MIN: CPT | Mod: 25 | Performed by: INTERNAL MEDICINE

## 2023-11-07 RX ORDER — LYSINE HCL 500 MG
500 TABLET ORAL 2 TIMES DAILY
COMMUNITY
End: 2024-04-22

## 2023-11-07 RX ORDER — TIMOLOL 5 MG/ML
5 SOLUTION/ DROPS OPHTHALMIC
COMMUNITY

## 2023-11-07 ASSESSMENT — PAIN SCALES - GENERAL: PAINLEVEL: NO PAIN (0)

## 2023-11-07 NOTE — NURSING NOTE
"Chief Complaint   Patient presents with    New Patient     ILD (interstitial lung disease) (H)       Vitals:    11/07/23 1322   BP: 123/73   BP Location: Left arm   Patient Position: Sitting   Cuff Size: Adult Regular   Pulse: 64   SpO2: 94%   Weight: 75.8 kg (167 lb)   Height: 1.702 m (5' 7\")       Body mass index is 26.16 kg/m .      ENEIDA Siddiqui    "

## 2023-11-07 NOTE — PATIENT INSTRUCTIONS
Recommend you get the following vaccinations:  -Flu  -COVID  -RSV    Follow up in 1 year with breathing tests and 6 minute walk test.    Keep up the regular exercise and call if there are any changes in your breathing.

## 2023-11-07 NOTE — LETTER
11/7/2023         RE: Aniket Macias  [address]  [facility] [address]        Dear Colleague,    Thank you for referring your patient, Aniket Macias, to the [facility] [place]. Please see a copy of my visit note below.    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  He is previously followed with Dr. Lord.  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  This has been fairly stable and the patient has generally not been interested in any antifibrotic therapy.  He returns clinic today overall stating that he is doing relatively well he does not feel there has been any significant change he does have some dyspnea on exertion he does try to use exercise machine regularly up to 1 hour/week.  He is able to tolerate this but does have some dyspnea on exertion.  He also states that he walks quite a bit.  Overall feels his breathing is stable.      Current Outpatient Medications   Medication    atorvastatin (LIPITOR) 40 MG tablet    FLUOCINOLONE ACETONIDE IO    furosemide (LASIX) 40 MG tablet    Homeopathic Products (PROSACEA) GEL    l-lysine HCl 500 MG TABS tablet    latanoprost (XALATAN) 0.005 % ophthalmic solution    lisinopril (ZESTRIL) 40 MG tablet    metoprolol tartrate (LOPRESSOR) 25 MG tablet    Propylene Glycol (SYSTANE COMPLETE OP)    terazosin (HYTRIN) 5 MG capsule    timolol, PF, (TIMOPTIC OCUDOSE) 0.5 % ophthalmic solution    warfarin ANTICOAGULANT (COUMADIN) 5 MG tablet    ACETAMINOPHEN PO    L-Serine POWD    Multiple Vitamins-Minerals (MULTIVITAMIN PO)    UNABLE TO FIND     No current facility-administered medications for this visit.     No Known Allergies  Past Medical  History:   Diagnosis Date    Basal cell carcinoma     BPH (benign prostatic hypertrophy)     Diverticulosis     Glaucoma     Hemorrhoids     HTN (hypertension)     Hyperlipidemia     Obesity     Persistent atrial fibrillation (H)     PVC (premature ventricular contraction)     Squamous cell carcinoma in situ of skin     Syncope 2016       Past Surgical History:   Procedure Laterality Date    BACK SURGERY      COLONOSCOPY  11/19/15    hx polyps    COLONOSCOPY N/A 2021    Procedure: COLONOSCOPY, FLEXIBLE, WITH LESION REMOVAL USING SNARE;  Surgeon: Eugene Burden MD;  Location:  GI    SURGICAL HISTORY OF -       Laminectomy    SURGICAL HISTORY OF -       biopsy of prostate    TONSILLECTOMY & ADENOIDECTOMY         Social History     Socioeconomic History    Marital status:      Spouse name: Not on file    Number of children: Not on file    Years of education: Not on file    Highest education level: Not on file   Occupational History    Not on file   Tobacco Use    Smoking status: Former     Packs/day: 2.00     Years: 35.00     Additional pack years: 0.00     Total pack years: 70.00     Types: Cigarettes     Quit date: 1995     Years since quittin.5    Smokeless tobacco: Never    Tobacco comments:     quit age 54   Substance and Sexual Activity    Alcohol use: Yes     Alcohol/week: 0.0 standard drinks of alcohol     Comment: 1-2 drinks per week    Drug use: No    Sexual activity: Not Currently     Partners: Female   Other Topics Concern     Service Not Asked    Blood Transfusions Not Asked    Caffeine Concern Yes     Comment: 2 cups coffee per day    Occupational Exposure Not Asked    Hobby Hazards Not Asked    Sleep Concern No    Stress Concern No    Weight Concern No    Special Diet Not Asked    Back Care Not Asked    Exercise No    Bike Helmet Not Asked    Seat Belt Not Asked    Self-Exams Not Asked    Parent/sibling w/ CABG, MI or angioplasty before 65F 55M? Not Asked   Social  "History Narrative    Not on file     Social Determinants of Health     Financial Resource Strain: Not on file   Food Insecurity: Not on file   Transportation Needs: Not on file   Physical Activity: Not on file   Stress: Not on file   Social Connections: Not on file   Interpersonal Safety: Not on file   Housing Stability: Not on file       Family History   Problem Relation Age of Onset    Cerebrovascular Disease Mother     Hypertension Mother     Cerebrovascular Disease Father     Myocardial Infarction Father     Hypertension Father     Hypertension Sister     Myocardial Infarction Sister     LUNG DISEASE No family hx of        ROS Pulmonary    A complete ROS was otherwise negative except as noted in the HPI.  Vitals:    11/07/23 1322   BP: 123/73   BP Location: Left arm   Patient Position: Sitting   Cuff Size: Adult Regular   Pulse: 64   SpO2: 94%   Weight: 75.8 kg (167 lb)   Height: 1.702 m (5' 7\")     Exam:   GENERAL APPEARANCE: Well developed, well nourished, alert, and in no apparent distress.  NECK: supple, no masses, no thyromegaly.  LYMPHATICS: No significant axillary, cervical, or supraclavicular nodes.  RESP: good air flow throughout, - no crackles, rhonchi or wheezes.  CV: Normal S1, S2, regular rhythm, normal rate, no rub, no murmur,  no gallop, no LE edema.   ABDOMEN:  Bowel sounds normal, soft, nontender, no HSM or masses.   MS: extremities normal- no clubbing, no cyanosis.  PSYCH: mentation appears normal. and affect normal/bright  Results: I have reviewed all imaging, PFTs and other relavent tests, please see below for details, PFT and imaging results were reviewed with the patient.  PFTs: Normal spirometry with mild reduction in total lung capacity and severe reduction in diffusing capacity.  Overall stable from previous.      Assessment and plan:    83-year-old male with combined pulmonary fibrosis and emphysema.  Reviewing his CT scans I think the emphysema is the main component of his lung " disease.  He certainly does have some lower lobe fibrotic changes though they were present as far back as 2016 and I am not sure they have progressed all that much since then.  The patient would prefer to reduce his doctors appointments and discuss the possibility of an annual follow-up.  I think this is reasonable.  I did emphasize the need for him to call if he does develop any significant changes in his breathing.  I also suggested he get a pulse oximeter and monitor his oxygen saturations and if he does notice worsening oxygen saturations with his exercise then he should call us as well.  Otherwise I think it is reasonable for him to follow-up in 1 year.    I spent 30 minutes on the date of the encounter doing chart review, history and exam, interpretation of any new PFTs and imaging, documentation and further activities as noted above.        CBC   Recent Labs   Lab Test 08/22/23  1127 08/17/22  1010 08/17/21  1219 05/06/20  1103 04/25/19  0848   RBC  --   --   --  4.14* 4.27*   HGB 13.1* 12.7*   < > 13.9 14.1   HCT  --   --   --  42.0 42.1   PLT  --   --   --  191 188    < > = values in this interval not displayed.       Basic Metabolic Panel  Recent Labs   Lab Test 08/22/23  1127 08/17/22  1010    139   POTASSIUM 4.8 4.6   CHLORIDE 107 108   CO2 26 25   BUN 27.9* 17   * 104*   MCKAYLA 9.0 8.7       INR  Recent Labs   Lab Test 10/24/23  0924 09/13/23  0925   INR 2.9* 3.0*       PFT      Latest Ref Rng & Units 10/25/2023     7:35 AM   PFT   FVC L 3.60    FEV1 L 2.52    FVC% % 104    FEV1% % 98              Again, thank you for allowing me to participate in the care of your patient.        Sincerely,        David Morris Perlman, MD   08-Sep-2020 18:06

## 2023-11-07 NOTE — PROGRESS NOTES
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  He is previously followed with Dr. Lord.  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  This has been fairly stable and the patient has generally not been interested in any antifibrotic therapy.  He returns clinic today overall stating that he is doing relatively well he does not feel there has been any significant change he does have some dyspnea on exertion he does try to use exercise machine regularly up to 1 hour/week.  He is able to tolerate this but does have some dyspnea on exertion.  He also states that he walks quite a bit.  Overall feels his breathing is stable.      Current Outpatient Medications   Medication    atorvastatin (LIPITOR) 40 MG tablet    FLUOCINOLONE ACETONIDE IO    furosemide (LASIX) 40 MG tablet    Homeopathic Products (PROSACEA) GEL    l-lysine HCl 500 MG TABS tablet    latanoprost (XALATAN) 0.005 % ophthalmic solution    lisinopril (ZESTRIL) 40 MG tablet    metoprolol tartrate (LOPRESSOR) 25 MG tablet    Propylene Glycol (SYSTANE COMPLETE OP)    terazosin (HYTRIN) 5 MG capsule    timolol, PF, (TIMOPTIC OCUDOSE) 0.5 % ophthalmic solution    warfarin ANTICOAGULANT (COUMADIN) 5 MG tablet    ACETAMINOPHEN PO    L-Serine POWD    Multiple Vitamins-Minerals (MULTIVITAMIN PO)    UNABLE TO FIND     No current facility-administered medications for this visit.     No Known Allergies  Past Medical History:   Diagnosis Date    Basal cell carcinoma     BPH (benign prostatic hypertrophy)     Diverticulosis     Glaucoma     Hemorrhoids     HTN (hypertension)     Hyperlipidemia     Obesity     Persistent atrial fibrillation (H)     PVC (premature ventricular  contraction)     Squamous cell carcinoma in situ of skin     Syncope 2016       Past Surgical History:   Procedure Laterality Date    BACK SURGERY      COLONOSCOPY  11/19/15    hx polyps    COLONOSCOPY N/A 2021    Procedure: COLONOSCOPY, FLEXIBLE, WITH LESION REMOVAL USING SNARE;  Surgeon: Eugene Burden MD;  Location:  GI    SURGICAL HISTORY OF -       Laminectomy    SURGICAL HISTORY OF -       biopsy of prostate    TONSILLECTOMY & ADENOIDECTOMY         Social History     Socioeconomic History    Marital status:      Spouse name: Not on file    Number of children: Not on file    Years of education: Not on file    Highest education level: Not on file   Occupational History    Not on file   Tobacco Use    Smoking status: Former     Packs/day: 2.00     Years: 35.00     Additional pack years: 0.00     Total pack years: 70.00     Types: Cigarettes     Quit date: 1995     Years since quittin.5    Smokeless tobacco: Never    Tobacco comments:     quit age 54   Substance and Sexual Activity    Alcohol use: Yes     Alcohol/week: 0.0 standard drinks of alcohol     Comment: 1-2 drinks per week    Drug use: No    Sexual activity: Not Currently     Partners: Female   Other Topics Concern     Service Not Asked    Blood Transfusions Not Asked    Caffeine Concern Yes     Comment: 2 cups coffee per day    Occupational Exposure Not Asked    Hobby Hazards Not Asked    Sleep Concern No    Stress Concern No    Weight Concern No    Special Diet Not Asked    Back Care Not Asked    Exercise No    Bike Helmet Not Asked    Seat Belt Not Asked    Self-Exams Not Asked    Parent/sibling w/ CABG, MI or angioplasty before 65F 55M? Not Asked   Social History Narrative    Not on file     Social Determinants of Health     Financial Resource Strain: Not on file   Food Insecurity: Not on file   Transportation Needs: Not on file   Physical Activity: Not on file   Stress: Not on file   Social Connections: Not on  "file   Interpersonal Safety: Not on file   Housing Stability: Not on file       Family History   Problem Relation Age of Onset    Cerebrovascular Disease Mother     Hypertension Mother     Cerebrovascular Disease Father     Myocardial Infarction Father     Hypertension Father     Hypertension Sister     Myocardial Infarction Sister     LUNG DISEASE No family hx of        ROS Pulmonary    A complete ROS was otherwise negative except as noted in the HPI.  Vitals:    11/07/23 1322   BP: 123/73   BP Location: Left arm   Patient Position: Sitting   Cuff Size: Adult Regular   Pulse: 64   SpO2: 94%   Weight: 75.8 kg (167 lb)   Height: 1.702 m (5' 7\")     Exam:   GENERAL APPEARANCE: Well developed, well nourished, alert, and in no apparent distress.  NECK: supple, no masses, no thyromegaly.  LYMPHATICS: No significant axillary, cervical, or supraclavicular nodes.  RESP: good air flow throughout, - no crackles, rhonchi or wheezes.  CV: Normal S1, S2, regular rhythm, normal rate, no rub, no murmur,  no gallop, no LE edema.   ABDOMEN:  Bowel sounds normal, soft, nontender, no HSM or masses.   MS: extremities normal- no clubbing, no cyanosis.  PSYCH: mentation appears normal. and affect normal/bright  Results: I have reviewed all imaging, PFTs and other relavent tests, please see below for details, PFT and imaging results were reviewed with the patient.  PFTs: Normal spirometry with mild reduction in total lung capacity and severe reduction in diffusing capacity.  Overall stable from previous.      Assessment and plan:    83-year-old male with combined pulmonary fibrosis and emphysema.  Reviewing his CT scans I think the emphysema is the main component of his lung disease.  He certainly does have some lower lobe fibrotic changes though they were present as far back as 2016 and I am not sure they have progressed all that much since then.  The patient would prefer to reduce his doctors appointments and discuss the possibility of " an annual follow-up.  I think this is reasonable.  I did emphasize the need for him to call if he does develop any significant changes in his breathing.  I also suggested he get a pulse oximeter and monitor his oxygen saturations and if he does notice worsening oxygen saturations with his exercise then he should call us as well.  Otherwise I think it is reasonable for him to follow-up in 1 year.    I spent 30 minutes on the date of the encounter doing chart review, history and exam, interpretation of any new PFTs and imaging, documentation and further activities as noted above.        CBC   Recent Labs   Lab Test 08/22/23  1127 08/17/22  1010 08/17/21  1219 05/06/20  1103 04/25/19  0848   RBC  --   --   --  4.14* 4.27*   HGB 13.1* 12.7*   < > 13.9 14.1   HCT  --   --   --  42.0 42.1   PLT  --   --   --  191 188    < > = values in this interval not displayed.       Basic Metabolic Panel  Recent Labs   Lab Test 08/22/23  1127 08/17/22  1010    139   POTASSIUM 4.8 4.6   CHLORIDE 107 108   CO2 26 25   BUN 27.9* 17   * 104*   MCKAYLA 9.0 8.7       INR  Recent Labs   Lab Test 10/24/23  0924 09/13/23  0925   INR 2.9* 3.0*       PFT      Latest Ref Rng & Units 10/25/2023     7:35 AM   PFT   FVC L 3.60    FEV1 L 2.52    FVC% % 104    FEV1% % 98            CC:

## 2023-11-17 ENCOUNTER — IMMUNIZATION (OUTPATIENT)
Dept: FAMILY MEDICINE | Facility: CLINIC | Age: 83
End: 2023-11-17
Payer: COMMERCIAL

## 2023-11-17 DIAGNOSIS — Z23 NEED FOR PROPHYLACTIC VACCINATION AND INOCULATION AGAINST INFLUENZA: Primary | ICD-10-CM

## 2023-11-17 PROCEDURE — G0008 ADMIN INFLUENZA VIRUS VAC: HCPCS

## 2023-11-17 PROCEDURE — 99207 PR NO CHARGE NURSE ONLY: CPT

## 2023-11-17 PROCEDURE — 90662 IIV NO PRSV INCREASED AG IM: CPT

## 2023-12-05 ENCOUNTER — LAB (OUTPATIENT)
Dept: LAB | Facility: CLINIC | Age: 83
End: 2023-12-05
Payer: COMMERCIAL

## 2023-12-05 ENCOUNTER — ANTICOAGULATION THERAPY VISIT (OUTPATIENT)
Dept: ANTICOAGULATION | Facility: CLINIC | Age: 83
End: 2023-12-05

## 2023-12-05 DIAGNOSIS — I48.21 PERMANENT ATRIAL FIBRILLATION (H): ICD-10-CM

## 2023-12-05 DIAGNOSIS — I48.20 CHRONIC ATRIAL FIBRILLATION (H): ICD-10-CM

## 2023-12-05 DIAGNOSIS — Z79.01 LONG TERM CURRENT USE OF ANTICOAGULANTS WITH INR GOAL OF 2.0-3.0: ICD-10-CM

## 2023-12-05 DIAGNOSIS — Z79.01 LONG TERM CURRENT USE OF ANTICOAGULANTS WITH INR GOAL OF 2.0-3.0: Primary | ICD-10-CM

## 2023-12-05 LAB — INR BLD: 2.7 (ref 0.9–1.1)

## 2023-12-05 PROCEDURE — 36416 COLLJ CAPILLARY BLOOD SPEC: CPT

## 2023-12-05 PROCEDURE — 85610 PROTHROMBIN TIME: CPT

## 2023-12-05 NOTE — PROGRESS NOTES
ANTICOAGULATION MANAGEMENT     Aniket Macias 83 year old male is on warfarin with therapeutic INR result. (Goal INR 2.0-3.0)    Recent labs: (last 7 days)     12/05/23  0929   INR 2.7*       ASSESSMENT     Warfarin Lab Questionnaire    Warfarin Doses Last 7 Days  Confirmed dosing with patient         12/5/2023   Warfarin Lab Questionnaire   Missed doses within past 14 days? No   Changes in diet or alcohol within past 14 days? No   Medication changes since last result? No   Injuries or illness since last result? No   New shortness of breath, severe headaches or sudden changes in vision since last result? No   Abnormal bleeding since last result? No   Upcoming surgery, procedure? No   Best number to call with results? [phone]     Previous result: Therapeutic last 2(+) visits  Additional findings: None       PLAN     Recommended plan for no diet, medication or health factor changes affecting INR     Dosing Instructions: Continue your current warfarin dose with next INR in 6 weeks       Summary  As of 12/5/2023      Full warfarin instructions:  2.5 mg every Mon, Fri; 5 mg all other days   Next INR check:  1/16/2024               Telephone call with Lowell who verbalizes understanding and agrees to plan    Lab visit scheduled    Education provided:   Goal range and lab monitoring: goal range and significance of current result and Importance of therapeutic range    Plan made per [facility] anticoagulation protocol    Franc Metzger RN  Anticoagulation Clinic  12/5/2023    _______________________________________________________________________     Anticoagulation Episode Summary       Current INR goal:  2.0-3.0   TTR:  88.5% (1 y)   Target end date:  Indefinite   Send INR reminders to:  [facility]    Indications    Long term current use of anticoagulants with INR goal of 2.0-3.0 [Z79.01]  Chronic atrial fibrillation (H) [I48.20]  Permanent atrial fibrillation (H) [I48.21]             Comments:               Anticoagulation  Care Providers       Provider Role Specialty Phone number    Maribeth Haney MD Referring Internal Medicine - Pediatrics [phone]    Basilio Gregorio MD Referring Family Medicine [phone]    Sebastián Bermudez MD Responsible Cardiovascular Disease [phone]

## 2024-01-16 ENCOUNTER — LAB (OUTPATIENT)
Dept: LAB | Facility: CLINIC | Age: 84
End: 2024-01-16
Payer: COMMERCIAL

## 2024-01-16 ENCOUNTER — ANTICOAGULATION THERAPY VISIT (OUTPATIENT)
Dept: ANTICOAGULATION | Facility: CLINIC | Age: 84
End: 2024-01-16

## 2024-01-16 DIAGNOSIS — I48.21 PERMANENT ATRIAL FIBRILLATION (H): ICD-10-CM

## 2024-01-16 DIAGNOSIS — Z79.01 LONG TERM CURRENT USE OF ANTICOAGULANTS WITH INR GOAL OF 2.0-3.0: ICD-10-CM

## 2024-01-16 DIAGNOSIS — I48.20 CHRONIC ATRIAL FIBRILLATION (H): ICD-10-CM

## 2024-01-16 DIAGNOSIS — Z79.01 LONG TERM CURRENT USE OF ANTICOAGULANTS WITH INR GOAL OF 2.0-3.0: Primary | ICD-10-CM

## 2024-01-16 LAB — INR BLD: 2.9 (ref 0.9–1.1)

## 2024-01-16 PROCEDURE — 85610 PROTHROMBIN TIME: CPT

## 2024-01-16 PROCEDURE — 36416 COLLJ CAPILLARY BLOOD SPEC: CPT

## 2024-01-16 NOTE — PROGRESS NOTES
ANTICOAGULATION MANAGEMENT     Aniket Macias 83 year old male is on warfarin with therapeutic INR result. (Goal INR 2.0-3.0)    Recent labs: (last 7 days)     01/16/24  0934   INR 2.9*       ASSESSMENT     Warfarin Lab Questionnaire    Warfarin Doses Last 7 Days          1/16/2024   Warfarin Lab Questionnaire   Missed doses within past 14 days? No   Changes in diet or alcohol within past 14 days? No   Medication changes since last result? No   Injuries or illness since last result? No   New shortness of breath, severe headaches or sudden changes in vision since last result? No   Abnormal bleeding since last result? No   Upcoming surgery, procedure? No     Previous result: Therapeutic last 2(+) visits  Additional findings: None       PLAN     Recommended plan for no diet, medication or health factor changes affecting INR     Dosing Instructions: Continue your current warfarin dose with next INR in 6 weeks       Summary  As of 1/16/2024      Full warfarin instructions:  2.5 mg every Mon, Fri; 5 mg all other days   Next INR check:  2/27/2024               Detailed voice message left for Lowell with dosing instructions and follow up date.   Sent [employer] message with dosing and follow up instructions    Contact [phone]  to schedule and with any changes, questions or concerns.     Education provided:   Please call back if any changes to your diet, medications or how you've been taking warfarin    Plan made per [facility] anticoagulation protocol    Rita De La Cruz RN  Anticoagulation Clinic  1/16/2024    _______________________________________________________________________     Anticoagulation Episode Summary       Current INR goal:  2.0-3.0   TTR:  88.5% (1 y)   Target end date:  Indefinite   Send INR reminders to:  [facility]    Indications    Long term current use of anticoagulants with INR goal of 2.0-3.0 [Z79.01]  Chronic atrial fibrillation (H) [I48.20]  Permanent atrial fibrillation (H)  [I48.21]             Comments:               Anticoagulation Care Providers       Provider Role Specialty Phone number    Maribeth Haney MD Referring Internal Medicine - Pediatrics [phone]    Basilio Gregorio MD Referring Family Medicine [phone]    Sebastián Bermudez MD Responsible Cardiovascular Disease [phone]

## 2024-02-01 DIAGNOSIS — I10 BENIGN ESSENTIAL HYPERTENSION: ICD-10-CM

## 2024-02-01 RX ORDER — LISINOPRIL 40 MG/1
40 TABLET ORAL DAILY
Qty: 90 TABLET | Refills: 0 | Status: SHIPPED | OUTPATIENT
Start: 2024-02-01 | End: 2024-05-14

## 2024-02-02 ENCOUNTER — MYC MEDICAL ADVICE (OUTPATIENT)
Dept: CARDIOLOGY | Facility: CLINIC | Age: 84
End: 2024-02-02
Payer: COMMERCIAL

## 2024-02-02 DIAGNOSIS — I48.21 PERMANENT ATRIAL FIBRILLATION (H): ICD-10-CM

## 2024-02-05 RX ORDER — METOPROLOL TARTRATE 25 MG/1
37.5 TABLET, FILM COATED ORAL 2 TIMES DAILY
Qty: 90 TABLET | Refills: 1 | Status: SHIPPED | OUTPATIENT
Start: 2024-02-05 | End: 2024-03-22

## 2024-02-05 NOTE — TELEPHONE ENCOUNTER
2/5/24 [facility] Refill Guide Reviewed     Received refill request for: Metoprolol 37.5 mg BID   Last OV was: 3/2023  Labs/EKG:3/2023  F/U scheduled :ordered, message sent to scheduling to call pt   Rx sent to:[facility] 940 am

## 2024-02-15 NOTE — PROCEDURE: MOHS SURGERY
Contacted pharmacy and they stated the last prescription sent was 2/7/24.      Rx was sent on 2/12/2024 and confirmed received by pharmacy.  Please call pharmacy to see if it received. If not, let me know.      O-L Flap Text: In order to preserve normal anatomic and functional relationships, an O-L advancement flap was deemed the most appropriate reconstructive procedure.  The beveled edges of the Mohs defect were excised with a #15 scalpel blade.    The flap was designed to fall along relaxed skin tension lines and/or free margins, incised, and elevated using blunt curved tissue scissors.  Hemostasis was achieved.  The flap was advanced into place and secured using interrupted buried vertical mattress sutures.  Redundant cutaneous columns defined by the flap's movement were removed to the adipose layer using the triangulation technique and repaired in similar fashion.  Final epidermal approximation along the length of the flap was achieved using epidermal sutures.  The wound was stressed in various directions to ensure the adequacy of the closure and of hemostasis.  The flap edges appeared pink and well perfused.  Reconstruction was considered complete.

## 2024-02-17 DIAGNOSIS — I50.32 CHRONIC DIASTOLIC HEART FAILURE (H): ICD-10-CM

## 2024-02-20 RX ORDER — FUROSEMIDE 40 MG
40 TABLET ORAL DAILY
Qty: 90 TABLET | Refills: 0 | Status: SHIPPED | OUTPATIENT
Start: 2024-02-20 | End: 2024-04-22

## 2024-02-27 ENCOUNTER — LAB (OUTPATIENT)
Dept: LAB | Facility: CLINIC | Age: 84
End: 2024-02-27
Payer: COMMERCIAL

## 2024-02-27 ENCOUNTER — ANTICOAGULATION THERAPY VISIT (OUTPATIENT)
Dept: ANTICOAGULATION | Facility: CLINIC | Age: 84
End: 2024-02-27

## 2024-02-27 DIAGNOSIS — Z79.01 LONG TERM CURRENT USE OF ANTICOAGULANTS WITH INR GOAL OF 2.0-3.0: Primary | ICD-10-CM

## 2024-02-27 DIAGNOSIS — I48.21 PERMANENT ATRIAL FIBRILLATION (H): ICD-10-CM

## 2024-02-27 DIAGNOSIS — I48.20 CHRONIC ATRIAL FIBRILLATION (H): ICD-10-CM

## 2024-02-27 DIAGNOSIS — Z79.01 LONG TERM CURRENT USE OF ANTICOAGULANTS WITH INR GOAL OF 2.0-3.0: ICD-10-CM

## 2024-02-27 LAB — INR BLD: 4.1 (ref 0.9–1.1)

## 2024-02-27 PROCEDURE — 36416 COLLJ CAPILLARY BLOOD SPEC: CPT

## 2024-02-27 PROCEDURE — 85610 PROTHROMBIN TIME: CPT

## 2024-02-27 NOTE — PROGRESS NOTES
ANTICOAGULATION MANAGEMENT     Aniket Macias 83 year old male is on warfarin with supratherapeutic INR result. (Goal INR 2.0-3.0)    Recent labs: (last 7 days)     02/27/24  0903   INR 4.1*       ASSESSMENT     Source(s): Chart Review and Patient/Caregiver Call     Warfarin doses taken: Warfarin taken as instructed  Diet: No new diet changes identified  Medication/supplement changes: None noted  New illness, injury, or hospitalization: No  Signs or symptoms of bleeding or clotting: No  Previous result: Therapeutic last 2(+) visits  Additional findings: None       PLAN     Recommended plan for no diet, medication or health factor changes affecting INR     Dosing Instructions: hold dose then decrease your warfarin dose (8.3% change) with next INR in 7-10 days       Summary  As of 2/27/2024      Full warfarin instructions:  2/27: Hold; Otherwise 2.5 mg every Mon, Wed, Fri; 5 mg all other days   Next INR check:  3/5/2024               Telephone call with Lowell-- states that he does not want to decrease dosing and will increase his greens instead since his INR has been good for so long. Will hold tonight, then resume maintenance dose and increase greens. Discussed risks of not following dose reduction; patient verbalized understanding and chose to not decrease dose. Will recheck in 1 week.    Lab visit scheduled    Education provided:   Symptom monitoring: monitoring for bleeding signs and symptoms and when to seek medical attention/emergency care  Contact [phone]  with any changes, questions or concerns.     Plan made per [facility] anticoagulation protocol; closest % change with current tablet size made per protocol for for INR 4-4.9 (goal 2-3)    Vani Jimenez RN  Anticoagulation Clinic  2/27/2024    _______________________________________________________________________     Anticoagulation Episode Summary       Current INR goal:  2.0-3.0   TTR:  79.2% (1 y)   Target end date:  Indefinite   Send INR reminders to:   [facility]    Indications    Long term current use of anticoagulants with INR goal of 2.0-3.0 [Z79.01]  Chronic atrial fibrillation (H) [I48.20]  Permanent atrial fibrillation (H) [I48.21]             Comments:               Anticoagulation Care Providers       Provider Role Specialty Phone number    Maribeth Haney MD Referring Internal Medicine - Pediatrics [phone]    Basilio Gregorio MD Referring Family Medicine [phone]    Sebastián Bermudez MD Responsible Cardiovascular Disease [phone]

## 2024-03-05 ENCOUNTER — LAB (OUTPATIENT)
Dept: LAB | Facility: CLINIC | Age: 84
End: 2024-03-05
Payer: COMMERCIAL

## 2024-03-05 ENCOUNTER — ANTICOAGULATION THERAPY VISIT (OUTPATIENT)
Dept: ANTICOAGULATION | Facility: CLINIC | Age: 84
End: 2024-03-05

## 2024-03-05 DIAGNOSIS — Z79.01 LONG TERM CURRENT USE OF ANTICOAGULANTS WITH INR GOAL OF 2.0-3.0: ICD-10-CM

## 2024-03-05 DIAGNOSIS — Z79.01 LONG TERM CURRENT USE OF ANTICOAGULANTS WITH INR GOAL OF 2.0-3.0: Primary | ICD-10-CM

## 2024-03-05 DIAGNOSIS — I48.21 PERMANENT ATRIAL FIBRILLATION (H): ICD-10-CM

## 2024-03-05 DIAGNOSIS — I48.20 CHRONIC ATRIAL FIBRILLATION (H): ICD-10-CM

## 2024-03-05 LAB — INR BLD: 3.4 (ref 0.9–1.1)

## 2024-03-05 PROCEDURE — 85610 PROTHROMBIN TIME: CPT

## 2024-03-05 PROCEDURE — 36416 COLLJ CAPILLARY BLOOD SPEC: CPT

## 2024-03-05 NOTE — PROGRESS NOTES
ANTICOAGULATION MANAGEMENT     Aniket Macias 83 year old male is on warfarin with supratherapeutic INR result. (Goal INR 2.0-3.0)    Recent labs: (last 7 days)     03/05/24  1116   INR 3.4*       ASSESSMENT     Source(s): Chart Review and Patient/Caregiver Call     Warfarin doses taken: More warfarin taken than planned which may be affecting INR. Per last encounter, patient advised to reduce weekly dose to 1/2 tablet M, W, F, but he states he just ws told to hold once and continue with same dose of 1/2 tab Mon and Fri. Dosing calendar was not changed to reflect change either.    Diet: No new diet changes identified  Medication/supplement changes: None noted  New illness, injury, or hospitalization: No  Signs or symptoms of bleeding or clotting: No  Previous result: Supratherapeutic  Additional findings: None       PLAN     Recommended plan for no diet, medication or health factor changes affecting INR     Dosing Instructions: decrease your warfarin dose (8.3% change) same weekly reduction as advised last week, but on different days, this will give him 4 days of 1/2 tablets this week to help reduce elevated INR - with next INR in 2 weeks       Summary  As of 3/5/2024      Full warfarin instructions:  2.5 mg every Tue, Thu, Sat; 5 mg all other days   Next INR check:  3/18/2024               Telephone call with Lowell who agrees to plan and repeated back plan correctly and he wrote down instructions as well - did not want them sent via [email or web address].    Lab visit scheduled    Education provided:   Please call back if any changes to your diet, medications or how you've been taking warfarin  Contact [phone]  with any changes, questions or concerns.     Plan made per [facility] anticoagulation protocol    Jeanette Bender, RN  Anticoagulation Clinic  3/5/2024    _______________________________________________________________________     Anticoagulation Episode Summary       Current INR goal:  2.0-3.0   TTR:  79.2% (1 y)   Target  end date:  Indefinite   Send INR reminders to:  [facility]    Indications    Long term current use of anticoagulants with INR goal of 2.0-3.0 [Z79.01]  Chronic atrial fibrillation (H) [I48.20]  Permanent atrial fibrillation (H) [I48.21]             Comments:               Anticoagulation Care Providers       Provider Role Specialty Phone number    Maribeth Haney MD Referring Internal Medicine - Pediatrics [phone]    Basilio Gregorio MD Referring Family Medicine [phone]    Sebastián Bermudez MD Responsible Cardiovascular Disease [phone]

## 2024-03-18 ENCOUNTER — LAB (OUTPATIENT)
Dept: LAB | Facility: CLINIC | Age: 84
End: 2024-03-18
Payer: COMMERCIAL

## 2024-03-18 ENCOUNTER — ANTICOAGULATION THERAPY VISIT (OUTPATIENT)
Dept: ANTICOAGULATION | Facility: CLINIC | Age: 84
End: 2024-03-18

## 2024-03-18 ENCOUNTER — TELEPHONE (OUTPATIENT)
Dept: FAMILY MEDICINE | Facility: CLINIC | Age: 84
End: 2024-03-18

## 2024-03-18 DIAGNOSIS — I48.21 PERMANENT ATRIAL FIBRILLATION (H): ICD-10-CM

## 2024-03-18 DIAGNOSIS — Z79.01 LONG TERM CURRENT USE OF ANTICOAGULANTS WITH INR GOAL OF 2.0-3.0: Primary | ICD-10-CM

## 2024-03-18 DIAGNOSIS — I48.20 CHRONIC ATRIAL FIBRILLATION (H): ICD-10-CM

## 2024-03-18 DIAGNOSIS — Z79.01 LONG TERM CURRENT USE OF ANTICOAGULANTS WITH INR GOAL OF 2.0-3.0: ICD-10-CM

## 2024-03-18 LAB — INR BLD: 2.4 (ref 0.9–1.1)

## 2024-03-18 PROCEDURE — 85610 PROTHROMBIN TIME: CPT

## 2024-03-18 PROCEDURE — 36416 COLLJ CAPILLARY BLOOD SPEC: CPT

## 2024-03-18 NOTE — TELEPHONE ENCOUNTER
Reason for Call:  Other returning call    Detailed comments: patient called anti coag nurse back.    No answer at number.    Please contact him back.  Thank you.    Phone Number Patient can be reached at: Cell number on file:    Telephone Information:   Mobile [phone]       Best Time: any    Can we leave a detailed message on this number? YES    Call taken on 3/18/2024 at 12:35 PM by Myrna Worley

## 2024-03-18 NOTE — PROGRESS NOTES
ANTICOAGULATION MANAGEMENT     Aniket Macias 83 year old male is on warfarin with therapeutic INR result. (Goal INR 2.0-3.0)    Recent labs: (last 7 days)     03/18/24  1038   INR 2.4*       ASSESSMENT     Source(s): Chart Review and Patient/Caregiver Call     Warfarin doses taken: Warfarin taken as instructed + MD previously reduced by 8.3%   Diet: No new diet changes identified  Medication/supplement changes: None noted  New illness, injury, or hospitalization: No  Signs or symptoms of bleeding or clotting: No  Previous result: Supratherapeutic  Additional findings: None       PLAN     Recommended plan for no diet, medication or health factor changes affecting INR     Dosing Instructions: Continue your current warfarin dose with next INR in 3-4 weeks d/t recent dose reduction     Summary  As of 3/18/2024      Full warfarin instructions:  2.5 mg every Tue, Thu, Sat; 5 mg all other days   Next INR check:  4/15/2024               Telephone call with Lowell who verbalizes understanding and agrees to plan    Lab visit scheduled    Education provided:   Please call back if any changes to your diet, medications or how you've been taking warfarin  Symptom monitoring: monitoring for bleeding signs and symptoms and monitoring for clotting signs and symptoms    Plan made per [facility] anticoagulation protocol    Yvette Guaman RN  Anticoagulation Clinic  3/18/2024    _______________________________________________________________________     Anticoagulation Episode Summary       Current INR goal:  2.0-3.0   TTR:  78.5% (1 y)   Target end date:  Indefinite   Send INR reminders to:  [facility]    Indications    Long term current use of anticoagulants with INR goal of 2.0-3.0 [Z79.01]  Chronic atrial fibrillation (H) [I48.20]  Permanent atrial fibrillation (H) [I48.21]             Comments:               Anticoagulation Care Providers       Provider Role Specialty Phone number    Maribeth Haney MD Referring  Internal Medicine - Pediatrics [phone]    Basilio Gregorio MD Referring Family Medicine [phone]    Sebastián Bermudez MD Responsible Cardiovascular Disease [phone]

## 2024-03-18 NOTE — TELEPHONE ENCOUNTER
Writer returned call to patient and reviewed INR level and dosing plan. See [facility] encounter. BLAKE JustinN, RN

## 2024-03-18 NOTE — PROGRESS NOTES
ANTICOAGULATION MANAGEMENT     Aniket Macias 83 year old male is on warfarin with therapeutic INR result. (Goal INR 2.0-3.0)    Recent labs: (last 7 days)     03/18/24  1038   INR 2.4*       ASSESSMENT     Source(s): Chart Review  Previous INR was Supratherapeutic  Medication, diet, health changes since last INR chart reviewed; none identified except MD previously reduced by 8.3%         PLAN     Unable to reach Lowell today.    Left message to continue current dose of warfarin 5 mg tonight. Request call back for assessment.    Follow up required to confirm warfarin dose taken and assess for changes    Yvette Guaman, RN  Anticoagulation Clinic  3/18/2024

## 2024-03-22 DIAGNOSIS — I48.21 PERMANENT ATRIAL FIBRILLATION (H): ICD-10-CM

## 2024-03-22 RX ORDER — METOPROLOL TARTRATE 25 MG/1
37.5 TABLET, FILM COATED ORAL 2 TIMES DAILY
Qty: 270 TABLET | Refills: 0 | Status: SHIPPED | OUTPATIENT
Start: 2024-03-22 | End: 2024-06-28

## 2024-04-15 ENCOUNTER — LAB (OUTPATIENT)
Dept: LAB | Facility: CLINIC | Age: 84
End: 2024-04-15
Payer: COMMERCIAL

## 2024-04-15 ENCOUNTER — ANTICOAGULATION THERAPY VISIT (OUTPATIENT)
Dept: ANTICOAGULATION | Facility: CLINIC | Age: 84
End: 2024-04-15

## 2024-04-15 DIAGNOSIS — Z79.01 LONG TERM CURRENT USE OF ANTICOAGULANTS WITH INR GOAL OF 2.0-3.0: Primary | ICD-10-CM

## 2024-04-15 DIAGNOSIS — I48.21 PERMANENT ATRIAL FIBRILLATION (H): ICD-10-CM

## 2024-04-15 DIAGNOSIS — Z79.01 LONG TERM CURRENT USE OF ANTICOAGULANTS WITH INR GOAL OF 2.0-3.0: ICD-10-CM

## 2024-04-15 DIAGNOSIS — I48.20 CHRONIC ATRIAL FIBRILLATION (H): ICD-10-CM

## 2024-04-15 LAB — INR BLD: 2 (ref 0.9–1.1)

## 2024-04-15 PROCEDURE — 85610 PROTHROMBIN TIME: CPT

## 2024-04-15 PROCEDURE — 36415 COLL VENOUS BLD VENIPUNCTURE: CPT

## 2024-04-15 NOTE — PROGRESS NOTES
ANTICOAGULATION MANAGEMENT     Aniket Macias 83 year old male is on warfarin with therapeutic INR result. (Goal INR 2.0-3.0)    Recent labs: (last 7 days)     04/15/24  0931   INR 2.0*       ASSESSMENT     Warfarin Lab Questionnaire    Warfarin Doses Last 7 Days      4/15/2024     9:31 AM   Dose in Tablet or Mg   TAB or MG? tablet (tab)     Pt Rptd Dose KATHARINA MONDAY TUESDAY WED THURS FRIDAY SATURDAY   4/15/2024   9:31 AM 1 1 0.5 1 0.5 1 0.5         4/15/2024   Warfarin Lab Questionnaire   Missed doses within past 14 days? No   Changes in diet or alcohol within past 14 days? No - per patient, he may have had slightly more broccoli over the weekend d/t a qushantele his wife made    Medication changes since last result? No   Injuries or illness since last result? No   New shortness of breath, severe headaches or sudden changes in vision since last result? No   Abnormal bleeding since last result? No   Upcoming surgery, procedure? No     Previous result: Therapeutic last visit; previously outside of goal range  Additional findings: None       PLAN     Recommended plan for temporary change(s) affecting INR     Dosing Instructions: Continue your current warfarin dose with next INR in 2-3 weeks       Summary  As of 4/15/2024      Full warfarin instructions:  2.5 mg every Tue, Thu, Sat; 5 mg all other days   Next INR check:  5/13/2024               Telephone call with Lowell who verbalizes understanding and agrees to plan    Patient elected to schedule next visit 5/13/24    Education provided:   Please call back if any changes to your diet, medications or how you've been taking warfarin  Symptom monitoring: monitoring for bleeding signs and symptoms and monitoring for clotting signs and symptoms    Plan made per [facility] anticoagulation protocol    Yvette Guaman, RN  Anticoagulation Clinic  4/15/2024    _______________________________________________________________________     Anticoagulation Episode Summary       Current INR  goal:  2.0-3.0   TTR:  78.5% (1 y)   Target end date:  Indefinite   Send INR reminders to:  [facility]    Indications    Long term current use of anticoagulants with INR goal of 2.0-3.0 [Z79.01]  Chronic atrial fibrillation (H) [I48.20]  Permanent atrial fibrillation (H) [I48.21]             Comments:               Anticoagulation Care Providers       Provider Role Specialty Phone number    Maribeth Haney MD Referring Internal Medicine - Pediatrics [phone]    Basilio Gregorio MD Referring Family Medicine [phone]    Sebastián Bermudez MD Responsible Cardiovascular Disease [phone]

## 2024-04-19 DIAGNOSIS — Z79.01 LONG TERM CURRENT USE OF ANTICOAGULANTS WITH INR GOAL OF 2.0-3.0: ICD-10-CM

## 2024-04-19 DIAGNOSIS — I48.0 PAROXYSMAL ATRIAL FIBRILLATION (H): ICD-10-CM

## 2024-04-19 RX ORDER — WARFARIN SODIUM 5 MG/1
TABLET ORAL
Qty: 90 TABLET | Refills: 1 | Status: SHIPPED | OUTPATIENT
Start: 2024-04-19

## 2024-04-19 NOTE — TELEPHONE ENCOUNTER
ANTICOAGULATION MANAGEMENT:  Medication Refill    Anticoagulation Summary  As of 4/15/2024      Warfarin maintenance plan:  2.5 mg (5 mg x 0.5) every Tue, Thu, Sat; 5 mg (5 mg x 1) all other days   Next INR check:  5/13/2024   Target end date:  Indefinite    Indications    Long term current use of anticoagulants with INR goal of 2.0-3.0 [Z79.01]  Chronic atrial fibrillation (H) [I48.20]  Permanent atrial fibrillation (H) [I48.21]                 Anticoagulation Care Providers       Provider Role Specialty Phone number    Maribeth Haney MD Referring Internal Medicine - Pediatrics [phone]    Basilio Gregorio MD Referring Family Medicine [phone]    Sebastián Bermudez MD Responsible Cardiovascular Disease [phone]            Refill Criteria    Visit with referring provider/group: Meets criteria: office visit within referring provider group in the last 1 year on 8/22/23    [facility] referral last signed: 08/03/2023; within last year: Yes    Lab monitoring not exceeding 2 weeks overdue: Yes    Aniket meets all criteria for refill. Rx instructions and quantity supplied updated to match patient's current dosing plan. Warfarin 90 day supply with 1 refill granted per [facility] protocol     Radha Seymour RN  Anticoagulation Clinic

## 2024-04-22 ENCOUNTER — OFFICE VISIT (OUTPATIENT)
Dept: CARDIOLOGY | Facility: CLINIC | Age: 84
End: 2024-04-22
Payer: COMMERCIAL

## 2024-04-22 VITALS
HEART RATE: 65 BPM | SYSTOLIC BLOOD PRESSURE: 128 MMHG | OXYGEN SATURATION: 94 % | DIASTOLIC BLOOD PRESSURE: 68 MMHG | WEIGHT: 173.5 LBS | BODY MASS INDEX: 27.17 KG/M2

## 2024-04-22 DIAGNOSIS — I48.21 PERMANENT ATRIAL FIBRILLATION (H): Primary | ICD-10-CM

## 2024-04-22 PROCEDURE — 99214 OFFICE O/P EST MOD 30 MIN: CPT | Performed by: NURSE PRACTITIONER

## 2024-04-22 RX ORDER — ALBUTEROL SULFATE 90 UG/1
2 AEROSOL, METERED RESPIRATORY (INHALATION) EVERY 6 HOURS PRN
COMMUNITY

## 2024-04-22 NOTE — PROGRESS NOTES
Electrophysiology Clinic Progress Note  Aniket Macias MRN# [id number]   YOB: 1940 Age: 83 year old     Primary cardiologist: Dr. Bermudez    Reason for visit: Annual follow up    History of presenting illness:    Aniket Macias is a pleasant 83 year old patient with past medical history significant for:    Permanent atrial fibrillation:   [id number] score 3 (age++, hypertension) anticoagulated on Coumadin  Hypertension  Interstitial lung disease: Follows with pulmonology  Hyperlipidemia  Venous insufficiency: Currently on Lasix    The patient was last evaluated by Dr. Bermudez in 3/2023 and at that time a Zio patch noted up to 4.8-second pause.  Toprol was then decreased to 37.5 mg BID.  A follow-up monitor noted no significant episodes of bradycardia or pauses.    Today Lowell states that he is doing overall well from a heart standpoint.  His biggest concerns are his increased shortness of breath with his interstitial lung disease diagnosis.    Diagnotic studies:  Holter monitor (5/2023): Atrial fibrillation with rate between  bpm with average of 62 bpm.  Zio Patch (3/2023): Persistent atrial fibrillation with average heart rate of 61 bpm early morning bradycardia with 4.8-second pause.  Total of 25 pauses.  Echocardiogram (3/2023): LVEF 60 to 65% with mild concentric LVH.  Severe biatrial enlargement.  Mild TR.  Stress cardiac MRI (2019): LV normal in size and wall thickness with LVEF 67% and no regional wall motion abnormalities. RVEF 65%, biatrial moderate to severe dilation, Possible bicuspid aortic valve, but functionally appears tricuspid. No ischemia noted on atress portion.             Assessment and Plan:     ASSESSMENT:    Permanent atrial fibrillation  [id number] score 3 (age++, hypertension) anticoagulated on Coumadin  Rate control with metoprolol 37.5 mg twice daily    Hypertension  Well-controlled and currently on lisinopril 40 mg daily, metoprolol tartrate 37.5 mg twice  daily    Venous insufficiency  Patient expresses increased frequency in urination and would like to trial off of Lasix.    Hyperlipidemia  Currently on atorvastatin 40 mg daily  FLP showed LDL 53 from 8/2023    PLAN:     Okay to stop Lasix and use as needed  Return to clinic in 1 year or sooner if needed       Orders this Visit:  Orders Placed This Encounter   Procedures    Follow-Up with Cardiology RODRIGUE     Orders Placed This Encounter   Medications    albuterol (PROAIR HFA/PROVENTIL HFA/VENTOLIN HFA) 108 (90 Base) MCG/ACT inhaler     Sig: Inhale 2 puffs into the lungs every 6 hours as needed for shortness of breath, wheezing or cough     Medications Discontinued During This Encounter   Medication Reason    L-Serine POWD     l-lysine HCl 500 MG TABS tablet     furosemide (LASIX) 40 MG tablet        Today's clinic visit entailed:  Review of the result(s) of each unique test - EKG, echo, Holter, Zio  30 minutes spent by me on the date of the encounter doing chart review, history and exam, documentation and further activities per the note  Provider  Link to [facility] [employer]     The level of medical decision making during this visit was of moderate complexity.           Review of Systems:     Review of Systems:  Skin:  Negative bruising   Eyes:  Positive for glasses;glaucoma  ENT:  Positive for tinnitus  Respiratory:  Positive for dyspnea on exertion  Cardiovascular:  chest pain;syncope or near-syncope;cyanosis;exercise intolerance;fatigue;lightheadedness;dizziness Positive for;palpitations;edema  Gastroenterology: Negative    Genitourinary:  not assessed prostate problem  Musculoskeletal:  Positive for arthritis  Neurologic:  Negative numbness or tingling of hands  Psychiatric:  Negative    Heme/Lymph/Imm:  Negative easy bruising  Endocrine:  Negative              Physical Exam:     Vitals: /68   Pulse 65   Wt 78.7 kg (173 lb 8 oz)   SpO2 94%   BMI 27.17 kg/m    Constitutional: Well nourished and in no apparent  distress.  Eyes: Pupils equal, round.   HEENT: Normocephalic, atraumatic.   Neck: Supple.   Respiratory: Breathing non-labored. Lungs clear to auscultation bilaterally.  Cardiovascular: IRR rate and rhythm, normal S1 and S2. No murmur   Skin: Warm, dry.   Extremities: L>R LE edema  Neurologic: No gross motor deficits. Alert, awake, and oriented to person, place and time.  Psychiatric: Affect appropriate.        CURRENT MEDICATIONS:  Current Outpatient Medications   Medication Sig Dispense Refill    ACETAMINOPHEN PO Take 1,000 mg by mouth every 6 hours as needed for pain      albuterol (PROAIR HFA/PROVENTIL HFA/VENTOLIN HFA) 108 (90 Base) MCG/ACT inhaler Inhale 2 puffs into the lungs every 6 hours as needed for shortness of breath, wheezing or cough      atorvastatin (LIPITOR) 40 MG tablet Take 1 tablet (40 mg) by mouth daily 90 tablet 3    FLUOCINOLONE ACETONIDE IO 60 mLs as needed      Homeopathic Products (PROSACEA) GEL Externally apply topically as needed      latanoprost (XALATAN) 0.005 % ophthalmic solution Place 1 drop into both eyes At Bedtime      lisinopril (ZESTRIL) 40 MG tablet TAKE ONE TABLET BY MOUTH ONE TIME DAILY 90 tablet 0    metoprolol tartrate (LOPRESSOR) 25 MG tablet Take 1.5 tablets (37.5 mg) by mouth 2 times daily 270 tablet 0    Multiple Vitamins-Minerals (MULTIVITAMIN PO) Take 1 tablet by mouth daily       Propylene Glycol (SYSTANE COMPLETE OP) Apply 10 mLs to eye      terazosin (HYTRIN) 5 MG capsule TAKE ONE CAPSULE BY MOUTH ONCE DAILY AT BEDTIME 90 capsule 3    timolol, PF, (TIMOPTIC OCUDOSE) 0.5 % ophthalmic solution 5 mLs      warfarin ANTICOAGULANT (COUMADIN) 5 MG tablet 2.5 mg Tue, Thu, Sat; 5 mg all other days or as directed by INR clinic 90 tablet 1    UNABLE TO FIND MEDICATION NAME: Pro-racia    OTC rosacea cream.         ALLERGIES  No Known Allergies      PAST MEDICAL HISTORY:  Past Medical History:   Diagnosis Date    Basal cell carcinoma     BPH (benign prostatic hypertrophy)      Diverticulosis     Glaucoma     Hemorrhoids     HTN (hypertension)     Hyperlipidemia     Obesity     Persistent atrial fibrillation (H)     PVC (premature ventricular contraction)     Squamous cell carcinoma in situ of skin     Syncope 2016       PAST SURGICAL HISTORY:  Past Surgical History:   Procedure Laterality Date    BACK SURGERY      COLONOSCOPY  11/19/15    hx polyps    COLONOSCOPY N/A 2021    Procedure: COLONOSCOPY, FLEXIBLE, WITH LESION REMOVAL USING SNARE;  Surgeon: Eugene Burden MD;  Location:  GI    SURGICAL HISTORY OF -       Laminectomy    SURGICAL HISTORY OF -       biopsy of prostate    TONSILLECTOMY & ADENOIDECTOMY         FAMILY HISTORY:  Family History   Problem Relation Age of Onset    Cerebrovascular Disease Mother     Hypertension Mother     Cerebrovascular Disease Father     Myocardial Infarction Father     Hypertension Father     Hypertension Sister     Myocardial Infarction Sister     LUNG DISEASE No family hx of        SOCIAL HISTORY:  Social History     Socioeconomic History    Marital status:      Spouse name: None    Number of children: None    Years of education: None    Highest education level: None   Tobacco Use    Smoking status: Former     Current packs/day: 0.00     Average packs/day: 2.0 packs/day for 35.0 years (70.0 ttl pk-yrs)     Types: Cigarettes     Start date: 1960     Quit date: 1995     Years since quittin.9    Smokeless tobacco: Never    Tobacco comments:     quit age 54   Substance and Sexual Activity    Alcohol use: Yes     Alcohol/week: 0.0 standard drinks of alcohol     Comment: 1-2 drinks per week    Drug use: No    Sexual activity: Not Currently     Partners: Female   Other Topics Concern    Caffeine Concern Yes     Comment: 2 cups coffee per day    Sleep Concern No    Stress Concern No    Weight Concern No    Exercise No

## 2024-04-22 NOTE — LETTER
4/22/2024    Basilio Gregorio MD  [address]  [place] [address]    RE: Aniket Macias       Dear Colleague,     I had the pleasure of seeing Aniket Macias in the [facility].    Electrophysiology Clinic Progress Note  Aniket Macias MRN# [id number]   YOB: 1940 Age: 83 year old     Primary cardiologist: Dr. Bermudez    Reason for visit: Annual follow up    History of presenting illness:    Aniket Macias is a pleasant 83 year old patient with past medical history significant for:    Permanent atrial fibrillation:   [id number] score 3 (age++, hypertension) anticoagulated on Coumadin  Hypertension  Interstitial lung disease: Follows with pulmonology  Hyperlipidemia  Venous insufficiency: Currently on Lasix    The patient was last evaluated by Dr. Bermudez in 3/2023 and at that time a Zio patch noted up to 4.8-second pause.  Toprol was then decreased to 37.5 mg BID.  A follow-up monitor noted no significant episodes of bradycardia or pauses.    Today Lowell states that he is doing overall well from a heart standpoint.  His biggest concerns are his increased shortness of breath with his interstitial lung disease diagnosis.    Diagnotic studies:  Holter monitor (5/2023): Atrial fibrillation with rate between  bpm with average of 62 bpm.  Zio Patch (3/2023): Persistent atrial fibrillation with average heart rate of 61 bpm early morning bradycardia with 4.8-second pause.  Total of 25 pauses.  Echocardiogram (3/2023): LVEF 60 to 65% with mild concentric LVH.  Severe biatrial enlargement.  Mild TR.  Stress cardiac MRI (2019): LV normal in size and wall thickness with LVEF 67% and no regional wall motion abnormalities. RVEF 65%, biatrial moderate to severe dilation, Possible bicuspid aortic valve, but functionally appears tricuspid. No ischemia noted on atress portion.             Assessment and Plan:     ASSESSMENT:    Permanent atrial fibrillation  [id number] score 3 (age++,  hypertension) anticoagulated on Coumadin  Rate control with metoprolol 37.5 mg twice daily    Hypertension  Well-controlled and currently on lisinopril 40 mg daily, metoprolol tartrate 37.5 mg twice daily    Venous insufficiency  Patient expresses increased frequency in urination and would like to trial off of Lasix.    Hyperlipidemia  Currently on atorvastatin 40 mg daily  FLP showed LDL 53 from 8/2023    PLAN:     Okay to stop Lasix and use as needed  Return to clinic in 1 year or sooner if needed       Orders this Visit:  Orders Placed This Encounter   Procedures    Follow-Up with Cardiology RODRIGUE     Orders Placed This Encounter   Medications    albuterol (PROAIR HFA/PROVENTIL HFA/VENTOLIN HFA) 108 (90 Base) MCG/ACT inhaler     Sig: Inhale 2 puffs into the lungs every 6 hours as needed for shortness of breath, wheezing or cough     Medications Discontinued During This Encounter   Medication Reason    L-Serine POWD     l-lysine HCl 500 MG TABS tablet     furosemide (LASIX) 40 MG tablet        Today's clinic visit entailed:  Review of the result(s) of each unique test - EKG, echo, Holter, Zio  30 minutes spent by me on the date of the encounter doing chart review, history and exam, documentation and further activities per the note  Provider  Link to MDM Help Grid     The level of medical decision making during this visit was of moderate complexity.           Review of Systems:     Review of Systems:  Skin:  Negative bruising   Eyes:  Positive for glasses;glaucoma  ENT:  Positive for tinnitus  Respiratory:  Positive for dyspnea on exertion  Cardiovascular:  chest pain;syncope or near-syncope;cyanosis;exercise intolerance;fatigue;lightheadedness;dizziness Positive for;palpitations;edema  Gastroenterology: Negative    Genitourinary:  not assessed prostate problem  Musculoskeletal:  Positive for arthritis  Neurologic:  Negative numbness or tingling of hands  Psychiatric:  Negative    Heme/Lymph/Imm:  Negative easy  bruising  Endocrine:  Negative              Physical Exam:     Vitals: /68   Pulse 65   Wt 78.7 kg (173 lb 8 oz)   SpO2 94%   BMI 27.17 kg/m    Constitutional: Well nourished and in no apparent distress.  Eyes: Pupils equal, round.   HEENT: Normocephalic, atraumatic.   Neck: Supple.   Respiratory: Breathing non-labored. Lungs clear to auscultation bilaterally.  Cardiovascular: IRR rate and rhythm, normal S1 and S2. No murmur   Skin: Warm, dry.   Extremities: L>R LE edema  Neurologic: No gross motor deficits. Alert, awake, and oriented to person, place and time.  Psychiatric: Affect appropriate.        CURRENT MEDICATIONS:  Current Outpatient Medications   Medication Sig Dispense Refill    ACETAMINOPHEN PO Take 1,000 mg by mouth every 6 hours as needed for pain      albuterol (PROAIR HFA/PROVENTIL HFA/VENTOLIN HFA) 108 (90 Base) MCG/ACT inhaler Inhale 2 puffs into the lungs every 6 hours as needed for shortness of breath, wheezing or cough      atorvastatin (LIPITOR) 40 MG tablet Take 1 tablet (40 mg) by mouth daily 90 tablet 3    FLUOCINOLONE ACETONIDE IO 60 mLs as needed      Homeopathic Products (PROSACEA) GEL Externally apply topically as needed      latanoprost (XALATAN) 0.005 % ophthalmic solution Place 1 drop into both eyes At Bedtime      lisinopril (ZESTRIL) 40 MG tablet TAKE ONE TABLET BY MOUTH ONE TIME DAILY 90 tablet 0    metoprolol tartrate (LOPRESSOR) 25 MG tablet Take 1.5 tablets (37.5 mg) by mouth 2 times daily 270 tablet 0    Multiple Vitamins-Minerals (MULTIVITAMIN PO) Take 1 tablet by mouth daily       Propylene Glycol (SYSTANE COMPLETE OP) Apply 10 mLs to eye      terazosin (HYTRIN) 5 MG capsule TAKE ONE CAPSULE BY MOUTH ONCE DAILY AT BEDTIME 90 capsule 3    timolol, PF, (TIMOPTIC OCUDOSE) 0.5 % ophthalmic solution 5 mLs      warfarin ANTICOAGULANT (COUMADIN) 5 MG tablet 2.5 mg Tue, Thu, Sat; 5 mg all other days or as directed by INR clinic 90 tablet 1    UNABLE TO FIND MEDICATION NAME:  Pro-racia    OTC rosacea cream.         ALLERGIES  No Known Allergies      PAST MEDICAL HISTORY:  Past Medical History:   Diagnosis Date    Basal cell carcinoma     BPH (benign prostatic hypertrophy)     Diverticulosis     Glaucoma     Hemorrhoids     HTN (hypertension)     Hyperlipidemia     Obesity     Persistent atrial fibrillation (H)     PVC (premature ventricular contraction)     Squamous cell carcinoma in situ of skin     Syncope 2016       PAST SURGICAL HISTORY:  Past Surgical History:   Procedure Laterality Date    BACK SURGERY      COLONOSCOPY  11/19/15    hx polyps    COLONOSCOPY N/A 2021    Procedure: COLONOSCOPY, FLEXIBLE, WITH LESION REMOVAL USING SNARE;  Surgeon: Eugene Burden MD;  Location:  GI    SURGICAL HISTORY OF -       Laminectomy    SURGICAL HISTORY OF -       biopsy of prostate    TONSILLECTOMY & ADENOIDECTOMY         FAMILY HISTORY:  Family History   Problem Relation Age of Onset    Cerebrovascular Disease Mother     Hypertension Mother     Cerebrovascular Disease Father     Myocardial Infarction Father     Hypertension Father     Hypertension Sister     Myocardial Infarction Sister     LUNG DISEASE No family hx of        SOCIAL HISTORY:  Social History     Socioeconomic History    Marital status:      Spouse name: None    Number of children: None    Years of education: None    Highest education level: None   Tobacco Use    Smoking status: Former     Current packs/day: 0.00     Average packs/day: 2.0 packs/day for 35.0 years (70.0 ttl pk-yrs)     Types: Cigarettes     Start date: 1960     Quit date: 1995     Years since quittin.9    Smokeless tobacco: Never    Tobacco comments:     quit age 54   Substance and Sexual Activity    Alcohol use: Yes     Alcohol/week: 0.0 standard drinks of alcohol     Comment: 1-2 drinks per week    Drug use: No    Sexual activity: Not Currently     Partners: Female   Other Topics Concern    Caffeine Concern Yes     Comment: 2  cups coffee per day    Sleep Concern No    Stress Concern No    Weight Concern No    Exercise No               Thank you for allowing me to participate in the care of your patient.      Sincerely,     MAHNAZ Benedict CNP     [facility]  cc:   MAHNAZ Roque CNP  [address] [facility] [address]  [place] [address]

## 2024-05-11 DIAGNOSIS — I10 BENIGN ESSENTIAL HYPERTENSION: ICD-10-CM

## 2024-05-14 RX ORDER — LISINOPRIL 40 MG/1
40 TABLET ORAL DAILY
Qty: 90 TABLET | Refills: 0 | Status: SHIPPED | OUTPATIENT
Start: 2024-05-14 | End: 2024-08-08

## 2024-05-15 ENCOUNTER — ANTICOAGULATION THERAPY VISIT (OUTPATIENT)
Dept: ANTICOAGULATION | Facility: CLINIC | Age: 84
End: 2024-05-15

## 2024-05-15 ENCOUNTER — LAB (OUTPATIENT)
Dept: LAB | Facility: CLINIC | Age: 84
End: 2024-05-15
Payer: COMMERCIAL

## 2024-05-15 DIAGNOSIS — I48.21 PERMANENT ATRIAL FIBRILLATION (H): ICD-10-CM

## 2024-05-15 DIAGNOSIS — Z79.01 LONG TERM CURRENT USE OF ANTICOAGULANTS WITH INR GOAL OF 2.0-3.0: Primary | ICD-10-CM

## 2024-05-15 DIAGNOSIS — I48.20 CHRONIC ATRIAL FIBRILLATION (H): ICD-10-CM

## 2024-05-15 DIAGNOSIS — Z79.01 LONG TERM CURRENT USE OF ANTICOAGULANTS WITH INR GOAL OF 2.0-3.0: ICD-10-CM

## 2024-05-15 LAB — INR BLD: 2.8 (ref 0.9–1.1)

## 2024-05-15 PROCEDURE — 85610 PROTHROMBIN TIME: CPT

## 2024-05-15 PROCEDURE — 36416 COLLJ CAPILLARY BLOOD SPEC: CPT

## 2024-05-15 NOTE — PROGRESS NOTES
ANTICOAGULATION MANAGEMENT     Aniket Macias 83 year old male is on warfarin with therapeutic INR result. (Goal INR 2.0-3.0)    Recent labs: (last 7 days)     05/15/24  1347   INR 2.8*       ASSESSMENT     Warfarin Lab Questionnaire    Warfarin Doses Last 7 Days    Pt Rptd Dose KATHARINA MONDAY TUESDAY WED THURS FRIDAY SATURDAY   5/15/2024   1:43 PM 5 5 2.5 5 2.5 5 2.5         5/15/2024   Warfarin Lab Questionnaire   Missed doses within past 14 days? No   Changes in diet or alcohol within past 14 days? No   Medication changes since last result? No   Injuries or illness since last result? No   New shortness of breath, severe headaches or sudden changes in vision since last result? No   Abnormal bleeding since last result? No   Upcoming surgery, procedure? No     Previous result: Therapeutic last 2(+) visits  Additional findings: None       PLAN     Recommended plan for no diet, medication or health factor changes affecting INR     Dosing Instructions: Continue your current warfarin dose with next INR in 5 weeks       Summary  As of 5/15/2024      Full warfarin instructions:  2.5 mg every Tue, Thu, Sat; 5 mg all other days   Next INR check:  6/26/2024               Telephone call with Lowell who verbalizes understanding and agrees to plan    Patient elected to schedule next visit 6/26/24    Education provided:   Please call back if any changes to your diet, medications or how you've been taking warfarin    Plan made per [facility] anticoagulation protocol    Radha Seymour RN  Anticoagulation Clinic  5/15/2024    _______________________________________________________________________     Anticoagulation Episode Summary       Current INR goal:  2.0-3.0   TTR:  78.5% (1 y)   Target end date:  Indefinite   Send INR reminders to:  [facility] [place] [facility]    Indications    Long term current use of anticoagulants with INR goal of 2.0-3.0 [Z79.01]  Chronic atrial fibrillation (H) [I48.20]             Comments:                Anticoagulation Care Providers       Provider Role Specialty Phone number    Basilio Gregorio MD Referring Family Medicine [phone]

## 2024-06-26 ENCOUNTER — TELEPHONE (OUTPATIENT)
Dept: ANTICOAGULATION | Facility: CLINIC | Age: 84
End: 2024-06-26

## 2024-06-26 ENCOUNTER — ANTICOAGULATION THERAPY VISIT (OUTPATIENT)
Dept: ANTICOAGULATION | Facility: CLINIC | Age: 84
End: 2024-06-26

## 2024-06-26 ENCOUNTER — LAB (OUTPATIENT)
Dept: LAB | Facility: CLINIC | Age: 84
End: 2024-06-26
Payer: COMMERCIAL

## 2024-06-26 DIAGNOSIS — I48.20 CHRONIC ATRIAL FIBRILLATION (H): ICD-10-CM

## 2024-06-26 DIAGNOSIS — Z79.01 LONG TERM CURRENT USE OF ANTICOAGULANTS WITH INR GOAL OF 2.0-3.0: ICD-10-CM

## 2024-06-26 DIAGNOSIS — Z79.01 LONG TERM CURRENT USE OF ANTICOAGULANTS WITH INR GOAL OF 2.0-3.0: Primary | ICD-10-CM

## 2024-06-26 DIAGNOSIS — I48.21 PERMANENT ATRIAL FIBRILLATION (H): ICD-10-CM

## 2024-06-26 DIAGNOSIS — I48.20 CHRONIC ATRIAL FIBRILLATION (H): Primary | ICD-10-CM

## 2024-06-26 LAB — INR BLD: 2.8 (ref 0.9–1.1)

## 2024-06-26 PROCEDURE — 85610 PROTHROMBIN TIME: CPT

## 2024-06-26 PROCEDURE — 36416 COLLJ CAPILLARY BLOOD SPEC: CPT

## 2024-06-26 NOTE — PROGRESS NOTES
ANTICOAGULATION MANAGEMENT     Aniket Macias 84 year old male is on warfarin with therapeutic INR result. (Goal INR 2.0-3.0)    Recent labs: (last 7 days)     06/26/24  0925   INR 2.8*       ASSESSMENT     Warfarin Lab Questionnaire    Warfarin Doses Last 7 Days      6/26/2024     8:13 AM   Dose in Tablet or Mg   TAB or MG? milligram (mg)     Pt Rptd Dose SUNDAY MONDAY TUESDAY WED THURS FRIDAY SATURDAY 6/26/2024   8:13 AM 5 5 2.5 5 2.5 5 2.5         6/26/2024   Warfarin Lab Questionnaire   Missed doses within past 14 days? No   Changes in diet or alcohol within past 14 days? No   Medication changes since last result? No   Injuries or illness since last result? No   New shortness of breath, severe headaches or sudden changes in vision since last result? No   Abnormal bleeding since last result? Yes   If yes, please explain: bump arms and small amount blood.   Upcoming surgery, procedure? No        Previous result: Therapeutic last 2(+) visits  Additional findings: None, just a few small bruises occasionally when bumps arms       PLAN     Recommended plan for no diet, medication or health factor changes affecting INR     Dosing Instructions: Continue your current warfarin dose with next INR in 6 weeks       Summary  As of 6/26/2024      Full warfarin instructions:  2.5 mg every Tue, Thu, Sat; 5 mg all other days   Next INR check:  8/7/2024               Telephone call with Lowell who verbalizes understanding and agrees to plan    Lab visit scheduled    Education provided:   Please call back if any changes to your diet, medications or how you've been taking warfarin  Goal range and lab monitoring: goal range and significance of current result    Plan made per [facility] anticoagulation protocol    Lyric Bustos, RN  Anticoagulation Clinic  6/26/2024    _______________________________________________________________________     Anticoagulation Episode Summary       Current INR goal:  2.0-3.0   TTR:  86.1% (1 y)   Target end date:   Indefinite   Send INR reminders to:  [facility] [place]    Indications    Long term current use of anticoagulants with INR goal of 2.0-3.0 [Z79.01]  Chronic atrial fibrillation (H) [I48.20]             Comments:               Anticoagulation Care Providers       Provider Role Specialty Phone number    Basilio Gregorio MD Referring Family Medicine [phone]

## 2024-06-26 NOTE — TELEPHONE ENCOUNTER
ANTICOAGULATION CLINIC REFERRAL RENEWAL REQUEST       An annual renewal order is required for all patients referred to [facility].?  Please review and sign the pended referral order for Aniket Macias.       ANTICOAGULATION SUMMARY      Warfarin indication(s)   Atrial Fibrillation    Mechanical heart valve present?  NO       Current goal range   INR: 2.0-3.0     Goal appropriate for indication? Goal INR 2-3, standard for indication(s) above     Time in Therapeutic Range (TTR)  (Goal > 60%) 186.1%       Office visit with referring provider's group within last year yes on 8/22/24       Lyric Bustos RN  [facility]

## 2024-06-28 ENCOUNTER — TELEPHONE (OUTPATIENT)
Dept: CARDIOLOGY | Facility: CLINIC | Age: 84
End: 2024-06-28
Payer: COMMERCIAL

## 2024-06-28 DIAGNOSIS — I48.21 PERMANENT ATRIAL FIBRILLATION (H): ICD-10-CM

## 2024-06-28 RX ORDER — METOPROLOL TARTRATE 25 MG/1
37.5 TABLET, FILM COATED ORAL 2 TIMES DAILY
Qty: 270 TABLET | Refills: 3 | Status: SHIPPED | OUTPATIENT
Start: 2024-06-28

## 2024-06-28 NOTE — TELEPHONE ENCOUNTER
[facility] Call Center    Phone Message    May a detailed message be left on voicemail: yes     Reason for Call: Medication Refill Request    Has the patient contacted the pharmacy for the refill? Yes   Name of medication being requested: Metoprolol  Provider who prescribed the medication: Maribeth  Pharmacy: [id number] [place] [facility] [place] [place] - [address]    Date medication is needed: asap, please call patient when rx is sent       Action Taken: Other: cardio    Travel Screening: Not Applicable     Date of Service:

## 2024-06-28 NOTE — TELEPHONE ENCOUNTER
Medication refilled per protocol. Patient updated that medication was refilled. Patient did not have any additional questions or concerns.

## 2024-07-15 ENCOUNTER — TELEPHONE (OUTPATIENT)
Dept: FAMILY MEDICINE | Facility: CLINIC | Age: 84
End: 2024-07-15
Payer: COMMERCIAL

## 2024-07-15 NOTE — TELEPHONE ENCOUNTER
Reason for Call:  Appointment Request    Patient requesting this type of appt:  office visit    Requested provider: Basilio Gregorio    Reason patient unable to be scheduled: Not within requested timeframe    When does patient want to be seen/preferred time: 3-7 days    Comments: Patient has back pain and is requesting mri or xray and would like to be seen this week if possible Thursday or Friday or next week July 22, 25 or 26th if possible    Could we send this information to you in [employer] or would you prefer to receive a phone call?:   Patient would prefer a phone call   Okay to leave a detailed message?: Yes at Cell number on file:    Telephone Information:   Mobile [phone]       Call taken on 7/15/2024 at 2:20 PM by Janeen Goode

## 2024-07-23 ENCOUNTER — PATIENT OUTREACH (OUTPATIENT)
Dept: CARE COORDINATION | Facility: CLINIC | Age: 84
End: 2024-07-23
Payer: COMMERCIAL

## 2024-07-25 ENCOUNTER — OFFICE VISIT (OUTPATIENT)
Dept: FAMILY MEDICINE | Facility: CLINIC | Age: 84
End: 2024-07-25
Payer: COMMERCIAL

## 2024-07-25 ENCOUNTER — ANCILLARY PROCEDURE (OUTPATIENT)
Dept: GENERAL RADIOLOGY | Facility: CLINIC | Age: 84
End: 2024-07-25
Attending: FAMILY MEDICINE
Payer: COMMERCIAL

## 2024-07-25 VITALS
DIASTOLIC BLOOD PRESSURE: 76 MMHG | SYSTOLIC BLOOD PRESSURE: 132 MMHG | WEIGHT: 163 LBS | HEART RATE: 66 BPM | OXYGEN SATURATION: 96 % | RESPIRATION RATE: 14 BRPM | BODY MASS INDEX: 25.53 KG/M2 | TEMPERATURE: 97.1 F

## 2024-07-25 DIAGNOSIS — D17.30 LIPOMA OF SKIN AND SUBCUTANEOUS TISSUE: ICD-10-CM

## 2024-07-25 DIAGNOSIS — J43.9 PULMONARY EMPHYSEMA, UNSPECIFIED EMPHYSEMA TYPE (H): ICD-10-CM

## 2024-07-25 DIAGNOSIS — M54.42 BILATERAL LOW BACK PAIN WITH LEFT-SIDED SCIATICA, UNSPECIFIED CHRONICITY: ICD-10-CM

## 2024-07-25 DIAGNOSIS — I10 BENIGN ESSENTIAL HYPERTENSION: Primary | ICD-10-CM

## 2024-07-25 DIAGNOSIS — J84.112 UIP (USUAL INTERSTITIAL PNEUMONITIS) (H): ICD-10-CM

## 2024-07-25 DIAGNOSIS — I50.32 CHRONIC DIASTOLIC HEART FAILURE (H): ICD-10-CM

## 2024-07-25 PROCEDURE — 99213 OFFICE O/P EST LOW 20 MIN: CPT | Performed by: FAMILY MEDICINE

## 2024-07-25 PROCEDURE — G2211 COMPLEX E/M VISIT ADD ON: HCPCS | Performed by: FAMILY MEDICINE

## 2024-07-25 PROCEDURE — 72100 X-RAY EXAM L-S SPINE 2/3 VWS: CPT | Mod: TC | Performed by: RADIOLOGY

## 2024-07-25 RX ORDER — FUROSEMIDE 40 MG
40 TABLET ORAL DAILY
Qty: 90 TABLET | Refills: 0 | Status: SHIPPED | OUTPATIENT
Start: 2024-07-25

## 2024-07-25 ASSESSMENT — ENCOUNTER SYMPTOMS: BACK PAIN: 1

## 2024-07-25 ASSESSMENT — PAIN SCALES - GENERAL: PAINLEVEL: SEVERE PAIN (6)

## 2024-07-25 NOTE — PROGRESS NOTES
"  Assessment & Plan     Benign essential hypertension   Blood Pressure stable.    UIP (usual interstitial pneumonitis) (H)  Is following by pulmonology.    Pulmonary emphysema, unspecified emphysema type (H)  Pulmonology stable.    Bilateral low back pain with left-sided sciatica, unspecified chronicity  Advanced arthritis noted in the lower back which may be contributing suggested range of motion exercises.  - XR Lumbar Spine 2/3 Views; Future    Lipoma of skin and subcutaneous tissue  Enlarging lipoma on the left side which she noticed some discomfort which may or may not be causing some compression on that area.  Suggested to see a general surgery for further evaluation.  - Adult Gen Surg  Referral; Future    The longitudinal plan of care for the diagnosis(es)/condition(s) as documented were addressed during this visit. Due to the added complexity in care, I will continue to support Lowell in the subsequent management and with ongoing continuity of care.        BMI  Estimated body mass index is 25.53 kg/m  as calculated from the following:    Height as of 11/7/23: 1.702 m (5' 7\").    Weight as of this encounter: 73.9 kg (163 lb).             Subjective   Lowell is a 84 year old, presenting for the following health issues:  Back Pain  Patient came today for evaluation of low back pain which is gradual in onset.  He denies any numbness tingling however he feels there was a small lipoma which has grown significantly over the years in that same area which he is not sure causing the discomfort.  Range of motion in the back is normal no bony tenderness.      7/25/2024     9:31 AM   Additional Questions   Roomed by Devon     History of Present Illness       Back Pain:  He presents for follow up of back pain. Patient's back pain is a new problem.    Original cause of back pain: not sure  First noticed back pain: 1-4 weeks ago  Patient feels back pain: dailyLocation of back pain:  Left lower back and left " buttock  Description of back pain: sharp  Back pain spreads: left buttocks and left thigh    Since patient first noticed back pain, pain is: unchanged  Does back pain interfere with his job:  Not applicable  On a scale of 1-10 (10 being the worst), patient describes pain as:  6  What makes back pain worse: certain positions and twisting   Acupuncture: not tried  Acetaminophen: helpful  Activity or exercise: not helpful  Chiropractor:  Not tried  Cold: not tried  Heat: helpful  Massage: not tried  Muscle relaxants: not tried  NSAIDS: not tried  Opioids: not tried  Physical Therapy: not tried  Rest: not tried  Steroid Injection: not tried  Stretching: not tried  Surgery: not tried  TENS unit: not tried  Topical pain relievers: not tried  Other healthcare providers patient is seeing for back pain: None    He eats 2-3 servings of fruits and vegetables daily.He consumes 0 sweetened beverage(s) daily.He exercises with enough effort to increase his heart rate 10 to 19 minutes per day.  He exercises with enough effort to increase his heart rate 3 or less days per week.   He is taking medications regularly.           Review of Systems  Constitutional, HEENT, cardiovascular, pulmonary, gi and gu systems are negative, except as otherwise noted.      Objective    /76   Pulse 66   Temp 97.1  F (36.2  C) (Temporal)   Resp 14   Wt 73.9 kg (163 lb)   SpO2 96%   BMI 25.53 kg/m    Body mass index is 25.53 kg/m .  Physical Exam   GENERAL: alert and no distress  NECK: no adenopathy, no asymmetry, masses, or scars  RESP: lungs clear to auscultation - no rales, rhonchi or wheezes  CV: regular rate and rhythm, normal S1 S2, no S3 or S4, no murmur, click or rub, no peripheral edema  ABDOMEN: soft, nontender, no hepatosplenomegaly, no masses and bowel sounds normal  Large growing lipoma on the lower side of the left buttock area.          Signed Electronically by: Basilio Gregorio MD

## 2024-07-31 ENCOUNTER — OFFICE VISIT (OUTPATIENT)
Dept: SURGERY | Facility: CLINIC | Age: 84
End: 2024-07-31
Payer: COMMERCIAL

## 2024-07-31 VITALS
SYSTOLIC BLOOD PRESSURE: 138 MMHG | WEIGHT: 163 LBS | OXYGEN SATURATION: 95 % | DIASTOLIC BLOOD PRESSURE: 86 MMHG | HEART RATE: 55 BPM | BODY MASS INDEX: 25.58 KG/M2 | HEIGHT: 67 IN

## 2024-07-31 DIAGNOSIS — D17.30 LIPOMA OF SKIN AND SUBCUTANEOUS TISSUE: ICD-10-CM

## 2024-07-31 PROCEDURE — 99203 OFFICE O/P NEW LOW 30 MIN: CPT | Performed by: SURGERY

## 2024-07-31 NOTE — Clinical Note
I wish I saw at 45 years ago, it would have been a chip shot to get out then.  It is so large there is no way to get it out under local, any sedation would need to have a controlled airway so therefore it would be general as it is on his backside.  He would probably do fine given the fact that his FEV1 is much above 1 on his last set of PFTs but he has absolutely no symptoms from it so therefore we should probably leave it alone.  Thanks for sending him.

## 2024-07-31 NOTE — LETTER
August 2, 2024          Basilio Gregorio MD  [address] [place] [address]      RE:   Aniket Macias 1940      Dear Colleague,    Thank you for referring your patient, Aniket Macias, to [facility]. Please see a copy of my visit note below.     Aniket Macias is a 84 year old male who presented with a large lump in his left buttock.  He states he has had this for very long time.  He thinks he has had it for over 45 years.  Apparently 30 years ago he went to a doctor to have it looked at and possibly removed.  He was told that it would be dangerous as it was filled with blood vessels and other problems.  He did not think that that provider was a surgeon.  It has slowly grown over time.  Unfortunately he has started to accumulate some medical issues which would make general anesthesia risky.  He has no symptoms from this.  It is not causing pain even when he sitting on it     PMH:  Aniket Macias  has a past medical history of Basal cell carcinoma, BPH (benign prostatic hypertrophy), Diverticulosis, Glaucoma, Hemorrhoids, HTN (hypertension), Hyperlipidemia, Obesity, Persistent atrial fibrillation (H), PVC (premature ventricular contraction), Squamous cell carcinoma in situ of skin, and Syncope (4/11/2016).  PSH:  Aniket Macias  has a past surgical history that includes surgical history of - ; tonsillectomy & adenoidectomy; surgical history of - ; colonoscopy (11/19/15); back surgery; and Colonoscopy (N/A, 12/7/2021).     Home medications and allergies reviewed.     Social History:  Aniket Macias  reports that he quit smoking about 29 years ago. His smoking use included cigarettes. He started smoking about 64 years ago. He has a 70 pack-year smoking history. He has never used smokeless tobacco. He reports current alcohol use. He reports that he does not use drugs.  Family History:  Aniket Macias family history includes Cerebrovascular Disease in his father and mother;  "Hypertension in his father, mother, and sister; Myocardial Infarction in his father and sister.     ROS:  The 10 point Review of Systems is negative other than noted in the HPI.        Physical Exam:  Blood pressure 138/86, pulse 55, height 1.702 m (5' 7\"), weight 73.9 kg (163 lb), SpO2 95%.  163 lbs 0 oz     Patient has a pleasant affect and communicates well.   Pupils equal round and reactive to light.   No cervical lymphadenopathy or thyromegaly.   Lung fields clear, breathing comfortably.   Heart normal sinus rhythm.  No murmurs rubs or gallops.  Abdomen soft, nontender, nondistended.  Skin warm, dry.  Large 8 to 10 cm lump in his left buttock.  The medial edge of it does go right up to the gluteal cleft     All new lab and imaging data was reviewed.    3  Assessment and Plan: Aniket Macias is a 84 year old male with a large gluteal lipoma  1.  Removal of this would require general anesthesia.  I note that he has pulmonary fibrosis and is closely followed by pulmonology.  I did review his last set of pulmonary function tests and that shows that he would likely tolerate general anesthesia okay.  However, he has no symptoms from this and is taken 45 years to get to its current size.  He and his significant other are fairly resistant to any idea of doing anything with it.  I did discuss with them given his history, its appearance it is most likely a lipoma.  I told them that the only way to 100% know what it is is to take it out and give it to a pathologist.  They are okay going with my opinion on it that it is a lipoma.  I did state if that is the case it is safe to watch.  That is what they plan to do       Again, thank you for allowing me to participate in the care of your patient.      Sincerely,      Ad Valles MD        "

## 2024-08-02 NOTE — PROGRESS NOTES
Surgery Consultation, [facility], ADITI Valles MD    Aniket Macias MRN# [id number]   YOB: 1940 Age: 84 year old     PCP:  Basilio Gregorio [phone]    Chief Complaint: Large lump on the buttocks    Pt was seen in consultation from Basilio Gregorio.    History of Present Illness:  Aniket Macias is a 84 year old male who presented with a large lump in his left buttock.  He states he has had this for very long time.  He thinks he has had it for over 45 years.  Apparently 30 years ago he went to a doctor to have it looked at and possibly removed.  He was told that it would be dangerous as it was filled with blood vessels and other problems.  He did not think that that provider was a surgeon.  It has slowly grown over time.  Unfortunately he has started to accumulate some medical issues which would make general anesthesia risky.  He has no symptoms from this.  It is not causing pain even when he sitting on it    PMH:  Aniket Macias  has a past medical history of Basal cell carcinoma, BPH (benign prostatic hypertrophy), Diverticulosis, Glaucoma, Hemorrhoids, HTN (hypertension), Hyperlipidemia, Obesity, Persistent atrial fibrillation (H), PVC (premature ventricular contraction), Squamous cell carcinoma in situ of skin, and Syncope (4/11/2016).  PSH:  Aniket Macias  has a past surgical history that includes surgical history of - ; tonsillectomy & adenoidectomy; surgical history of - ; colonoscopy (11/19/15); back surgery; and Colonoscopy (N/A, 12/7/2021).    Home medications and allergies reviewed.    Social History:  Aniket Macias  reports that he quit smoking about 29 years ago. His smoking use included cigarettes. He started smoking about 64 years ago. He has a 70 pack-year smoking history. He has never used smokeless tobacco. He reports current alcohol use. He reports that he does not use drugs.  Family History:  Aniket Macias family history includes Cerebrovascular Disease in his father and  "mother; Hypertension in his father, mother, and sister; Myocardial Infarction in his father and sister.    ROS:  The 10 point Review of Systems is negative other than noted in the HPI.      Physical Exam:  Blood pressure 138/86, pulse 55, height 1.702 m (5' 7\"), weight 73.9 kg (163 lb), SpO2 95%.  163 lbs 0 oz    Patient has a pleasant affect and communicates well.   Pupils equal round and reactive to light.   No cervical lymphadenopathy or thyromegaly.   Lung fields clear, breathing comfortably.   Heart normal sinus rhythm.  No murmurs rubs or gallops.  Abdomen soft, nontender, nondistended.  Skin warm, dry.  Large 8 to 10 cm lump in his left buttock.  The medial edge of it does go right up to the gluteal cleft    All new lab and imaging data was reviewed.    3  Assessment and Plan: Aniket Macias is a 84 year old male with a large gluteal lipoma  1.  Removal of this would require general anesthesia.  I note that he has pulmonary fibrosis and is closely followed by pulmonology.  I did review his last set of pulmonary function tests and that shows that he would likely tolerate general anesthesia okay.  However, he has no symptoms from this and is taken 45 years to get to its current size.  He and his significant other are fairly resistant to any idea of doing anything with it.  I did discuss with them given his history, its appearance it is most likely a lipoma.  I told them that the only way to 100% know what it is is to take it out and give it to a pathologist.  They are okay going with my opinion on it that it is a lipoma.  I did state if that is the case it is safe to watch.  That is what they plan to do    Ad Valles M.D.  [facility]  [phone]    Please route or send letter to:  Primary Care Provider (PCP) and Referring Provider  "

## 2024-08-06 DIAGNOSIS — I10 BENIGN ESSENTIAL HYPERTENSION: ICD-10-CM

## 2024-08-06 DIAGNOSIS — E78.5 HYPERLIPIDEMIA LDL GOAL <100: ICD-10-CM

## 2024-08-06 RX ORDER — ATORVASTATIN CALCIUM 40 MG/1
40 TABLET, FILM COATED ORAL DAILY
Qty: 90 TABLET | Refills: 0 | Status: SHIPPED | OUTPATIENT
Start: 2024-08-06 | End: 2024-09-20

## 2024-08-07 ENCOUNTER — LAB (OUTPATIENT)
Dept: LAB | Facility: CLINIC | Age: 84
End: 2024-08-07
Payer: COMMERCIAL

## 2024-08-07 ENCOUNTER — ANTICOAGULATION THERAPY VISIT (OUTPATIENT)
Dept: ANTICOAGULATION | Facility: CLINIC | Age: 84
End: 2024-08-07

## 2024-08-07 DIAGNOSIS — I48.20 CHRONIC ATRIAL FIBRILLATION (H): ICD-10-CM

## 2024-08-07 DIAGNOSIS — Z79.01 LONG TERM CURRENT USE OF ANTICOAGULANTS WITH INR GOAL OF 2.0-3.0: ICD-10-CM

## 2024-08-07 DIAGNOSIS — Z79.01 LONG TERM CURRENT USE OF ANTICOAGULANTS WITH INR GOAL OF 2.0-3.0: Primary | ICD-10-CM

## 2024-08-07 LAB — INR BLD: 3.6 (ref 0.9–1.1)

## 2024-08-07 PROCEDURE — 36416 COLLJ CAPILLARY BLOOD SPEC: CPT

## 2024-08-07 PROCEDURE — 85610 PROTHROMBIN TIME: CPT

## 2024-08-07 NOTE — PROGRESS NOTES
ANTICOAGULATION MANAGEMENT     Aniket Macias 84 year old male is on warfarin with supratherapeutic INR result. (Goal INR 2.0-3.0)    Recent labs: (last 7 days)     08/07/24  0822   INR 3.6*       ASSESSMENT     Source(s): Chart Review  Previous INR was Therapeutic last 2(+) visits  Medication, diet, health changes since last INR chart reviewed - recent ov (7/25/24) noted worsening low back pain - further discussion required in regards to today's level  Saw General Surgery on 7/31/24 for gluteal lipoma - removal would require general anesthesia, patient/provider agreed to monitor for now         PLAN     Unable to reach [place] today.    No instructions provided. Unable to leave voicemail.    Follow up required to assess for changes  and discuss out of range result     Yvette Guaman, RN  Anticoagulation Clinic  8/7/2024

## 2024-08-07 NOTE — PROGRESS NOTES
ANTICOAGULATION MANAGEMENT     Aniket Macias 84 year old male is on warfarin with supratherapeutic INR result. (Goal INR 2.0-3.0)    Recent labs: (last 7 days)     08/07/24  0822   INR 3.6*       ASSESSMENT     Source(s): Chart Review and Patient/Caregiver Call     Warfarin doses taken: Warfarin taken as instructed  Diet: Decreased greens/vitamin K in diet - patient is not certain if this is a temporary or ongoing change, mentioned keeping greens consistent weekly is difficult for him  Medication/supplement changes:  Tylenol 3 g daily prn  New illness, injury, or hospitalization: Yes: recent ov (7/25/24) noted low back pain - patient endorses pain has worsen over the last several weeks  Saw General Surgery on 7/31/24 for gluteal lipoma - removal would require general anesthesia, patient/provider agreed to monitor for now  Signs or symptoms of bleeding or clotting: No  Previous result: Therapeutic last 2(+) visits  Additional findings: None       PLAN     Recommended plan for temporary change(s) and ongoing change(s) affecting INR     Dosing Instructions: partial hold then decrease your warfarin dose (9.1% change) with next INR in 2 weeks       Summary  As of 8/7/2024      Full warfarin instructions:  8/7: 2.5 mg; Otherwise 5 mg every Mon, Wed, Fri; 2.5 mg all other days   Next INR check:  8/21/2024               Telephone call with [place] who verbalizes understanding and agrees to plan    Lab visit scheduled    Education provided: Please call back if any changes to your diet, medications or how you've been taking warfarin  Symptom monitoring: monitoring for bleeding signs and symptoms and monitoring for clotting signs and symptoms    Plan made per [facility] anticoagulation protocol    Yvette Guaman RN  Anticoagulation Clinic  8/7/2024    _______________________________________________________________________     Anticoagulation Episode Summary       Current INR goal:  2.0-3.0   TTR:  77.5% (1 y)   Target end date:   Indefinite   Send INR reminders to:  [facility]    Indications    Long term current use of anticoagulants with INR goal of 2.0-3.0 [Z79.01]  Chronic atrial fibrillation (H) [I48.20]             Comments:               Anticoagulation Care Providers       Provider Role Specialty Phone number    Basilio Gregorio MD Referring Family Medicine [phone]

## 2024-08-08 RX ORDER — LISINOPRIL 40 MG/1
40 TABLET ORAL DAILY
Qty: 90 TABLET | Refills: 0 | Status: SHIPPED | OUTPATIENT
Start: 2024-08-08 | End: 2024-09-20

## 2024-08-21 ENCOUNTER — LAB (OUTPATIENT)
Dept: LAB | Facility: CLINIC | Age: 84
End: 2024-08-21
Payer: COMMERCIAL

## 2024-08-21 ENCOUNTER — ANTICOAGULATION THERAPY VISIT (OUTPATIENT)
Dept: ANTICOAGULATION | Facility: CLINIC | Age: 84
End: 2024-08-21

## 2024-08-21 DIAGNOSIS — Z79.01 LONG TERM CURRENT USE OF ANTICOAGULANTS WITH INR GOAL OF 2.0-3.0: Primary | ICD-10-CM

## 2024-08-21 DIAGNOSIS — I48.20 CHRONIC ATRIAL FIBRILLATION (H): ICD-10-CM

## 2024-08-21 DIAGNOSIS — Z79.01 LONG TERM CURRENT USE OF ANTICOAGULANTS WITH INR GOAL OF 2.0-3.0: ICD-10-CM

## 2024-08-21 LAB — INR BLD: 2.5 (ref 0.9–1.1)

## 2024-08-21 PROCEDURE — 36416 COLLJ CAPILLARY BLOOD SPEC: CPT

## 2024-08-21 PROCEDURE — 85610 PROTHROMBIN TIME: CPT

## 2024-08-21 NOTE — PROGRESS NOTES
ANTICOAGULATION MANAGEMENT     Aniket Macias 84 year old male is on warfarin with therapeutic INR result. (Goal INR 2.0-3.0)    Recent labs: (last 7 days)     08/21/24  0858   INR 2.5*       ASSESSMENT     Source(s): Chart Review  Previous INR was Supratherapeutic  Medication, diet, health changes since last INR chart reviewed; none identified    Since not able to reach patient to leave a message, and not sure how often he utilizes [employer] so will not close encounter yet.      PLAN     Unable to reach [place] today.    Voicemail full, unable to leave message. MyChart sent.     Follow up required to discuss dosing instructions and confirm understanding of instructions    Radha Seymour RN  Anticoagulation Clinic  8/21/2024

## 2024-08-22 NOTE — PROGRESS NOTES
ANTICOAGULATION MANAGEMENT     Aniket Macias 84 year old male is on warfarin with therapeutic INR result. (Goal INR 2.0-3.0)    Recent labs: (last 7 days)     08/21/24  0858   INR 2.5*       ASSESSMENT     Source(s): Chart Review and Patient/Caregiver Call     Warfarin doses taken: Warfarin taken as instructed  Diet: No new diet changes identified  Medication/supplement changes: None noted  New illness, injury, or hospitalization: No  Signs or symptoms of bleeding or clotting: No  Previous result: Supratherapeutic  Additional findings: None       PLAN     Recommended plan for no diet, medication or health factor changes affecting INR     Dosing Instructions: Continue your current warfarin dose with next INR in 2 weeks       Summary  As of 8/21/2024      Full warfarin instructions:  5 mg every Mon, Wed, Fri; 2.5 mg all other days   Next INR check:  9/4/2024               Telephone call with [place] who verbalizes understanding and agrees to plan    Lab visit scheduled    Education provided: Please call back if any changes to your diet, medications or how you've been taking warfarin    Plan made per [facility] anticoagulation protocol    Aleah Nogueira RN  Anticoagulation Clinic  8/22/2024    _______________________________________________________________________     Anticoagulation Episode Summary       Current INR goal:  2.0-3.0   TTR:  75.3% (1 y)   Target end date:  Indefinite   Send INR reminders to:  [facility]    Indications    Long term current use of anticoagulants with INR goal of 2.0-3.0 [Z79.01]  Chronic atrial fibrillation (H) [I48.20]             Comments:               Anticoagulation Care Providers       Provider Role Specialty Phone number    Basilio Gregorio MD Referring Family Medicine [phone]

## 2024-09-05 ENCOUNTER — LAB (OUTPATIENT)
Dept: LAB | Facility: CLINIC | Age: 84
End: 2024-09-05
Payer: COMMERCIAL

## 2024-09-05 ENCOUNTER — TELEPHONE (OUTPATIENT)
Dept: ANTICOAGULATION | Facility: CLINIC | Age: 84
End: 2024-09-05

## 2024-09-05 ENCOUNTER — ANTICOAGULATION THERAPY VISIT (OUTPATIENT)
Dept: ANTICOAGULATION | Facility: CLINIC | Age: 84
End: 2024-09-05

## 2024-09-05 DIAGNOSIS — Z79.01 LONG TERM CURRENT USE OF ANTICOAGULANTS WITH INR GOAL OF 2.0-3.0: Primary | ICD-10-CM

## 2024-09-05 DIAGNOSIS — I48.20 CHRONIC ATRIAL FIBRILLATION (H): ICD-10-CM

## 2024-09-05 DIAGNOSIS — Z79.01 LONG TERM CURRENT USE OF ANTICOAGULANTS WITH INR GOAL OF 2.0-3.0: ICD-10-CM

## 2024-09-05 LAB — INR BLD: 2.7 (ref 0.9–1.1)

## 2024-09-05 PROCEDURE — 85610 PROTHROMBIN TIME: CPT

## 2024-09-05 PROCEDURE — 36416 COLLJ CAPILLARY BLOOD SPEC: CPT

## 2024-09-05 NOTE — PROGRESS NOTES
ANTICOAGULATION MANAGEMENT     Aniket Macias 84 year old male is on warfarin with therapeutic INR result. (Goal INR 2.0-3.0)    Recent labs: (last 7 days)     09/05/24  1029   INR 2.7*       ASSESSMENT     Source(s): Chart Review and Patient/Caregiver Call     Warfarin doses taken: Warfarin taken as instructed  Diet: No new diet changes identified  Medication/supplement changes: Tylenol 3000 mg daily since July for pain control  New illness, injury, or hospitalization: Yes, ongoing left-side back discomfort d/t slowly-growing gluteal lipoma - sx improving w/ OTC meds - surgery consult 7/31/24 - advised against surgery at this time  Signs or symptoms of bleeding or clotting: No  Previous result: Therapeutic last visit; previously outside of goal range  Additional findings: Patient received medicare notice regarding coverage for recent appt on 7/25/24 - writer will forward to PCP team       PLAN     Recommended plan for no diet, medication or health factor changes affecting INR     Dosing Instructions: Continue your current warfarin dose with next INR in 4 weeks       Summary  As of 9/5/2024      Full warfarin instructions:  5 mg every Mon, Wed, Fri; 2.5 mg all other days   Next INR check:  10/16/2024               Telephone call with [place] who verbalizes understanding and agrees to plan    Patient elected to schedule next visit 10/16/24    Education provided: Please call back if any changes to your diet, medications or how you've been taking warfarin  Symptom monitoring: monitoring for bleeding signs and symptoms and monitoring for clotting signs and symptoms    Plan made per [facility] anticoagulation protocol    Yvette Guaman RN  Anticoagulation Clinic  9/5/2024    _______________________________________________________________________     Anticoagulation Episode Summary       Current INR goal:  2.0-3.0   TTR:  75.4% (1 y)   Target end date:  Indefinite   Send INR reminders to:  [facility]    Indications     Long term current use of anticoagulants with INR goal of 2.0-3.0 [Z79.01]  Chronic atrial fibrillation (H) [I48.20]             Comments:               Anticoagulation Care Providers       Provider Role Specialty Phone number    Basilio Gregorio MD Referring Family Medicine [phone]

## 2024-09-05 NOTE — TELEPHONE ENCOUNTER
Patient received a Medicare notice summarizing that his recent ov on 7/25/24 was accepted as an annual visit (despite being 11 months from prior appt). Routing to PCP to ensure 7/25/24 meets provider's requirements as well. If so, please have reception team notify patient and cancel 9/19/24 ov. Yvette Guaman, BLAKEN, RN

## 2024-09-09 NOTE — TELEPHONE ENCOUNTER
Pt states that he got confused on the dates, thinking originally that it was for 2024, but after calling us notes that the year on the letter is 2022.     Pt states he will keep his appointment on 9/19/24 since the request was in error.    Caroline Godinez RN

## 2024-09-18 ENCOUNTER — APPOINTMENT (OUTPATIENT)
Dept: URBAN - METROPOLITAN AREA CLINIC 255 | Age: 84
Setting detail: DERMATOLOGY
End: 2024-09-18

## 2024-09-18 VITALS — HEIGHT: 69 IN | WEIGHT: 160 LBS

## 2024-09-18 DIAGNOSIS — D18.0 HEMANGIOMA: ICD-10-CM

## 2024-09-18 DIAGNOSIS — D22 MELANOCYTIC NEVI: ICD-10-CM

## 2024-09-18 DIAGNOSIS — L82.1 OTHER SEBORRHEIC KERATOSIS: ICD-10-CM

## 2024-09-18 DIAGNOSIS — Z71.89 OTHER SPECIFIED COUNSELING: ICD-10-CM

## 2024-09-18 DIAGNOSIS — L57.8 OTHER SKIN CHANGES DUE TO CHRONIC EXPOSURE TO NONIONIZING RADIATION: ICD-10-CM

## 2024-09-18 DIAGNOSIS — L57.0 ACTINIC KERATOSIS: ICD-10-CM

## 2024-09-18 DIAGNOSIS — L21.8 OTHER SEBORRHEIC DERMATITIS: ICD-10-CM

## 2024-09-18 DIAGNOSIS — L82.0 INFLAMED SEBORRHEIC KERATOSIS: ICD-10-CM

## 2024-09-18 PROBLEM — D22.61 MELANOCYTIC NEVI OF RIGHT UPPER LIMB, INCLUDING SHOULDER: Status: ACTIVE | Noted: 2024-09-18

## 2024-09-18 PROBLEM — D22.72 MELANOCYTIC NEVI OF LEFT LOWER LIMB, INCLUDING HIP: Status: ACTIVE | Noted: 2024-09-18

## 2024-09-18 PROBLEM — D22.5 MELANOCYTIC NEVI OF TRUNK: Status: ACTIVE | Noted: 2024-09-18

## 2024-09-18 PROBLEM — D22.71 MELANOCYTIC NEVI OF RIGHT LOWER LIMB, INCLUDING HIP: Status: ACTIVE | Noted: 2024-09-18

## 2024-09-18 PROBLEM — D22.62 MELANOCYTIC NEVI OF LEFT UPPER LIMB, INCLUDING SHOULDER: Status: ACTIVE | Noted: 2024-09-18

## 2024-09-18 PROBLEM — D18.01 HEMANGIOMA OF SKIN AND SUBCUTANEOUS TISSUE: Status: ACTIVE | Noted: 2024-09-18

## 2024-09-18 PROCEDURE — 17110 DESTRUCT B9 LESION 1-14: CPT

## 2024-09-18 PROCEDURE — OTHER PRESCRIPTION: OTHER

## 2024-09-18 PROCEDURE — OTHER COUNSELING: OTHER

## 2024-09-18 PROCEDURE — OTHER SUNSCREEN RECOMMENDATIONS: OTHER

## 2024-09-18 PROCEDURE — 17003 DESTRUCT PREMALG LES 2-14: CPT | Mod: 59

## 2024-09-18 PROCEDURE — 17000 DESTRUCT PREMALG LESION: CPT | Mod: 59

## 2024-09-18 PROCEDURE — OTHER LIQUID NITROGEN: OTHER

## 2024-09-18 PROCEDURE — OTHER MIPS QUALITY: OTHER

## 2024-09-18 PROCEDURE — 99213 OFFICE O/P EST LOW 20 MIN: CPT | Mod: 25

## 2024-09-18 PROCEDURE — OTHER PRESCRIPTION MEDICATION MANAGEMENT: OTHER

## 2024-09-18 RX ORDER — CLOBETASOL PROPIONATE 0.5 MG/ML
SOLUTION TOPICAL BID
Qty: 50 | Refills: 1 | Status: ERX | COMMUNITY
Start: 2024-09-18

## 2024-09-18 ASSESSMENT — LOCATION ZONE DERM
LOCATION ZONE: HAND
LOCATION ZONE: TRUNK
LOCATION ZONE: FACE
LOCATION ZONE: LEG
LOCATION ZONE: SCALP
LOCATION ZONE: ARM

## 2024-09-18 ASSESSMENT — LOCATION SIMPLE DESCRIPTION DERM
LOCATION SIMPLE: LEFT UPPER ARM
LOCATION SIMPLE: UPPER BACK
LOCATION SIMPLE: RIGHT THIGH
LOCATION SIMPLE: LEFT UPPER BACK
LOCATION SIMPLE: ABDOMEN
LOCATION SIMPLE: LEFT THIGH
LOCATION SIMPLE: RIGHT HAND
LOCATION SIMPLE: RIGHT FOREARM
LOCATION SIMPLE: LEFT FOREARM
LOCATION SIMPLE: LEFT LOWER BACK
LOCATION SIMPLE: LEFT CHEEK
LOCATION SIMPLE: POSTERIOR SCALP
LOCATION SIMPLE: CHEST

## 2024-09-18 ASSESSMENT — LOCATION DETAILED DESCRIPTION DERM
LOCATION DETAILED: LEFT INFERIOR POSTAURICULAR SKIN
LOCATION DETAILED: LEFT VENTRAL PROXIMAL FOREARM
LOCATION DETAILED: LEFT ANTERIOR DISTAL THIGH
LOCATION DETAILED: RIGHT ANTERIOR DISTAL THIGH
LOCATION DETAILED: LEFT MEDIAL UPPER BACK
LOCATION DETAILED: LEFT ANTECUBITAL SKIN
LOCATION DETAILED: MIDDLE STERNUM
LOCATION DETAILED: RIGHT ANTERIOR PROXIMAL THIGH
LOCATION DETAILED: PERIUMBILICAL SKIN
LOCATION DETAILED: XIPHOID
LOCATION DETAILED: RIGHT ULNAR DORSAL HAND
LOCATION DETAILED: RIGHT PROXIMAL DORSAL FOREARM
LOCATION DETAILED: RIGHT VENTRAL DISTAL FOREARM
LOCATION DETAILED: LEFT INFERIOR LATERAL MIDBACK
LOCATION DETAILED: LEFT ANTERIOR PROXIMAL THIGH
LOCATION DETAILED: LEFT VENTRAL DISTAL FOREARM
LOCATION DETAILED: LEFT INFERIOR CENTRAL MALAR CHEEK
LOCATION DETAILED: RIGHT VENTRAL PROXIMAL FOREARM
LOCATION DETAILED: EPIGASTRIC SKIN
LOCATION DETAILED: LEFT DISTAL DORSAL FOREARM
LOCATION DETAILED: RIGHT RADIAL DORSAL HAND
LOCATION DETAILED: INFERIOR THORACIC SPINE

## 2024-09-18 NOTE — HPI: FULL BODY SKIN EXAMINATION
What Type Of Note Output Would You Prefer (Optional)?: Standard Output
What Is The Reason For Today's Visit?: Full Body Skin Examination
What Is The Reason For Today's Visit? (Being Monitored For X): concerning skin lesions on an annual basis
Additional History: The patients area of concern is located on the right hand. It is asymptomatic. The patient notes using fluocinonide acetonide topical solution 0.01% for his scalp and would like a stronger prescription.

## 2024-09-18 NOTE — PROCEDURE: PRESCRIPTION MEDICATION MANAGEMENT
Detail Level: Zone
Initiate Treatment: Clobetasol 0.05% topical solution BID x 2 weeks/month
Discontinue Regimen: Fluocinonide acetonide 0.1% topical solution
Render In Strict Bullet Format?: No

## 2024-09-18 NOTE — PROCEDURE: LIQUID NITROGEN
Duration Of Freeze Thaw-Cycle (Seconds): 6
Render Post-Care Instructions In Note?: no
Post-Care Instructions: I reviewed with the patient in detail post-care instructions. Patient is to wear sunprotection, and avoid picking at any of the treated lesions. Pt may apply Vaseline to crusted or scabbing areas.
Show Aperture Variable?: Yes
Number Of Freeze-Thaw Cycles: 1 freeze-thaw cycle
Detail Level: Detailed
Application Tool (Optional): Liquid Nitrogen Sprayer
Consent: The patient's consent was obtained including but not limited to risks of crusting, scabbing, blistering, scarring, darker or lighter pigmentary change, recurrence, incomplete removal and infection.
Medical Necessity Clause: This procedure was medically necessary because the lesions that were treated were:
Spray Paint Text: The liquid nitrogen was applied to the skin utilizing a spray paint frosting technique.
Medical Necessity Information: It is in your best interest to select a reason for this procedure from the list below. All of these items fulfill various CMS LCD requirements except the new and changing color options.
Number Of Freeze-Thaw Cycles: 3 freeze-thaw cycles
Detail Level: Zone
Duration Of Freeze Thaw-Cycle (Seconds): 2

## 2024-09-19 ENCOUNTER — OFFICE VISIT (OUTPATIENT)
Dept: FAMILY MEDICINE | Facility: CLINIC | Age: 84
End: 2024-09-19
Payer: COMMERCIAL

## 2024-09-19 VITALS
WEIGHT: 162.4 LBS | OXYGEN SATURATION: 96 % | SYSTOLIC BLOOD PRESSURE: 138 MMHG | DIASTOLIC BLOOD PRESSURE: 86 MMHG | HEART RATE: 71 BPM | BODY MASS INDEX: 25.44 KG/M2 | TEMPERATURE: 97.3 F | RESPIRATION RATE: 21 BRPM

## 2024-09-19 DIAGNOSIS — I48.20 CHRONIC ATRIAL FIBRILLATION (H): ICD-10-CM

## 2024-09-19 DIAGNOSIS — I10 BENIGN ESSENTIAL HYPERTENSION: Primary | ICD-10-CM

## 2024-09-19 DIAGNOSIS — R60.0 BILATERAL LEG EDEMA: ICD-10-CM

## 2024-09-19 DIAGNOSIS — J43.9 PULMONARY EMPHYSEMA, UNSPECIFIED EMPHYSEMA TYPE (H): ICD-10-CM

## 2024-09-19 DIAGNOSIS — Z00.00 ENCOUNTER FOR ANNUAL WELLNESS VISIT (AWV) IN MEDICARE PATIENT: ICD-10-CM

## 2024-09-19 DIAGNOSIS — Z79.01 LONG TERM CURRENT USE OF ANTICOAGULANTS WITH INR GOAL OF 2.0-3.0: ICD-10-CM

## 2024-09-19 DIAGNOSIS — E78.2 MIXED HYPERLIPIDEMIA: ICD-10-CM

## 2024-09-19 LAB
ERYTHROCYTE [DISTWIDTH] IN BLOOD BY AUTOMATED COUNT: 13.4 % (ref 10–15)
HCT VFR BLD AUTO: 39 % (ref 40–53)
HGB BLD-MCNC: 13.1 G/DL (ref 13.3–17.7)
MCH RBC QN AUTO: 35.2 PG (ref 26.5–33)
MCHC RBC AUTO-ENTMCNC: 33.6 G/DL (ref 31.5–36.5)
MCV RBC AUTO: 105 FL (ref 78–100)
PLATELET # BLD AUTO: 196 10E3/UL (ref 150–450)
RBC # BLD AUTO: 3.72 10E6/UL (ref 4.4–5.9)
WBC # BLD AUTO: 8.3 10E3/UL (ref 4–11)

## 2024-09-19 PROCEDURE — 91320 SARSCV2 VAC 30MCG TRS-SUC IM: CPT | Performed by: FAMILY MEDICINE

## 2024-09-19 PROCEDURE — 80053 COMPREHEN METABOLIC PANEL: CPT | Performed by: FAMILY MEDICINE

## 2024-09-19 PROCEDURE — 99214 OFFICE O/P EST MOD 30 MIN: CPT | Mod: 25 | Performed by: FAMILY MEDICINE

## 2024-09-19 PROCEDURE — G0439 PPPS, SUBSEQ VISIT: HCPCS | Performed by: FAMILY MEDICINE

## 2024-09-19 PROCEDURE — 85027 COMPLETE CBC AUTOMATED: CPT | Performed by: FAMILY MEDICINE

## 2024-09-19 PROCEDURE — 36415 COLL VENOUS BLD VENIPUNCTURE: CPT | Performed by: FAMILY MEDICINE

## 2024-09-19 PROCEDURE — G0008 ADMIN INFLUENZA VIRUS VAC: HCPCS | Performed by: FAMILY MEDICINE

## 2024-09-19 PROCEDURE — 90662 IIV NO PRSV INCREASED AG IM: CPT | Performed by: FAMILY MEDICINE

## 2024-09-19 PROCEDURE — 90480 ADMN SARSCOV2 VAC 1/ONLY CMP: CPT | Performed by: FAMILY MEDICINE

## 2024-09-19 PROCEDURE — 80061 LIPID PANEL: CPT | Performed by: FAMILY MEDICINE

## 2024-09-19 RX ORDER — CLOBETASOL PROPIONATE 0.5 MG/G
AEROSOL, FOAM TOPICAL
COMMUNITY

## 2024-09-19 ASSESSMENT — ACTIVITIES OF DAILY LIVING (ADL): CURRENT_FUNCTION: NO ASSISTANCE NEEDED

## 2024-09-19 ASSESSMENT — PAIN SCALES - GENERAL: PAINLEVEL: NO PAIN (0)

## 2024-09-19 NOTE — PROGRESS NOTES
"Preventive Care Visit  [facility] TREMAINE Gregorio MD, Family Medicine  Sep 19, 2024      Assessment & Plan     Encounter for annual wellness visit (AWV) in Medicare patient  Patient is overall feeling healthy some memory issues but he is able to pass mini cog test.  Bilateral earwax he would like to get it taken care of.  - CBC with Platelets; Future  - Lipid panel reflex to direct LDL Non-fasting; Future  - Comprehensive metabolic panel (BMP + Alb, Alk Phos, ALT, AST, Total. Bili, TP); Future    Benign essential hypertension  Blood pressure stable on metoprolol lisinopril and also taking terazosin.  Once labs reviewed we will refill those medications.    Mixed hyperlipidemia  Refill medication once labs reviewed.  - Lipid panel reflex to direct LDL Non-fasting; Future  - Comprehensive metabolic panel (BMP + Alb, Alk Phos, ALT, AST, Total. Bili, TP); Future    Chronic atrial fibrillation (H)  On warfarin     Bilateral leg edema  Patient has chronic bilateral leg edema which is not worsening or improving.  He is currently on 40 mg of Lasix.  He feels he has to go to the bathroom too often will check his labs if stable we may decrease the dose and see if he has similar effect are improved.  - Comprehensive metabolic panel (BMP + Alb, Alk Phos, ALT, AST, Total. Bili, TP); Future    Long term current use of anticoagulants with INR goal of 2.0-3.0      Pulmonary emphysema, unspecified emphysema type (H)      Patient has been advised of split billing requirements and indicates understanding: Yes        BMI  Estimated body mass index is 25.44 kg/m  as calculated from the following:    Height as of 7/31/24: 1.702 m (5' 7\").    Weight as of this encounter: 73.7 kg (162 lb 6.4 oz).       Counseling  Appropriate preventive services were addressed with this patient via screening, questionnaire, or discussion as appropriate for fall prevention, nutrition, physical activity, Tobacco-use cessation, social " "engagement, weight loss and cognition.  Checklist reviewing preventive services available has been given to the patient.  Reviewed patient's diet, addressing concerns and/or questions.   He is at risk for lack of exercise and has been provided with information to increase physical activity for the benefit of his well-being.   Patient is at risk for social isolation and has been provided with information about the benefit of social connection.   The patient was provided with written information regarding signs of hearing loss.       MEDICATIONS:  Continue current medications without change    Arminda Etienne is a 84 year old, presenting for the following:  Annual Visit (Fasting)  Patient is a pleasant 84-year-old male he is overall doing stable.  He has large lipoma in the back which was evaluated by surgery.  Due to his health issues it was decided not to operate as it may have some high risk for his recovery.  Other medical conditions include high blood pressure cholesterol emphysema as well as atrial fibrillation.  He feels his memory is not as sharp as it was in the past.      9/19/2024     9:14 AM   Additional Questions   Roomed by Carolina OROSCO       Health Care Directive  Patient has a Health Care Directive on file  Advance care planning document is on file and is current.    Healthy Habits:     In general, how would you rate your overall health?  Good    Frequency of exercise:  1 day/week    Do you usually eat at least 4 servings of fruit and vegetables a day, include whole grains    & fiber and avoid regularly eating high fat or \"junk\" foods?  No    Taking medications regularly:  Yes    Barriers to taking medications:  None    Medication side effects:  None    Ability to successfully perform activities of daily living:  No assistance needed    Home Safety:  No safety concerns identified    Hearing Impairment:  Difficulty following a conversation in a noisy restaurant or crowded room, need to ask people to speak up " or repeat themselves and find that men's voices are easier to understand than woman's    In the past 6 months, have you been bothered by leaking of urine?  No    In general, how would you rate your overall mental or emotional health?  Good    Additional concerns today:  No          2023   General Health   How would you rate your overall physical health? Good            2023   Nutrition   At least 4 servings of fruits and vegetables/day No            2023   Exercise   Frequency of exercise: 1 day/week               2023   Activities of Daily Living- Home Safety   Needs help with the following daily activites NO assistance is needed   Safety concerns in the home None of the above             No data to display                  2023   Hearing Screening   Hearing concerns? Difficulty following a conversation in a noisy restaurant or crowded room    Need to ask people to speak up or repeat themselves    Find that men's voices are easier to understand than woman's       Multiple values from one day are sorted in reverse-chronological order            No data to display                           2023   Substance Use   Alcohol more than 3/day or more than 7/wk No        Social History     Tobacco Use    Smoking status: Former     Current packs/day: 0.00     Average packs/day: 2.0 packs/day for 35.0 years (70.0 ttl pk-yrs)     Types: Cigarettes     Start date: 1960     Quit date: 1995     Years since quittin.4    Smokeless tobacco: Never    Tobacco comments:     quit age 54   Substance Use Topics    Alcohol use: Yes     Comment: 1-2 drinks per week    Drug use: No             Reviewed and updated as needed this visit by Provider     Meds                  Current providers sharing in care for this patient include:  Patient Care Team:  Basilio Gregorio MD as PCP - General (Family Medicine)  Basilio Gregorio MD as Assigned PCP  Derrek Lord MD as Assigned Pulmonology Provider  Sebastián Bermudez  MD Ras as MD (Cardiovascular Disease)  Maribeth Wood APRN CNP as Nurse Practitioner (Cardiovascular Disease)  Maribeth Wood APRN CNP as Assigned Heart and Vascular Provider  Ad Valles MD as Assigned Surgical Provider    The following health maintenance items are reviewed in Epic and correct as of today:  Health Maintenance   Topic Date Due    HF ACTION PLAN  Never done    COPD ACTION PLAN  Never done    ZOSTER IMMUNIZATION (1 of 2) Never done    RSV VACCINE (1 - 1-dose 75+ series) Never done    CBC  05/06/2021    ANNUAL REVIEW OF HM ORDERS  08/17/2023    DTAP/TDAP/TD IMMUNIZATION (5 - Td or Tdap) 02/04/2024    BMP  02/22/2024    ALT  08/22/2024    LIPID  08/22/2024    INFLUENZA VACCINE (1) 09/01/2024    COVID-19 Vaccine (6 - 2024-25 season) 09/01/2024    MEDICARE ANNUAL WELLNESS VISIT  08/22/2024    FALL RISK ASSESSMENT  09/19/2025    ADVANCE CARE PLANNING  08/22/2028    TSH W/FREE T4 REFLEX  Completed    SPIROMETRY  Completed    PHQ-2 (once per calendar year)  Completed    Pneumococcal Vaccine: 65+ Years  Completed    HPV IMMUNIZATION  Aged Out    MENINGITIS IMMUNIZATION  Aged Out    RSV MONOCLONAL ANTIBODY  Aged Out    COLORECTAL CANCER SCREENING  Discontinued         Review of Systems  CONSTITUTIONAL: NEGATIVE for fever, chills, change in weight  INTEGUMENTARY/SKIN: NEGATIVE for worrisome rashes, moles or lesions  EYES: NEGATIVE for vision changes or irritation  ENT/MOUTH: positive for congestion  RESP: NEGATIVE for significant cough or SOB  BREAST: NEGATIVE for masses, tenderness or discharge  CV: NEGATIVE for chest pain, palpitations or peripheral edema  GI: NEGATIVE for nausea, abdominal pain, heartburn, or change in bowel habits  : NEGATIVE for frequency, dysuria, or hematuria  MUSCULOSKELETAL: NEGATIVE for significant arthralgias or myalgia  NEURO: NEGATIVE for weakness, dizziness or paresthesias  ENDOCRINE: NEGATIVE for temperature intolerance, skin/hair changes  HEME: NEGATIVE for bleeding  "problems  PSYCHIATRIC: NEGATIVE for changes in mood or affect     Objective    Exam  /86   Pulse 71   Temp 97.3  F (36.3  C)   Resp 21   Wt 73.7 kg (162 lb 6.4 oz)   SpO2 96%   BMI 25.44 kg/m     Estimated body mass index is 25.44 kg/m  as calculated from the following:    Height as of 7/31/24: 1.702 m (5' 7\").    Weight as of this encounter: 73.7 kg (162 lb 6.4 oz).    Physical Exam  GENERAL: alert and no distress  EYES: Eyes grossly normal to inspection, PERRL and conjunctivae and sclerae normal  HENT: bilateral ear wax  NECK: no adenopathy, no asymmetry, masses, or scars  RESP: lungs clear to auscultation - no rales, rhonchi or wheezes  CV:  irregular irregular rhythm., normal S1 S2, no S3 or S4, no murmur, click or rub, no peripheral edema  ABDOMEN: soft, nontender, no hepatosplenomegaly, no masses and bowel sounds normal  MS: no gross musculoskeletal defects noted, no edema  SKIN: no suspicious lesions or rashes  NEURO: Normal strength and tone, mentation intact and speech normal  PSYCH: mentation appears normal, affect normal/bright   Bilateral leg swelling noted.      9/19/2024   Mini Cog   Clock Draw Score 2 Normal   3 Item Recall 3 objects recalled   Mini Cog Total Score 5                 Signed Electronically by: Basilio Gregorio MD    "

## 2024-09-20 DIAGNOSIS — N40.1 BENIGN NON-NODULAR PROSTATIC HYPERPLASIA WITH LOWER URINARY TRACT SYMPTOMS: ICD-10-CM

## 2024-09-20 DIAGNOSIS — E78.5 HYPERLIPIDEMIA LDL GOAL <100: ICD-10-CM

## 2024-09-20 DIAGNOSIS — I10 BENIGN ESSENTIAL HYPERTENSION: ICD-10-CM

## 2024-09-20 LAB
ALBUMIN SERPL BCG-MCNC: 4 G/DL (ref 3.5–5.2)
ALP SERPL-CCNC: 66 U/L (ref 40–150)
ALT SERPL W P-5'-P-CCNC: 22 U/L (ref 0–70)
ANION GAP SERPL CALCULATED.3IONS-SCNC: 13 MMOL/L (ref 7–15)
AST SERPL W P-5'-P-CCNC: 30 U/L (ref 0–45)
BILIRUB SERPL-MCNC: 0.9 MG/DL
BUN SERPL-MCNC: 23.5 MG/DL (ref 8–23)
CALCIUM SERPL-MCNC: 8.8 MG/DL (ref 8.8–10.4)
CHLORIDE SERPL-SCNC: 104 MMOL/L (ref 98–107)
CHOLEST SERPL-MCNC: 116 MG/DL
CREAT SERPL-MCNC: 1.04 MG/DL (ref 0.67–1.17)
EGFRCR SERPLBLD CKD-EPI 2021: 70 ML/MIN/1.73M2
FASTING STATUS PATIENT QL REPORTED: YES
FASTING STATUS PATIENT QL REPORTED: YES
GLUCOSE SERPL-MCNC: 104 MG/DL (ref 70–99)
HCO3 SERPL-SCNC: 24 MMOL/L (ref 22–29)
HDLC SERPL-MCNC: 57 MG/DL
LDLC SERPL CALC-MCNC: 47 MG/DL
NONHDLC SERPL-MCNC: 59 MG/DL
POTASSIUM SERPL-SCNC: 4.2 MMOL/L (ref 3.4–5.3)
PROT SERPL-MCNC: 6.7 G/DL (ref 6.4–8.3)
SODIUM SERPL-SCNC: 141 MMOL/L (ref 135–145)
TRIGL SERPL-MCNC: 59 MG/DL

## 2024-09-20 RX ORDER — TERAZOSIN 5 MG/1
CAPSULE ORAL
Qty: 90 CAPSULE | Refills: 3 | Status: SHIPPED | OUTPATIENT
Start: 2024-09-20

## 2024-09-20 RX ORDER — ATORVASTATIN CALCIUM 40 MG/1
40 TABLET, FILM COATED ORAL DAILY
Qty: 90 TABLET | Refills: 3 | Status: SHIPPED | OUTPATIENT
Start: 2024-09-20

## 2024-09-20 RX ORDER — LISINOPRIL 40 MG/1
40 TABLET ORAL DAILY
Qty: 90 TABLET | Refills: 3 | Status: SHIPPED | OUTPATIENT
Start: 2024-09-20

## 2024-10-16 ENCOUNTER — LAB (OUTPATIENT)
Dept: LAB | Facility: CLINIC | Age: 84
End: 2024-10-16
Payer: COMMERCIAL

## 2024-10-16 ENCOUNTER — ANTICOAGULATION THERAPY VISIT (OUTPATIENT)
Dept: ANTICOAGULATION | Facility: CLINIC | Age: 84
End: 2024-10-16

## 2024-10-16 DIAGNOSIS — I48.20 CHRONIC ATRIAL FIBRILLATION (H): ICD-10-CM

## 2024-10-16 DIAGNOSIS — Z79.01 LONG TERM CURRENT USE OF ANTICOAGULANTS WITH INR GOAL OF 2.0-3.0: Primary | ICD-10-CM

## 2024-10-16 DIAGNOSIS — Z79.01 LONG TERM CURRENT USE OF ANTICOAGULANTS WITH INR GOAL OF 2.0-3.0: ICD-10-CM

## 2024-10-16 LAB — INR BLD: 2.2 (ref 0.9–1.1)

## 2024-10-16 PROCEDURE — 85610 PROTHROMBIN TIME: CPT

## 2024-10-16 PROCEDURE — 36416 COLLJ CAPILLARY BLOOD SPEC: CPT

## 2024-10-16 NOTE — PROGRESS NOTES
ANTICOAGULATION MANAGEMENT     Aniket Macias 84 year old male is on warfarin with therapeutic INR result. (Goal INR 2.0-3.0)    Recent labs: (last 7 days)     10/16/24  0906   INR 2.2*       ASSESSMENT     Source(s): Chart Review  Previous INR was Therapeutic last 2(+) visits  Medication, diet, health changes since last INR chart reviewed; none identified         PLAN     Recommended plan for no diet, medication or health factor changes affecting INR     Dosing Instructions: Continue your current warfarin dose with next INR in 6 weeks       Summary  As of 10/16/2024      Full warfarin instructions:  5 mg every Mon, Wed, Fri; 2.5 mg all other days   Next INR check:  11/27/2024               Sent [employer] message with dosing and follow up instructions  Called [place] but mailbox was full and unable to leave voicemail.    Contact [phone] to schedule and with any changes, questions or concerns.     Education provided: Please call back if any changes to your diet, medications or how you've been taking warfarin  Contact [phone] with any changes, questions or concerns.     Plan made per [facility] anticoagulation protocol    Jeanette Bender RN  10/16/2024  Anticoagulation Clinic  [employer] pool for routing messages: p [facility] [place]  [facility] patient phone line: [phone]        _______________________________________________________________________     Anticoagulation Episode Summary       Current INR goal:  2.0-3.0   TTR:  75.4% (1 y)   Target end date:  Indefinite   Send INR reminders to:  [facility] [place]    Indications    Long term current use of anticoagulants with INR goal of 2.0-3.0 [Z79.01]  Chronic atrial fibrillation (H) [I48.20]             Comments:               Anticoagulation Care Providers       Provider Role Specialty Phone number    Basilio Gregorio MD Referring Family Medicine [phone]

## 2024-11-05 DIAGNOSIS — I50.32 CHRONIC DIASTOLIC HEART FAILURE (H): ICD-10-CM

## 2024-11-05 RX ORDER — FUROSEMIDE 40 MG/1
40 TABLET ORAL DAILY
Qty: 90 TABLET | Refills: 0 | Status: SHIPPED | OUTPATIENT
Start: 2024-11-05

## 2024-11-07 DIAGNOSIS — I50.32 CHRONIC DIASTOLIC HEART FAILURE (H): ICD-10-CM

## 2024-11-07 RX ORDER — FUROSEMIDE 40 MG/1
40 TABLET ORAL DAILY
Qty: 90 TABLET | Refills: 0 | OUTPATIENT
Start: 2024-11-07

## 2024-11-19 ENCOUNTER — OFFICE VISIT (OUTPATIENT)
Dept: PULMONOLOGY | Facility: CLINIC | Age: 84
End: 2024-11-19
Payer: COMMERCIAL

## 2024-11-19 VITALS
OXYGEN SATURATION: 94 % | BODY MASS INDEX: 24.25 KG/M2 | HEART RATE: 66 BPM | SYSTOLIC BLOOD PRESSURE: 153 MMHG | WEIGHT: 160 LBS | RESPIRATION RATE: 18 BRPM | DIASTOLIC BLOOD PRESSURE: 87 MMHG | HEIGHT: 68 IN

## 2024-11-19 DIAGNOSIS — J43.9 PULMONARY EMPHYSEMA, UNSPECIFIED EMPHYSEMA TYPE (H): ICD-10-CM

## 2024-11-19 DIAGNOSIS — J84.9 ILD (INTERSTITIAL LUNG DISEASE) (H): Primary | ICD-10-CM

## 2024-11-19 DIAGNOSIS — J84.9 ILD (INTERSTITIAL LUNG DISEASE) (H): ICD-10-CM

## 2024-11-19 LAB
6 MIN WALK (FT): 880 FT
6 MIN WALK (M): 268 M

## 2024-11-19 PROCEDURE — 99215 OFFICE O/P EST HI 40 MIN: CPT | Mod: 25 | Performed by: INTERNAL MEDICINE

## 2024-11-19 ASSESSMENT — PAIN SCALES - GENERAL: PAINLEVEL_OUTOF10: NO PAIN (0)

## 2024-11-19 NOTE — LETTER
11/19/2024      Aniket Macias  [address]  [place] [address]      Dear Colleague,    Thank you for referring your patient, Aniket Macias, to the [facility]. Please see a copy of my visit note below.    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  The patient has not been on any treatment as he has not found inhalers to be helpful and he generally feels well.  He does have some dyspnea on exertion when hurrying or going up significant flights of stairs.  However overall he feels this is stable.  The patient is seen today with his wife.  Overall she feels he is relatively stable as well.  They are questioning the need for repeated visits given that there is no therapy for his lungs.  Otherwise no other new complaints today.      Current Outpatient Medications   Medication Sig Dispense Refill     ACETAMINOPHEN PO Take 1,000 mg by mouth 2 times daily.       albuterol (PROAIR HFA/PROVENTIL HFA/VENTOLIN HFA) 108 (90 Base) MCG/ACT inhaler Inhale 2 puffs into the lungs every 6 hours as needed for shortness of breath, wheezing or cough.       atorvastatin (LIPITOR) 40 MG tablet Take 1 tablet (40 mg) by mouth daily. 90 tablet 3     clobetasol propionate (OLUX) 0.05 % external foam Apply topically.       furosemide (LASIX) 40 MG tablet Take 1 tablet (40 mg) by mouth daily. (Patient taking differently: Take 40 mg by mouth daily. PRN) 90 tablet 0     Homeopathic Products (PROSACEA) GEL Externally apply topically as needed       latanoprost (XALATAN) 0.005 % ophthalmic solution Place 1 drop into both eyes At Bedtime       lisinopril (ZESTRIL) 40 MG tablet Take 1 tablet (40 mg) by mouth daily. 90 tablet 3     metoprolol tartrate (LOPRESSOR) 25 MG tablet Take 1.5  tablets (37.5 mg) by mouth 2 times daily 270 tablet 3     Multiple Vitamins-Minerals (MULTIVITAMIN PO) Take 1 tablet by mouth daily        Propylene Glycol (SYSTANE COMPLETE OP) Apply 10 mLs to eye       terazosin (HYTRIN) 5 MG capsule TAKE ONE CAPSULE BY MOUTH ONCE DAILY AT BEDTIME 90 capsule 3     timolol, PF, (TIMOPTIC OCUDOSE) 0.5 % ophthalmic solution 5 mLs       warfarin ANTICOAGULANT (COUMADIN) 5 MG tablet 2.5 mg Tue, Thu, Sat; 5 mg all other days or as directed by INR clinic 90 tablet 1     FLUOCINOLONE ACETONIDE IO 60 mLs as needed (Patient not taking: Reported on 11/19/2024)       No current facility-administered medications for this visit.     No Known Allergies  Past Medical History:   Diagnosis Date     Arthritis Last 5 years or so     Basal cell carcinoma      BPH (benign prostatic hypertrophy)      COPD (chronic obstructive pulmonary disease) (H) last 1 year     Diverticulosis      Glaucoma      Hemorrhoids      HTN (hypertension)      Hyperlipidemia      Obesity      Persistent atrial fibrillation (H)      PVC (premature ventricular contraction)      Squamous cell carcinoma in situ of skin      Syncope 04/11/2016       Past Surgical History:   Procedure Laterality Date     BACK SURGERY       BIOPSY  20 yrs ago    prostate (benign)     COLONOSCOPY  11/19/2015    hx polyps     COLONOSCOPY N/A 12/07/2021    Procedure: COLONOSCOPY, FLEXIBLE, WITH LESION REMOVAL USING SNARE;  Surgeon: Eugene Burden MD;  Location:  GI     EYE SURGERY  abt 4 yrs    Cateract Surgery     SURGICAL HISTORY OF -       Laminectomy     SURGICAL HISTORY OF -       biopsy of prostate     TONSILLECTOMY & ADENOIDECTOMY         Social History     Socioeconomic History     Marital status:      Spouse name: Not on file     Number of children: Not on file     Years of education: Not on file     Highest education level: Not on file   Occupational History     Not on file   Tobacco Use     Smoking status: Former     Current  packs/day: 0.00     Average packs/day: 2.0 packs/day for 35.0 years (70.0 ttl pk-yrs)     Types: Cigarettes     Start date: 1960     Quit date: 1995     Years since quittin.5     Smokeless tobacco: Never     Tobacco comments:     quit age 54   Substance and Sexual Activity     Alcohol use: Yes     Comment: 1-2 drinks per week     Drug use: No     Sexual activity: Not Currently     Partners: Female   Other Topics Concern      Service Not Asked     Blood Transfusions Not Asked     Caffeine Concern Yes     Comment: 2 cups coffee per day     Occupational Exposure Not Asked     Hobby Hazards Not Asked     Sleep Concern No     Stress Concern No     Weight Concern No     Special Diet Not Asked     Back Care Not Asked     Exercise No     Bike Helmet Not Asked     Seat Belt Not Asked     Self-Exams Not Asked     Parent/sibling w/ CABG, MI or angioplasty before 65F 55M? No   Social History Narrative     Not on file     Social Drivers of Health     Financial Resource Strain: Low Risk  (2024)    Financial Resource Strain      Within the past 12 months, have you or your family members you live with been unable to get utilities (heat, electricity) when it was really needed?: No   Food Insecurity: Low Risk  (2024)    Food Insecurity      Within the past 12 months, did you worry that your food would run out before you got money to buy more?: No      Within the past 12 months, did the food you bought just not last and you didn t have money to get more?: No   Transportation Needs: Low Risk  (2024)    Transportation Needs      Within the past 12 months, has lack of transportation kept you from medical appointments, getting your medicines, non-medical meetings or appointments, work, or from getting things that you need?: No   Physical Activity: Unknown (2024)    Exercise Vital Sign      Days of Exercise per Week: 1 day      Minutes of Exercise per Session: Patient declined   Stress: No Stress  "Concern Present (9/19/2024)    [employer] [employer] - Occupational Stress Questionnaire      Feeling of Stress : Not at all   Social Connections: Unknown (9/19/2024)    Social Connection and Isolation Panel [NHANES]      Frequency of Communication with Friends and Family: Not on file      Frequency of Social Gatherings with Friends and Family: Never      Attends Scientologist Services: Not on file      Active Member of Clubs or Organizations: Not on file      Attends Club or Organization Meetings: Not on file      Marital Status: Not on file   Interpersonal Safety: Low Risk  (9/19/2024)    Interpersonal Safety      Do you feel physically and emotionally safe where you currently live?: Yes      Within the past 12 months, have you been hit, slapped, kicked or otherwise physically hurt by someone?: No      Within the past 12 months, have you been humiliated or emotionally abused in other ways by your partner or ex-partner?: No   Housing Stability: Low Risk  (9/19/2024)    Housing Stability      Do you have housing? : Yes      Are you worried about losing your housing?: No       Family History   Problem Relation Age of Onset     Cerebrovascular Disease Mother      Hypertension Mother      Cerebrovascular Disease Father      Myocardial Infarction Father      Hypertension Father      Hypertension Sister         Diabetis     Myocardial Infarction Sister      LUNG DISEASE No family hx of        ROS Pulmonary    A complete ROS was otherwise negative except as noted in the HPI.  Vitals:    11/19/24 0958 11/19/24 1000   BP: (!) 146/84 (!) 153/87   BP Location: Right arm Right arm   Patient Position: Sitting    Cuff Size: Adult Regular    Pulse: 66    Resp: 18    SpO2: 94%    Weight: 72.6 kg (160 lb)    Height: 1.727 m (5' 8\")      Exam:   GENERAL APPEARANCE: Well developed, well nourished, alert, and in no apparent distress.  NECK: supple, no masses, no thyromegaly.  LYMPHATICS: No significant axillary, " cervical, or supraclavicular nodes.  RESP: good air flow throughout, - no crackles, rhonchi or wheezes.  CV: Normal S1, S2, regular rhythm, normal rate, no rub, no murmur,  no gallop, no LE edema.   ABDOMEN:  Bowel sounds normal, soft, nontender, no HSM or masses.   MS: extremities normal- no clubbing, no cyanosis.  PSYCH: mentation appears normal. and affect normal/bright  Results: I have reviewed all imaging, PFTs and other relavent tests, please see below for details, PFT and imaging results were reviewed with the patient.  PFTs: Normal spirometry with severe reduction in diffusing capacity.  There has been about a 400 cc drop in his FVC and about a 800 cc drop in his total lung capacity compared to prior.    Assessment and plan:  84-year-old male with Combined pulmonary fibrosis and emphysema.  I had a long discussion with the patient and his wife regarding the implications of his diagnosis.  I did discuss with the patient and his wife the rationale for regular follow-up.  We did also discuss the fact that the drop in his FVC could represent progression of his fibrotic disease and could prompt consideration of antifibrotic therapy.  However in talking with the patient and his wife they do not have any insurance coverage for prescriptions and therefore it seems unlikely that antifibrotic therapy would be an option for him.  The patient did not seem particularly interested in adding medications in any case.  We did again discuss the rationale for follow-up at this point the patient does not need supplemental oxygen based on his 6-minute walk test although that could change.  Ultimately we agreed to plan for a annual follow-up with pulmonary function test and a 6-minute walk.  I did instruct him to call sooner if they notice any changes in his breathing or if when monitoring his home oxygen saturations they are dropping significantly below 89%.    I spent 40 minutes on the date of the encounter doing chart review,  history and exam, interpretation of any new PFTs and imaging, documentation and further activities as noted above.          CBC   Recent Labs   Lab Test 09/19/24  1023 08/22/23  1127 08/17/21  1219 05/06/20  1103   RBC 3.72*  --   --  4.14*   HGB 13.1* 13.1*   < > 13.9   HCT 39.0*  --   --  42.0     --   --  191    < > = values in this interval not displayed.       Basic Metabolic Panel  Recent Labs   Lab Test 09/19/24  1023 08/22/23  1127    141   POTASSIUM 4.2 4.8   CHLORIDE 104 107   CO2 24 26   BUN 23.5* 27.9*   * 106*   MCKAYLA 8.8 9.0       INR  Recent Labs   Lab Test 10/16/24  0906 09/05/24  1029   INR 2.2* 2.7*       PFT      Latest Ref Rng & Units 11/19/2024     8:14 AM   PFT   FVC L 3.21  P   FEV1 L 2.34  P   FVC% % 93  P   FEV1% % 92  P      P Preliminary result           CC:        Again, thank you for allowing me to participate in the care of your patient.        Sincerely,        David Morris Perlman, MD

## 2024-11-19 NOTE — PROGRESS NOTES
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  The patient has not been on any treatment as he has not found inhalers to be helpful and he generally feels well.  He does have some dyspnea on exertion when hurrying or going up significant flights of stairs.  However overall he feels this is stable.  The patient is seen today with his wife.  Overall she feels he is relatively stable as well.  They are questioning the need for repeated visits given that there is no therapy for his lungs.  Otherwise no other new complaints today.      Current Outpatient Medications   Medication Sig Dispense Refill    ACETAMINOPHEN PO Take 1,000 mg by mouth 2 times daily.      albuterol (PROAIR HFA/PROVENTIL HFA/VENTOLIN HFA) 108 (90 Base) MCG/ACT inhaler Inhale 2 puffs into the lungs every 6 hours as needed for shortness of breath, wheezing or cough.      atorvastatin (LIPITOR) 40 MG tablet Take 1 tablet (40 mg) by mouth daily. 90 tablet 3    clobetasol propionate (OLUX) 0.05 % external foam Apply topically.      furosemide (LASIX) 40 MG tablet Take 1 tablet (40 mg) by mouth daily. (Patient taking differently: Take 40 mg by mouth daily. PRN) 90 tablet 0    Homeopathic Products (PROSACEA) GEL Externally apply topically as needed      latanoprost (XALATAN) 0.005 % ophthalmic solution Place 1 drop into both eyes At Bedtime      lisinopril (ZESTRIL) 40 MG tablet Take 1 tablet (40 mg) by mouth daily. 90 tablet 3    metoprolol tartrate (LOPRESSOR) 25 MG tablet Take 1.5 tablets (37.5 mg) by mouth 2 times daily 270 tablet 3    Multiple Vitamins-Minerals (MULTIVITAMIN PO) Take 1 tablet by mouth daily       Propylene Glycol (SYSTANE COMPLETE OP) Apply 10 mLs to eye      terazosin (HYTRIN) 5 MG capsule TAKE ONE CAPSULE BY MOUTH ONCE DAILY  AT BEDTIME 90 capsule 3    timolol, PF, (TIMOPTIC OCUDOSE) 0.5 % ophthalmic solution 5 mLs      warfarin ANTICOAGULANT (COUMADIN) 5 MG tablet 2.5 mg Tue, Thu, Sat; 5 mg all other days or as directed by INR clinic 90 tablet 1    FLUOCINOLONE ACETONIDE IO 60 mLs as needed (Patient not taking: Reported on 2024)       No current facility-administered medications for this visit.     No Known Allergies  Past Medical History:   Diagnosis Date    Arthritis Last 5 years or so    Basal cell carcinoma     BPH (benign prostatic hypertrophy)     COPD (chronic obstructive pulmonary disease) (H) last 1 year    Diverticulosis     Glaucoma     Hemorrhoids     HTN (hypertension)     Hyperlipidemia     Obesity     Persistent atrial fibrillation (H)     PVC (premature ventricular contraction)     Squamous cell carcinoma in situ of skin     Syncope 2016       Past Surgical History:   Procedure Laterality Date    BACK SURGERY      BIOPSY  20 yrs ago    prostate (benign)    COLONOSCOPY  2015    hx polyps    COLONOSCOPY N/A 2021    Procedure: COLONOSCOPY, FLEXIBLE, WITH LESION REMOVAL USING SNARE;  Surgeon: Eugene Burden MD;  Location:  GI    EYE SURGERY  abt 4 yrs    Cateract Surgery    SURGICAL HISTORY OF -       Laminectomy    SURGICAL HISTORY OF -       biopsy of prostate    TONSILLECTOMY & ADENOIDECTOMY         Social History     Socioeconomic History    Marital status:      Spouse name: Not on file    Number of children: Not on file    Years of education: Not on file    Highest education level: Not on file   Occupational History    Not on file   Tobacco Use    Smoking status: Former     Current packs/day: 0.00     Average packs/day: 2.0 packs/day for 35.0 years (70.0 ttl pk-yrs)     Types: Cigarettes     Start date: 1960     Quit date: 1995     Years since quittin.5    Smokeless tobacco: Never    Tobacco comments:     quit age 54   Substance and Sexual Activity    Alcohol use: Yes      Comment: 1-2 drinks per week    Drug use: No    Sexual activity: Not Currently     Partners: Female   Other Topics Concern     Service Not Asked    Blood Transfusions Not Asked    Caffeine Concern Yes     Comment: 2 cups coffee per day    Occupational Exposure Not Asked    Hobby Hazards Not Asked    Sleep Concern No    Stress Concern No    Weight Concern No    Special Diet Not Asked    Back Care Not Asked    Exercise No    Bike Helmet Not Asked    Seat Belt Not Asked    Self-Exams Not Asked    Parent/sibling w/ CABG, MI or angioplasty before 65F 55M? No   Social History Narrative    Not on file     Social Drivers of Health     Financial Resource Strain: Low Risk  (9/19/2024)    Financial Resource Strain     Within the past 12 months, have you or your family members you live with been unable to get utilities (heat, electricity) when it was really needed?: No   Food Insecurity: Low Risk  (9/19/2024)    Food Insecurity     Within the past 12 months, did you worry that your food would run out before you got money to buy more?: No     Within the past 12 months, did the food you bought just not last and you didn t have money to get more?: No   Transportation Needs: Low Risk  (9/19/2024)    Transportation Needs     Within the past 12 months, has lack of transportation kept you from medical appointments, getting your medicines, non-medical meetings or appointments, work, or from getting things that you need?: No   Physical Activity: Unknown (9/19/2024)    Exercise Vital Sign     Days of Exercise per Week: 1 day     Minutes of Exercise per Session: Patient declined   Stress: No Stress Concern Present (9/19/2024)    [employer] [place] [employer] - Occupational Stress Questionnaire     Feeling of Stress : Not at all   Social Connections: Unknown (9/19/2024)    Social Connection and Isolation Panel [NHANES]     Frequency of Communication with Friends and Family: Not on file     Frequency of Social Gatherings  "with Friends and Family: Never     Attends Latter-day Services: Not on file     Active Member of Clubs or Organizations: Not on file     Attends Club or Organization Meetings: Not on file     Marital Status: Not on file   Interpersonal Safety: Low Risk  (9/19/2024)    Interpersonal Safety     Do you feel physically and emotionally safe where you currently live?: Yes     Within the past 12 months, have you been hit, slapped, kicked or otherwise physically hurt by someone?: No     Within the past 12 months, have you been humiliated or emotionally abused in other ways by your partner or ex-partner?: No   Housing Stability: Low Risk  (9/19/2024)    Housing Stability     Do you have housing? : Yes     Are you worried about losing your housing?: No       Family History   Problem Relation Age of Onset    Cerebrovascular Disease Mother     Hypertension Mother     Cerebrovascular Disease Father     Myocardial Infarction Father     Hypertension Father     Hypertension Sister         Diabetis    Myocardial Infarction Sister     LUNG DISEASE No family hx of        ROS Pulmonary    A complete ROS was otherwise negative except as noted in the HPI.  Vitals:    11/19/24 0958 11/19/24 1000   BP: (!) 146/84 (!) 153/87   BP Location: Right arm Right arm   Patient Position: Sitting    Cuff Size: Adult Regular    Pulse: 66    Resp: 18    SpO2: 94%    Weight: 72.6 kg (160 lb)    Height: 1.727 m (5' 8\")      Exam:   GENERAL APPEARANCE: Well developed, well nourished, alert, and in no apparent distress.  NECK: supple, no masses, no thyromegaly.  LYMPHATICS: No significant axillary, cervical, or supraclavicular nodes.  RESP: good air flow throughout, - no crackles, rhonchi or wheezes.  CV: Normal S1, S2, regular rhythm, normal rate, no rub, no murmur,  no gallop, no LE edema.   ABDOMEN:  Bowel sounds normal, soft, nontender, no HSM or masses.   MS: extremities normal- no clubbing, no cyanosis.  PSYCH: mentation appears normal. and affect " normal/bright  Results: I have reviewed all imaging, PFTs and other relavent tests, please see below for details, PFT and imaging results were reviewed with the patient.  PFTs: Normal spirometry with severe reduction in diffusing capacity.  There has been about a 400 cc drop in his FVC and about a 800 cc drop in his total lung capacity compared to prior.    Assessment and plan:  84-year-old male with Combined pulmonary fibrosis and emphysema.  I had a long discussion with the patient and his wife regarding the implications of his diagnosis.  I did discuss with the patient and his wife the rationale for regular follow-up.  We did also discuss the fact that the drop in his FVC could represent progression of his fibrotic disease and could prompt consideration of antifibrotic therapy.  However in talking with the patient and his wife they do not have any insurance coverage for prescriptions and therefore it seems unlikely that antifibrotic therapy would be an option for him.  The patient did not seem particularly interested in adding medications in any case.  We did again discuss the rationale for follow-up at this point the patient does not need supplemental oxygen based on his 6-minute walk test although that could change.  Ultimately we agreed to plan for a annual follow-up with pulmonary function test and a 6-minute walk.  I did instruct him to call sooner if they notice any changes in his breathing or if when monitoring his home oxygen saturations they are dropping significantly below 89%.    I spent 40 minutes on the date of the encounter doing chart review, history and exam, interpretation of any new PFTs and imaging, documentation and further activities as noted above.          CBC   Recent Labs   Lab Test 09/19/24  1023 08/22/23  1127 08/17/21  1219 05/06/20  1103   RBC 3.72*  --   --  4.14*   HGB 13.1* 13.1*   < > 13.9   HCT 39.0*  --   --  42.0     --   --  191    < > = values in this interval not  displayed.       Basic Metabolic Panel  Recent Labs   Lab Test 09/19/24  1023 08/22/23  1127    141   POTASSIUM 4.2 4.8   CHLORIDE 104 107   CO2 24 26   BUN 23.5* 27.9*   * 106*   MCKAYLA 8.8 9.0       INR  Recent Labs   Lab Test 10/16/24  0906 09/05/24  1029   INR 2.2* 2.7*       PFT      Latest Ref Rng & Units 11/19/2024     8:14 AM   PFT   FVC L 3.21  P   FEV1 L 2.34  P   FVC% % 93  P   FEV1% % 92  P      P Preliminary result           CC:

## 2024-11-19 NOTE — PROGRESS NOTES
Aniket Macias comes into clinic today at the request of Dr Perlman , for PFT    Tolerated testing well. No adverse reactions. Left lab in no distress.        TAMIKO CORNELIUS

## 2024-11-19 NOTE — NURSING NOTE
"Chief Complaint   Patient presents with    Annual Visit     Interstitial lung disease       Vitals:    11/19/24 0958 11/19/24 1000   BP: (!) 146/84 (!) 153/87   BP Location: Right arm Right arm   Patient Position: Sitting    Cuff Size: Adult Regular    Pulse: 66    Resp: 18    SpO2: 94%    Weight: 72.6 kg (160 lb)    Height: 1.727 m (5' 8\")        Body mass index is 24.33 kg/m .     Kaya Boyer LPN  "

## 2024-11-19 NOTE — PROGRESS NOTES
Aniket Macias comes into clinic today at the request of Dr Perlman , for 6 minute walk    Tolerated testing well. No adverse reactions. Left lab in no distress.        TAMIKO CORNELIUS

## 2024-11-20 LAB
DLCOUNC-%PRED-PRE: 34 %
DLCOUNC-PRE: 8.27 ML/MIN/MMHG
DLCOUNC-PRED: 23.64 ML/MIN/MMHG
ERV-%PRED-PRE: 112 %
ERV-PRE: 1.4 L
ERV-PRED: 1.25 L
EXPTIME-PRE: 5.95 SEC
FEF2575-%PRED-PRE: 94 %
FEF2575-PRE: 1.66 L/SEC
FEF2575-PRED: 1.75 L/SEC
FEFMAX-%PRED-PRE: 106 %
FEFMAX-PRE: 6.91 L/SEC
FEFMAX-PRED: 6.51 L/SEC
FEV1-%PRED-PRE: 92 %
FEV1-PRE: 2.34 L
FEV1FEV6-PRE: 74 %
FEV1FEV6-PRED: 76 %
FEV1FVC-PRE: 73 %
FEV1FVC-PRED: 75 %
FEV1SVC-PRE: 71 %
FEV1SVC-PRED: 67 %
FIFMAX-PRE: 5.2 L/SEC
FRCPLETH-%PRED-PRE: 65 %
FRCPLETH-PRE: 2.53 L
FRCPLETH-PRED: 3.84 L
FVC-%PRED-PRE: 93 %
FVC-PRE: 3.21 L
FVC-PRED: 3.43 L
IC-%PRED-PRE: 75 %
IC-PRE: 1.89 L
IC-PRED: 2.5 L
RVPLETH-%PRED-PRE: 37 %
RVPLETH-PRE: 1.13 L
RVPLETH-PRED: 2.98 L
TLCPLETH-%PRED-PRE: 61 %
TLCPLETH-PRE: 4.42 L
TLCPLETH-PRED: 7.13 L
VA-%PRED-PRE: 67 %
VA-PRE: 4.21 L
VC-%PRED-PRE: 87 %
VC-PRE: 3.29 L
VC-PRED: 3.77 L

## 2024-11-26 ENCOUNTER — ANTICOAGULATION THERAPY VISIT (OUTPATIENT)
Dept: ANTICOAGULATION | Facility: CLINIC | Age: 84
End: 2024-11-26

## 2024-11-26 ENCOUNTER — LAB (OUTPATIENT)
Dept: LAB | Facility: CLINIC | Age: 84
End: 2024-11-26
Payer: COMMERCIAL

## 2024-11-26 DIAGNOSIS — I48.20 CHRONIC ATRIAL FIBRILLATION (H): ICD-10-CM

## 2024-11-26 DIAGNOSIS — Z79.01 LONG TERM CURRENT USE OF ANTICOAGULANTS WITH INR GOAL OF 2.0-3.0: Primary | ICD-10-CM

## 2024-11-26 DIAGNOSIS — Z79.01 LONG TERM CURRENT USE OF ANTICOAGULANTS WITH INR GOAL OF 2.0-3.0: ICD-10-CM

## 2024-11-26 LAB — INR BLD: 2.9 (ref 0.9–1.1)

## 2024-11-26 PROCEDURE — 36416 COLLJ CAPILLARY BLOOD SPEC: CPT

## 2024-11-26 PROCEDURE — 85610 PROTHROMBIN TIME: CPT

## 2024-11-26 NOTE — PROGRESS NOTES
ANTICOAGULATION MANAGEMENT     Aniket Macias 84 year old male is on warfarin with therapeutic INR result. (Goal INR 2.0-3.0)    Recent labs: (last 7 days)     11/26/24  0840   INR 2.9*       ASSESSMENT     Source(s): Chart Review  Previous INR was Therapeutic last 2(+) visits  Medication, diet, health changes since last INR chart reviewed; none identified         PLAN     Recommended plan for no diet, medication or health factor changes affecting INR     Dosing Instructions: Continue your current warfarin dose with next INR in 6 weeks       Summary  As of 11/26/2024      Full warfarin instructions:  5 mg every Mon, Wed, Fri; 2.5 mg all other days   Next INR check:  1/7/2025               Detailed voice message left for Lowell with dosing instructions and follow up date.     Contact [phone] to schedule and with any changes, questions or concerns.     Education provided: Please call back if any changes to your diet, medications or how you've been taking warfarin    Plan made per [facility] anticoagulation protocol    Radha Seymour RN  11/26/2024  Anticoagulation Clinic  [facility] for routing messages: p [facility]AG [place]  [facility] patient phone line: [phone]        _______________________________________________________________________     Anticoagulation Episode Summary       Current INR goal:  2.0-3.0   TTR:  75.4% (1 y)   Target end date:  Indefinite   Send INR reminders to:  [facility] [place]    Indications    Long term current use of anticoagulants with INR goal of 2.0-3.0 [Z79.01]  Chronic atrial fibrillation (H) [I48.20]             Comments:  --             Anticoagulation Care Providers       Provider Role Specialty Phone number    Basilio Gregorio MD Referring Family Medicine [phone]

## 2024-12-03 DIAGNOSIS — Z79.01 LONG TERM CURRENT USE OF ANTICOAGULANTS WITH INR GOAL OF 2.0-3.0: ICD-10-CM

## 2024-12-03 DIAGNOSIS — I48.0 PAROXYSMAL ATRIAL FIBRILLATION (H): ICD-10-CM

## 2024-12-03 RX ORDER — WARFARIN SODIUM 5 MG/1
TABLET ORAL
Qty: 90 TABLET | Refills: 1 | Status: SHIPPED | OUTPATIENT
Start: 2024-12-03

## 2024-12-03 NOTE — TELEPHONE ENCOUNTER
ANTICOAGULATION MANAGEMENT:  Medication Refill    Anticoagulation Summary  As of 11/26/2024      Warfarin maintenance plan:  5 mg (5 mg x 1) every Mon, Wed, Fri; 2.5 mg (5 mg x 0.5) all other days   Next INR check:  1/7/2025   Target end date:  Indefinite    Indications    Long term current use of anticoagulants with INR goal of 2.0-3.0 [Z79.01]  Chronic atrial fibrillation (H) [I48.20]                 Anticoagulation Care Providers       Provider Role Specialty Phone number    Basilio Gregorio MD Referring Family Medicine [phone]            Refill Criteria    Visit with referring provider/group: Meets criteria: visit within referring provider group in the last 15 months on 9/19/24    [facility] referral last signed: 07/09/2024; within last year:  Yes    Lab monitoring not exceeding 2 weeks overdue: Yes    Aniket meets all criteria for refill. Rx instructions and quantity supplied updated to match patient's current dosing plan. Warfarin 90 day supply with 1 refill granted per [facility] protocol     Radha Seymour RN  Anticoagulation Clinic

## 2025-01-07 ENCOUNTER — LAB (OUTPATIENT)
Dept: LAB | Facility: CLINIC | Age: 85
End: 2025-01-07
Payer: COMMERCIAL

## 2025-01-07 ENCOUNTER — ANTICOAGULATION THERAPY VISIT (OUTPATIENT)
Dept: ANTICOAGULATION | Facility: CLINIC | Age: 85
End: 2025-01-07

## 2025-01-07 DIAGNOSIS — Z79.01 LONG TERM CURRENT USE OF ANTICOAGULANTS WITH INR GOAL OF 2.0-3.0: Primary | ICD-10-CM

## 2025-01-07 DIAGNOSIS — I48.20 CHRONIC ATRIAL FIBRILLATION (H): ICD-10-CM

## 2025-01-07 DIAGNOSIS — Z79.01 LONG TERM CURRENT USE OF ANTICOAGULANTS WITH INR GOAL OF 2.0-3.0: ICD-10-CM

## 2025-01-07 LAB — INR BLD: 2.2 (ref 0.9–1.1)

## 2025-01-07 PROCEDURE — 36416 COLLJ CAPILLARY BLOOD SPEC: CPT

## 2025-01-07 PROCEDURE — 85610 PROTHROMBIN TIME: CPT

## 2025-01-07 NOTE — PROGRESS NOTES
ANTICOAGULATION MANAGEMENT     Aniket Macias 84 year old male is on warfarin with therapeutic INR result. (Goal INR 2.0-3.0)    Recent labs: (last 7 days)     01/07/25  1009   INR 2.2*       ASSESSMENT     Source(s): Chart Review  Previous INR was Therapeutic last 2(+) visits  Medication, diet, health changes since last INR chart reviewed; none identified         PLAN     Recommended plan for no diet, medication or health factor changes affecting INR     Dosing Instructions: Continue your current warfarin dose with next INR in 6 weeks       Summary  As of 1/7/2025      Full warfarin instructions:  5 mg every Mon, Wed, Fri; 2.5 mg all other days   Next INR check:  2/18/2025               Detailed voice message left for Lowell with dosing instructions and follow up date. Also, MyC message sent.    Contact [phone] to schedule and with any changes, questions or concerns.     Education provided: Please call back if any changes to your diet, medications or how you've been taking warfarin  Contact [phone] with any changes, questions or concerns.     Plan made per [facility] anticoagulation protocol    Jeanette Bender RN  1/7/2025  Anticoagulation Clinic  [employer] for routing messages: p [facility] [place]  [facility] patient phone line: [phone]        _______________________________________________________________________     Anticoagulation Episode Summary       Current INR goal:  2.0-3.0   TTR:  75.4% (1 y)   Target end date:  Indefinite   Send INR reminders to:  [facility] [place]    Indications    Long term current use of anticoagulants with INR goal of 2.0-3.0 [Z79.01]  Chronic atrial fibrillation (H) [I48.20]             Comments:  --             Anticoagulation Care Providers       Provider Role Specialty Phone number    Basilio Gregorio MD Referring Family Medicine [phone]

## 2025-02-18 ENCOUNTER — ANTICOAGULATION THERAPY VISIT (OUTPATIENT)
Dept: ANTICOAGULATION | Facility: CLINIC | Age: 85
End: 2025-02-18

## 2025-02-18 ENCOUNTER — LAB (OUTPATIENT)
Dept: LAB | Facility: CLINIC | Age: 85
End: 2025-02-18
Payer: COMMERCIAL

## 2025-02-18 DIAGNOSIS — I48.20 CHRONIC ATRIAL FIBRILLATION (H): ICD-10-CM

## 2025-02-18 DIAGNOSIS — Z79.01 LONG TERM CURRENT USE OF ANTICOAGULANTS WITH INR GOAL OF 2.0-3.0: Primary | ICD-10-CM

## 2025-02-18 DIAGNOSIS — Z79.01 LONG TERM CURRENT USE OF ANTICOAGULANTS WITH INR GOAL OF 2.0-3.0: ICD-10-CM

## 2025-02-18 LAB — INR BLD: 2 (ref 0.9–1.1)

## 2025-02-18 PROCEDURE — 36416 COLLJ CAPILLARY BLOOD SPEC: CPT

## 2025-02-18 PROCEDURE — 85610 PROTHROMBIN TIME: CPT

## 2025-02-18 NOTE — PROGRESS NOTES
ANTICOAGULATION MANAGEMENT     Aniket Macias 84 year old male is on warfarin with therapeutic INR result. (Goal INR 2.0-3.0)    Recent labs: (last 7 days)     02/18/25  0914   INR 2.0*       ASSESSMENT     Source(s): Chart Review and Patient/Caregiver Call     Warfarin doses taken: Warfarin taken as instructed  Diet: No new diet changes identified  Medication/supplement changes: None noted  New illness, injury, or hospitalization: No  Signs or symptoms of bleeding or clotting: No  Previous result: Therapeutic last 2(+) visits  Additional findings: None       PLAN     Recommended plan for no diet, medication or health factor changes affecting INR     Dosing Instructions: Continue your current warfarin dose with next INR in 6 weeks       Summary  As of 2/18/2025      Full warfarin instructions:  5 mg every Mon, Wed, Fri; 2.5 mg all other days   Next INR check:  4/1/2025               Telephone call with Lowell who verbalizes understanding and agrees to plan and who agrees to plan and repeated back plan correctly    Lab visit scheduled    Education provided: None required    Plan made per [facility] anticoagulation protocol    Renu Stiles RN  2/18/2025  Anticoagulation Clinic  [facility] for routing messages: p [facility] [place]  [facility] patient phone line: [phone]        _______________________________________________________________________     Anticoagulation Episode Summary       Current INR goal:  2.0-3.0   TTR:  83.6% (1 y)   Target end date:  Indefinite   Send INR reminders to:  [facility] [place]    Indications    Long term current use of anticoagulants with INR goal of 2.0-3.0 [Z79.01]  Chronic atrial fibrillation (H) [I48.20]             Comments:  --             Anticoagulation Care Providers       Provider Role Specialty Phone number    Basilio Gregorio MD Referring Family Medicine [phone]

## 2025-04-01 ENCOUNTER — ANTICOAGULATION THERAPY VISIT (OUTPATIENT)
Dept: ANTICOAGULATION | Facility: CLINIC | Age: 85
End: 2025-04-01

## 2025-04-01 ENCOUNTER — LAB (OUTPATIENT)
Dept: LAB | Facility: CLINIC | Age: 85
End: 2025-04-01
Payer: COMMERCIAL

## 2025-04-01 DIAGNOSIS — I48.20 CHRONIC ATRIAL FIBRILLATION (H): ICD-10-CM

## 2025-04-01 DIAGNOSIS — Z79.01 LONG TERM CURRENT USE OF ANTICOAGULANTS WITH INR GOAL OF 2.0-3.0: ICD-10-CM

## 2025-04-01 DIAGNOSIS — Z79.01 LONG TERM CURRENT USE OF ANTICOAGULANTS WITH INR GOAL OF 2.0-3.0: Primary | ICD-10-CM

## 2025-04-01 LAB — INR BLD: 1.9 (ref 0.9–1.1)

## 2025-04-01 PROCEDURE — 36416 COLLJ CAPILLARY BLOOD SPEC: CPT

## 2025-04-01 PROCEDURE — 85610 PROTHROMBIN TIME: CPT

## 2025-04-01 NOTE — PROGRESS NOTES
ANTICOAGULATION MANAGEMENT     Aniket Macias 84 year old male is on warfarin with subtherapeutic INR result. (Goal INR 2.0-3.0)    Recent labs: (last 7 days)     04/01/25  0915   INR 1.9*       ASSESSMENT     Source(s): Chart Review  Previous INR was Therapeutic last 2(+) visits but has been trending downward  Medication, diet, health changes since last INR chart reviewed; none identified         PLAN     Unable to reach Lowell today.    Left message to continue current dose of warfarin 2.5 mg tonight. Request call back for assessment.    Follow up required to confirm warfarin dose taken and assess for changes and discuss out of range result     Allison De La Cruz, RN  4/1/2025  Anticoagulation Clinic  [employer] for routing messages: p [facility] [place]  [facility] patient phone line: [phone]

## 2025-04-02 NOTE — PROGRESS NOTES
ANTICOAGULATION MANAGEMENT     Aniket Macias 84 year old male is on warfarin with subtherapeutic INR result. (Goal INR 2.0-3.0)    Recent labs: (last 7 days)     04/01/25  0915   INR 1.9*       ASSESSMENT     Source(s): Chart Review and Patient/Caregiver Call     Warfarin doses taken: Warfarin taken as instructed  Diet: No new diet changes identified  Medication/supplement changes: None noted  New illness, injury, or hospitalization: No  Signs or symptoms of bleeding or clotting: No  Previous result: Therapeutic last 2(+) visits  Additional findings: None       PLAN     Recommended plan for no diet, medication or health factor changes affecting INR     Dosing Instructions: Continue your current warfarin dose with next INR in 2 weeks       Summary  As of 4/1/2025      Full warfarin instructions:  5 mg every Mon, Wed, Fri; 2.5 mg all other days   Next INR check:  4/15/2025               Telephone call with Lowell who verbalizes understanding and agrees to plan    Lab visit scheduled    Education provided: Please call back if any changes to your diet, medications or how you've been taking warfarin    Plan made per [facility] anticoagulation protocol    Radha Seymour RN  4/2/2025  Anticoagulation Clinic  [employer] for routing messages: p [facility] [place]  [facility] patient phone line: [phone]        _______________________________________________________________________     Anticoagulation Episode Summary       Current INR goal:  2.0-3.0   TTR:  77.7% (1 y)   Target end date:  Indefinite   Send INR reminders to:  [facility] [place]    Indications    Long term current use of anticoagulants with INR goal of 2.0-3.0 [Z79.01]  Chronic atrial fibrillation (H) [I48.20]             Comments:  --             Anticoagulation Care Providers       Provider Role Specialty Phone number    Basilio Gregorio MD Referring Family Medicine [phone]

## 2025-04-09 ENCOUNTER — TELEPHONE (OUTPATIENT)
Dept: FAMILY MEDICINE | Facility: CLINIC | Age: 85
End: 2025-04-09
Payer: COMMERCIAL

## 2025-04-09 NOTE — TELEPHONE ENCOUNTER
"Pt sister-in-law calling stating that pt has alzheimer's but no dx. Christal is calling because if wife calls pt will get violent and hurt her. Pt refuses to go to the doctor because he thinks the doctor will take away his driving privileges (Christal states this should be done as he is not safe).     Christal is requesting if there is a way we can try and get pt to the clinic for an appt to be reassessed and have a SLUMs test completed.     She states that the pt \"can't even make toast, does not know nouns\" She states he can function and perform when needed but doesn't last long.     She reports that the pt is violent towards wife and wife has tried calling the police but they cannot pull him over or do anything as there is no dx and has a valid DL. Wife is too afraid to call the clinic.     Please advise on next steps. Looks like pt does have a lab appt next week, possibly be able to be seen for an appt that same day?     Lia Burrell RN        "

## 2025-04-14 NOTE — TELEPHONE ENCOUNTER
Christal called back to follow up on this. No c2c so no information was given out.     Christal reports- pt has alzheimer's but no dx. Pts wife just had a stroke and can't drive. Pt will drive 6 blocks down the [address] to the dentist appts etc. Per Christal- pt is not safe driving.     Christal stated pt will become violent towards his wife if she calls the clinic or knows she is involved in this.     Christal is asking that we call pt and tell him he is overdue for his appt and needs to come in so he can get his meds refilled or else he wont come to the appt.     Routing to PCP to review and advise next steps.

## 2025-04-14 NOTE — TELEPHONE ENCOUNTER
PCP is out of clinic until next week. Last OV was 9/2024 and no documented follow-up found in note; cannot authorize same day with PCP without PCP approval at this time. PCP will follow-up next week upon his return.    In the meantime, recommend she contact the police for impaired  if patient leaves home and is driving.    Recommend she file vulnerable adult report with [facility] re: violence toward wife. Recommend she also report this to wife's PCP.

## 2025-04-14 NOTE — TELEPHONE ENCOUNTER
"No consent to communicate on file to speak with daughter.    Daughter given message about contacting the police for impaired  when the patient leaves home. Christal states that her mother already tried that and she was told by the police that they don't have the man power to do that without a diagnosis.    Daughter states that they don't want to file a vulnerable adult for her mother. She states that if the patient had the right medication to \"mellow him out\" the patient and her mother could continue to live together. States that when the patient has thrown things he does not throw them at his wife. If she tells him she doesn't want to ride in the car with him he sits on the couch and pouts. States that he is verbally abusive.     Daughter understands that she cannot schedule the patient. That is why she is really hoping that someone can call him and tell him he is due to a medication check. She states that he is all about being able to continue his medication. States that if he was given a SLUMS test there is no way he would pass. States that the patient is very good at hiding his memory problems but states that he struggles to find the right noun to use.     Informed the daughter that we will have to wait for PCP to return before trying to schedule the patient. Shazia Marquez RN    " "Subjective   Nesha Kee is a 56 y.o. female.   Chief Complaint   Patient presents with   • Hyperlipidemia     management   • Pain     right hip       History of Present Illness     Nesha is a 56 year old female who present for hyperlipidemia and vitamin D management,.  She has not been taking her cholesterol medication everyday.  She forgot to take on her vacation.   She has not taken Vitamin D since late winter.  Nesha is still having right hip pain.   She had a normal hip x-ray in January 2017.  Now she is having right foot pain with the hip pain.  She hears a\"popping' in her right hip.  Denied any recent falls/injury to hip/foot.  Nesha take OTC Ibuprofen occassionally which helps.  States the pain waxes and wanes but has begun to be more often.   Nesha states the pain is worse with walking.  She declined Physical therapy in January but would like to do so now.  The pain is a dull ache.      The following portions of the patient's history were reviewed and updated as appropriate: allergies, current medications, past family history, past medical history, past social history and past surgical history.    Review of Systems   Constitutional: Negative.    HENT: Negative.    Eyes: Negative.    Respiratory: Negative.  Negative for cough, shortness of breath and wheezing.    Cardiovascular: Negative.  Negative for chest pain, palpitations and leg swelling.   Gastrointestinal: Negative.    Endocrine: Negative.    Genitourinary: Negative.    Musculoskeletal: Negative.    Skin: Negative.    Allergic/Immunologic: Negative.    Neurological: Negative.    Hematological: Negative.    Psychiatric/Behavioral: Negative.    All other systems reviewed and are negative.    Vitals:    07/12/17 0840   BP: 128/88   BP Location: Right arm   Patient Position: Sitting   Cuff Size: Adult   Pulse: 67   Resp: 16   Temp: 98.4 °F (36.9 °C)   TempSrc: Oral   SpO2: 98%   Weight: 180 lb (81.6 kg)   Height: 65\" (165.1 cm)       Wt Readings from " Last 3 Encounters:   07/12/17 180 lb (81.6 kg)   01/16/17 181 lb (82.1 kg)   11/30/16 178 lb (80.7 kg)       BP Readings from Last 3 Encounters:   07/12/17 128/88   01/16/17 118/80   11/30/16 110/72     Body mass index is 29.95 kg/(m^2).  Allergies   Allergen Reactions   • Iodinated Diagnostic Agents    • Other      Iv contrast   • Sulfa Antibiotics        Objective   Physical Exam   Constitutional: She is oriented to person, place, and time. Vital signs are normal. She appears well-developed and well-nourished.   Neck: Trachea normal and phonation normal. Neck supple. Carotid bruit is not present. No edema present.   Cardiovascular: Normal rate, regular rhythm, S1 normal, S2 normal, normal heart sounds and normal pulses.    Pulmonary/Chest: Effort normal and breath sounds normal.   Abdominal: Soft. Normal appearance and bowel sounds are normal. There is no hepatomegaly. There is no tenderness.   Musculoskeletal:        Right hip: She exhibits tenderness. She exhibits normal range of motion, normal strength and no bony tenderness.        Legs:  Neurological: She is alert and oriented to person, place, and time.   Skin: Skin is warm, dry and intact.   Psychiatric: She has a normal mood and affect. Her speech is normal and behavior is normal. Judgment and thought content normal. Cognition and memory are normal.       Assessment/Plan   Nesha was seen today for hyperlipidemia and pain.    Diagnoses and all orders for this visit:    Mixed hyperlipidemia    Vitamin D deficiency  -     vitamin D (ERGOCALCIFEROL) 46910 UNITS capsule capsule; Take 1 capsule by mouth 1 (One) Time Per Week.    Right hip pain  -     Ambulatory Referral to Physical Therapy Evaluate and treat    1. Chronic  Hyperlipidemia: I have reviewed Nesha's lab work with her at today's office visit.  She has been off of her cholesterol medicines for the past week or so.  I have stressed to her the importance of taking her medication regularly.  She will  also make dietary changes.  Nesha will return to office in 6 months for fasting blood work.  2.  Chronic vitamin D deficiency: I have reviewed her vitamin D results with her at today's office visit.  She is not deficient in vitamin D.  I have sent a prescription for vitamin D3 50,000 international units to take 1 tablet weekly.  Nesha will return to office in 6 months for a vitamin D level.  3.  Chronic right hip pain: Nesha is still having right hip pain that is now radiating to her right foot.  I suspect her foot pain is related to her hip.  I will schedule a physical therapy appointment for further evaluation.  If she has no improvement will schedule an appointment with orthopedist.          Orders Only on 07/03/2017   Component Date Value Ref Range Status   • WBC 07/05/2017 4.70* 4.80 - 10.80 10*3/mm3 Final   • RBC 07/05/2017 4.55  4.20 - 5.40 10*6/mm3 Final   • Hemoglobin 07/05/2017 14.0  12.0 - 16.0 g/dL Final   • Hematocrit 07/05/2017 41.2  37.0 - 47.0 % Final   • MCV 07/05/2017 90.5  81.0 - 99.0 fL Final   • MCH 07/05/2017 30.8  27.0 - 31.0 pg Final   • MCHC 07/05/2017 34.0  31.0 - 37.0 g/dL Final   • RDW 07/05/2017 12.5  11.5 - 14.5 % Final   • Platelets 07/05/2017 268  140 - 500 10*3/mm3 Final   • Neutrophil Rel % 07/05/2017 60.8  45.0 - 70.0 % Final   • Lymphocyte Rel % 07/05/2017 27.9  20.0 - 45.0 % Final   • Monocyte Rel % 07/05/2017 6.4  3.0 - 8.0 % Final   • Eosinophil Rel % 07/05/2017 3.4  0.0 - 4.0 % Final   • Basophil Rel % 07/05/2017 1.3  0.0 - 2.0 % Final   • Neutrophils Absolute 07/05/2017 2.86  1.50 - 8.30 10*3/mm3 Final   • Lymphocytes Absolute 07/05/2017 1.31  0.60 - 4.80 10*3/mm3 Final   • Monocytes Absolute 07/05/2017 0.30  0.00 - 1.00 10*3/mm3 Final   • Eosinophils Absolute 07/05/2017 0.16  0.10 - 0.30 10*3/mm3 Final   • Basophils Absolute 07/05/2017 0.06  0.00 - 0.20 10*3/mm3 Final   • Immature Granulocyte Rel % 07/05/2017 0.2  0.0 - 0.5 % Final   • Immature Grans Absolute 07/05/2017 0.01   0.00 - 0.03 10*3/mm3 Final   • nRBC 07/05/2017 0.0  0.0 - 0.0 /100 WBC Final   • Glucose 07/05/2017 94  65 - 99 mg/dL Final   • BUN 07/05/2017 11  6 - 20 mg/dL Final   • Creatinine 07/05/2017 0.82  0.57 - 1.00 mg/dL Final   • eGFR Non  Am 07/05/2017 72  >60 mL/min/1.73 Final   • eGFR African Am 07/05/2017 87  >60 mL/min/1.73 Final   • BUN/Creatinine Ratio 07/05/2017 13.4  7.0 - 25.0 Final   • Sodium 07/05/2017 139  136 - 145 mmol/L Final   • Potassium 07/05/2017 4.0  3.5 - 5.2 mmol/L Final   • Chloride 07/05/2017 100  98 - 107 mmol/L Final   • Total CO2 07/05/2017 25.5  22.0 - 29.0 mmol/L Final   • Calcium 07/05/2017 9.1  8.6 - 10.5 mg/dL Final   • Total Protein 07/05/2017 6.8  6.0 - 8.5 g/dL Final   • Albumin 07/05/2017 4.40  3.50 - 5.20 g/dL Final   • Globulin 07/05/2017 2.4  gm/dL Final   • A/G Ratio 07/05/2017 1.8  g/dL Final   • Total Bilirubin 07/05/2017 0.4  0.2 - 1.2 mg/dL Final   • Alkaline Phosphatase 07/05/2017 53  40 - 129 U/L Final   • AST (SGOT) 07/05/2017 17  5 - 32 U/L Final   • ALT (SGPT) 07/05/2017 21  5 - 33 U/L Final   • Total Cholesterol 07/05/2017 197  0 - 200 mg/dL Final   • Triglycerides 07/05/2017 202* 0 - 150 mg/dL Final   • HDL Cholesterol 07/05/2017 60  40 - 60 mg/dL Final   • VLDL Cholesterol 07/05/2017 40.4* 7 - 27 mg/dL Final   • LDL Cholesterol  07/05/2017 97  0 - 100 mg/dL Final   • LDL/HDL Ratio 07/05/2017 1.61   Final   • TSH 07/05/2017 1.640  0.270 - 4.200 mIU/mL Final   • 25 Hydroxy, Vitamin D 07/05/2017 19.0  ng/mL Final    Comment: Reference Range for Total Vitamin D 25(OH)  Deficiency    <20.0 ng/mL  Insufficiency 21-29 ng/mL  Sufficiency   30-74 ng/mL       Dragon transcription disclaimer    Much of this encounter note is an electronic transcription/translation of spoken language to printed text.  The electronic translation of spoken language may permit erroneous, or at times, nonsensical words or phrases to be inadvertently transcribed.  Although I have reviewed the  note for such errors, some may still exist.    Sari Yanes PA-C  Family Practice

## 2025-04-15 ENCOUNTER — LAB (OUTPATIENT)
Dept: LAB | Facility: CLINIC | Age: 85
End: 2025-04-15
Payer: COMMERCIAL

## 2025-04-15 ENCOUNTER — ANTICOAGULATION THERAPY VISIT (OUTPATIENT)
Dept: ANTICOAGULATION | Facility: CLINIC | Age: 85
End: 2025-04-15

## 2025-04-15 DIAGNOSIS — I48.20 CHRONIC ATRIAL FIBRILLATION (H): ICD-10-CM

## 2025-04-15 DIAGNOSIS — Z79.01 LONG TERM CURRENT USE OF ANTICOAGULANTS WITH INR GOAL OF 2.0-3.0: ICD-10-CM

## 2025-04-15 DIAGNOSIS — Z79.01 LONG TERM CURRENT USE OF ANTICOAGULANTS WITH INR GOAL OF 2.0-3.0: Primary | ICD-10-CM

## 2025-04-15 LAB — INR BLD: 2 (ref 0.9–1.1)

## 2025-04-15 PROCEDURE — 85610 PROTHROMBIN TIME: CPT

## 2025-04-15 PROCEDURE — 36416 COLLJ CAPILLARY BLOOD SPEC: CPT

## 2025-04-15 NOTE — TELEPHONE ENCOUNTER
Dr. Gregorio, I saw this documentation while doing chart review for Lowell's INR today. I wanted to add that the last several times I have spoken to Lowell, we have a very difficult time communicating with each other. He is so confused and really struggles to get words out. He seems to have a hard time answering direct questions. I am concerned about his cognitive decline and ability to care for himself.     Ellyn Seymour RN   [facility] Anticoagulation Clinic

## 2025-04-15 NOTE — PROGRESS NOTES
ANTICOAGULATION MANAGEMENT     Aniket Macias 84 year old male is on warfarin with therapeutic INR result. (Goal INR 2.0-3.0)    Recent labs: (last 7 days)     04/15/25  1025   INR 2.0*       ASSESSMENT     Source(s): Chart Review and Patient/Caregiver Call     Warfarin doses taken: Warfarin taken as instructed  Diet:  Patient had been eating greens thinking that they increased his INR. We discussed that vitamin K has the opposite effect . Patient would rather decrease greens than make a dose change.  Medication/supplement changes: None noted  New illness, injury, or hospitalization: No  Signs or symptoms of bleeding or clotting: No  Previous result: Subtherapeutic  Additional findings:  None       PLAN     Recommended plan for no diet, medication or health factor changes affecting INR     Dosing Instructions: Continue your current warfarin dose with next INR in 3 weeks       Summary  As of 4/15/2025      Full warfarin instructions:  5 mg every Mon, Wed, Fri; 2.5 mg all other days   Next INR check:  5/13/2025               Telephone call with Lowell who verbalizes understanding and agrees to plan    Patient elected to schedule next visit 4 weeks    Education provided: Please call back if any changes to your diet, medications or how you've been taking warfarin    Plan made per [facility] anticoagulation protocol    Radha Seymour RN  4/15/2025  Anticoagulation Clinic  [employer] for routing messages: p AN[facility]AG [place]  [facility] patient phone line: [phone]        _______________________________________________________________________     Anticoagulation Episode Summary       Current INR goal:  2.0-3.0   TTR:  74.0% (1 y)   Target end date:  Indefinite   Send INR reminders to:  [facility] [place]    Indications    Long term current use of anticoagulants with INR goal of 2.0-3.0 [Z79.01]  Chronic atrial fibrillation (H) [I48.20]             Comments:  --             Anticoagulation Care Providers       Provider  Role Specialty Phone number    Basilio Gregorio MD Referring Family Medicine [phone]

## 2025-04-22 ENCOUNTER — TELEPHONE (OUTPATIENT)
Dept: FAMILY MEDICINE | Facility: CLINIC | Age: 85
End: 2025-04-22
Payer: COMMERCIAL

## 2025-04-22 NOTE — TELEPHONE ENCOUNTER
"When asked for pt's birthday \"June 9, 2, 1940\"    \"What's the 6 minutes, got the one year, and got the one thing that they just do the small thing but it's only for 12 minutes months and that one until July maybe. Then what do they do for the next one in July. I should look for the  and 9/19 so that is when I'm supposed to be having that. Now there's another one that you can have also under the.... you can have and check it... it's just a total thing and I don't know when I would have had that.\"     Pt was having a hard time with nouns during discussion, communication seemed to be slow and take a lot of time to pick the right words. Overall pt was able to follow the conversation and knew his annual was in September and was questioning why he needed a 6 month follow up, but it was noticeable that it was taking effort and some nouns pt used incorrectly in context.    RN explained that this was a 6 month follow up request to make sure all is well with labs and medications are good and at correct doses. Patient initially declined but then decided to schedule. Pt agreed to follow up visit and was scheduled when pt was available 4/30 with PCP.      [facility] filed.        Caroline Godinez RN    "

## 2025-04-22 NOTE — TELEPHONE ENCOUNTER
Patient Quality Outreach    Patient is due for the following:   Hypertension -  Hypertension follow-up visit    Action(s) Taken:   Patient has upcoming appointment, these items will be addressed at that time.    Type of outreach:    Chart review performed, no outreach needed.    Questions for provider review:    None         Imani Parr, HUNG  Chart routed to None.

## 2025-04-22 NOTE — TELEPHONE ENCOUNTER
I would suggest if possible set up a follow-up appointment so that we can assess the situation and see what further evaluation needs to be done.  If it is an immediate danger I would suggest they can contact the police and take him to the ER for evaluation.  Okay to use same-day slot over the next week .

## 2025-04-30 ENCOUNTER — OFFICE VISIT (OUTPATIENT)
Dept: FAMILY MEDICINE | Facility: CLINIC | Age: 85
End: 2025-04-30
Payer: COMMERCIAL

## 2025-04-30 VITALS
TEMPERATURE: 97.6 F | SYSTOLIC BLOOD PRESSURE: 128 MMHG | OXYGEN SATURATION: 97 % | RESPIRATION RATE: 17 BRPM | HEIGHT: 68 IN | WEIGHT: 153 LBS | BODY MASS INDEX: 23.19 KG/M2 | HEART RATE: 88 BPM | DIASTOLIC BLOOD PRESSURE: 76 MMHG

## 2025-04-30 DIAGNOSIS — I11.0 HYPERTENSIVE HEART DISEASE WITH HEART FAILURE (H): ICD-10-CM

## 2025-04-30 DIAGNOSIS — R41.3 MEMORY DEFICIT: ICD-10-CM

## 2025-04-30 DIAGNOSIS — J43.9 PULMONARY EMPHYSEMA, UNSPECIFIED EMPHYSEMA TYPE (H): ICD-10-CM

## 2025-04-30 DIAGNOSIS — R45.86 MOOD CHANGES: ICD-10-CM

## 2025-04-30 DIAGNOSIS — I10 BENIGN ESSENTIAL HYPERTENSION: Primary | ICD-10-CM

## 2025-04-30 DIAGNOSIS — J84.112 UIP (USUAL INTERSTITIAL PNEUMONITIS) (H): ICD-10-CM

## 2025-04-30 DIAGNOSIS — I48.91 ATRIAL FIBRILLATION, UNSPECIFIED TYPE (H): ICD-10-CM

## 2025-04-30 LAB
ERYTHROCYTE [DISTWIDTH] IN BLOOD BY AUTOMATED COUNT: 13 % (ref 10–15)
HCT VFR BLD AUTO: 40.9 % (ref 40–53)
HGB BLD-MCNC: 13.7 G/DL (ref 13.3–17.7)
MCH RBC QN AUTO: 35.2 PG (ref 26.5–33)
MCHC RBC AUTO-ENTMCNC: 33.5 G/DL (ref 31.5–36.5)
MCV RBC AUTO: 105 FL (ref 78–100)
PLATELET # BLD AUTO: 209 10E3/UL (ref 150–450)
RBC # BLD AUTO: 3.89 10E6/UL (ref 4.4–5.9)
WBC # BLD AUTO: 8.9 10E3/UL (ref 4–11)

## 2025-04-30 ASSESSMENT — PAIN SCALES - GENERAL: PAINLEVEL_OUTOF10: NO PAIN (0)

## 2025-04-30 NOTE — PROGRESS NOTES
Assessment & Plan     Benign essential hypertension    - BASIC METABOLIC PANEL; Future    Pulmonary emphysema, unspecified emphysema type (H)  Pulmonology    UIP (usual interstitial pneumonitis) (H)  Follow-up with    Atrial fibrillation, unspecified type (H)  On warfarin.    Mood changes  No acute changes however some frustration getting older and some memory deficits.  Declines any depression.    Hypertensive heart disease with heart failure (H)  Blood pressure is stable currently on metoprolol lisinopril IR 10 and currently stable blood pressure no edema.  Can use Lasix as needed.  - BASIC METABOLIC PANEL; Future  - CBC with platelets; Future  - TSH; Future    Memory deficit  Ongoing memory deficits.  Family also noticed over the last few months memory has been declining.  He is oriented to time and place but sometime not very oriented to person which frustrates him.  I suggested he should get a thorough evaluation from neurology about memory issues.  Based on their recommendation he will follow-up with them.  Referral provided meanwhile we will get some blood work.  I would suggest family to visit with him next time if they have more concerns so that we can discuss that in detail.  - Adult Neurology  Referral; Future  - TSH; Future        The longitudinal plan of care for the diagnosis(es)/condition(s) as documented were addressed during this visit. Due to the added complexity in care, I will continue to support Lowell in the subsequent management and with ongoing continuity of care.          Subjective   Lowell is a 84 year old, presenting for the following health issues:  No chief complaint on file.        4/30/2025    10:14 AM   Additional Questions   Roomed by Jo MCKEON      Pt is here for follow up. Pt's family is concerned for memory decline.  Patient is a [age]-year-old male with underlying history of atrial fibrillation high blood pressure as well as pulmonary issues.  Lately he noticed some  decline in the memory and some mood issues however he is able to show me all the medications which he is taking with a list.  He has issues with forgetting names and continuing the conversations.  But memory has been declining which has caused some frustration.  No acute changes in health.  Denies any chest pains no shortness of breath.  He is up to date on his INR checks.  Hyperlipidemia Follow-Up    Are you regularly taking any medication or supplement to lower your cholesterol?   Yes- atorvastatin  Are you having muscle aches or other side effects that you think could be caused by your cholesterol lowering medication?  No    Hypertension Follow-up    Do you check your blood pressure regularly outside of the clinic? No   Are you following a low salt diet? No  Are your blood pressures ever more than 140 on the top number (systolic) OR more   than 90 on the bottom number (diastolic), for example 140/90? N/A    BP Readings from Last 2 Encounters:   04/30/25 128/76   04/25/25 130/75     How many servings of fruits and vegetables do you eat daily?  0-1  On average, how many sweetened beverages do you drink each day (Examples: soda, juice, sweet tea, etc.  Do NOT count diet or artificially sweetened beverages)?   1  How many days per week do you exercise enough to make your heart beat faster? 3 or less  How many minutes a day do you exercise enough to make your heart beat faster? 10 - 19  How many days per week do you miss taking your medication? 0        Review of Systems  Constitutional, HEENT, cardiovascular, pulmonary, gi and gu systems are negative, except as otherwise noted.      Objective    There were no vitals taken for this visit.  There is no height or weight on file to calculate BMI.  Physical Exam   GENERAL: alert and no distress  NECK: no adenopathy, no asymmetry, masses, or scars  RESP: lungs clear to auscultation - no rales, rhonchi or wheezes  CV: regular rate and rhythm, normal S1 S2, no S3 or S4, no  murmur, click or rub, no peripheral edema  ABDOMEN: soft, nontender, no hepatosplenomegaly, no masses and bowel sounds normal  Short-term memory as well as long-term memory deficit noted.  Oriented to time and place.  Declined mini cog exam.          Signed Electronically by: Basilio Gregorio MD

## 2025-05-01 ENCOUNTER — PATIENT OUTREACH (OUTPATIENT)
Dept: CARE COORDINATION | Facility: CLINIC | Age: 85
End: 2025-05-01
Payer: COMMERCIAL

## 2025-05-01 LAB
ANION GAP SERPL CALCULATED.3IONS-SCNC: 11 MMOL/L (ref 7–15)
BUN SERPL-MCNC: 30.4 MG/DL (ref 8–23)
CALCIUM SERPL-MCNC: 9.1 MG/DL (ref 8.8–10.4)
CHLORIDE SERPL-SCNC: 106 MMOL/L (ref 98–107)
CREAT SERPL-MCNC: 1.11 MG/DL (ref 0.67–1.17)
EGFRCR SERPLBLD CKD-EPI 2021: 65 ML/MIN/1.73M2
GLUCOSE SERPL-MCNC: 103 MG/DL (ref 70–99)
HCO3 SERPL-SCNC: 25 MMOL/L (ref 22–29)
POTASSIUM SERPL-SCNC: 4.5 MMOL/L (ref 3.4–5.3)
SODIUM SERPL-SCNC: 142 MMOL/L (ref 135–145)
TSH SERPL DL<=0.005 MIU/L-ACNC: 2.99 UIU/ML (ref 0.3–4.2)

## 2025-05-05 ENCOUNTER — PATIENT OUTREACH (OUTPATIENT)
Dept: CARE COORDINATION | Facility: CLINIC | Age: 85
End: 2025-05-05
Payer: COMMERCIAL

## 2025-05-07 PROBLEM — J84.112 UIP (USUAL INTERSTITIAL PNEUMONITIS) (H): Status: RESOLVED | Noted: 2020-03-18 | Resolved: 2025-05-07

## 2025-05-13 ENCOUNTER — ANTICOAGULATION THERAPY VISIT (OUTPATIENT)
Dept: ANTICOAGULATION | Facility: CLINIC | Age: 85
End: 2025-05-13

## 2025-05-13 ENCOUNTER — RESULTS FOLLOW-UP (OUTPATIENT)
Dept: ANTICOAGULATION | Facility: CLINIC | Age: 85
End: 2025-05-13

## 2025-05-13 ENCOUNTER — LAB (OUTPATIENT)
Dept: LAB | Facility: CLINIC | Age: 85
End: 2025-05-13
Payer: COMMERCIAL

## 2025-05-13 ENCOUNTER — DOCUMENTATION ONLY (OUTPATIENT)
Dept: ANTICOAGULATION | Facility: CLINIC | Age: 85
End: 2025-05-13

## 2025-05-13 DIAGNOSIS — Z79.01 LONG TERM CURRENT USE OF ANTICOAGULANTS WITH INR GOAL OF 2.0-3.0: Primary | ICD-10-CM

## 2025-05-13 DIAGNOSIS — Z79.01 LONG TERM CURRENT USE OF ANTICOAGULANTS WITH INR GOAL OF 2.0-3.0: ICD-10-CM

## 2025-05-13 DIAGNOSIS — I48.20 CHRONIC ATRIAL FIBRILLATION (H): ICD-10-CM

## 2025-05-13 DIAGNOSIS — I48.21 PERMANENT ATRIAL FIBRILLATION (H): ICD-10-CM

## 2025-05-13 LAB — INR BLD: 2 (ref 0.9–1.1)

## 2025-05-13 PROCEDURE — 85610 PROTHROMBIN TIME: CPT

## 2025-05-13 PROCEDURE — 36415 COLL VENOUS BLD VENIPUNCTURE: CPT

## 2025-05-13 NOTE — PROGRESS NOTES
ANTICOAGULATION MANAGEMENT     Aniket Macias 84 year old male is on warfarin with therapeutic INR result. (Goal INR 2.0-3.0)    Recent labs: (last 7 days)     05/13/25  0846   INR 2.0*       ASSESSMENT     Source(s): Chart Review  Previous INR was Therapeutic last visit; previously outside of goal range  Medication, diet, health changes since last INR chart reviewed:  Annual OV on 4/30/25 - Neurology evaluation recommended for memory issues  Cardiology OV on 4/25/25 - no changes in care plan          PLAN     Unable to reach Lowell today.    Left message to continue current dose of warfarin 2.5 mg tonight. Request call back for assessment.    Follow up required to confirm warfarin dose taken and assess for changes    Yvette Guaman, RN  5/13/2025  Anticoagulation Clinic  [facility] for routing messages: p [facility] [place]  [facility] patient phone line: [phone]

## 2025-05-13 NOTE — PROGRESS NOTES
ANTICOAGULATION MANAGEMENT     Aniket Macias 84 year old male is on warfarin with therapeutic INR result. (Goal INR 2.0-3.0)    Recent labs: (last 7 days)     05/13/25  0846   INR 2.0*       ASSESSMENT     Source(s): Chart Review and Patient/Caregiver Call     Warfarin doses taken: Warfarin taken as instructed  Diet: Eating more calories, weight has improved (currently 150.2 lbs)  Medication/supplement changes: None noted  New illness, injury, or hospitalization: No - no changes at recent annual OV or Cardiology OV  Signs or symptoms of bleeding or clotting: Minor bumps/bruises  Previous result: Therapeutic last visit; previously outside of goal range  Additional findings: None       PLAN     Recommended plan for ongoing change(s) affecting INR     Dosing Instructions: Continue your current warfarin dose with next INR in 2-3 weeks (d/t diet changes, ongoing lower INR levels)    Summary  As of 5/13/2025      Full warfarin instructions:  5 mg every Mon, Wed, Fri; 2.5 mg all other days   Next INR check:  6/10/2025               Telephone call with Lowell who verbalizes understanding and agrees to plan    Patient elected to schedule next visit 6/10/25    Education provided: Please call back if any changes to your diet, medications or how you've been taking warfarin  Symptom monitoring: monitoring for bleeding signs and symptoms and monitoring for clotting signs and symptoms    Plan made per [facility] anticoagulation protocol    Yvette Guaman RN  5/13/2025  Anticoagulation Clinic  [employer] for routing messages: p [facility] [place]  [facility] patient phone line: [phone]        _______________________________________________________________________     Anticoagulation Episode Summary       Current INR goal:  2.0-3.0   TTR:  74.0% (1 y)   Target end date:  Indefinite   Send INR reminders to:  [facility] [place]    Indications    Long term current use of anticoagulants with INR goal of 2.0-3.0 [Z79.01]  Chronic atrial  fibrillation (H) [I48.20]  Permanent atrial fibrillation (H) [I48.21]             Comments:  --             Anticoagulation Care Providers       Provider Role Specialty Phone number    Basilio Gregorio MD Referring Family Medicine [phone]

## 2025-05-13 NOTE — PROGRESS NOTES
ANTICOAGULATION CLINIC REFERRAL RENEWAL REQUEST       An annual renewal order is required for all patients referred to [facility].?  Please review and sign the pended referral order for Aniket Macias.       ANTICOAGULATION SUMMARY      Warfarin indication(s)   Atrial Fibrillation    Mechanical heart valve present?  NO       Current goal range   INR: 2.0-3.0     Goal appropriate for indication? Goal INR 2-3, standard for indication(s) above     Time in Therapeutic Range (TTR)  (Goal > 60%) 74.0%       Office visit with referring provider's group within last year yes on 4/30/25       Yvette Guaman RN  [facility]

## 2025-06-10 ENCOUNTER — ANTICOAGULATION THERAPY VISIT (OUTPATIENT)
Dept: ANTICOAGULATION | Facility: CLINIC | Age: 85
End: 2025-06-10

## 2025-06-10 ENCOUNTER — RESULTS FOLLOW-UP (OUTPATIENT)
Dept: ANTICOAGULATION | Facility: CLINIC | Age: 85
End: 2025-06-10

## 2025-06-10 ENCOUNTER — LAB (OUTPATIENT)
Dept: LAB | Facility: CLINIC | Age: 85
End: 2025-06-10
Payer: COMMERCIAL

## 2025-06-10 DIAGNOSIS — I48.20 CHRONIC ATRIAL FIBRILLATION (H): ICD-10-CM

## 2025-06-10 DIAGNOSIS — Z79.01 LONG TERM CURRENT USE OF ANTICOAGULANTS WITH INR GOAL OF 2.0-3.0: ICD-10-CM

## 2025-06-10 DIAGNOSIS — I48.21 PERMANENT ATRIAL FIBRILLATION (H): ICD-10-CM

## 2025-06-10 DIAGNOSIS — Z79.01 LONG TERM CURRENT USE OF ANTICOAGULANTS WITH INR GOAL OF 2.0-3.0: Primary | ICD-10-CM

## 2025-06-10 LAB — INR BLD: 2.8 (ref 0.9–1.1)

## 2025-06-10 PROCEDURE — 36416 COLLJ CAPILLARY BLOOD SPEC: CPT

## 2025-06-10 PROCEDURE — 85610 PROTHROMBIN TIME: CPT

## 2025-06-10 NOTE — PROGRESS NOTES
ANTICOAGULATION MANAGEMENT     Aniket Macias 85 year old male is on warfarin with therapeutic INR result. (Goal INR 2.0-3.0)    Recent labs: (last 7 days)     06/10/25  0900   INR 2.8*       ASSESSMENT     Source(s): Chart Review and Patient/Caregiver Call     Warfarin doses taken: Warfarin taken as instructed  Diet: No new diet changes identified  Medication/supplement changes: None noted  New illness, injury, or hospitalization: No  Signs or symptoms of bleeding or clotting: No  Previous result: Therapeutic last 2(+) visits  Additional findings: None       PLAN     Recommended plan for no diet, medication or health factor changes affecting INR     Dosing Instructions: Continue your current warfarin dose with next INR in 5 weeks       Summary  As of 6/10/2025      Full warfarin instructions:  5 mg every Mon, Wed, Fri; 2.5 mg all other days   Next INR check:  7/15/2025               Telephone call with Lowell who verbalizes understanding and agrees to plan    Patient elected to schedule next visit 7/22/25    Education provided: Contact [phone] with any changes, questions or concerns.     Plan made per [facility] anticoagulation protocol    Vani Jimenez RN  6/10/2025  Anticoagulation Clinic  [email or web address] for routing messages: p [facility] [place]  [facility] patient phone line: [phone]        _______________________________________________________________________     Anticoagulation Episode Summary       Current INR goal:  2.0-3.0   TTR:  74.0% (1 y)   Target end date:  Indefinite   Send INR reminders to:  [facility] [place]    Indications    Long term current use of anticoagulants with INR goal of 2.0-3.0 [Z79.01]  Chronic atrial fibrillation (H) [I48.20]  Permanent atrial fibrillation (H) [I48.21]             Comments:  --             Anticoagulation Care Providers       Provider Role Specialty Phone number    Basilio Gregorio MD Referring Family Medicine [phone]

## 2025-07-22 ENCOUNTER — RESULTS FOLLOW-UP (OUTPATIENT)
Dept: ANTICOAGULATION | Facility: CLINIC | Age: 85
End: 2025-07-22

## 2025-07-22 ENCOUNTER — ANTICOAGULATION THERAPY VISIT (OUTPATIENT)
Dept: ANTICOAGULATION | Facility: CLINIC | Age: 85
End: 2025-07-22

## 2025-07-22 ENCOUNTER — LAB (OUTPATIENT)
Dept: LAB | Facility: CLINIC | Age: 85
End: 2025-07-22
Payer: COMMERCIAL

## 2025-07-22 DIAGNOSIS — I48.21 PERMANENT ATRIAL FIBRILLATION (H): ICD-10-CM

## 2025-07-22 DIAGNOSIS — I48.20 CHRONIC ATRIAL FIBRILLATION (H): ICD-10-CM

## 2025-07-22 DIAGNOSIS — Z79.01 LONG TERM CURRENT USE OF ANTICOAGULANTS WITH INR GOAL OF 2.0-3.0: ICD-10-CM

## 2025-07-22 DIAGNOSIS — Z79.01 LONG TERM CURRENT USE OF ANTICOAGULANTS WITH INR GOAL OF 2.0-3.0: Primary | ICD-10-CM

## 2025-07-22 LAB — INR BLD: 2.1 (ref 0.9–1.1)

## 2025-07-22 PROCEDURE — 85610 PROTHROMBIN TIME: CPT

## 2025-07-22 PROCEDURE — 36415 COLL VENOUS BLD VENIPUNCTURE: CPT

## 2025-07-22 NOTE — PROGRESS NOTES
ANTICOAGULATION MANAGEMENT     Aniket Macias 85 year old male is on warfarin with therapeutic INR result. (Goal INR 2.0-3.0)    Recent labs: (last 7 days)     07/22/25  0835   INR 2.1*       ASSESSMENT     Source(s): Chart Review and Patient/Caregiver Call     Warfarin doses taken: Warfarin taken as instructed  Diet: No new diet changes identified  Medication/supplement changes: None noted  New illness, injury, or hospitalization: No  Signs or symptoms of bleeding or clotting: No  Previous result: Therapeutic last 2(+) visits  Additional findings: None       PLAN     Recommended plan for no diet, medication or health factor changes affecting INR     Dosing Instructions: Continue your current warfarin dose with next INR in 6 weeks       Summary  As of 7/22/2025      Full warfarin instructions:  5 mg every Mon, Wed, Fri; 2.5 mg all other days   Next INR check:  8/12/2025               Telephone call with Lowell who agrees to plan and repeated back plan correctly    Lab visit scheduled    Education provided: Please call back if any changes to your diet, medications or how you've been taking warfarin  Goal range and lab monitoring: goal range and significance of current result, Importance of therapeutic range, and Importance of following up at instructed interval    Plan made per [facility] anticoagulation protocol    Lyric Bustos, RN  7/22/2025  Anticoagulation Clinic  [employer] for routing messages: p [facility] [place]  [facility] patient phone line: [phone]        _______________________________________________________________________     Anticoagulation Episode Summary       Current INR goal:  2.0-3.0   TTR:  78.1% (1 y)   Target end date:  Indefinite   Send INR reminders to:  [facility] [place]    Indications    Long term current use of anticoagulants with INR goal of 2.0-3.0 [Z79.01]  Chronic atrial fibrillation (H) [I48.20]  Permanent atrial fibrillation (H) [I48.21]             Comments:  --              Anticoagulation Care Providers       Provider Role Specialty Phone number    Basilio Gregorio MD Referring Family Medicine [phone]

## 2025-07-31 ENCOUNTER — PRE VISIT (OUTPATIENT)
Dept: NEUROLOGY | Facility: CLINIC | Age: 85
End: 2025-07-31
Payer: COMMERCIAL

## 2025-07-31 NOTE — TELEPHONE ENCOUNTER
NEUROLOGY PRE- VISIT    RECORDS RECEVEIVED FROM: Referred by Basilio Gregorio MD    REASON FOR VISIT: R41.3 (ICD-10-CM) - Memory deficit    PROVIDER: Giles   DATE OF APPT: 8/18/25     NOTES (FOR ALL VISITS) STATUS DETAILS   OFFICE NOTE from referring provider In chart 4/30/25   OFFICE NOTE from other specialist     DISCHARGE SUMMARY from hospital     DISCHARGE REPORT from ER     OPERATIVE REPORT     EMG REPORT     EEG     Physical therapy       IMAGING  (FOR ALL VISITS) STATUS DETAILS   MRI (HEAD, NECK, SPINE)     XRAY (SPINE) *NEUROSURGERY*     CT (HEAD, NECK, SPINE) In chart CT Head 4/11/2016     Does patient have C2C?  Year last updated Action     YES   []      Please update at appointment if outdated more than 5 years       NO     [x]   N/A   Please complete C2C at appointment

## 2025-08-18 ENCOUNTER — OFFICE VISIT (OUTPATIENT)
Dept: NEUROLOGY | Facility: CLINIC | Age: 85
End: 2025-08-18
Attending: FAMILY MEDICINE
Payer: COMMERCIAL

## 2025-08-18 VITALS
DIASTOLIC BLOOD PRESSURE: 94 MMHG | HEART RATE: 74 BPM | WEIGHT: 151.25 LBS | OXYGEN SATURATION: 94 % | SYSTOLIC BLOOD PRESSURE: 156 MMHG | HEIGHT: 68 IN | BODY MASS INDEX: 22.92 KG/M2

## 2025-08-18 DIAGNOSIS — G31.84 MILD COGNITIVE IMPAIRMENT: Primary | ICD-10-CM

## 2025-08-18 PROCEDURE — 99417 PROLNG OP E/M EACH 15 MIN: CPT | Performed by: PSYCHIATRY & NEUROLOGY

## 2025-08-18 PROCEDURE — 3079F DIAST BP 80-89 MM HG: CPT | Performed by: PSYCHIATRY & NEUROLOGY

## 2025-08-18 PROCEDURE — G2211 COMPLEX E/M VISIT ADD ON: HCPCS | Performed by: PSYCHIATRY & NEUROLOGY

## 2025-08-18 PROCEDURE — 99205 OFFICE O/P NEW HI 60 MIN: CPT | Performed by: PSYCHIATRY & NEUROLOGY

## 2025-08-18 PROCEDURE — 3077F SYST BP >= 140 MM HG: CPT | Performed by: PSYCHIATRY & NEUROLOGY

## 2025-08-18 ASSESSMENT — MONTREAL COGNITIVE ASSESSMENT (MOCA)
12. MEMORY INDEX SCORE: 3
8. SERIAL SUBTRACTION OF 7S: 1
4. NAME EACH OF THE THREE ANIMALS SHOWN: 3
VISUOSPATIAL/EXECUTIVE SUBSCORE: 4
10. [FLUENCY] NAME WORDS STARTING WITH DESIGNATED LETTER: 0
WHAT LEVEL OF EDUCATION WAS ATTAINED: 0
11. FOR EACH PAIR OF WORDS, WHAT CATEGORY DO THEY BELONG TO (OUT OF 2): 1
7. [VIGILENCE] TAP WHEN HEARING DESIGNATED LETTER: 1
6. READ LIST OF DIGITS [FORWARD/BACKWARD]: 1
13. ORIENTATION SUBSCORE: 6
9. REPEAT EACH SENTENCE: 0
WHAT IS THE TOTAL SCORE (OUT OF 30): 20

## 2025-08-25 DIAGNOSIS — Z79.01 LONG TERM CURRENT USE OF ANTICOAGULANTS WITH INR GOAL OF 2.0-3.0: ICD-10-CM

## 2025-08-25 DIAGNOSIS — N40.1 BENIGN NON-NODULAR PROSTATIC HYPERPLASIA WITH LOWER URINARY TRACT SYMPTOMS: ICD-10-CM

## 2025-08-25 DIAGNOSIS — I48.0 PAROXYSMAL ATRIAL FIBRILLATION (H): ICD-10-CM

## 2025-08-25 DIAGNOSIS — I50.32 CHRONIC DIASTOLIC HEART FAILURE (H): ICD-10-CM

## 2025-08-26 ENCOUNTER — ANCILLARY PROCEDURE (OUTPATIENT)
Dept: MRI IMAGING | Facility: CLINIC | Age: 85
End: 2025-08-26
Attending: PSYCHIATRY & NEUROLOGY
Payer: COMMERCIAL

## 2025-08-26 DIAGNOSIS — G31.84 MILD COGNITIVE IMPAIRMENT: ICD-10-CM

## 2025-08-26 PROCEDURE — 70551 MRI BRAIN STEM W/O DYE: CPT

## 2025-08-26 RX ORDER — TERAZOSIN 5 MG/1
CAPSULE ORAL
Qty: 90 CAPSULE | Refills: 0 | Status: SHIPPED | OUTPATIENT
Start: 2025-08-26

## 2025-08-26 RX ORDER — WARFARIN SODIUM 5 MG/1
TABLET ORAL
Qty: 90 TABLET | Refills: 0 | Status: SHIPPED | OUTPATIENT
Start: 2025-08-26

## 2025-08-26 RX ORDER — FUROSEMIDE 40 MG/1
40 TABLET ORAL DAILY
Qty: 90 TABLET | Refills: 0 | Status: SHIPPED | OUTPATIENT
Start: 2025-08-26

## 2025-09-02 ENCOUNTER — LAB (OUTPATIENT)
Dept: LAB | Facility: CLINIC | Age: 85
End: 2025-09-02
Payer: COMMERCIAL

## 2025-09-02 ENCOUNTER — ANTICOAGULATION THERAPY VISIT (OUTPATIENT)
Dept: ANTICOAGULATION | Facility: CLINIC | Age: 85
End: 2025-09-02

## 2025-09-02 DIAGNOSIS — Z79.01 LONG TERM CURRENT USE OF ANTICOAGULANTS WITH INR GOAL OF 2.0-3.0: Primary | ICD-10-CM

## 2025-09-02 DIAGNOSIS — I48.21 PERMANENT ATRIAL FIBRILLATION (H): ICD-10-CM

## 2025-09-02 DIAGNOSIS — Z79.01 LONG TERM CURRENT USE OF ANTICOAGULANTS WITH INR GOAL OF 2.0-3.0: ICD-10-CM

## 2025-09-02 DIAGNOSIS — I48.20 CHRONIC ATRIAL FIBRILLATION (H): ICD-10-CM

## 2025-09-02 DIAGNOSIS — Z13.6 SCREENING FOR CARDIOVASCULAR CONDITION: ICD-10-CM

## 2025-09-02 DIAGNOSIS — E78.5 HYPERLIPIDEMIA: Primary | ICD-10-CM

## 2025-09-02 DIAGNOSIS — G31.84 MILD COGNITIVE IMPAIRMENT: ICD-10-CM

## 2025-09-02 LAB
ALT SERPL W P-5'-P-CCNC: 43 U/L (ref 0–70)
CHOLEST SERPL-MCNC: 98 MG/DL
FASTING STATUS PATIENT QL REPORTED: YES
FOLATE SERPL-MCNC: 18.2 NG/ML (ref 4.6–34.8)
HDLC SERPL-MCNC: 47 MG/DL
INR BLD: 2.1 (ref 0.9–1.1)
LAB ORDER RESULT STATUS: NORMAL
LDLC SERPL CALC-MCNC: 39 MG/DL
Lab: NORMAL
NONHDLC SERPL-MCNC: 51 MG/DL
PERFORMING LABORATORY: NORMAL
TEST NAME: NORMAL
TRIGL SERPL-MCNC: 59 MG/DL
VIT B12 SERPL-MCNC: 1756 PG/ML (ref 232–1245)

## 2025-09-03 LAB — MAYO MISC RESULT: ABNORMAL

## 2025-09-04 LAB
METHYLMALONATE SERPL-SCNC: 0.19 UMOL/L (ref 0–0.4)
VIT B1 PYROPHOSHATE BLD-SCNC: 200 NMOL/L

## (undated) RX ORDER — REGADENOSON 0.08 MG/ML
INJECTION, SOLUTION INTRAVENOUS
Status: DISPENSED
Start: 2019-07-10

## (undated) RX ORDER — FENTANYL CITRATE 50 UG/ML
INJECTION, SOLUTION INTRAMUSCULAR; INTRAVENOUS
Status: DISPENSED
Start: 2021-12-07

## (undated) RX ORDER — FENTANYL CITRATE 50 UG/ML
INJECTION, SOLUTION INTRAMUSCULAR; INTRAVENOUS
Status: DISPENSED
Start: 2018-11-29

## (undated) RX ORDER — DEXAMETHASONE SODIUM PHOSPHATE 10 MG/ML
INJECTION, SOLUTION INTRAMUSCULAR; INTRAVENOUS
Status: DISPENSED
Start: 2017-07-10

## (undated) RX ORDER — LIDOCAINE HYDROCHLORIDE 10 MG/ML
INJECTION, SOLUTION EPIDURAL; INFILTRATION; INTRACAUDAL; PERINEURAL
Status: DISPENSED
Start: 2017-07-10